# Patient Record
Sex: MALE | Race: WHITE | NOT HISPANIC OR LATINO | Employment: PART TIME | URBAN - METROPOLITAN AREA
[De-identification: names, ages, dates, MRNs, and addresses within clinical notes are randomized per-mention and may not be internally consistent; named-entity substitution may affect disease eponyms.]

---

## 2017-02-07 RX ORDER — NIFEDIPINE 60 MG/1
TABLET, FILM COATED, EXTENDED RELEASE ORAL
Qty: 90 TABLET | Refills: 0 | Status: SHIPPED | OUTPATIENT
Start: 2017-02-07 | End: 2017-05-04 | Stop reason: SDUPTHER

## 2017-03-16 RX ORDER — ESOMEPRAZOLE MAGNESIUM 40 MG/1
CAPSULE, DELAYED RELEASE ORAL
Qty: 90 CAPSULE | Refills: 0 | Status: SHIPPED | OUTPATIENT
Start: 2017-03-16 | End: 2017-11-21

## 2017-04-18 DIAGNOSIS — Z79.899 ENCOUNTER FOR LONG-TERM (CURRENT) USE OF MEDICATIONS: ICD-10-CM

## 2017-04-18 DIAGNOSIS — E78.5 HYPERLIPIDEMIA, UNSPECIFIED HYPERLIPIDEMIA TYPE: ICD-10-CM

## 2017-04-18 DIAGNOSIS — I10 ESSENTIAL HYPERTENSION: Primary | ICD-10-CM

## 2017-04-18 DIAGNOSIS — R97.20 ABNORMAL PSA: ICD-10-CM

## 2017-04-26 ENCOUNTER — LAB (OUTPATIENT)
Dept: LAB | Facility: HOSPITAL | Age: 67
End: 2017-04-26

## 2017-04-26 DIAGNOSIS — I10 ESSENTIAL HYPERTENSION: Primary | ICD-10-CM

## 2017-04-26 DIAGNOSIS — R97.20 ABNORMAL PSA: ICD-10-CM

## 2017-04-26 DIAGNOSIS — Z79.899 ENCOUNTER FOR LONG-TERM (CURRENT) USE OF MEDICATIONS: ICD-10-CM

## 2017-04-26 DIAGNOSIS — E78.5 HYPERLIPIDEMIA, UNSPECIFIED HYPERLIPIDEMIA TYPE: ICD-10-CM

## 2017-04-26 LAB
ALBUMIN SERPL-MCNC: 4.4 G/DL (ref 3.5–5.2)
ALBUMIN/GLOB SERPL: 1.8 G/DL
ALP SERPL-CCNC: 37 U/L (ref 39–117)
ALT SERPL W P-5'-P-CCNC: 16 U/L (ref 1–41)
ANION GAP SERPL CALCULATED.3IONS-SCNC: 11.9 MMOL/L
AST SERPL-CCNC: 17 U/L (ref 1–40)
BASOPHILS # BLD AUTO: 0.05 10*3/MM3 (ref 0–0.2)
BASOPHILS NFR BLD AUTO: 0.9 % (ref 0–1.5)
BILIRUB SERPL-MCNC: 0.3 MG/DL (ref 0.1–1.2)
BUN BLD-MCNC: 16 MG/DL (ref 8–23)
BUN/CREAT SERPL: 12.8 (ref 7–25)
CALCIUM SPEC-SCNC: 9 MG/DL (ref 8.6–10.5)
CHLORIDE SERPL-SCNC: 103 MMOL/L (ref 98–107)
CHOLEST SERPL-MCNC: 210 MG/DL (ref 0–200)
CO2 SERPL-SCNC: 27.1 MMOL/L (ref 22–29)
CREAT BLD-MCNC: 1.25 MG/DL (ref 0.76–1.27)
DEPRECATED RDW RBC AUTO: 42.5 FL (ref 37–54)
EOSINOPHIL # BLD AUTO: 0.19 10*3/MM3 (ref 0–0.7)
EOSINOPHIL NFR BLD AUTO: 3.3 % (ref 0.3–6.2)
ERYTHROCYTE [DISTWIDTH] IN BLOOD BY AUTOMATED COUNT: 12.7 % (ref 11.5–14.5)
GFR SERPL CREATININE-BSD FRML MDRD: 58 ML/MIN/1.73
GLOBULIN UR ELPH-MCNC: 2.4 GM/DL
GLUCOSE BLD-MCNC: 109 MG/DL (ref 65–99)
HCT VFR BLD AUTO: 43.4 % (ref 40.4–52.2)
HDLC SERPL-MCNC: 33 MG/DL (ref 40–60)
HGB BLD-MCNC: 14.7 G/DL (ref 13.7–17.6)
IMM GRANULOCYTES # BLD: 0.02 10*3/MM3 (ref 0–0.03)
IMM GRANULOCYTES NFR BLD: 0.4 % (ref 0–0.5)
LDLC SERPL CALC-MCNC: 137 MG/DL (ref 0–100)
LDLC/HDLC SERPL: 4.16 {RATIO}
LYMPHOCYTES # BLD AUTO: 2.42 10*3/MM3 (ref 0.9–4.8)
LYMPHOCYTES NFR BLD AUTO: 42.4 % (ref 19.6–45.3)
MCH RBC QN AUTO: 31.1 PG (ref 27–32.7)
MCHC RBC AUTO-ENTMCNC: 33.9 G/DL (ref 32.6–36.4)
MCV RBC AUTO: 91.8 FL (ref 79.8–96.2)
MONOCYTES # BLD AUTO: 0.44 10*3/MM3 (ref 0.2–1.2)
MONOCYTES NFR BLD AUTO: 7.7 % (ref 5–12)
NEUTROPHILS # BLD AUTO: 2.59 10*3/MM3 (ref 1.9–8.1)
NEUTROPHILS NFR BLD AUTO: 45.3 % (ref 42.7–76)
PLATELET # BLD AUTO: 218 10*3/MM3 (ref 140–500)
PMV BLD AUTO: 9.8 FL (ref 6–12)
POTASSIUM BLD-SCNC: 4.1 MMOL/L (ref 3.5–5.2)
PROT SERPL-MCNC: 6.8 G/DL (ref 6–8.5)
PSA SERPL-MCNC: 3.31 NG/ML (ref 0–4)
RBC # BLD AUTO: 4.73 10*6/MM3 (ref 4.6–6)
SODIUM BLD-SCNC: 142 MMOL/L (ref 136–145)
T-UPTAKE NFR SERPL: 1.08 TBI (ref 0.8–1.3)
T4 SERPL-MCNC: 5.99 MCG/DL (ref 4.5–11.7)
TRIGL SERPL-MCNC: 198 MG/DL (ref 0–150)
TSH SERPL DL<=0.05 MIU/L-ACNC: 1.32 MIU/ML (ref 0.27–4.2)
VLDLC SERPL-MCNC: 39.6 MG/DL (ref 5–40)
WBC NRBC COR # BLD: 5.71 10*3/MM3 (ref 4.5–10.7)

## 2017-04-26 PROCEDURE — 84443 ASSAY THYROID STIM HORMONE: CPT | Performed by: FAMILY MEDICINE

## 2017-04-26 PROCEDURE — 84436 ASSAY OF TOTAL THYROXINE: CPT | Performed by: FAMILY MEDICINE

## 2017-04-26 PROCEDURE — 80061 LIPID PANEL: CPT

## 2017-04-26 PROCEDURE — 85025 COMPLETE CBC W/AUTO DIFF WBC: CPT | Performed by: FAMILY MEDICINE

## 2017-04-26 PROCEDURE — 80053 COMPREHEN METABOLIC PANEL: CPT | Performed by: FAMILY MEDICINE

## 2017-04-26 PROCEDURE — 84153 ASSAY OF PSA TOTAL: CPT | Performed by: FAMILY MEDICINE

## 2017-04-26 PROCEDURE — 36415 COLL VENOUS BLD VENIPUNCTURE: CPT

## 2017-04-26 PROCEDURE — 84479 ASSAY OF THYROID (T3 OR T4): CPT | Performed by: FAMILY MEDICINE

## 2017-05-04 RX ORDER — NIFEDIPINE 60 MG/1
TABLET, FILM COATED, EXTENDED RELEASE ORAL
Qty: 90 TABLET | Refills: 1 | Status: SHIPPED | OUTPATIENT
Start: 2017-05-04 | End: 2018-02-02

## 2017-05-11 ENCOUNTER — TELEPHONE (OUTPATIENT)
Dept: CARDIOLOGY | Facility: CLINIC | Age: 67
End: 2017-05-11

## 2017-05-11 RX ORDER — PROPAFENONE HYDROCHLORIDE 150 MG/1
150 TABLET, COATED ORAL EVERY 8 HOURS
Qty: 270 TABLET | Refills: 0 | Status: SHIPPED | OUTPATIENT
Start: 2017-05-11 | End: 2017-08-04 | Stop reason: SDUPTHER

## 2017-05-16 ENCOUNTER — OFFICE VISIT (OUTPATIENT)
Dept: FAMILY MEDICINE CLINIC | Facility: CLINIC | Age: 67
End: 2017-05-16

## 2017-05-16 VITALS
SYSTOLIC BLOOD PRESSURE: 128 MMHG | WEIGHT: 192.2 LBS | TEMPERATURE: 98.2 F | OXYGEN SATURATION: 98 % | HEART RATE: 64 BPM | HEIGHT: 67 IN | BODY MASS INDEX: 30.17 KG/M2 | DIASTOLIC BLOOD PRESSURE: 84 MMHG

## 2017-05-16 DIAGNOSIS — R97.20 ELEVATED PSA: Primary | ICD-10-CM

## 2017-05-16 DIAGNOSIS — R13.12 OROPHARYNGEAL DYSPHAGIA: ICD-10-CM

## 2017-05-16 PROCEDURE — 99213 OFFICE O/P EST LOW 20 MIN: CPT | Performed by: FAMILY MEDICINE

## 2017-05-16 RX ORDER — LANOLIN ALCOHOL/MO/W.PET/CERES
1000 CREAM (GRAM) TOPICAL DAILY
COMMUNITY
End: 2017-11-21

## 2017-05-22 DIAGNOSIS — R13.12 OROPHARYNGEAL DYSPHAGIA: Primary | ICD-10-CM

## 2017-06-16 ENCOUNTER — HOSPITAL ENCOUNTER (OUTPATIENT)
Dept: GENERAL RADIOLOGY | Facility: HOSPITAL | Age: 67
Discharge: HOME | End: 2017-06-16
Attending: FAMILY MEDICINE | Admitting: FAMILY MEDICINE

## 2017-06-16 DIAGNOSIS — R13.12 OROPHARYNGEAL DYSPHAGIA: ICD-10-CM

## 2017-06-16 PROCEDURE — G8998 SWALLOW D/C STATUS: HCPCS

## 2017-06-16 PROCEDURE — 92611 MOTION FLUOROSCOPY/SWALLOW: CPT

## 2017-06-16 PROCEDURE — G8997 SWALLOW GOAL STATUS: HCPCS

## 2017-06-16 PROCEDURE — G8996 SWALLOW CURRENT STATUS: HCPCS

## 2017-06-16 PROCEDURE — 74230 X-RAY XM SWLNG FUNCJ C+: CPT

## 2017-06-16 NOTE — MBS/VFSS/FEES
Outpatient Speech Language Pathology   Adult Swallow Initial Evaluation  [facility]     Patient Name: Wilmar Carmona  : 1950  MRN: [id number]  Today's Date: 2017      SPEECH-LANGUAGE PATHOLOGY EVALUTION - VFSS  Subjective: The patient was seen on this date for a VFSS(Videofluoroscopic Swallowing Study).  Patient was alert and cooperative.    The patient's history is significant for Hair's esophagus.   Objective: Risks/benefits were reviewed with the patient, and consent was obtained. The study was completed with SLP present and Radiologist review. The patient was seen in lateral and AP view(s). Textures given included thin liquid, puree consistency, mechanical soft consistency and regular consistency.  Assessment: Difficulties were noted with none of the above consistencies. Patient also with abnormal pharyngeal impression (question pharyngeal diverticulum or pharyngeal web); however, this did not impact the patient's swallow. Patient with mild vallecular residue with one bolus of thin liquids. This was cleared with a dry swallow. Esophageal screen was performed. Reduced esophageal motility and retrograde esophageal movement noted.   SLP Findings: Patient presents with functional swallow.     Recommendations: Diet Textures: thin liquid, regular consistency food. Medications should be taken whole with thin liquids.   Recommended Strategies: Upright for PO and small bites and sips.   Other Recommended Evaluations: Question if upper GI/esophogram would be beneficial in further evaluation of patient's symptoms.   Dysphagia therapy is not recommended. Rationale: Patient with functional swallow.           Visit Date: 2017   Patient Active Problem List   Diagnosis   • Abnormal electrocardiogram   • Abnormal cardiovascular stress test   • Hypertension   • Premature atrial contraction   • Palpitations   • Hyperlipidemia   • Bronchitis with bronchospasm        Past Medical History:   Diagnosis Date   •  Arthritis    • BPH (benign prostatic hypertrophy)    • Depression    • GERD (gastroesophageal reflux disease)    • Heart murmur    • Hyperlipidemia    • Hypertension    • Tremor         Past Surgical History:   Procedure Laterality Date   • COLONOSCOPY     • HERNIA REPAIR     • KNEE SURGERY Left    • TONSILLECTOMY     • VASECTOMY           Visit Dx:     ICD-10-CM ICD-9-CM   1. Oropharyngeal dysphagia R13.12 787.22                   Adult Dysphagia - 06/16/17 1200     Adult Dysphagia Background    Patient Description of Complaint patient with globus sensation (indicated at upper chest area)   -KS    Symptoms Reported by Patient Food gets stuck  -KS    Oral Mech    Anatomy/Physiology WNL Patient demonstrates anatomy and physiology that is WNL  -KS    Dentition Patient has own teeth  -KS    Oral Health Oral cavity is clean  -KS    Awareness/Control of Secretions Patient swallows saliva;Patient wipes mouth  -KS    VFSS Exam    Study Completed By SLP with Radiology Review (comment)  -KS    SLP Communication to Radiology    Severity Level of Dysphagia esophageal dysfunction  -KS    Consistencies Aspirated/Penetrated other consistency (see comments)   None  -KS    Summary Statement Patient with no penetration or aspiration of any consistency. Reduced CP opening and abnormal pharyngeal impression noted.   -KS    Results/Recs/Barriers/Education for Dysphagia    Learning Motivation strong  -KS    Clinical Impression: Swallowing esophageal dysphagia  -KS    Impact on Function no impact on function  -KS    Recommendations for Diet/Nutirition full oral diet  -KS    Swallowing Precautions upright position for at least 30 minutes after meals;small sips and bites when eating;other   consider multiple small meals per day  -KS      User Key  (r) = Recorded By, (t) = Taken By, (c) = Cosigned By    Initials Name Provider Type    MANE Kruse CCC-SLP Speech and Language Pathologist                            OP SLP Education        06/16/17 1200    Education    Barriers to Learning No barriers identified  -KS    Education Provided Described results of evaluation;Patient expressed understanding of evaluation  -KS    Assessed Learning needs;Learning motivation;Learning preferences;Learning readiness  -KS    Learning Motivation Strong  -KS    Learning Method Explanation  -KS    Teaching Response Verbalized understanding  -KS      User Key  (r) = Recorded By, (t) = Taken By, (c) = Cosigned By    Initials Name Effective Dates    TAHIR Carbajal 05/11/17 -                     OP SLP Assessment/Plan - 06/16/17 1200     SLP Assessment    Functional Problems Swallowing  -KS    Impact on Function: Swallowing Impact on social aspects of eating  -KS    Clinical Impression: Swallowing esophageal dysphagia  -KS    SLP Diagnosis Esophageal Dysfunction   -KS      User Key  (r) = Recorded By, (t) = Taken By, (c) = Cosigned By    Initials Name Provider Type    TAHIR Carbajal Speech and Language Pathologist                    Time Calculation:   SLP Start Time: 1015  SLP Stop Time: 1130  SLP Time Calculation (min): 75 min    Therapy Charges for Today     Code Description Service Date Service Provider Modifiers Qty    [id number] HC ST MOTION FLUORO EVAL SWALLOW 5 6/16/2017 TAHIR Lenz GN 1                   TAHIR Lenz  6/16/2017

## 2017-06-22 DIAGNOSIS — K22.70 BARRETT'S ESOPHAGUS WITHOUT DYSPLASIA: Primary | ICD-10-CM

## 2017-06-23 ENCOUNTER — OFFICE VISIT (OUTPATIENT)
Dept: ORTHOPEDIC SURGERY | Facility: CLINIC | Age: 67
End: 2017-06-23

## 2017-06-23 VITALS — HEIGHT: 68 IN | BODY MASS INDEX: 28.04 KG/M2 | WEIGHT: 185 LBS

## 2017-06-23 DIAGNOSIS — M25.511 RIGHT SHOULDER PAIN, UNSPECIFIED CHRONICITY: Primary | ICD-10-CM

## 2017-06-23 PROCEDURE — 73030 X-RAY EXAM OF SHOULDER: CPT | Performed by: ORTHOPAEDIC SURGERY

## 2017-06-23 PROCEDURE — 99202 OFFICE O/P NEW SF 15 MIN: CPT | Performed by: ORTHOPAEDIC SURGERY

## 2017-06-26 ENCOUNTER — TRANSCRIBE ORDERS (OUTPATIENT)
Dept: ADMINISTRATIVE | Facility: HOSPITAL | Age: 67
End: 2017-06-26

## 2017-06-26 ENCOUNTER — TELEPHONE (OUTPATIENT)
Dept: CARDIOLOGY | Facility: CLINIC | Age: 67
End: 2017-06-26

## 2017-06-26 DIAGNOSIS — K21.9 CHRONIC GERD: Primary | ICD-10-CM

## 2017-06-26 RX ORDER — ACEBUTOLOL HYDROCHLORIDE 200 MG/1
200 CAPSULE ORAL 2 TIMES DAILY
Qty: 180 CAPSULE | Refills: 0 | Status: SHIPPED | OUTPATIENT
Start: 2017-06-26 | End: 2017-06-29 | Stop reason: SDUPTHER

## 2017-06-26 NOTE — PROGRESS NOTES
Patient: Wilmar Carmona    YOB: 1950    Medical Record Number: [id number]    Chief Complaints:  Right shoulder pain    History of Present Illness:     66 y.o. male patient who presents for evaluation of his right shoulder.  He reports a several month history of symptoms.  He cannot recall a specific inciting event.  He describes his pain as mild, constant, and aching.  The pain is worse with certain reaching and lifting movements.  When asked to localize his pain, he has a difficult time doing so.  Gestures towards the front and top of the shoulder.  Anti-inflammatories have helped somewhat.    Allergies: No Known Allergies    Home Medications:      Current Outpatient Prescriptions:   •  acebutolol (SECTRAL) 200 MG capsule, TAKE 1 CAPSULE TWICE A DAY, Disp: 180 capsule, Rfl: 3  •  esomeprazole (nexIUM) 40 MG capsule, TAKE 1 CAPSULE DAILY, Disp: 90 capsule, Rfl: 0  •  finasteride (PROSCAR) 5 MG tablet, TK 1 T PO ONCE DAILY, Disp: , Rfl: 2  •  Fish Oil-Cholecalciferol (FISH OIL + D3) 3636-2530 MG-UNIT capsule, , Disp: , Rfl:   •  lamoTRIgine (LaMICtal) 200 MG tablet, Take  by mouth daily., Disp: , Rfl:   •  Methylfol-Algae-W50-Tarmpyryzb (L-METHYLFOLATE CA ME-CBL NAC PO), Take  by mouth., Disp: , Rfl:   •  montelukast (SINGULAIR) 10 MG tablet, Take 1 tablet by mouth Every Night., Disp: 30 tablet, Rfl: 5  •  nefazodone (SERZONE) 50 MG tablet, Take  by mouth., Disp: , Rfl:   •  NIFEDICAL XL 60 MG 24 hr tablet, TAKE 1 TABLET BY MOUTH EVERY DAY, Disp: 90 tablet, Rfl: 1  •  propafenone (RHTHYMOL) 150 MG tablet, Take 1 tablet by mouth Every 8 (Eight) Hours., Disp: 270 tablet, Rfl: 0  •  simvastatin (ZOCOR) 20 MG tablet, Take 1 tablet by mouth every night., Disp: 30 tablet, Rfl: 3  •  vitamin B-12 (CYANOCOBALAMIN) 1000 MCG tablet, Take 1,000 mcg by mouth Daily., Disp: , Rfl:     Past Medical History:   Diagnosis Date   • Arthritis    • BPH (benign prostatic hypertrophy)    • Depression    • GERD  "(gastroesophageal reflux disease)    • Heart murmur    • Hyperlipidemia    • Hypertension    • Tremor        Past Surgical History:   Procedure Laterality Date   • COLONOSCOPY     • HERNIA REPAIR     • KNEE SURGERY Left    • TONSILLECTOMY     • VASECTOMY         Social History     Occupational History   • Not on file.     Social History Main Topics   • Smoking status: Never Smoker   • Smokeless tobacco: Never Used   • Alcohol use No   • Drug use: No   • Sexual activity: Not on file      Social History     Social History Narrative       Family History   Problem Relation Age of Onset   • Coronary artery disease Mother    • Cancer Father      prostate   • Heart disease Father    • Cancer Brother      prostate       Review of Systems:      Constitutional: Denies fever, shaking or chills   Eyes: Denies change in visual acuity   HEENT: Denies nasal congestion or sore throat   Respiratory: Denies cough or shortness of breath   Cardiovascular: Denies chest pain or edema  Endocrine: Denies tremors, palpitations, intolerance of heat or cold, polyuria, polydipsia.  GI: Denies abdominal pain, nausea, vomiting, bloody stools or diarrhea  : Denies frequency, urgency, incontinence, retention, or nocturia.  Musculoskeletal: Denies numbness tingling or loss of motor function except as above  Integument: Denies rash, lesion or ulceration   Neurologic: Denies headache or focal weakness, deficits  Heme: Denies epistaxis, spontaneous or excessive bleeding, epistaxis, hematuria, melena, fatigue, enlarged or tender lymph nodes.      All other pertinent positives and negatives as noted above in HPI.      Physical Exam: 66 y.o. male    Vitals:    06/23/17 1602   Weight: 185 lb (83.9 kg)   Height: 68\" (172.7 cm)       General:  Patient is awake and alert.  Appears in no acute distress or discomfort.    Psych:  Affect and demeanor are appropriate.    Eyes:  Conjunctiva and sclera appear grossly normal.  Eyes track well and EOM seem to be " intact.    Ears:  No gross abnormalities.  Hearing adequate for the exam.    Cardiovascular:  Regular rate and rhythm.    Lungs:  Good chest expansion.  Breathing unlabored.    Lymph:  No palpable masses or adenopathy in the affected extremity    Extremities:  The right shoulder is examined.  Skin is benign.  No masses or swelling.  Moderate tenderness over the acromioclavicular joint.  Mild pain with an active compression maneuver.  He has mild deep shoulder pain with an Luzerne's maneuver as well.  No mechanical phenomenon.  No instability.  Seems to have good strength in his rotator cuff.  Good motor and sensory function distally.  Brisk cap refill.         Radiology:   AP, scapular Y, and axillary views of the right shoulder are ordered and reviewed.  No comparison films are available.  He appears to have moderate to severe acromioclavicular osteoarthritis    Assessment/Plan:  Right shoulder symptomatic acromioclavicular osteoarthritis, possible degenerative glenoid labral tear    His symptoms seem to be fairly minimal.  We talked about options for him including a possible injection.  I think that would be helpful from both a diagnostic and potentially a therapeutic standpoint.  He feels that his pain is not really bad enough to justify that at this point.  Going forward, I have recommended appropriate activity modifications and restrictions as well as icing and anti-inflammatories as needed.  He will follow-up with me as needed.    Ricardo Stafford MD    06/23/2017    CC to Bonilla Campos Jr., MD

## 2017-06-26 NOTE — TELEPHONE ENCOUNTER
----- Message from Ema Gilbert sent at 6/26/2017 11:41 AM EDT -----  Regarding: REFILL ON ACEBUTOLOL  SEND TO [facility] HAS AN APPT AUGUST 11  THANKS

## 2017-06-29 RX ORDER — ACEBUTOLOL HYDROCHLORIDE 200 MG/1
200 CAPSULE ORAL 2 TIMES DAILY
Qty: 180 CAPSULE | Refills: 0 | Status: SHIPPED | OUTPATIENT
Start: 2017-06-29 | End: 2018-08-29 | Stop reason: SDUPTHER

## 2017-07-07 ENCOUNTER — HOSPITAL ENCOUNTER (OUTPATIENT)
Dept: GENERAL RADIOLOGY | Facility: HOSPITAL | Age: 67
Discharge: HOME | End: 2017-07-07
Admitting: NURSE PRACTITIONER

## 2017-07-07 DIAGNOSIS — K21.9 CHRONIC GERD: ICD-10-CM

## 2017-07-07 PROCEDURE — 74247: CPT

## 2017-07-21 ENCOUNTER — OFFICE VISIT (OUTPATIENT)
Dept: FAMILY MEDICINE CLINIC | Facility: CLINIC | Age: 67
End: 2017-07-21

## 2017-07-21 VITALS
DIASTOLIC BLOOD PRESSURE: 76 MMHG | OXYGEN SATURATION: 98 % | BODY MASS INDEX: 28.79 KG/M2 | HEART RATE: 61 BPM | TEMPERATURE: 98.6 F | SYSTOLIC BLOOD PRESSURE: 124 MMHG | HEIGHT: 68 IN | WEIGHT: 190 LBS

## 2017-07-21 DIAGNOSIS — M99.83 OTHER BIOMECHANICAL LESIONS OF LUMBAR REGION: Primary | ICD-10-CM

## 2017-07-21 PROCEDURE — 99213 OFFICE O/P EST LOW 20 MIN: CPT | Performed by: FAMILY MEDICINE

## 2017-07-21 RX ORDER — MELOXICAM 7.5 MG/1
15 TABLET ORAL DAILY
Qty: 30 TABLET | Refills: 2 | Status: SHIPPED | OUTPATIENT
Start: 2017-07-21 | End: 2017-11-21 | Stop reason: DRUGHIGH

## 2017-07-21 RX ORDER — LAMOTRIGINE 100 MG/1
250 TABLET ORAL DAILY
COMMUNITY
End: 2018-08-13

## 2017-07-21 NOTE — PROGRESS NOTES
"  Chief Complaint   Patient presents with   • GI Problem     follow up pt doing better        Subjective.../HPI  Patient present today with    I have reviewed the patient's medical history in detail and updated the computerized patient record.    Family History   Problem Relation Age of Onset   • Coronary artery disease Mother    • Cancer Father      prostate   • Heart disease Father    • Cancer Brother      prostate       Social History     Social History   • Marital status:      Spouse name: N/A   • Number of children: N/A   • Years of education: N/A     Occupational History   • Not on file.     Social History Main Topics   • Smoking status: Never Smoker   • Smokeless tobacco: Never Used   • Alcohol use No   • Drug use: No   • Sexual activity: Not on file     Other Topics Concern   • Not on file     Social History Narrative       Review of Systems:   Review of Systems   Eyes: Negative.    Respiratory: Negative.    Gastrointestinal:        Reflux   Endocrine: Negative.    Genitourinary: Negative.    Musculoskeletal: Negative.    Skin: Negative.    Allergic/Immunologic: Negative.    Neurological: Negative.    Psychiatric/Behavioral: Negative.        Objective:  Vital Signs     Vitals:    07/21/17 1709   BP: 124/76   BP Location: Left arm   Patient Position: Sitting   Pulse: 61   Temp: 98.6 °F (37 °C)   TempSrc: Oral   SpO2: 98%   Weight: 190 lb (86.2 kg)   Height: 68\" (172.7 cm)     Physical Exam   Constitutional: He is oriented to person, place, and time. He appears well-developed and well-nourished.   HENT:   Head: Normocephalic.   Eyes: Pupils are equal, round, and reactive to light.   Neck: Normal range of motion.   Cardiovascular: Normal rate and regular rhythm.    Pulmonary/Chest: Effort normal.   Abdominal: Soft.   Musculoskeletal: Normal range of motion. He exhibits tenderness (tender in left mid thoracic paraspinal muscle).   Neurological: He is alert and oriented to person, place, and time.   Skin: " Skin is warm and dry.        Results Review:      REVIEWED AND DISCUSSED XRAY RESULTS WITH PATIENT and REVIEWED AND DISCUSSED CLINICAL RESULTS WITH PATIENT                          Current Outpatient Prescriptions:   •  acebutolol (SECTRAL) 200 MG capsule, Take 1 capsule by mouth 2 (Two) Times a Day., Disp: 180 capsule, Rfl: 0  •  esomeprazole (nexIUM) 40 MG capsule, TAKE 1 CAPSULE DAILY (Patient taking differently: TAKE 2 CAPSULE DAILY), Disp: 90 capsule, Rfl: 0  •  finasteride (PROSCAR) 5 MG tablet, TK 1 T PO ONCE DAILY, Disp: , Rfl: 2  •  Fish Oil-Cholecalciferol (FISH OIL + D3) 8775-1563 MG-UNIT capsule, , Disp: , Rfl:   •  lamoTRIgine (LaMICtal) 100 MG tablet, Take 50 mg by mouth., Disp: , Rfl:   •  lamoTRIgine (LaMICtal) 200 MG tablet, Take  by mouth daily., Disp: , Rfl:   •  Methylfol-Algae-T46-Ncwphwtztn (L-METHYLFOLATE CA ME-CBL NAC PO), Take  by mouth., Disp: , Rfl:   •  nefazodone (SERZONE) 50 MG tablet, Take  by mouth., Disp: , Rfl:   •  NIFEDICAL XL 60 MG 24 hr tablet, TAKE 1 TABLET BY MOUTH EVERY DAY, Disp: 90 tablet, Rfl: 1  •  propafenone (RHTHYMOL) 150 MG tablet, Take 1 tablet by mouth Every 8 (Eight) Hours., Disp: 270 tablet, Rfl: 0  •  simvastatin (ZOCOR) 20 MG tablet, Take 1 tablet by mouth every night., Disp: 30 tablet, Rfl: 3  •  vitamin B-12 (CYANOCOBALAMIN) 1000 MCG tablet, Take 1,000 mcg by mouth Daily., Disp: , Rfl:   •  meloxicam (MOBIC) 7.5 MG tablet, Take 2 tablets by mouth Daily., Disp: 30 tablet, Rfl: 2    Procedures    Assessment/Plan     Diagnoses and all orders for this visit:    Other biomechanical lesions of lumbar region  -     NM Bone Scan Whole Body  -     MRI Lumbar Spine Without Contrast    Other orders  -     lamoTRIgine (LaMICtal) 100 MG tablet; Take 50 mg by mouth.  -     meloxicam (MOBIC) 7.5 MG tablet; Take 2 tablets by mouth Daily.         Bonilla Campos Jr., MD  07/21/17  5:58 PM

## 2017-08-02 ENCOUNTER — TELEPHONE (OUTPATIENT)
Dept: FAMILY MEDICINE CLINIC | Facility: CLINIC | Age: 67
End: 2017-08-02

## 2017-08-02 ENCOUNTER — HOSPITAL ENCOUNTER (OUTPATIENT)
Dept: NUCLEAR MEDICINE | Facility: HOSPITAL | Age: 67
Discharge: HOME | End: 2017-08-02
Attending: FAMILY MEDICINE

## 2017-08-02 ENCOUNTER — HOSPITAL ENCOUNTER (OUTPATIENT)
Dept: MRI IMAGING | Facility: HOSPITAL | Age: 67
Discharge: HOME | End: 2017-08-02
Attending: FAMILY MEDICINE | Admitting: FAMILY MEDICINE

## 2017-08-02 PROCEDURE — 72148 MRI LUMBAR SPINE W/O DYE: CPT

## 2017-08-02 PROCEDURE — 78306 BONE IMAGING WHOLE BODY: CPT

## 2017-08-02 PROCEDURE — 0 TECHNETIUM MEDRONATE KIT: Performed by: FAMILY MEDICINE

## 2017-08-02 PROCEDURE — A9503 TC99M MEDRONATE: HCPCS | Performed by: FAMILY MEDICINE

## 2017-08-02 RX ORDER — TC 99M MEDRONATE 20 MG/10ML
24 INJECTION, POWDER, LYOPHILIZED, FOR SOLUTION INTRAVENOUS
Status: COMPLETED | OUTPATIENT
Start: 2017-08-02 | End: 2017-08-02

## 2017-08-02 RX ADMIN — Medication 24 MILLICURIE: at 08:15

## 2017-08-02 NOTE — TELEPHONE ENCOUNTER
----- Message from Miriam Allen sent at 8/2/2017 11:20 AM EDT -----   [phone]    MRI AND BONE SCAN RESULTS       lov 7/21/17

## 2017-08-04 ENCOUNTER — OFFICE VISIT (OUTPATIENT)
Dept: CARDIOLOGY | Facility: CLINIC | Age: 67
End: 2017-08-04

## 2017-08-04 VITALS
WEIGHT: 191 LBS | SYSTOLIC BLOOD PRESSURE: 143 MMHG | HEART RATE: 69 BPM | BODY MASS INDEX: 28.95 KG/M2 | HEIGHT: 68 IN | DIASTOLIC BLOOD PRESSURE: 79 MMHG

## 2017-08-04 DIAGNOSIS — R94.31 ABNORMAL ELECTROCARDIOGRAM: ICD-10-CM

## 2017-08-04 DIAGNOSIS — R94.39 ABNORMAL CARDIOVASCULAR STRESS TEST: ICD-10-CM

## 2017-08-04 DIAGNOSIS — E78.5 HYPERLIPIDEMIA, UNSPECIFIED HYPERLIPIDEMIA TYPE: ICD-10-CM

## 2017-08-04 DIAGNOSIS — I10 HTN (HYPERTENSION), BENIGN: ICD-10-CM

## 2017-08-04 DIAGNOSIS — I49.1 PREMATURE ATRIAL CONTRACTION: ICD-10-CM

## 2017-08-04 DIAGNOSIS — R00.2 PALPITATIONS: Primary | ICD-10-CM

## 2017-08-04 DIAGNOSIS — I10 ESSENTIAL HYPERTENSION: ICD-10-CM

## 2017-08-04 PROCEDURE — 99214 OFFICE O/P EST MOD 30 MIN: CPT | Performed by: INTERNAL MEDICINE

## 2017-08-04 PROCEDURE — 93000 ELECTROCARDIOGRAM COMPLETE: CPT | Performed by: INTERNAL MEDICINE

## 2017-08-04 RX ORDER — PROPAFENONE HYDROCHLORIDE 150 MG/1
150 TABLET, COATED ORAL EVERY 8 HOURS
Qty: 270 TABLET | Refills: 3 | Status: SHIPPED | OUTPATIENT
Start: 2017-08-04 | End: 2018-04-19 | Stop reason: SDUPTHER

## 2017-08-04 NOTE — PROGRESS NOTES
[facility]  Cardiology Office Visit Note        Subjective:     Encounter Date:2017      Patient ID: Wilmar Carmona   Age: 66 y.o.  Sex: male  :  1950  MRN: [id number]             Date of Office Visit: 2017  Encounter Provider: Sharath Reynolds MD  Place of Service: [facility]     .    Chief Complaint:  History of Present Illness    The following portions of the patient's history were reviewed and updated as appropriate: allergies, current medications, past family history, past medical history, past social history, past surgical history and problem list.    Review of Systems   Constitution: Negative for chills, fever and weight gain.   HENT: Negative for congestion, headaches, hearing loss and sore throat.    Eyes: Negative for blurred vision and double vision.   Cardiovascular: Negative for chest pain, claudication, cyanosis, dyspnea on exertion, irregular heartbeat, leg swelling, near-syncope, orthopnea, palpitations, paroxysmal nocturnal dyspnea and syncope.   Respiratory: Negative for cough, shortness of breath, snoring and wheezing.    Endocrine: Negative for cold intolerance.   Hematologic/Lymphatic: Negative for adenopathy. Does not bruise/bleed easily.   Skin: Negative for rash.   Musculoskeletal: Negative for back pain.   Gastrointestinal: Negative for abdominal pain, change in bowel habit, constipation, diarrhea, nausea and vomiting.   Genitourinary: Negative for dysuria, frequency, hematuria and hesitancy.   Neurological: Negative for disturbances in coordination, excessive daytime sleepiness, dizziness, focal weakness, light-headedness, loss of balance and numbness.   Psychiatric/Behavioral: Negative for depression and memory loss. The patient is not nervous/anxious.    Allergic/Immunologic: Negative for hives.         ECG 12 Lead  Date/Time: 2017 3:26 PM  Performed by: SHARATH REYNOLDS JR  Authorized by: GAIL CHOU  SHARATH ECHOLS   Comparison: compared with previous ECG   Similar to previous ECG  Rhythm: sinus rhythm  BPM: 63  T flattening: V3, V4, V5, V6, I and aVL  Clinical impression: abnormal ECG                       HPI     The patient is a 66-year-old white male, with history of symptomatic PACs treated successfully Rythmol and Sectral, history of atrial fibrillation, history of hypertension and hyperlipidemia, obstructive sleep apnea, normal coronary arteries by cardiac catheterization in October 2015, who presents for cardiac follow-up.  I last saw him in the office in May 2016.  Since then, he complains of rare palpitations describes a fluttering or*the last about 5 seconds and then resolves spontaneously.  No associated symptoms.  The longest he had it was 3 minutes.    Cardiac review systems: No chest pain.  No PND, orthopnea pedal edema.  No dizziness or syncope.    Cardiac risk factors: Negative for family history of early CAD as 5 bypass at age 75.  Positive hypertension and hyperlipidemia.  No smoking.  No diabetes.          Past Medical History:   Diagnosis Date   • Arthritis    • BPH (benign prostatic hypertrophy)    • Depression    • GERD (gastroesophageal reflux disease)    • Heart murmur    • Hyperlipidemia    • Hypertension    • Tremor        Past Surgical History:   Procedure Laterality Date   • COLONOSCOPY     • HERNIA REPAIR     • KNEE SURGERY Left    • TONSILLECTOMY     • VASECTOMY         Social History     Social History   • Marital status:      Spouse name: N/A   • Number of children: N/A   • Years of education: N/A     Occupational History   • Not on file.     Social History Main Topics   • Smoking status: Never Smoker   • Smokeless tobacco: Never Used   • Alcohol use No   • Drug use: No   • Sexual activity: Not on file     Other Topics Concern   • Not on file     Social History Narrative       Family History   Problem Relation Age of Onset   • Coronary artery disease Mother    • Cancer Father   "    prostate   • Heart disease Father    • Cancer Brother      prostate           Scheduled Meds:  Current Outpatient Prescriptions on File Prior to Visit   Medication Sig Dispense Refill   • acebutolol (SECTRAL) 200 MG capsule Take 1 capsule by mouth 2 (Two) Times a Day. 180 capsule 0   • esomeprazole (nexIUM) 40 MG capsule TAKE 1 CAPSULE DAILY (Patient taking differently: TAKE 2 CAPSULE DAILY) 90 capsule 0   • finasteride (PROSCAR) 5 MG tablet TK 1 T PO ONCE DAILY  2   • Fish Oil-Cholecalciferol (FISH OIL + D3) 6613-3812 MG-UNIT capsule      • lamoTRIgine (LaMICtal) 100 MG tablet Take 50 mg by mouth.     • lamoTRIgine (LaMICtal) 200 MG tablet Take  by mouth daily.     • meloxicam (MOBIC) 7.5 MG tablet Take 2 tablets by mouth Daily. 30 tablet 2   • Methylfol-Algae-M64-Fdsfngnimn (L-METHYLFOLATE CA ME-CBL NAC PO) Take  by mouth.     • nefazodone (SERZONE) 50 MG tablet Take  by mouth.     • NIFEDICAL XL 60 MG 24 hr tablet TAKE 1 TABLET BY MOUTH EVERY DAY 90 tablet 1   • simvastatin (ZOCOR) 20 MG tablet Take 1 tablet by mouth every night. 30 tablet 3   • vitamin B-12 (CYANOCOBALAMIN) 1000 MCG tablet Take 1,000 mcg by mouth Daily.     • [DISCONTINUED] propafenone (RHTHYMOL) 150 MG tablet Take 1 tablet by mouth Every 8 (Eight) Hours. 270 tablet 0     No current facility-administered medications on file prior to visit.        /79  Pulse 69  Ht 68\" (172.7 cm)  Wt 191 lb (86.6 kg)  BMI 29.04 kg/m2    Objective:     Physical Exam   Constitutional: He is oriented to person, place, and time. He appears well-developed and well-nourished. No distress.   HENT:   Head: Normocephalic and atraumatic.   Right Ear: External ear normal.   Left Ear: External ear normal.   Mouth/Throat: Oropharynx is clear and moist. No oropharyngeal exudate.   Eyes: Conjunctivae and EOM are normal. Pupils are equal, round, and reactive to light. No scleral icterus.   Neck: Normal range of motion. Neck supple. No JVD present. No tracheal " deviation present. No thyromegaly present.   Cardiovascular: Normal rate, regular rhythm, S1 normal, S2 normal, normal heart sounds and intact distal pulses.  PMI is not displaced.  Exam reveals no gallop, no distant heart sounds, no friction rub and no decreased pulses.    No murmur heard.  Pulmonary/Chest: Effort normal and breath sounds normal. No accessory muscle usage. No respiratory distress. He has no wheezes. He has no rales. He exhibits no tenderness.   Abdominal: Soft. Bowel sounds are normal. He exhibits no distension and no mass. There is no tenderness. There is no rebound and no guarding.   Musculoskeletal: Normal range of motion. He exhibits no edema, tenderness or deformity.   Lymphadenopathy:     He has no cervical adenopathy.   Neurological: He is alert and oriented to person, place, and time. He has normal reflexes. No cranial nerve deficit. Coordination normal.   Skin: Skin is dry. No rash noted. He is not diaphoretic. No erythema. No pallor.   Psychiatric: He has a normal mood and affect.             Lab Review:               Lab Review:         Lab Review     Lab Results   Component Value Date    CHOL 210 (H) 04/26/2017     Lab Results   Component Value Date    HDL 33 (L) 04/26/2017    HDL 32 (L) 09/17/2016     Lab Results   Component Value Date    LDL 93 09/17/2016     Lab Results   Component Value Date    TRIG 198 (H) 04/26/2017    TRIG 201 (H) 09/17/2016     No components found for: CHOLHDL  Lab Results   Component Value Date    GLUCOSE 109 (H) 04/26/2017    BUN 16 04/26/2017    CREATININE 1.25 04/26/2017    EGFRIFNONA 58 (L) 04/26/2017    EGFRIFAFRI 69 09/17/2016    BCR 12.8 04/26/2017    CO2 27.1 04/26/2017    CALCIUM 9.0 04/26/2017    PROTENTOTREF 6.8 09/17/2016    ALBUMIN 4.40 04/26/2017    LABIL2 1.8 04/26/2017    AST 17 04/26/2017    ALT 16 04/26/2017     Lab Results   Component Value Date    GLUCOSE 109 (H) 04/26/2017    CALCIUM 9.0 04/26/2017     04/26/2017    K 4.1 04/26/2017     CO2 27.1 04/26/2017     04/26/2017    BUN 16 04/26/2017    CREATININE 1.25 04/26/2017    EGFRIFAFRI 69 09/17/2016    EGFRIFNONA 58 (L) 04/26/2017    BCR 12.8 04/26/2017    ANIONGAP 11.9 04/26/2017     Lab Results   Component Value Date    WBC 5.71 04/26/2017    HGB 14.7 04/26/2017    HCT 43.4 04/26/2017    MCV 91.8 04/26/2017     04/26/2017     No results found for: DDIMER  Lab Results   Component Value Date    TSH 1.320 04/26/2017    T0QMOVT 5.99 04/26/2017     No results found for: CKTOTAL  No results found for: DIGOXIN  No results found for: CKTOTAL, CKMB, CKMBINDEX, TROPONINI, TROPONINT  Lab Results   Component Value Date    INR 1.0 10/08/2015    PROTIME 10.8 10/08/2015     CrCl cannot be calculated (Patient's most recent sCr result is older than the maximum 30 days allowed.).    Assessment:          Diagnosis Plan   1. Palpitations  ECG 12 Lead   2. HTN (hypertension), benign  ECG 12 Lead   3. Abnormal cardiovascular stress test     4. Hyperlipidemia, unspecified hyperlipidemia type     5. Essential hypertension     6. Abnormal electrocardiogram     7. Premature atrial contraction            Assessment and Plan:    Problems Addressed this Visit        Cardiovascular and Mediastinum    Abnormal cardiovascular stress test    Hypertension    Premature atrial contraction    Palpitations - Primary    Relevant Orders    ECG 12 Lead (Completed)    Hyperlipidemia       Other    Abnormal electrocardiogram      Other Visit Diagnoses     HTN (hypertension), benign        Relevant Orders    ECG 12 Lead (Completed)      The patient is doing well from a cardiac standpoint without angina or heart failure symptoms.  He does have occasional palpitations likely due to brief atrial tachycardia or frequent PACs.  His primary care physician follows his electrolytes.  Told him that he can take the next dose of Rythmol sooner should his palpitations be prolonged more than a few minutes.      His last cholesterol  profile in April 2017 showed cholesterol 210, HDL 33 and .  These are more abnormal than his previous profile showed, which included cholesterol 165, 8 show 30 to an LDL 93.  He is not understanding.  However he does have normal coronary arteries.  Told him to stick to a better diet and exercise, to try to improve his cholesterol profile.    His recently diagnosed was sleep apnea and is now on CPAP.  CPAP should reduce his episodes of palpitations.      A total of 25 minutes was spent in the care of this patient, including at least 13 minutes face-to-face with the patient.    I not only counseled the patient today on the significant factors noted in the assessment and plan, but I also recommended that the patient reduce salt and saturated animal fat intake in diet, as well as to perform scheduled exercise on a regular basis.      Plan:                  08/04/2017  7:48 PM  MD Elier Zelaya MD  8/4/2017, 7:48 PM    EMR Dragon/Transcription disclaimer:   Much of this encounter note is an electronic transcription/translation of spoken language to printed text. The electronic translation of spoken language may permit erroneous, or at times, nonsensical words or phrases to be inadvertently transcribed; Although I have reviewed the note for such errors, some may still exist.

## 2017-08-08 ENCOUNTER — TELEPHONE (OUTPATIENT)
Dept: FAMILY MEDICINE CLINIC | Facility: CLINIC | Age: 67
End: 2017-08-08

## 2017-08-08 NOTE — TELEPHONE ENCOUNTER
----- Message from Maria Luisa Brown sent at 8/8/2017  9:28 AM EDT -----  Mri results   [phone]    Last office visit 7/21/17

## 2017-08-10 NOTE — TELEPHONE ENCOUNTER
Told patient results of his MRI.  Total patient results of his bone scan.  Total patient results of his video fluoroscopy.  Told patient results of his cholesterol.  Patient states that he restarted his simvastatin after his last visit, we discussed his elevated cholesterol.  The sclerosis on the MRI and bone scan have been present since 2012 and did not appear to have significantly worsened and are felt to be benign bone islands.  The patient saw Dr. Dez Jackson naproxen be a month ago and felt that his PSA had decreased to 3.3 and the patient is on finasteride.  The PSA has been as high as 7.3 while he is on the finasteride.  He has had 3 different biopsies on his prostate by Dr. shi.  He was instructed to return and see Dr. Jackson in approximately a year.  We discussed that he could repeat an x-ray of his lumbar spine and pelvis in 4-6 months.

## 2017-09-18 ENCOUNTER — OFFICE VISIT (OUTPATIENT)
Dept: FAMILY MEDICINE CLINIC | Facility: CLINIC | Age: 67
End: 2017-09-18

## 2017-09-18 VITALS
BODY MASS INDEX: 29.8 KG/M2 | DIASTOLIC BLOOD PRESSURE: 82 MMHG | WEIGHT: 196.6 LBS | TEMPERATURE: 97.3 F | HEIGHT: 68 IN | OXYGEN SATURATION: 98 % | SYSTOLIC BLOOD PRESSURE: 128 MMHG | HEART RATE: 64 BPM

## 2017-09-18 DIAGNOSIS — M67.472 GANGLION CYST OF LEFT FOOT: Primary | ICD-10-CM

## 2017-09-18 PROCEDURE — 99213 OFFICE O/P EST LOW 20 MIN: CPT | Performed by: FAMILY MEDICINE

## 2017-09-18 NOTE — PROGRESS NOTES
"  Chief Complaint   Patient presents with   • Foot Pain     arch of left foot       Subjective.../HPI  Patient present today with pain in left foot.    I have reviewed the patient's medical history in detail and updated the computerized patient record.    Family History   Problem Relation Age of Onset   • Coronary artery disease Mother    • Cancer Father      prostate   • Heart disease Father    • Cancer Brother      prostate       Social History     Social History   • Marital status:      Spouse name: N/A   • Number of children: N/A   • Years of education: N/A     Occupational History   • Not on file.     Social History Main Topics   • Smoking status: Never Smoker   • Smokeless tobacco: Never Used   • Alcohol use No   • Drug use: No   • Sexual activity: Defer     Other Topics Concern   • Not on file     Social History Narrative       Review of Systems:   Review of Systems   Constitutional: Negative.    HENT: Negative.    Eyes: Negative.    Respiratory: Negative.    Cardiovascular: Negative.    Gastrointestinal: Negative.    Endocrine: Negative.    Genitourinary: Negative.    Musculoskeletal: Negative.    Skin: Negative.    Allergic/Immunologic: Negative.    Neurological: Negative.    Hematological: Negative.    Psychiatric/Behavioral: Negative.        Objective:  Vital Signs     Vitals:    09/18/17 1307   BP: 136/70   BP Location: Right arm   Patient Position: Sitting   Cuff Size: Adult   Pulse: 64   Temp: 97.3 °F (36.3 °C)   TempSrc: Oral   SpO2: 98%   Weight: 196 lb 9.6 oz (89.2 kg)   Height: 68\" (172.7 cm)     Physical Exam   Constitutional: He is oriented to person, place, and time. He appears well-developed and well-nourished.   HENT:   Head: Normocephalic.   Eyes: Pupils are equal, round, and reactive to light.   Neck: Normal range of motion.   Cardiovascular: Normal rate, regular rhythm and normal heart sounds.    Pulmonary/Chest: Effort normal.   Abdominal: Soft.   Musculoskeletal: Normal range of " motion.   Neurological: He is alert and oriented to person, place, and time.   Skin: Skin is warm and dry.        Results Review:      REVIEWED AND DISCUSSED CLINICAL RESULTS WITH PATIENT                          Current Outpatient Prescriptions:   •  acebutolol (SECTRAL) 200 MG capsule, Take 1 capsule by mouth 2 (Two) Times a Day., Disp: 180 capsule, Rfl: 0  •  esomeprazole (nexIUM) 40 MG capsule, TAKE 1 CAPSULE DAILY (Patient taking differently: TAKE 2 CAPSULE DAILY), Disp: 90 capsule, Rfl: 0  •  finasteride (PROSCAR) 5 MG tablet, TK 1 T PO ONCE DAILY, Disp: , Rfl: 2  •  Fish Oil-Cholecalciferol (FISH OIL + D3) 0581-0426 MG-UNIT capsule, , Disp: , Rfl:   •  lamoTRIgine (LaMICtal) 100 MG tablet, Take 50 mg by mouth., Disp: , Rfl:   •  lamoTRIgine (LaMICtal) 200 MG tablet, Take  by mouth daily., Disp: , Rfl:   •  meloxicam (MOBIC) 7.5 MG tablet, Take 2 tablets by mouth Daily., Disp: 30 tablet, Rfl: 2  •  nefazodone (SERZONE) 50 MG tablet, Take  by mouth., Disp: , Rfl:   •  NIFEDICAL XL 60 MG 24 hr tablet, TAKE 1 TABLET BY MOUTH EVERY DAY, Disp: 90 tablet, Rfl: 1  •  propafenone (RHTHYMOL) 150 MG tablet, Take 1 tablet by mouth Every 8 (Eight) Hours., Disp: 270 tablet, Rfl: 3  •  simvastatin (ZOCOR) 20 MG tablet, Take 1 tablet by mouth every night., Disp: 30 tablet, Rfl: 3  •  vitamin B-12 (CYANOCOBALAMIN) 1000 MCG tablet, Take 1,000 mcg by mouth Daily., Disp: , Rfl:   •  Methylfol-Algae-S08-Olhufggftq (L-METHYLFOLATE CA ME-CBL NAC PO), Take  by mouth., Disp: , Rfl:     Procedures    Assessment/Plan     Diagnoses and all orders for this visit:    Ganglion cyst of left foot  -     Ambulatory Referral to Podiatry  -     Orthotics Lower Limb Ankle-Foot           Bonilla Romulo Campos Jr., MD  09/18/17  1:30 PM

## 2017-11-14 DIAGNOSIS — E02 SUBCLINICAL IODINE-DEFICIENCY HYPOTHYROIDISM: ICD-10-CM

## 2017-11-14 DIAGNOSIS — E78.00 PURE HYPERCHOLESTEROLEMIA: Primary | ICD-10-CM

## 2017-11-14 DIAGNOSIS — I10 ESSENTIAL HYPERTENSION: ICD-10-CM

## 2017-11-14 DIAGNOSIS — E08.00 DIABETES MELLITUS DUE TO UNDERLYING CONDITION WITH HYPEROSMOLARITY WITHOUT COMA, WITHOUT LONG-TERM CURRENT USE OF INSULIN (HCC): ICD-10-CM

## 2017-11-16 ENCOUNTER — LAB (OUTPATIENT)
Dept: LAB | Facility: HOSPITAL | Age: 67
End: 2017-11-16

## 2017-11-16 DIAGNOSIS — E02 SUBCLINICAL IODINE-DEFICIENCY HYPOTHYROIDISM: ICD-10-CM

## 2017-11-16 DIAGNOSIS — I10 ESSENTIAL HYPERTENSION: Primary | ICD-10-CM

## 2017-11-16 DIAGNOSIS — E78.00 PURE HYPERCHOLESTEROLEMIA: ICD-10-CM

## 2017-11-16 DIAGNOSIS — E08.00 DIABETES MELLITUS DUE TO UNDERLYING CONDITION WITH HYPEROSMOLARITY WITHOUT COMA, WITHOUT LONG-TERM CURRENT USE OF INSULIN (HCC): ICD-10-CM

## 2017-11-16 LAB
ALBUMIN SERPL-MCNC: 4.1 G/DL (ref 3.5–5.2)
ALBUMIN/GLOB SERPL: 1.5 G/DL
ALP SERPL-CCNC: 35 U/L (ref 39–117)
ALT SERPL W P-5'-P-CCNC: 14 U/L (ref 1–41)
ANION GAP SERPL CALCULATED.3IONS-SCNC: 13 MMOL/L
AST SERPL-CCNC: 15 U/L (ref 1–40)
BILIRUB SERPL-MCNC: 0.3 MG/DL (ref 0.1–1.2)
BUN BLD-MCNC: 18 MG/DL (ref 8–23)
BUN/CREAT SERPL: 14.3 (ref 7–25)
CALCIUM SPEC-SCNC: 9.3 MG/DL (ref 8.6–10.5)
CHLORIDE SERPL-SCNC: 102 MMOL/L (ref 98–107)
CHOLEST SERPL-MCNC: 151 MG/DL (ref 0–200)
CO2 SERPL-SCNC: 28 MMOL/L (ref 22–29)
CREAT BLD-MCNC: 1.26 MG/DL (ref 0.76–1.27)
GFR SERPL CREATININE-BSD FRML MDRD: 57 ML/MIN/1.73
GLOBULIN UR ELPH-MCNC: 2.7 GM/DL
GLUCOSE BLD-MCNC: 107 MG/DL (ref 65–99)
HBA1C MFR BLD: 5.58 % (ref 4.8–5.6)
HDLC SERPL-MCNC: 35 MG/DL (ref 40–60)
LDLC SERPL CALC-MCNC: 83 MG/DL (ref 0–100)
LDLC/HDLC SERPL: 2.37 {RATIO}
POTASSIUM BLD-SCNC: 4.1 MMOL/L (ref 3.5–5.2)
PROT SERPL-MCNC: 6.8 G/DL (ref 6–8.5)
SODIUM BLD-SCNC: 143 MMOL/L (ref 136–145)
TRIGL SERPL-MCNC: 166 MG/DL (ref 0–150)
TSH SERPL DL<=0.05 MIU/L-ACNC: 1.41 MIU/ML (ref 0.27–4.2)
VLDLC SERPL-MCNC: 33.2 MG/DL (ref 5–40)

## 2017-11-16 PROCEDURE — 84443 ASSAY THYROID STIM HORMONE: CPT

## 2017-11-16 PROCEDURE — 36415 COLL VENOUS BLD VENIPUNCTURE: CPT

## 2017-11-16 PROCEDURE — 80053 COMPREHEN METABOLIC PANEL: CPT

## 2017-11-16 PROCEDURE — 80061 LIPID PANEL: CPT

## 2017-11-16 PROCEDURE — 83036 HEMOGLOBIN GLYCOSYLATED A1C: CPT

## 2017-11-21 ENCOUNTER — OFFICE VISIT (OUTPATIENT)
Dept: FAMILY MEDICINE CLINIC | Facility: CLINIC | Age: 67
End: 2017-11-21

## 2017-11-21 VITALS
SYSTOLIC BLOOD PRESSURE: 138 MMHG | TEMPERATURE: 97.9 F | DIASTOLIC BLOOD PRESSURE: 78 MMHG | HEIGHT: 68 IN | WEIGHT: 192 LBS | BODY MASS INDEX: 29.1 KG/M2

## 2017-11-21 DIAGNOSIS — E78.01 FAMILIAL HYPERCHOLESTEROLEMIA: Primary | ICD-10-CM

## 2017-11-21 DIAGNOSIS — Z23 IMMUNIZATION DUE: ICD-10-CM

## 2017-11-21 PROCEDURE — G0009 ADMIN PNEUMOCOCCAL VACCINE: HCPCS | Performed by: FAMILY MEDICINE

## 2017-11-21 PROCEDURE — 90670 PCV13 VACCINE IM: CPT | Performed by: FAMILY MEDICINE

## 2017-11-21 PROCEDURE — 99213 OFFICE O/P EST LOW 20 MIN: CPT | Performed by: FAMILY MEDICINE

## 2017-11-21 RX ORDER — PANTOPRAZOLE SODIUM 40 MG/1
TABLET, DELAYED RELEASE ORAL
Refills: 4 | COMMUNITY
Start: 2017-09-26 | End: 2018-09-07 | Stop reason: ALTCHOICE

## 2017-11-21 RX ORDER — MELOXICAM 15 MG/1
TABLET ORAL
Refills: 0 | COMMUNITY
Start: 2017-11-06 | End: 2018-02-02

## 2017-11-21 RX ORDER — SIMVASTATIN 20 MG
20 TABLET ORAL NIGHTLY
Qty: 90 TABLET | Refills: 1 | Status: SHIPPED | OUTPATIENT
Start: 2017-11-21 | End: 2018-02-02 | Stop reason: SDUPTHER

## 2017-11-21 NOTE — PROGRESS NOTES
"  Chief Complaint   Patient presents with   • Hyperlipidemia     f/u     bld work       Subjective.../HPI  Patient present today with    I have reviewed the patient's medical history in detail and updated the computerized patient record.    Family History   Problem Relation Age of Onset   • Coronary artery disease Mother    • Cancer Father      prostate   • Heart disease Father    • Cancer Brother      prostate       Social History     Social History   • Marital status:      Spouse name: N/A   • Number of children: N/A   • Years of education: N/A     Occupational History   • Not on file.     Social History Main Topics   • Smoking status: Never Smoker   • Smokeless tobacco: Never Used   • Alcohol use No   • Drug use: No   • Sexual activity: Defer     Other Topics Concern   • Not on file     Social History Narrative       Review of Systems:   Review of Systems   Constitutional: Negative.    HENT: Negative.    Eyes: Negative.    Respiratory: Negative.    Cardiovascular: Negative.    Gastrointestinal: Negative.    Endocrine: Negative.    Genitourinary: Negative.    Musculoskeletal: Negative.    Skin: Negative.    Allergic/Immunologic: Negative.    Neurological: Negative.    Hematological: Negative.    Psychiatric/Behavioral: Negative.        Objective:  Vital Signs     Vitals:    11/21/17 1616   BP: 138/78   BP Location: Right arm   Patient Position: Sitting   Cuff Size: Adult   Temp: 97.9 °F (36.6 °C)   TempSrc: Oral   Weight: 192 lb (87.1 kg)   Height: 68\" (172.7 cm)     Physical Exam   Constitutional: He is oriented to person, place, and time. He appears well-developed and well-nourished.   HENT:   Head: Normocephalic.   Eyes: Pupils are equal, round, and reactive to light.   Neck: Normal range of motion.   Cardiovascular: Normal rate, regular rhythm and normal heart sounds.    Pulmonary/Chest: Effort normal.   Abdominal: Soft.   Musculoskeletal: Normal range of motion.   Neurological: He is alert and oriented " to person, place, and time.   Skin: Skin is warm and dry.        Results Review:      REVIEWED AND DISCUSSED LAB RESULTS WITH PATIENT and REVIEWED AND DISCUSSED CLINICAL RESULTS WITH PATIENT          Results from last 7 days  Lab Units 11/16/17  0738   SODIUM mmol/L 143   POTASSIUM mmol/L 4.1   CHLORIDE mmol/L 102   CO2 mmol/L 28.0   BUN mg/dL 18   CREATININE mg/dL 1.26   GLUCOSE mg/dL 107*   CALCIUM mg/dL 9.3                   Current Outpatient Prescriptions:   •  acebutolol (SECTRAL) 200 MG capsule, Take 1 capsule by mouth 2 (Two) Times a Day., Disp: 180 capsule, Rfl: 0  •  finasteride (PROSCAR) 5 MG tablet, TK 1 T PO ONCE DAILY, Disp: , Rfl: 2  •  Fish Oil-Cholecalciferol (FISH OIL + D3) 6642-8563 MG-UNIT capsule, , Disp: , Rfl:   •  lamoTRIgine (LaMICtal) 100 MG tablet, Take 50 mg by mouth., Disp: , Rfl:   •  lamoTRIgine (LaMICtal) 200 MG tablet, Take  by mouth daily., Disp: , Rfl:   •  meloxicam (MOBIC) 15 MG tablet, TK 1 T PO D, Disp: , Rfl: 0  •  nefazodone (SERZONE) 50 MG tablet, Take  by mouth., Disp: , Rfl:   •  NIFEDICAL XL 60 MG 24 hr tablet, TAKE 1 TABLET BY MOUTH EVERY DAY, Disp: 90 tablet, Rfl: 1  •  pantoprazole (PROTONIX) 40 MG EC tablet, TK 1 T PO D, Disp: , Rfl: 4  •  propafenone (RHTHYMOL) 150 MG tablet, Take 1 tablet by mouth Every 8 (Eight) Hours., Disp: 270 tablet, Rfl: 3  •  simvastatin (ZOCOR) 20 MG tablet, Take 1 tablet by mouth every night., Disp: 30 tablet, Rfl: 3    Procedures    Assessment/Plan     Diagnoses and all orders for this visit:    Familial hypercholesterolemia    Other orders  -     meloxicam (MOBIC) 15 MG tablet; TK 1 T PO D  -     pantoprazole (PROTONIX) 40 MG EC tablet; TK 1 T PO D  -     simvastatin (ZOCOR) 20 MG tablet; Take 1 tablet by mouth Every Night.  -     pneumococcal conj. 13-valent (PREVNAR-13) vaccine 0.5 mL; Inject 0.5 mL into the shoulder, thigh, or buttocks 1 (One) Time.         Bonilla Campos Jr., MD  11/21/17  5:42 PM

## 2017-12-11 ENCOUNTER — TELEPHONE (OUTPATIENT)
Dept: FAMILY MEDICINE CLINIC | Facility: CLINIC | Age: 67
End: 2017-12-11

## 2017-12-11 RX ORDER — CLONAZEPAM 0.5 MG/1
0.25 TABLET ORAL DAILY PRN
Qty: 6 TABLET | Refills: 0
Start: 2017-12-11 | End: 2018-02-02

## 2017-12-11 NOTE — TELEPHONE ENCOUNTER
----- Message from Rebeka Murphy sent at 12/11/2017  1:15 PM EST -----  Regarding: RX  Contact: [phone]  Patient needs script for 5-10 klonopin but it isn't on his list of medications. Would like a call from Dr. Campos.    Thank you

## 2017-12-12 NOTE — PROGRESS NOTES
The patient states he has anxiety.  It happens very rarely but has issues with the family.  When it occurs he wanted to know if he could have a low dose Klonopin.  He only needed about 6 pills for the year.  He sees Dr. Fierro for psychiatric issues.  He will discuss this with her on his next appointment.  He is aware that I will not be doing any refills.  I did call this into the pharmacy already.

## 2017-12-13 ENCOUNTER — APPOINTMENT (OUTPATIENT)
Dept: LAB | Facility: HOSPITAL | Age: 67
End: 2017-12-13

## 2017-12-13 DIAGNOSIS — R53.83 FATIGUE, UNSPECIFIED TYPE: ICD-10-CM

## 2017-12-13 DIAGNOSIS — R53.83 FATIGUE, UNSPECIFIED TYPE: Primary | ICD-10-CM

## 2017-12-13 DIAGNOSIS — K22.70 BARRETT'S ESOPHAGUS WITHOUT DYSPLASIA: ICD-10-CM

## 2017-12-13 LAB
FOLATE SERPL-MCNC: 18.37 NG/ML (ref 4.78–24.2)
T-UPTAKE NFR SERPL: 1.11 TBI (ref 0.8–1.3)
T4 SERPL-MCNC: 5.79 MCG/DL (ref 4.5–11.7)
TSH SERPL DL<=0.05 MIU/L-ACNC: 1.33 MIU/ML (ref 0.27–4.2)
VIT B12 BLD-MCNC: 598 PG/ML (ref 211–946)

## 2017-12-13 PROCEDURE — 82607 VITAMIN B-12: CPT | Performed by: FAMILY MEDICINE

## 2017-12-13 PROCEDURE — 84479 ASSAY OF THYROID (T3 OR T4): CPT | Performed by: FAMILY MEDICINE

## 2017-12-13 PROCEDURE — 84436 ASSAY OF TOTAL THYROXINE: CPT | Performed by: FAMILY MEDICINE

## 2017-12-13 PROCEDURE — 82746 ASSAY OF FOLIC ACID SERUM: CPT | Performed by: FAMILY MEDICINE

## 2017-12-13 PROCEDURE — 84443 ASSAY THYROID STIM HORMONE: CPT | Performed by: FAMILY MEDICINE

## 2018-01-29 ENCOUNTER — OFFICE VISIT (OUTPATIENT)
Dept: ORTHOPEDIC SURGERY | Facility: CLINIC | Age: 68
End: 2018-01-29

## 2018-01-29 VITALS — HEIGHT: 66 IN | TEMPERATURE: 97.6 F | BODY MASS INDEX: 31.66 KG/M2 | WEIGHT: 197 LBS

## 2018-01-29 DIAGNOSIS — S86.302A INJURY OF PERONEAL TENDON OF LEFT FOOT, INITIAL ENCOUNTER: ICD-10-CM

## 2018-01-29 DIAGNOSIS — M76.822 POSTERIOR TIBIAL TENDONITIS, LEFT: Primary | ICD-10-CM

## 2018-01-29 DIAGNOSIS — S99.912A INJURY OF LEFT ANKLE, INITIAL ENCOUNTER: ICD-10-CM

## 2018-01-29 DIAGNOSIS — S93.402A SPRAIN OF LEFT ANKLE, UNSPECIFIED LIGAMENT, INITIAL ENCOUNTER: ICD-10-CM

## 2018-01-29 PROCEDURE — 73610 X-RAY EXAM OF ANKLE: CPT | Performed by: ORTHOPAEDIC SURGERY

## 2018-01-29 PROCEDURE — 99214 OFFICE O/P EST MOD 30 MIN: CPT | Performed by: ORTHOPAEDIC SURGERY

## 2018-01-29 NOTE — PROGRESS NOTES
"   New Patient Complaint      Patient: Wilmar Carmona  YOB: 1950 67 y.o. male  Medical Record Number: [id number]    Chief Complaints: I hurt my ankle    History of Present Illness:   Patient injured left ankle on 1/10/18 when he had been sitting and  his foot was asleep when he stood up his left ankle inverted.  He's not sure if he felt or heard a pop had onset of significant pain and swelling.  Symptoms have persisted now with mild intermittent aching pain with bruising and swelling worse with walking and rest.  Pain is mainly over the inferolateral aspect left ankle.  Prior to this injury he had some occasional mild discomfort inferomedially which has persisted and worsened to some degree.  He states he's had a lifelong history of \"flatfeet\".  However he does not feel that this has worsened since his injury       HPI    Allergies: No Known Allergies    Medications:   Current Outpatient Prescriptions on File Prior to Visit   Medication Sig   • acebutolol (SECTRAL) 200 MG capsule Take 1 capsule by mouth 2 (Two) Times a Day.   • finasteride (PROSCAR) 5 MG tablet TK 1 T PO ONCE DAILY   • Fish Oil-Cholecalciferol (FISH OIL + D3) 6986-3323 MG-UNIT capsule    • lamoTRIgine (LaMICtal) 100 MG tablet Take 50 mg by mouth.   • lamoTRIgine (LaMICtal) 200 MG tablet Take  by mouth daily.   • nefazodone (SERZONE) 50 MG tablet Take  by mouth 3 (Three) Times a Day.   • NIFEDICAL XL 60 MG 24 hr tablet TAKE 1 TABLET BY MOUTH EVERY DAY   • pantoprazole (PROTONIX) 40 MG EC tablet TK 1 T PO D   • propafenone (RHTHYMOL) 150 MG tablet Take 1 tablet by mouth Every 8 (Eight) Hours.   • simvastatin (ZOCOR) 20 MG tablet Take 1 tablet by mouth Every Night.   • clonazePAM (KLONOPIN) 0.5 MG tablet Take 0.5 tablets by mouth Daily As Needed for Seizures.   • meloxicam (MOBIC) 15 MG tablet TK 1 T PO D     No current facility-administered medications on file prior to visit.        Past Medical History:   Diagnosis Date   • Arthritis  " "  • BPH (benign prostatic hypertrophy)    • Depression    • GERD (gastroesophageal reflux disease)    • Heart murmur    • Hyperlipidemia    • Hypertension    • Tremor      Past Surgical History:   Procedure Laterality Date   • COLONOSCOPY  10/20/2014    normal -dr leon..had egd at same time   • HERNIA REPAIR     • KNEE SURGERY Left    • TONSILLECTOMY     • VASECTOMY       Social History     Occupational History   • Not on file.     Social History Main Topics   • Smoking status: Never Smoker   • Smokeless tobacco: Never Used   • Alcohol use No   • Drug use: Defer   • Sexual activity: Defer      Social History     Social History Narrative     Family History   Problem Relation Age of Onset   • Coronary artery disease Mother    • Cancer Father      prostate   • Heart disease Father    • Cancer Brother      prostate       Review of Systems: 14 point review of systems performed, positive pertinent findings identified in HPI. All remaining systems negative     Review of Systems      Physical Exam:   Vitals:    01/29/18 0835   Temp: 97.6 °F (36.4 °C)   Weight: 89.4 kg (197 lb)   Height: 167.6 cm (66\")     Physical Exam   Constitutional: pleasant, well developed   Eyes: sclera non icteric  Hearing : adequate for exam  Cardiovascular: palpable pulses in left foot, left calf/ thigh NT without sign of DVT  Respiratoy: breathing unlabored   Neurological: grossly sensate to LT throughout left LE  Psychiatric: oriented with normal mood and affect.   Lymphatic: No palpable popliteal lymphadenopathy left LE  Skin: intact throughout left leg/foot  Musculoskeletal: Moderate  bilateral pes planus on standing without asymmetry.  There is mild swelling over the anterolateral ligamentous structures with discomfort and slightly increased anterior drawer.  Mild discomfort palpation along the peroneal tendons worsened with resisted eversion but no subluxation.  5 out of 5 eversion strength.  Mild discomfort along the posterior tib tendon " with mild discomfort on resisted inversion.  Nontender over the Achilles.  Mild discomfort to the inferolateral aspect of the ankle some along the sinus tarsi and anterior process calcaneus.  Physical Exam  Ortho Exam    Radiology: 3 views left ankle ordered to evaluate pain reviewed and no prior x-rays available for comparison talus appears well seated within the mortise there is elongation of posterior process of the talus and some questionable chronic changes along the syndesmosis but no widening.  There is also some questionable area along the anterior process the calcaneus.    Assessment/Plan: Left ankle injury with anterolateral ligamentous sprain and possible peroneal/posterior tib tendon injury and/or occult fracture or possible ligamentous injury around anterior process the calcaneus.    Fitted him with a PTT D brace today for daytime use with weightbearing.    need to get an MRI of his left ankle for further evaluation.    He understands to call to schedule follow-up appointment after MRI has been scheduled.

## 2018-02-02 ENCOUNTER — OFFICE VISIT (OUTPATIENT)
Dept: INTERNAL MEDICINE | Facility: CLINIC | Age: 68
End: 2018-02-02

## 2018-02-02 VITALS
SYSTOLIC BLOOD PRESSURE: 146 MMHG | DIASTOLIC BLOOD PRESSURE: 88 MMHG | WEIGHT: 197 LBS | BODY MASS INDEX: 29.86 KG/M2 | HEART RATE: 66 BPM | TEMPERATURE: 97.4 F | OXYGEN SATURATION: 98 % | HEIGHT: 68 IN

## 2018-02-02 DIAGNOSIS — E78.01 FAMILIAL HYPERCHOLESTEROLEMIA: Primary | ICD-10-CM

## 2018-02-02 DIAGNOSIS — N40.0 BENIGN PROSTATIC HYPERPLASIA, UNSPECIFIED WHETHER LOWER URINARY TRACT SYMPTOMS PRESENT: ICD-10-CM

## 2018-02-02 DIAGNOSIS — R00.2 PALPITATIONS: ICD-10-CM

## 2018-02-02 DIAGNOSIS — I10 ESSENTIAL HYPERTENSION: ICD-10-CM

## 2018-02-02 PROCEDURE — 99214 OFFICE O/P EST MOD 30 MIN: CPT | Performed by: FAMILY MEDICINE

## 2018-02-02 RX ORDER — NIFEDIPINE 60 MG/1
60 TABLET, EXTENDED RELEASE ORAL DAILY
COMMUNITY
End: 2018-11-09 | Stop reason: SDUPTHER

## 2018-02-02 RX ORDER — SIMVASTATIN 20 MG
20 TABLET ORAL NIGHTLY
Qty: 90 TABLET | Refills: 3 | Status: SHIPPED | OUTPATIENT
Start: 2018-02-02 | End: 2019-03-24 | Stop reason: SDUPTHER

## 2018-02-02 NOTE — PROGRESS NOTES
Subjective   Wilmar Carmona is a 67 y.o. male.     Chief Complaint   Patient presents with   • Hypertension   • GI Problem         History of Present Illness   Patient followed by Dr. Campos with a history of hypertension also premature atrial contractions normal heart catheterization is to be followed by cardiology..  He is on antiarrhythmic.  Is also followed by psychiatry for moderate depression.  He has been followed by Dr. Jackson urology for BPH.  Otherwise he is Dr. Bravo with history of reflux issues.      The following portions of the patient's history were reviewed and updated as appropriate: allergies, current medications, past social history and problem list.    Review of Systems   Constitutional: Negative.    HENT: Negative.    Eyes: Negative.    Respiratory: Negative.    Cardiovascular: Negative.    Gastrointestinal: Negative.    Endocrine: Negative.    Genitourinary: Negative.    Musculoskeletal: Negative.    Skin: Negative.    Allergic/Immunologic: Negative.    Neurological: Negative.    Hematological: Negative.    Psychiatric/Behavioral: Negative.        Objective   Vitals:    02/02/18 1529   BP: 146/88   Pulse: 66   Temp: 97.4 °F (36.3 °C)   SpO2: 98%     Physical Exam   Constitutional: He is oriented to person, place, and time. He appears well-developed and well-nourished.   HENT:   Head: Normocephalic and atraumatic.   Right Ear: Tympanic membrane and external ear normal.   Left Ear: Tympanic membrane and external ear normal.   Nose: Nose normal.   Mouth/Throat: Oropharynx is clear and moist.   Eyes: Conjunctivae and EOM are normal. Pupils are equal, round, and reactive to light.   Neck: Normal range of motion. Neck supple. No JVD present. No thyromegaly present.   Cardiovascular: Normal rate, regular rhythm, normal heart sounds and intact distal pulses.    Pulmonary/Chest: Effort normal and breath sounds normal.   Abdominal: Soft. Bowel sounds are normal.   Musculoskeletal: Normal range of  motion.   Lymphadenopathy:     He has no cervical adenopathy.   Neurological: He is alert and oriented to person, place, and time. No cranial nerve deficit. Coordination normal.   Skin: Skin is warm and dry. No rash noted.   Psychiatric: He has a normal mood and affect. His behavior is normal. Judgment and thought content normal.   Vitals reviewed.      Assessment/Plan   Problem List Items Addressed This Visit        Cardiovascular and Mediastinum    Hypertension    Relevant Medications    NIFEdipine XL (PROCARDIA XL) 60 MG 24 hr tablet    Palpitations    Hyperlipidemia - Primary    Relevant Medications    simvastatin (ZOCOR) 20 MG tablet      Other Visit Diagnoses     Benign prostatic hyperplasia, unspecified whether lower urinary tract symptoms present          Plan: Consider switching from nifedipine to losartan or valsartan for blood pressure and see if this improves reflux.  No changes at present we'll wait and see what cardiology thinks about this.  Otherwise recheck when necessary follow-up with a specialist he is seeing.  Refills on simvastatin.

## 2018-02-04 ENCOUNTER — HOSPITAL ENCOUNTER (OUTPATIENT)
Dept: MRI IMAGING | Facility: HOSPITAL | Age: 68
Discharge: HOME | End: 2018-02-04
Attending: ORTHOPAEDIC SURGERY | Admitting: ORTHOPAEDIC SURGERY

## 2018-02-04 DIAGNOSIS — S99.912A INJURY OF LEFT ANKLE, INITIAL ENCOUNTER: ICD-10-CM

## 2018-02-04 DIAGNOSIS — S86.302A INJURY OF PERONEAL TENDON OF LEFT FOOT, INITIAL ENCOUNTER: ICD-10-CM

## 2018-02-04 DIAGNOSIS — M76.822 POSTERIOR TIBIAL TENDONITIS, LEFT: ICD-10-CM

## 2018-02-04 DIAGNOSIS — S93.402A SPRAIN OF LEFT ANKLE, UNSPECIFIED LIGAMENT, INITIAL ENCOUNTER: ICD-10-CM

## 2018-02-04 PROCEDURE — 73721 MRI JNT OF LWR EXTRE W/O DYE: CPT

## 2018-02-09 ENCOUNTER — OFFICE VISIT (OUTPATIENT)
Dept: ORTHOPEDIC SURGERY | Facility: CLINIC | Age: 68
End: 2018-02-09

## 2018-02-09 VITALS — WEIGHT: 197 LBS | TEMPERATURE: 98.4 F | HEIGHT: 68 IN | BODY MASS INDEX: 29.86 KG/M2

## 2018-02-09 DIAGNOSIS — T14.8XXA CONTUSION OF BONE: ICD-10-CM

## 2018-02-09 DIAGNOSIS — S93.402D SPRAIN OF LEFT ANKLE, UNSPECIFIED LIGAMENT, SUBSEQUENT ENCOUNTER: Primary | ICD-10-CM

## 2018-02-09 PROBLEM — S93.402A SPRAIN OF LEFT ANKLE: Status: ACTIVE | Noted: 2018-02-09

## 2018-02-09 PROCEDURE — 99213 OFFICE O/P EST LOW 20 MIN: CPT | Performed by: ORTHOPAEDIC SURGERY

## 2018-02-09 NOTE — PROGRESS NOTES
"Ankle Follow Up      Patient: Wilmar Carmona    YOB: 1950 67 y.o. male    Chief Complaints: Ankle is much better    History of Present Illness: Left ankle injury 1/10/18 after he been sitting for a while and his foot was asleep and when he stood his left ankle inverted.  He was initially seen on 1/29/18 and fitted with a PTT D brace and sent for an MRI.  He says of last week or 2 his ankle has felt much much better only some occasional mild achiness over the anteromedial and some anterolateral aspects of the left ankle.  He has been going without his brace because he said it was more painful with it on than without.  He has not had any feelings of instability to the ankle  HPI    ROS: ankle pain  Past Medical History:   Diagnosis Date   • Arthritis    • BPH (benign prostatic hypertrophy)    • Depression    • GERD (gastroesophageal reflux disease)    • Heart murmur    • Hyperlipidemia    • Hypertension    • Tremor        Physical Exam:   Vitals:    02/09/18 1424   Temp: 98.4 °F (36.9 °C)   TempSrc: Temporal Artery    Weight: 89.4 kg (197 lb)   Height: 172.7 cm (68\")     Well developed with normal mood.Nonantalgic gait.  Minimal discomfort over the anteromedial ankle joint line along the posterior tib tendon area.  Minimal discomfort anterolaterally was grossly stable anterior drawer and talar tilt.  No focal discomfort along the peroneal tendons today.      Radiology: MRI films and report of the left ankle dated 2/4/18 were reviewed and actually discussed today with the radiologist.  There was some slight fluid within the peroneal tendon sheath but no clear tear.  There was some subchondral marrow edema within the central talar dome as well as tears of the ATFL and CFL ligaments.  Syndesmosis appeared intact as did the deltoid      Assessment/Plan: .  Left ATFL and CFL injury  2.  Left central talar dome contusion    We discussed treatment options and offered him a smaller brace which she declined " today.  Told him if he had increased pain he'll get an over-the-counter lace up brace from sporting goods or drugstore.    We talked about formal therapy he declined this at this time.    I counseled this may take several weeks or longer to improve and if he has persistent or worsening pain swelling or feelings of instability to let me know.  Otherwise I will see him back as needed

## 2018-04-09 ENCOUNTER — OFFICE VISIT (OUTPATIENT)
Dept: CARDIOLOGY | Facility: CLINIC | Age: 68
End: 2018-04-09

## 2018-04-09 ENCOUNTER — HOSPITAL ENCOUNTER (OUTPATIENT)
Dept: CARDIOLOGY | Facility: HOSPITAL | Age: 68
Discharge: HOME | End: 2018-04-09
Attending: INTERNAL MEDICINE | Admitting: INTERNAL MEDICINE

## 2018-04-09 VITALS
BODY MASS INDEX: 29.55 KG/M2 | HEIGHT: 68 IN | HEART RATE: 68 BPM | SYSTOLIC BLOOD PRESSURE: 128 MMHG | WEIGHT: 195 LBS | DIASTOLIC BLOOD PRESSURE: 76 MMHG

## 2018-04-09 DIAGNOSIS — I48.0 PAF (PAROXYSMAL ATRIAL FIBRILLATION) (HCC): Primary | ICD-10-CM

## 2018-04-09 DIAGNOSIS — I10 ESSENTIAL HYPERTENSION: ICD-10-CM

## 2018-04-09 DIAGNOSIS — I25.10 CORONARY ARTERY DISEASE INVOLVING NATIVE CORONARY ARTERY OF NATIVE HEART WITHOUT ANGINA PECTORIS: ICD-10-CM

## 2018-04-09 DIAGNOSIS — I71.20 THORACIC AORTIC ANEURYSM WITHOUT RUPTURE (HCC): ICD-10-CM

## 2018-04-09 DIAGNOSIS — E78.01 FAMILIAL HYPERCHOLESTEROLEMIA: ICD-10-CM

## 2018-04-09 DIAGNOSIS — R00.2 PALPITATIONS: ICD-10-CM

## 2018-04-09 DIAGNOSIS — I48.0 PAF (PAROXYSMAL ATRIAL FIBRILLATION) (HCC): ICD-10-CM

## 2018-04-09 LAB
ASCENDING AORTA: 2.8 CM
BH CV ECHO MEAS - ACS: 2.2 CM
BH CV ECHO MEAS - AO MAX PG (FULL): 1.3 MMHG
BH CV ECHO MEAS - AO MAX PG: 5.5 MMHG
BH CV ECHO MEAS - AO MEAN PG (FULL): 0.17 MMHG
BH CV ECHO MEAS - AO MEAN PG: 2.8 MMHG
BH CV ECHO MEAS - AO ROOT AREA (BSA CORRECTED): 1.7
BH CV ECHO MEAS - AO ROOT AREA: 9.4 CM^2
BH CV ECHO MEAS - AO ROOT DIAM: 3.5 CM
BH CV ECHO MEAS - AO V2 MAX: 116.7 CM/SEC
BH CV ECHO MEAS - AO V2 MEAN: 77.4 CM/SEC
BH CV ECHO MEAS - AO V2 VTI: 25.1 CM
BH CV ECHO MEAS - AVA(I,A): 3.5 CM^2
BH CV ECHO MEAS - AVA(I,D): 3.5 CM^2
BH CV ECHO MEAS - AVA(V,A): 3.3 CM^2
BH CV ECHO MEAS - AVA(V,D): 3.3 CM^2
BH CV ECHO MEAS - BSA(HAYCOCK): 2.1 M^2
BH CV ECHO MEAS - BSA: 2 M^2
BH CV ECHO MEAS - BZI_BMI: 29.6 KILOGRAMS/M^2
BH CV ECHO MEAS - BZI_METRIC_HEIGHT: 172.7 CM
BH CV ECHO MEAS - BZI_METRIC_WEIGHT: 88.5 KG
BH CV ECHO MEAS - CONTRAST EF (2CH): 59.1 ML/M^2
BH CV ECHO MEAS - CONTRAST EF 4CH: 60 ML/M^2
BH CV ECHO MEAS - EDV(MOD-SP2): 66 ML
BH CV ECHO MEAS - EDV(MOD-SP4): 75 ML
BH CV ECHO MEAS - EDV(TEICH): 144.6 ML
BH CV ECHO MEAS - EF(CUBED): 82.8 %
BH CV ECHO MEAS - EF(MOD-SP2): 59.1 %
BH CV ECHO MEAS - EF(MOD-SP4): 60 %
BH CV ECHO MEAS - EF(TEICH): 75.1 %
BH CV ECHO MEAS - ESV(MOD-SP2): 27 ML
BH CV ECHO MEAS - ESV(MOD-SP4): 30 ML
BH CV ECHO MEAS - ESV(TEICH): 36 ML
BH CV ECHO MEAS - FS: 44.4 %
BH CV ECHO MEAS - IVS/LVPW: 0.96
BH CV ECHO MEAS - IVSD: 1 CM
BH CV ECHO MEAS - LAT PEAK E' VEL: 11 CM/SEC
BH CV ECHO MEAS - LV DIASTOLIC VOL/BSA (35-75): 37.1 ML/M^2
BH CV ECHO MEAS - LV MASS(C)D: 215.2 GRAMS
BH CV ECHO MEAS - LV MASS(C)DI: 106.4 GRAMS/M^2
BH CV ECHO MEAS - LV MAX PG: 4.2 MMHG
BH CV ECHO MEAS - LV MEAN PG: 2.6 MMHG
BH CV ECHO MEAS - LV SYSTOLIC VOL/BSA (12-30): 14.8 ML/M^2
BH CV ECHO MEAS - LV V1 MAX: 102.3 CM/SEC
BH CV ECHO MEAS - LV V1 MEAN: 77.1 CM/SEC
BH CV ECHO MEAS - LV V1 VTI: 23.3 CM
BH CV ECHO MEAS - LVIDD: 5.5 CM
BH CV ECHO MEAS - LVIDS: 3 CM
BH CV ECHO MEAS - LVLD AP2: 7.1 CM
BH CV ECHO MEAS - LVLD AP4: 6.9 CM
BH CV ECHO MEAS - LVLS AP2: 6.3 CM
BH CV ECHO MEAS - LVLS AP4: 5.4 CM
BH CV ECHO MEAS - LVOT AREA (M): 3.8 CM^2
BH CV ECHO MEAS - LVOT AREA: 3.8 CM^2
BH CV ECHO MEAS - LVOT DIAM: 2.2 CM
BH CV ECHO MEAS - LVPWD: 1 CM
BH CV ECHO MEAS - MED PEAK E' VEL: 8 CM/SEC
BH CV ECHO MEAS - MV A DUR: 0.13 SEC
BH CV ECHO MEAS - MV A MAX VEL: 75.7 CM/SEC
BH CV ECHO MEAS - MV DEC SLOPE: 569 CM/SEC^2
BH CV ECHO MEAS - MV DEC TIME: 0.17 SEC
BH CV ECHO MEAS - MV E MAX VEL: 95.1 CM/SEC
BH CV ECHO MEAS - MV E/A: 1.3
BH CV ECHO MEAS - MV MAX PG: 4.1 MMHG
BH CV ECHO MEAS - MV MEAN PG: 1.7 MMHG
BH CV ECHO MEAS - MV P1/2T MAX VEL: 92.6 CM/SEC
BH CV ECHO MEAS - MV P1/2T: 47.7 MSEC
BH CV ECHO MEAS - MV V2 MAX: 100.8 CM/SEC
BH CV ECHO MEAS - MV V2 MEAN: 60.2 CM/SEC
BH CV ECHO MEAS - MV V2 VTI: 30.3 CM
BH CV ECHO MEAS - MVA P1/2T LCG: 2.4 CM^2
BH CV ECHO MEAS - MVA(P1/2T): 4.6 CM^2
BH CV ECHO MEAS - MVA(VTI): 2.9 CM^2
BH CV ECHO MEAS - PA MAX PG (FULL): 3.3 MMHG
BH CV ECHO MEAS - PA MAX PG: 5 MMHG
BH CV ECHO MEAS - PA V2 MAX: 111.3 CM/SEC
BH CV ECHO MEAS - PULM A REVS DUR: 0.12 SEC
BH CV ECHO MEAS - PULM A REVS VEL: 29.4 CM/SEC
BH CV ECHO MEAS - PULM DIAS VEL: 42.7 CM/SEC
BH CV ECHO MEAS - PULM S/D: 1.2
BH CV ECHO MEAS - PULM SYS VEL: 52.3 CM/SEC
BH CV ECHO MEAS - PVA(V,A): 2 CM^2
BH CV ECHO MEAS - PVA(V,D): 2 CM^2
BH CV ECHO MEAS - QP/QS: 0.69
BH CV ECHO MEAS - RAP SYSTOLE: 3 MMHG
BH CV ECHO MEAS - RV MAX PG: 1.7 MMHG
BH CV ECHO MEAS - RV MEAN PG: 1.1 MMHG
BH CV ECHO MEAS - RV V1 MAX: 65 CM/SEC
BH CV ECHO MEAS - RV V1 MEAN: 48.8 CM/SEC
BH CV ECHO MEAS - RV V1 VTI: 18.2 CM
BH CV ECHO MEAS - RVOT AREA: 3.4 CM^2
BH CV ECHO MEAS - RVOT DIAM: 2.1 CM
BH CV ECHO MEAS - RVSP: 25 MMHG
BH CV ECHO MEAS - SI(AO): 116.1 ML/M^2
BH CV ECHO MEAS - SI(CUBED): 66.4 ML/M^2
BH CV ECHO MEAS - SI(LVOT): 43.5 ML/M^2
BH CV ECHO MEAS - SI(MOD-SP2): 19.3 ML/M^2
BH CV ECHO MEAS - SI(MOD-SP4): 22.3 ML/M^2
BH CV ECHO MEAS - SI(TEICH): 53.7 ML/M^2
BH CV ECHO MEAS - SUP REN AO DIAM: 1.6 CM
BH CV ECHO MEAS - SV(AO): 234.7 ML
BH CV ECHO MEAS - SV(CUBED): 134.3 ML
BH CV ECHO MEAS - SV(LVOT): 87.9 ML
BH CV ECHO MEAS - SV(MOD-SP2): 39 ML
BH CV ECHO MEAS - SV(MOD-SP4): 45 ML
BH CV ECHO MEAS - SV(RVOT): 61.1 ML
BH CV ECHO MEAS - SV(TEICH): 108.6 ML
BH CV ECHO MEAS - TAPSE (>1.6): 1.8 CM2
BH CV ECHO MEAS - TR MAX VEL: 236.2 CM/SEC
BH CV XLRA - RV BASE: 2.3 CM
BH CV XLRA - TDI S': 13 CM/SEC
E/E' RATIO: 10.5
LEFT ATRIUM VOLUME INDEX: 19 ML/M2
SINUS: 3.4 CM
STJ: 3 CM

## 2018-04-09 PROCEDURE — 93306 TTE W/DOPPLER COMPLETE: CPT | Performed by: INTERNAL MEDICINE

## 2018-04-09 PROCEDURE — 99214 OFFICE O/P EST MOD 30 MIN: CPT | Performed by: INTERNAL MEDICINE

## 2018-04-09 PROCEDURE — 93000 ELECTROCARDIOGRAM COMPLETE: CPT | Performed by: INTERNAL MEDICINE

## 2018-04-09 PROCEDURE — 93306 TTE W/DOPPLER COMPLETE: CPT

## 2018-04-09 NOTE — PROGRESS NOTES
Date of Office Visit: 2018  Encounter Provider: Diana Durbin MD  Place of Service: [facility] CARDIOLOGY  Patient Name: Wilmar Carmona  :1950    Chief complaint  Consult requested by Dr. King for management of paroxysmal atrial fibrillation, dilated aortic root and left ventricular hypertrophy    History of Present Illness  Patient is a pleasant 67-year-old gentleman with history of hypertension, hyperlipidemia, obstructive sleep apnea.  He has had atrial fibrillation for many years and previously was followed by Dr. Bailey.  He has been maintained on Rythmol therapy and Sectral.  He had an abnormal stress test in  leading to cardiac catheterization that revealed minimal vessel disease up to 10%.  However there is a CT scan from 2016 that revealed calcification of all 3 major vessels.  He also had an echocardiogram that in  that showed normal systolic function with grade 1 diastolic disc function, RV enlargement with normal right ventricular systolic function with mild left ventricular hypertrophy and mild dilatation of the aortic root.    Since last visit he states he has palpitations about once a month lasting for 8-10 seconds it actually occurs less than once a month.  He denies any chest pain, shortness of breath, palpitations, syncope near syncope.  He is using his CPAP religiously.  He is followed by Dr. Mcgovern for this and has an upcoming appointment    Past Medical History:   Diagnosis Date   • Abnormal EKG    • Abnormal stress test    • Arthritis    • Atrial fibrillation    • BPH (benign prostatic hypertrophy)    • Depression    • GERD (gastroesophageal reflux disease)    • Heart murmur    • Hyperlipidemia    • Hypertension    • Mild tricuspid regurgitation    • PAC (premature atrial contraction)    • Palpitations    • Sleep apnea    • Tremor      Past Surgical History:   Procedure Laterality Date   • CARDIAC CATHETERIZATION     • COLONOSCOPY   10/20/2014    normal -dr leon..had egd at same time   • HERNIA REPAIR     • KNEE SURGERY Left    • TONSILLECTOMY     • VASECTOMY         Current Outpatient Prescriptions:   •  acebutolol (SECTRAL) 200 MG capsule, Take 1 capsule by mouth 2 (Two) Times a Day., Disp: 180 capsule, Rfl: 0  •  finasteride (PROSCAR) 5 MG tablet, TK 1 T PO ONCE DAILY, Disp: , Rfl: 2  •  Fish Oil-Cholecalciferol (FISH OIL + D3) 0664-7351 MG-UNIT capsule, , Disp: , Rfl:   •  lamoTRIgine (LaMICtal) 100 MG tablet, Take 250 mg by mouth Daily., Disp: , Rfl:   •  lamoTRIgine (LaMICtal) 200 MG tablet, Take 250 mg by mouth Daily., Disp: , Rfl:   •  nefazodone (SERZONE) 50 MG tablet, Take  by mouth 3 (Three) Times a Day., Disp: , Rfl:   •  NIFEdipine XL (PROCARDIA XL) 60 MG 24 hr tablet, Take 60 mg by mouth Daily., Disp: , Rfl:   •  pantoprazole (PROTONIX) 40 MG EC tablet, TK 1 T PO D, Disp: , Rfl: 4  •  propafenone (RHTHYMOL) 150 MG tablet, Take 1 tablet by mouth Every 8 (Eight) Hours., Disp: 270 tablet, Rfl: 3  •  simvastatin (ZOCOR) 20 MG tablet, Take 1 tablet by mouth Every Night., Disp: 90 tablet, Rfl: 3      Allergies as of 04/09/2018   • (No Known Allergies)     Social History     Social History   • Marital status:      Spouse name: N/A   • Number of children: N/A   • Years of education: N/A     Occupational History   • Not on file.     Social History Main Topics   • Smoking status: Never Smoker   • Smokeless tobacco: Never Used   • Alcohol use No   • Drug use: Unknown   • Sexual activity: Defer     Other Topics Concern   • Not on file     Social History Narrative   • No narrative on file     Family History   Problem Relation Age of Onset   • Hypertension Mother    • Cancer Father      prostate   • Heart disease Father    • Coronary artery disease Father    • Stroke Father    • Cancer Brother      prostate     Review of Systems   Constitution: Negative for fever, malaise/fatigue, weight gain and weight loss.   HENT: Negative for ear  "pain, hearing loss, nosebleeds and sore throat.    Eyes: Negative for double vision, pain, vision loss in left eye and vision loss in right eye.   Cardiovascular:        See history of present illness.   Respiratory: Negative for cough, shortness of breath, sleep disturbances due to breathing, snoring and wheezing.    Endocrine: Negative for cold intolerance, heat intolerance and polyuria.   Skin: Negative for itching, poor wound healing and rash.   Musculoskeletal: Negative for joint pain, joint swelling and myalgias.   Gastrointestinal: Negative for abdominal pain, diarrhea, hematochezia, nausea and vomiting.   Genitourinary: Negative for hematuria and hesitancy.   Neurological: Negative for numbness, paresthesias and seizures.   Psychiatric/Behavioral: Negative for depression. The patient is not nervous/anxious.         Objective:     Vitals:    04/09/18 0816 04/09/18 0817   BP: 126/76 128/76   BP Location: Right arm Left arm   Pulse: 68    Weight: 88.5 kg (195 lb)    Height: 172.7 cm (68\")      Body mass index is 29.65 kg/m².    Physical Exam   Constitutional: He is oriented to person, place, and time. He appears well-developed and well-nourished.   HENT:   Head: Normocephalic.   Nose: Nose normal.   Mouth/Throat: Oropharynx is clear and moist.   Eyes: Conjunctivae and EOM are normal. Pupils are equal, round, and reactive to light. Right eye exhibits no discharge. No scleral icterus.   Neck: Normal range of motion. Neck supple. No JVD present. No thyromegaly present.   Cardiovascular: Normal rate, regular rhythm, normal heart sounds and intact distal pulses.  Exam reveals no gallop and no friction rub.    No murmur heard.  Pulses:       Carotid pulses are 2+ on the right side, and 2+ on the left side.       Radial pulses are 2+ on the right side, and 2+ on the left side.        Femoral pulses are 2+ on the right side, and 2+ on the left side.       Popliteal pulses are 2+ on the right side, and 2+ on the left " side.        Dorsalis pedis pulses are 2+ on the right side, and 2+ on the left side.        Posterior tibial pulses are 2+ on the right side, and 2+ on the left side.   Pulmonary/Chest: Effort normal and breath sounds normal. No respiratory distress. He has no wheezes. He has no rales.   Abdominal: Soft. Bowel sounds are normal. He exhibits no distension. There is no hepatosplenomegaly. There is no tenderness. There is no rebound.   Musculoskeletal: Normal range of motion. He exhibits no edema or tenderness.   Neurological: He is alert and oriented to person, place, and time.   Skin: Skin is warm and dry. No rash noted. No erythema.   Psychiatric: He has a normal mood and affect. His behavior is normal. Judgment and thought content normal.   Vitals reviewed.    Lab Review:     ECG 12 Lead  Date/Time: 4/9/2018 8:50 AM  Performed by: SHANEKA DE DIOS  Authorized by: SHANEKA DE DIOS   Comparison: compared with previous ECG   Similar to previous ECG  Rhythm: sinus rhythm  Conduction: 1st degree and non-specific intraventricular conduction delay  Conduction comments: Nonspecific ST T wave changes  Clinical impression: abnormal ECG          Assessment:       Diagnosis Plan   1. PAF (paroxysmal atrial fibrillation)  Adult Transthoracic Echo Complete W/ Cont if Necessary Per Protocol    Holter Monitor - 72 Hour Up To 21 Days   2. Essential hypertension  Adult Transthoracic Echo Complete W/ Cont if Necessary Per Protocol   3. Familial hypercholesterolemia     4. Coronary artery disease involving native coronary artery of native heart without angina pectoris  Adult Transthoracic Echo Complete W/ Cont if Necessary Per Protocol    Holter Monitor - 72 Hour Up To 21 Days   5. Palpitations  Holter Monitor - 72 Hour Up To 21 Days   6. Thoracic aortic aneurysm without rupture  Holter Monitor - 72 Hour Up To 21 Days     Plan:       1.  Paroxysmal atrial fibrillation.  He has rare palpitations.  He is not on any anticoagulant therapy  whatsoever.  I recommended he see testing with 0 patch as he is now over age of 65 and his chads VASC score is 3.  2.  Dilated aortic root we'll check an echocardiogram  3. dilated right ventricle, as above likely due to sleep apnea.  4.  Obstructive sleep apnea on CPAP therapy  5.  Hypertension, controlled  6.  Dyslipidemia  7.  Coronary artery disease.  Noted by CT imaging there is no obvious anginal symptoms at this time we'll continue with lifestyle modification and risk factor management.  Plan on repeat stress testing in 1 year    Atrial Fibrillation and Atrial Flutter  Assessment  • The patient has paroxysmal atrial fibrillation  • This is non-valvular in etiology  • The patient's CHADS2-VASc score is 3  • A [id number] score of 2 or more is considered a high risk for a thromboembolic event    Plan  • Attempt to maintain sinus rhythm  • Continue propafenone for rhythm control       Wilmar Carmona   Home Medication Instructions SALMA:    Printed on:04/09/18 2956   Medication Information                      acebutolol (SECTRAL) 200 MG capsule  Take 1 capsule by mouth 2 (Two) Times a Day.             finasteride (PROSCAR) 5 MG tablet  TK 1 T PO ONCE DAILY             Fish Oil-Cholecalciferol (FISH OIL + D3) 0415-6739 MG-UNIT capsule               lamoTRIgine (LaMICtal) 100 MG tablet  Take 250 mg by mouth Daily.             lamoTRIgine (LaMICtal) 200 MG tablet  Take 250 mg by mouth Daily.             nefazodone (SERZONE) 50 MG tablet  Take  by mouth 3 (Three) Times a Day.             NIFEdipine XL (PROCARDIA XL) 60 MG 24 hr tablet  Take 60 mg by mouth Daily.             pantoprazole (PROTONIX) 40 MG EC tablet  TK 1 T PO D             propafenone (RHTHYMOL) 150 MG tablet  Take 1 tablet by mouth Every 8 (Eight) Hours.             simvastatin (ZOCOR) 20 MG tablet  Take 1 tablet by mouth Every Night.                 No orders of the defined types were placed in this encounter.      Dictated utilizing Ernst  dictation

## 2018-04-12 ENCOUNTER — TELEPHONE (OUTPATIENT)
Dept: CARDIOLOGY | Facility: CLINIC | Age: 68
End: 2018-04-12

## 2018-04-19 RX ORDER — PROPAFENONE HYDROCHLORIDE 150 MG/1
150 TABLET, COATED ORAL EVERY 8 HOURS
Qty: 270 TABLET | Refills: 1 | Status: SHIPPED | OUTPATIENT
Start: 2018-04-19 | End: 2018-08-29 | Stop reason: SDUPTHER

## 2018-04-24 ENCOUNTER — TELEPHONE (OUTPATIENT)
Dept: INTERNAL MEDICINE | Facility: CLINIC | Age: 68
End: 2018-04-24

## 2018-04-24 RX ORDER — FLUCONAZOLE 100 MG/1
100 TABLET ORAL DAILY
Qty: 10 TABLET | Refills: 0 | Status: SHIPPED | OUTPATIENT
Start: 2018-04-24 | End: 2018-06-29 | Stop reason: SDUPTHER

## 2018-04-24 NOTE — TELEPHONE ENCOUNTER
Pt is c/o thrush and was seen at the [facility] and he tried oral med and it went away and but it came back and he's tried the swish and spit and it wont go away completely. Any recommendation?  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

## 2018-04-26 ENCOUNTER — TRANSCRIBE ORDERS (OUTPATIENT)
Dept: SLEEP MEDICINE | Facility: HOSPITAL | Age: 68
End: 2018-04-26

## 2018-04-26 DIAGNOSIS — G47.33 OSA (OBSTRUCTIVE SLEEP APNEA): Primary | ICD-10-CM

## 2018-04-30 ENCOUNTER — HOSPITAL ENCOUNTER (OUTPATIENT)
Dept: SLEEP MEDICINE | Facility: HOSPITAL | Age: 68
Discharge: HOME | End: 2018-04-30
Admitting: INTERNAL MEDICINE

## 2018-04-30 DIAGNOSIS — G47.33 OSA (OBSTRUCTIVE SLEEP APNEA): ICD-10-CM

## 2018-04-30 PROCEDURE — 95811 POLYSOM 6/>YRS CPAP 4/> PARM: CPT

## 2018-05-03 ENCOUNTER — HOSPITAL ENCOUNTER (EMERGENCY)
Facility: HOSPITAL | Age: 68
Discharge: HOME | End: 2018-05-03
Attending: EMERGENCY MEDICINE | Admitting: EMERGENCY MEDICINE

## 2018-05-03 ENCOUNTER — APPOINTMENT (OUTPATIENT)
Dept: CARDIOLOGY | Facility: HOSPITAL | Age: 68
End: 2018-05-03

## 2018-05-03 VITALS
BODY MASS INDEX: 27.28 KG/M2 | WEIGHT: 180 LBS | TEMPERATURE: 98.7 F | RESPIRATION RATE: 18 BRPM | HEART RATE: 65 BPM | DIASTOLIC BLOOD PRESSURE: 77 MMHG | HEIGHT: 68 IN | OXYGEN SATURATION: 98 % | SYSTOLIC BLOOD PRESSURE: 131 MMHG

## 2018-05-03 DIAGNOSIS — S83.92XA: Primary | ICD-10-CM

## 2018-05-03 LAB
ALBUMIN SERPL-MCNC: 4.2 G/DL (ref 3.5–5.2)
ALBUMIN/GLOB SERPL: 1.6 G/DL
ALP SERPL-CCNC: 36 U/L (ref 39–117)
ALT SERPL W P-5'-P-CCNC: 15 U/L (ref 1–41)
ANION GAP SERPL CALCULATED.3IONS-SCNC: 13 MMOL/L
AST SERPL-CCNC: 18 U/L (ref 1–40)
BASOPHILS # BLD AUTO: 0.06 10*3/MM3 (ref 0–0.2)
BASOPHILS NFR BLD AUTO: 0.9 % (ref 0–1.5)
BH CV LOWER VASCULAR LEFT COMMON FEMORAL AUGMENT: NORMAL
BH CV LOWER VASCULAR LEFT COMMON FEMORAL COMPETENT: NORMAL
BH CV LOWER VASCULAR LEFT COMMON FEMORAL COMPRESS: NORMAL
BH CV LOWER VASCULAR LEFT COMMON FEMORAL PHASIC: NORMAL
BH CV LOWER VASCULAR LEFT COMMON FEMORAL SPONT: NORMAL
BH CV LOWER VASCULAR LEFT DISTAL FEMORAL COMPRESS: NORMAL
BH CV LOWER VASCULAR LEFT GASTRONEMIUS COMPRESS: NORMAL
BH CV LOWER VASCULAR LEFT GREATER SAPH AK COMPRESS: NORMAL
BH CV LOWER VASCULAR LEFT GREATER SAPH BK COMPRESS: NORMAL
BH CV LOWER VASCULAR LEFT LESSER SAPH COMPRESS: NORMAL
BH CV LOWER VASCULAR LEFT MID FEMORAL AUGMENT: NORMAL
BH CV LOWER VASCULAR LEFT MID FEMORAL COMPETENT: NORMAL
BH CV LOWER VASCULAR LEFT MID FEMORAL COMPRESS: NORMAL
BH CV LOWER VASCULAR LEFT MID FEMORAL PHASIC: NORMAL
BH CV LOWER VASCULAR LEFT MID FEMORAL SPONT: NORMAL
BH CV LOWER VASCULAR LEFT PERONEAL COMPRESS: NORMAL
BH CV LOWER VASCULAR LEFT POPLITEAL AUGMENT: NORMAL
BH CV LOWER VASCULAR LEFT POPLITEAL COMPETENT: NORMAL
BH CV LOWER VASCULAR LEFT POPLITEAL COMPRESS: NORMAL
BH CV LOWER VASCULAR LEFT POPLITEAL PHASIC: NORMAL
BH CV LOWER VASCULAR LEFT POPLITEAL SPONT: NORMAL
BH CV LOWER VASCULAR LEFT POSTERIOR TIBIAL COMPRESS: NORMAL
BH CV LOWER VASCULAR LEFT PROXIMAL FEMORAL COMPRESS: NORMAL
BH CV LOWER VASCULAR LEFT SAPHENOFEMORAL JUNCTION AUGMENT: NORMAL
BH CV LOWER VASCULAR LEFT SAPHENOFEMORAL JUNCTION COMPETENT: NORMAL
BH CV LOWER VASCULAR LEFT SAPHENOFEMORAL JUNCTION COMPRESS: NORMAL
BH CV LOWER VASCULAR LEFT SAPHENOFEMORAL JUNCTION PHASIC: NORMAL
BH CV LOWER VASCULAR LEFT SAPHENOFEMORAL JUNCTION SPONT: NORMAL
BH CV LOWER VASCULAR RIGHT COMMON FEMORAL AUGMENT: NORMAL
BH CV LOWER VASCULAR RIGHT COMMON FEMORAL COMPETENT: NORMAL
BH CV LOWER VASCULAR RIGHT COMMON FEMORAL COMPRESS: NORMAL
BH CV LOWER VASCULAR RIGHT COMMON FEMORAL PHASIC: NORMAL
BH CV LOWER VASCULAR RIGHT COMMON FEMORAL SPONT: NORMAL
BILIRUB SERPL-MCNC: 0.3 MG/DL (ref 0.1–1.2)
BUN BLD-MCNC: 21 MG/DL (ref 8–23)
BUN/CREAT SERPL: 17.1 (ref 7–25)
CALCIUM SPEC-SCNC: 8.8 MG/DL (ref 8.6–10.5)
CHLORIDE SERPL-SCNC: 104 MMOL/L (ref 98–107)
CO2 SERPL-SCNC: 26 MMOL/L (ref 22–29)
CREAT BLD-MCNC: 1.23 MG/DL (ref 0.76–1.27)
DEPRECATED RDW RBC AUTO: 44.3 FL (ref 37–54)
EOSINOPHIL # BLD AUTO: 0.22 10*3/MM3 (ref 0–0.7)
EOSINOPHIL NFR BLD AUTO: 3.5 % (ref 0.3–6.2)
ERYTHROCYTE [DISTWIDTH] IN BLOOD BY AUTOMATED COUNT: 12.9 % (ref 11.5–14.5)
GFR SERPL CREATININE-BSD FRML MDRD: 59 ML/MIN/1.73
GLOBULIN UR ELPH-MCNC: 2.7 GM/DL
GLUCOSE BLD-MCNC: 107 MG/DL (ref 65–99)
HCT VFR BLD AUTO: 44.2 % (ref 40.4–52.2)
HGB BLD-MCNC: 14.5 G/DL (ref 13.7–17.6)
HOLD SPECIMEN: NORMAL
HOLD SPECIMEN: NORMAL
IMM GRANULOCYTES # BLD: 0 10*3/MM3 (ref 0–0.03)
IMM GRANULOCYTES NFR BLD: 0 % (ref 0–0.5)
LYMPHOCYTES # BLD AUTO: 2.58 10*3/MM3 (ref 0.9–4.8)
LYMPHOCYTES NFR BLD AUTO: 40.7 % (ref 19.6–45.3)
MCH RBC QN AUTO: 30.7 PG (ref 27–32.7)
MCHC RBC AUTO-ENTMCNC: 32.8 G/DL (ref 32.6–36.4)
MCV RBC AUTO: 93.4 FL (ref 79.8–96.2)
MONOCYTES # BLD AUTO: 0.36 10*3/MM3 (ref 0.2–1.2)
MONOCYTES NFR BLD AUTO: 5.7 % (ref 5–12)
NEUTROPHILS # BLD AUTO: 3.12 10*3/MM3 (ref 1.9–8.1)
NEUTROPHILS NFR BLD AUTO: 49.2 % (ref 42.7–76)
PLATELET # BLD AUTO: 255 10*3/MM3 (ref 140–500)
PMV BLD AUTO: 9.6 FL (ref 6–12)
POTASSIUM BLD-SCNC: 4.5 MMOL/L (ref 3.5–5.2)
PROT SERPL-MCNC: 6.9 G/DL (ref 6–8.5)
RBC # BLD AUTO: 4.73 10*6/MM3 (ref 4.6–6)
SODIUM BLD-SCNC: 143 MMOL/L (ref 136–145)
WBC NRBC COR # BLD: 6.34 10*3/MM3 (ref 4.5–10.7)
WHOLE BLOOD HOLD SPECIMEN: NORMAL
WHOLE BLOOD HOLD SPECIMEN: NORMAL

## 2018-05-03 PROCEDURE — 36415 COLL VENOUS BLD VENIPUNCTURE: CPT

## 2018-05-03 PROCEDURE — 80053 COMPREHEN METABOLIC PANEL: CPT

## 2018-05-03 PROCEDURE — 85025 COMPLETE CBC W/AUTO DIFF WBC: CPT

## 2018-05-03 PROCEDURE — 93971 EXTREMITY STUDY: CPT

## 2018-05-03 PROCEDURE — 99283 EMERGENCY DEPT VISIT LOW MDM: CPT

## 2018-05-03 NOTE — ED TRIAGE NOTES
Pt complains of lower left leg pain and swelling that started this morning. Pt states that the pain has gotten worse. There is slight swelling to the left calf but no redness. Pt appears to be in NAD at this time, skin is PWD, and breathing is even and unlabored.

## 2018-05-03 NOTE — ED PROVIDER NOTES
[facility] EMERGENCY DEPARTMENT ENCOUNTER    CHIEF COMPLAINT  Chief Complaint: left leg pain and swelling  History given by: patient  History limited by: nothing  [facility] Room Number: 49/49  PMD: Darryl King Jr., MD      HPI:  Pt is a 67 y.o. male who presents complaining of left leg pain and swelling since 0900 while standing at work. Pt also reports there is some mild bruising and warmth to the area. He denies any recent trauma, CP, or SOA. Pt states his symptoms have resolved since arriving to the ED.    Onset: gradual  Duration: since 0900  Severity: moderate  Associated symptoms: mild bruising and warmth to left leg  Previous treatment: Pt reports no treatment PTA.    PAST MEDICAL HISTORY  Active Ambulatory Problems     Diagnosis Date Noted   • Abnormal electrocardiogram 05/06/2016   • PAF (paroxysmal atrial fibrillation) 05/06/2016   • Hypertension 05/06/2016   • Premature atrial contraction 05/06/2016   • Hyperlipidemia 05/06/2016   • Bronchitis with bronchospasm 11/18/2016   • Contusion of bone 02/09/2018   • Sprain of left ankle 02/09/2018   • Coronary artery disease involving native coronary artery of native heart without angina pectoris 04/09/2018     Resolved Ambulatory Problems     Diagnosis Date Noted   • Abnormal cardiovascular stress test 05/06/2016   • Palpitations 05/06/2016     Past Medical History:   Diagnosis Date   • Abnormal EKG    • Abnormal stress test    • Arthritis    • Atrial fibrillation    • BPH (benign prostatic hypertrophy)    • Depression    • GERD (gastroesophageal reflux disease)    • Heart murmur    • Hyperlipidemia    • Hypertension    • Mild tricuspid regurgitation    • PAC (premature atrial contraction)    • Palpitations    • Sleep apnea    • Tremor        PAST SURGICAL HISTORY  Past Surgical History:   Procedure Laterality Date   • CARDIAC CATHETERIZATION  2015   • COLONOSCOPY  10/20/2014    normal -dr leon..had egd at same time   • HERNIA REPAIR     • KNEE SURGERY Left    •  TONSILLECTOMY     • VASECTOMY         FAMILY HISTORY  Family History   Problem Relation Age of Onset   • Hypertension Mother    • Cancer Father      prostate   • Heart disease Father    • Coronary artery disease Father    • Stroke Father    • Cancer Brother      prostate       SOCIAL HISTORY  Social History     Social History   • Marital status:      Spouse name: N/A   • Number of children: N/A   • Years of education: N/A     Occupational History   • Not on file.     Social History Main Topics   • Smoking status: Never Smoker   • Smokeless tobacco: Never Used   • Alcohol use No   • Drug use: Unknown   • Sexual activity: Defer     Other Topics Concern   • Not on file     Social History Narrative   • No narrative on file       ALLERGIES  Review of patient's allergies indicates no known allergies.    REVIEW OF SYSTEMS  Review of Systems   Constitutional: Negative for fever.   Respiratory: Negative for shortness of breath.    Cardiovascular: Negative for chest pain.   Musculoskeletal: Positive for myalgias (left leg).   Skin: Positive for color change (bruise to left leg, resolved).       PHYSICAL EXAM  ED Triage Vitals   Temp Heart Rate Resp BP SpO2   05/03/18 1301 05/03/18 1259 05/03/18 1259 05/03/18 1314 05/03/18 1259   98.7 °F (37.1 °C) 65 18 131/77 98 %      Temp src Heart Rate Source Patient Position BP Location FiO2 (%)   05/03/18 1301 -- 05/03/18 1314 -- --   Tympanic  Sitting         Physical Exam   Constitutional: He is oriented to person, place, and time and well-developed, well-nourished, and in no distress.   HENT:   Head: Normocephalic and atraumatic.   Eyes: EOM are normal. Pupils are equal, round, and reactive to light.   Neck: Normal range of motion. Neck supple.   Cardiovascular: Normal rate, regular rhythm and normal heart sounds.    No murmur heard.  2+ dorsal pedal pulse on left  Cap refill is 1 second   Pulmonary/Chest: Effort normal and breath sounds normal. No respiratory distress.    Abdominal: Soft. There is no tenderness. There is no rebound and no guarding.   Musculoskeletal: Normal range of motion. He exhibits no edema.   BLE are equal in size  Mild tenderness to distal posteromedial leg  Tibia and fibula are non-tender  No Naidne's sign   Neurological: He is alert and oriented to person, place, and time. He has normal sensation and normal strength.   Skin: Skin is warm and dry.   Psychiatric: Mood and affect normal.   Nursing note and vitals reviewed.      LAB RESULTS  Lab Results (last 24 hours)     Procedure Component Value Units Date/Time    CBC & Differential [[id number]] Collected:  05/03/18 1320    Specimen:  Blood Updated:  05/03/18 1336    Narrative:       The following orders were created for panel order CBC & Differential.  Procedure                               Abnormality         Status                     ---------                               -----------         ------                     CBC Auto Differential[[id number]]        Normal              Final result                 Please view results for these tests on the individual orders.    Comprehensive Metabolic Panel [[id number]]  (Abnormal) Collected:  05/03/18 1320    Specimen:  Blood Updated:  05/03/18 1405     Glucose 107 (H) mg/dL      BUN 21 mg/dL      Creatinine 1.23 mg/dL      Sodium 143 mmol/L      Potassium 4.5 mmol/L      Chloride 104 mmol/L      CO2 26.0 mmol/L      Calcium 8.8 mg/dL      Total Protein 6.9 g/dL      Albumin 4.20 g/dL      ALT (SGPT) 15 U/L      AST (SGOT) 18 U/L      Alkaline Phosphatase 36 (L) U/L      Total Bilirubin 0.3 mg/dL      eGFR Non African Amer 59 (L) mL/min/1.73      Globulin 2.7 gm/dL      A/G Ratio 1.6 g/dL      BUN/Creatinine Ratio 17.1     Anion Gap 13.0 mmol/L     CBC Auto Differential [[id number]]  (Normal) Collected:  05/03/18 1320    Specimen:  Blood Updated:  05/03/18 1336     WBC 6.34 10*3/mm3      RBC 4.73 10*6/mm3      Hemoglobin 14.5 g/dL      Hematocrit 44.2 %      MCV  93.4 fL      MCH 30.7 pg      MCHC 32.8 g/dL      RDW 12.9 %      RDW-SD 44.3 fl      MPV 9.6 fL      Platelets 255 10*3/mm3      Neutrophil % 49.2 %      Lymphocyte % 40.7 %      Monocyte % 5.7 %      Eosinophil % 3.5 %      Basophil % 0.9 %      Immature Grans % 0.0 %      Neutrophils, Absolute 3.12 10*3/mm3      Lymphocytes, Absolute 2.58 10*3/mm3      Monocytes, Absolute 0.36 10*3/mm3      Eosinophils, Absolute 0.22 10*3/mm3      Basophils, Absolute 0.06 10*3/mm3      Immature Grans, Absolute 0.00 10*3/mm3           I ordered the above labs and reviewed the results      PROCEDURES  Procedures      PROGRESS AND CONSULTS  ED Course   1418  Reviewed pt's venus US, which is negative for DVT.    1424  Notified pt of all lab and imaging results which are negative. Discussed plan to d/c home. Pt understands and agrees with the plan, all questions answered.      MEDICAL DECISION MAKING  Results were reviewed/discussed with the patient and they were also made aware of online access. Pt also made aware that some labs, such as cultures, will not be resulted during ER visit and follow up with PMD is necessary.     MDM  Number of Diagnoses or Management Options  Sprain of lower leg, left, initial encounter:      Amount and/or Complexity of Data Reviewed  Clinical lab tests: ordered and reviewed (Labs are unremarkable.)  Tests in the radiology section of CPT®: ordered and reviewed (Doppler left leg is negative.)  Independent visualization of images, tracings, or specimens: yes    Patient Progress  Patient progress: stable         DIAGNOSIS  Final diagnoses:   Sprain of lower leg, left, initial encounter       DISPOSITION  DISCHARGE    Patient discharged in stable condition.    Reviewed implications of results, diagnosis, meds, responsibility to follow up, warning signs and symptoms of possible worsening, potential complications and reasons to return to ER.    Patient/Family voiced understanding of above  instructions.    Discussed plan for discharge, as there is no emergent indication for admission. Patient referred to primary care provider for BP management due to today's BP. Pt/family is agreeable and understands need for follow up and repeat testing.  Pt is aware that discharge does not mean that nothing is wrong but it indicates no emergency is present that requires admission and they must continue care with follow-up as given below or physician of their choice.     FOLLOW-UP  Darryl King Jr., MD  [address] [address]  [phone]    Schedule an appointment as soon as possible for a visit            Medication List      No changes were made to your prescriptions during this visit.         Latest Documented Vital Signs:  As of 2:26 PM  BP- 131/77 HR- 65 Temp- 98.7 °F (37.1 °C) (Tympanic) O2 sat- 98%    --  Documentation assistance provided by yany Jorgensen for Dr. Stokes.  Information recorded by the scribe was done at my direction and has been verified and validated by me.     Bhavana Jorgensen  05/03/18 4414       Venu Stokes MD  05/03/18 3696

## 2018-05-03 NOTE — DISCHARGE INSTRUCTIONS
Use over the counter Tylenol as needed for pain. Wear looser fitting socks.  Please return to the ED if symptoms worsen with chest pain or trouble breathing.

## 2018-05-07 ENCOUNTER — TELEPHONE (OUTPATIENT)
Dept: SOCIAL WORK | Facility: HOSPITAL | Age: 68
End: 2018-05-07

## 2018-05-07 ENCOUNTER — TELEPHONE (OUTPATIENT)
Dept: SLEEP MEDICINE | Facility: HOSPITAL | Age: 68
End: 2018-05-07

## 2018-05-07 NOTE — TELEPHONE ENCOUNTER
Faxed CPAP titration results to [facility].  Per Dr Mgcovern, pt to f/u at [facility].  Called [facility] to confirm receipt of pt study results.  [facility] is aware pt needs f/u appt @ their office. day[place]

## 2018-06-01 ENCOUNTER — TELEPHONE (OUTPATIENT)
Dept: CARDIOLOGY | Facility: CLINIC | Age: 68
End: 2018-06-01

## 2018-06-01 NOTE — TELEPHONE ENCOUNTER
Received a call from Lorena with Dr Tam's office @ [phone].  They are wanting the pt to have a Jorge Luis upper blepharoplasty under LMA anesthesia.  Pt did have an EKG at that office and this was abnormal.  They want a clearance to proceed.  No SOA, no palpitations, or no CP.  Pt was last seen 4/2018.     I did place a copy in your INBOX.

## 2018-06-21 DIAGNOSIS — E78.01 FAMILIAL HYPERCHOLESTEROLEMIA: ICD-10-CM

## 2018-06-21 DIAGNOSIS — I10 ESSENTIAL HYPERTENSION: Primary | ICD-10-CM

## 2018-06-22 LAB
ALBUMIN SERPL-MCNC: 4.3 G/DL (ref 3.5–5.2)
ALBUMIN/GLOB SERPL: 2 G/DL
ALP SERPL-CCNC: 39 U/L (ref 39–117)
ALT SERPL-CCNC: 14 U/L (ref 1–41)
AST SERPL-CCNC: 12 U/L (ref 1–40)
BASOPHILS # BLD AUTO: 0.02 10*3/MM3 (ref 0–0.2)
BASOPHILS NFR BLD AUTO: 0.3 % (ref 0–1.5)
BILIRUB SERPL-MCNC: 0.3 MG/DL (ref 0.1–1.2)
BUN SERPL-MCNC: 21 MG/DL (ref 8–23)
BUN/CREAT SERPL: 16.3 (ref 7–25)
CALCIUM SERPL-MCNC: 9.5 MG/DL (ref 8.6–10.5)
CHLORIDE SERPL-SCNC: 104 MMOL/L (ref 98–107)
CHOLEST SERPL-MCNC: 186 MG/DL (ref 0–200)
CHOLEST/HDLC SERPL: 4.89 {RATIO}
CO2 SERPL-SCNC: 27.9 MMOL/L (ref 22–29)
CREAT SERPL-MCNC: 1.29 MG/DL (ref 0.76–1.27)
EOSINOPHIL # BLD AUTO: 0.06 10*3/MM3 (ref 0–0.7)
EOSINOPHIL NFR BLD AUTO: 0.8 % (ref 0.3–6.2)
ERYTHROCYTE [DISTWIDTH] IN BLOOD BY AUTOMATED COUNT: 13.2 % (ref 11.5–14.5)
GFR SERPLBLD CREATININE-BSD FMLA CKD-EPI: 56 ML/MIN/1.73
GFR SERPLBLD CREATININE-BSD FMLA CKD-EPI: 67 ML/MIN/1.73
GLOBULIN SER CALC-MCNC: 2.2 GM/DL
GLUCOSE SERPL-MCNC: 96 MG/DL (ref 65–99)
HCT VFR BLD AUTO: 46.8 % (ref 40.4–52.2)
HDLC SERPL-MCNC: 38 MG/DL (ref 40–60)
HGB BLD-MCNC: 15.2 G/DL (ref 13.7–17.6)
IMM GRANULOCYTES # BLD: 0.02 10*3/MM3 (ref 0–0.03)
IMM GRANULOCYTES NFR BLD: 0.3 % (ref 0–0.5)
LDLC SERPL CALC-MCNC: 123 MG/DL (ref 0–100)
LYMPHOCYTES # BLD AUTO: 2.64 10*3/MM3 (ref 0.9–4.8)
LYMPHOCYTES NFR BLD AUTO: 33.2 % (ref 19.6–45.3)
MCH RBC QN AUTO: 30.5 PG (ref 27–32.7)
MCHC RBC AUTO-ENTMCNC: 32.5 G/DL (ref 32.6–36.4)
MCV RBC AUTO: 93.8 FL (ref 79.8–96.2)
MONOCYTES # BLD AUTO: 0.56 10*3/MM3 (ref 0.2–1.2)
MONOCYTES NFR BLD AUTO: 7.1 % (ref 5–12)
NEUTROPHILS # BLD AUTO: 4.64 10*3/MM3 (ref 1.9–8.1)
NEUTROPHILS NFR BLD AUTO: 58.3 % (ref 42.7–76)
PLATELET # BLD AUTO: 271 10*3/MM3 (ref 140–500)
POTASSIUM SERPL-SCNC: 4.3 MMOL/L (ref 3.5–5.2)
PROT SERPL-MCNC: 6.5 G/DL (ref 6–8.5)
RBC # BLD AUTO: 4.99 10*6/MM3 (ref 4.6–6)
SODIUM SERPL-SCNC: 144 MMOL/L (ref 136–145)
TRIGL SERPL-MCNC: 125 MG/DL (ref 0–150)
UNABLE TO VOID: NORMAL
VLDLC SERPL CALC-MCNC: 25 MG/DL (ref 5–40)
WBC # BLD AUTO: 7.94 10*3/MM3 (ref 4.5–10.7)

## 2018-06-29 RX ORDER — FLUCONAZOLE 100 MG/1
100 TABLET ORAL DAILY
Qty: 10 TABLET | Refills: 0 | Status: SHIPPED | OUTPATIENT
Start: 2018-06-29 | End: 2018-07-03

## 2018-07-03 ENCOUNTER — OFFICE VISIT (OUTPATIENT)
Dept: INTERNAL MEDICINE | Facility: CLINIC | Age: 68
End: 2018-07-03

## 2018-07-03 VITALS
HEART RATE: 63 BPM | DIASTOLIC BLOOD PRESSURE: 76 MMHG | WEIGHT: 179.2 LBS | SYSTOLIC BLOOD PRESSURE: 131 MMHG | BODY MASS INDEX: 27.16 KG/M2 | OXYGEN SATURATION: 96 % | HEIGHT: 68 IN | TEMPERATURE: 98.1 F

## 2018-07-03 DIAGNOSIS — B37.81 ESOPHAGEAL CANDIDIASIS (HCC): ICD-10-CM

## 2018-07-03 DIAGNOSIS — R68.89 COLD INTOLERANCE: ICD-10-CM

## 2018-07-03 DIAGNOSIS — E78.2 MIXED HYPERLIPIDEMIA: ICD-10-CM

## 2018-07-03 DIAGNOSIS — R53.83 OTHER FATIGUE: ICD-10-CM

## 2018-07-03 DIAGNOSIS — I48.0 PAF (PAROXYSMAL ATRIAL FIBRILLATION) (HCC): ICD-10-CM

## 2018-07-03 DIAGNOSIS — I10 ESSENTIAL HYPERTENSION: ICD-10-CM

## 2018-07-03 DIAGNOSIS — I25.10 CORONARY ARTERY DISEASE INVOLVING NATIVE CORONARY ARTERY OF NATIVE HEART WITHOUT ANGINA PECTORIS: Primary | ICD-10-CM

## 2018-07-03 PROCEDURE — 99214 OFFICE O/P EST MOD 30 MIN: CPT | Performed by: FAMILY MEDICINE

## 2018-07-03 RX ORDER — FLUCONAZOLE 200 MG/1
200 TABLET ORAL DAILY
Qty: 16 TABLET | Refills: 0 | Status: SHIPPED | OUTPATIENT
Start: 2018-07-03 | End: 2018-07-13 | Stop reason: ALTCHOICE

## 2018-07-03 NOTE — PROGRESS NOTES
Subjective   Wilmar Carmona is a 67 y.o. male.     Chief Complaint   Patient presents with   • GI Problem     Thrush, esophagitis, hiatal hernia, nail splitting, history of liver cyst.  Also cold intolerance.    History of Present Illness     Delightful gentleman with a history of recurrent thrush over the past 6 months.  He is gotten some improvement with at least 2 rounds of fluconazole requires at least 200 mg a day.  Historically it sounds like he's had some esophageal spasms related to yeast esophagitis.    Otherwise he has been evaluated gastroenterology Dr. Purdy with 2 EGDs over 10.  An upper GI swallow test gastrointestinal and he had previous severe GERD improved weight loss.  He is a new cardiologist whose they waited him as well.  History includes question of CAD also paroxysmal atrial fibrillation.  Treatment of hypertension hyperlipidemia is reviewed.    He's experienced some splitting of the nail of the index finger and no left hand.    He's experienced cold intolerance as well.  Prior labs are reviewed with him.  There is a history of a liver cyst on imaging studies.    The following portions of the patient's history were reviewed and updated as appropriate: allergies, current medications, past social history and problem list.    Review of Systems   Constitutional: Positive for fatigue.   HENT: Negative.    Eyes: Negative.    Respiratory: Negative.    Cardiovascular: Negative.    Gastrointestinal: Negative.    Endocrine: Positive for cold intolerance.   Genitourinary: Negative.    Musculoskeletal: Negative.    Skin: Negative.    Allergic/Immunologic: Negative.    Neurological: Negative.    Hematological: Negative.    Psychiatric/Behavioral: Negative.        Objective   Vitals:    07/03/18 1655   BP: 131/76   Pulse: 63   Temp: 98.1 °F (36.7 °C)   SpO2: 96%     Physical Exam   Constitutional: He is oriented to person, place, and time. He appears well-developed and well-nourished.   HENT:   Head:  Normocephalic.   Right Ear: External ear normal.   Left Ear: External ear normal.   Mouth/Throat: Oropharynx is clear and moist.   Eyes: Pupils are equal, round, and reactive to light.   Neck: Normal range of motion. Neck supple.   Cardiovascular: Normal rate, regular rhythm and normal heart sounds.    Pulmonary/Chest: Effort normal and breath sounds normal.   Abdominal: Soft. Bowel sounds are normal.   Musculoskeletal: Normal range of motion.        Hands:  Neurological: He is alert and oriented to person, place, and time.   Psychiatric: He has a normal mood and affect.   Vitals reviewed.      Assessment/Plan   Problem List Items Addressed This Visit        Cardiovascular and Mediastinum    PAF (paroxysmal atrial fibrillation) (CMS/HCC)    Coronary artery disease involving native coronary artery of native heart without angina pectoris - Primary    Hypertension    Hyperlipidemia      Other Visit Diagnoses     Esophageal candidiasis (CMS/[facility])        Relevant Medications    fluconazole (DIFLUCAN) 200 MG tablet    Other Relevant Orders    IgG    IgM    IgA    TSH    T4, Free    T3, Free    Sedimentation Rate    Prolactin    Testosterone, Free, Total    Cold intolerance        Relevant Orders    IgG    IgM    IgA    TSH    T4, Free    T3, Free    Sedimentation Rate    Prolactin    Testosterone, Free, Total    Other fatigue        Relevant Orders    IgG    IgM    IgA    TSH    T4, Free    T3, Free    Sedimentation Rate    Prolactin    Testosterone, Free, Total      Plan: Screening labs and also immunoglobulin quantitative.  Sedimentation rate prolactin testosterone free and total also thyroid panel.  Recheck in about a month.  Fluconazole 200 mg daily for 21 days.

## 2018-07-06 ENCOUNTER — LAB (OUTPATIENT)
Dept: LAB | Facility: HOSPITAL | Age: 68
End: 2018-07-06

## 2018-07-06 DIAGNOSIS — B37.81 ESOPHAGEAL CANDIDIASIS (HCC): ICD-10-CM

## 2018-07-06 DIAGNOSIS — R53.83 OTHER FATIGUE: ICD-10-CM

## 2018-07-06 DIAGNOSIS — I25.10 CORONARY ARTERY DISEASE INVOLVING NATIVE CORONARY ARTERY OF NATIVE HEART WITHOUT ANGINA PECTORIS: Primary | ICD-10-CM

## 2018-07-06 DIAGNOSIS — R68.89 COLD INTOLERANCE: ICD-10-CM

## 2018-07-06 LAB
BILIRUB UR QL STRIP: NEGATIVE
CLARITY UR: ABNORMAL
COLOR UR: ABNORMAL
ERYTHROCYTE [SEDIMENTATION RATE] IN BLOOD: 15 MM/HR (ref 0–20)
GLUCOSE UR STRIP-MCNC: NEGATIVE MG/DL
HGB UR QL STRIP.AUTO: NEGATIVE
IGA1 MFR SER: 133 MG/DL (ref 70–400)
IGG1 SER-MCNC: 603 MG/DL (ref 700–1600)
IGM SERPL-MCNC: 137 MG/DL (ref 40–230)
KETONES UR QL STRIP: ABNORMAL
LEUKOCYTE ESTERASE UR QL STRIP.AUTO: ABNORMAL
NITRITE UR QL STRIP: NEGATIVE
PH UR STRIP.AUTO: 5.5 [PH] (ref 5–8)
PROLACTIN SERPL-MCNC: 5.71 NG/ML (ref 4.04–15.2)
PROT UR QL STRIP: ABNORMAL
SP GR UR STRIP: >=1.03 (ref 1–1.03)
T3FREE SERPL-MCNC: 3.12 PG/ML (ref 2–4.4)
T4 FREE SERPL-MCNC: 1.09 NG/DL (ref 0.93–1.7)
TESTOST SERPL-MCNC: 441.2 NG/DL (ref 193–740)
TSH SERPL DL<=0.05 MIU/L-ACNC: 1.7 MIU/ML (ref 0.27–4.2)
UROBILINOGEN UR QL STRIP: ABNORMAL

## 2018-07-06 PROCEDURE — 84403 ASSAY OF TOTAL TESTOSTERONE: CPT

## 2018-07-06 PROCEDURE — 84439 ASSAY OF FREE THYROXINE: CPT

## 2018-07-06 PROCEDURE — 84146 ASSAY OF PROLACTIN: CPT

## 2018-07-06 PROCEDURE — 36415 COLL VENOUS BLD VENIPUNCTURE: CPT

## 2018-07-06 PROCEDURE — 84443 ASSAY THYROID STIM HORMONE: CPT

## 2018-07-06 PROCEDURE — 81003 URINALYSIS AUTO W/O SCOPE: CPT | Performed by: FAMILY MEDICINE

## 2018-07-06 PROCEDURE — 82784 ASSAY IGA/IGD/IGG/IGM EACH: CPT

## 2018-07-06 PROCEDURE — 84481 FREE ASSAY (FT-3): CPT

## 2018-07-06 PROCEDURE — 84402 ASSAY OF FREE TESTOSTERONE: CPT

## 2018-07-06 PROCEDURE — 85652 RBC SED RATE AUTOMATED: CPT

## 2018-07-08 LAB — TESTOST FREE SERPL-MCNC: 5.3 PG/ML (ref 6.6–18.1)

## 2018-07-09 ENCOUNTER — TELEPHONE (OUTPATIENT)
Dept: INTERNAL MEDICINE | Facility: CLINIC | Age: 68
End: 2018-07-09

## 2018-07-09 DIAGNOSIS — R76.8 LOW SERUM IGG FOR AGE: Primary | ICD-10-CM

## 2018-07-13 ENCOUNTER — TELEPHONE (OUTPATIENT)
Dept: INTERNAL MEDICINE | Facility: CLINIC | Age: 68
End: 2018-07-13

## 2018-07-13 DIAGNOSIS — B37.0 ORAL CANDIDIASIS: Primary | ICD-10-CM

## 2018-07-13 RX ORDER — AZITHROMYCIN 250 MG/1
TABLET, FILM COATED ORAL
Qty: 6 TABLET | Refills: 0 | Status: SHIPPED | OUTPATIENT
Start: 2018-07-13 | End: 2018-08-06

## 2018-07-13 NOTE — TELEPHONE ENCOUNTER
I placed her referral to Dr. Bryan Nelson's ENT.  Otherwise given a Z-Juan for presumptive sinus infection.  I talked about this on the phone Friday.

## 2018-07-13 NOTE — TELEPHONE ENCOUNTER
The pt has been on the Thrush med and it doesn't seem to be helping much, please call or Rx something else

## 2018-07-20 ENCOUNTER — LAB (OUTPATIENT)
Dept: LAB | Facility: HOSPITAL | Age: 68
End: 2018-07-20
Attending: UROLOGY

## 2018-07-20 ENCOUNTER — TRANSCRIBE ORDERS (OUTPATIENT)
Dept: ADMINISTRATIVE | Facility: HOSPITAL | Age: 68
End: 2018-07-20

## 2018-07-20 DIAGNOSIS — N40.3 NODULAR PROSTATE WITH URINARY OBSTRUCTION: ICD-10-CM

## 2018-07-20 DIAGNOSIS — N13.8 NODULAR PROSTATE WITH URINARY OBSTRUCTION: ICD-10-CM

## 2018-07-20 DIAGNOSIS — N40.1 ENLARGED PROSTATE WITH URINARY OBSTRUCTION: ICD-10-CM

## 2018-07-20 DIAGNOSIS — Z12.5 ENCOUNTER FOR SCREENING FOR MALIGNANT NEOPLASM OF PROSTATE: ICD-10-CM

## 2018-07-20 DIAGNOSIS — N40.1 ENLARGED PROSTATE WITH URINARY OBSTRUCTION: Primary | ICD-10-CM

## 2018-07-20 DIAGNOSIS — R39.12 WEAK URINARY STREAM: ICD-10-CM

## 2018-07-20 DIAGNOSIS — N13.8 ENLARGED PROSTATE WITH URINARY OBSTRUCTION: Primary | ICD-10-CM

## 2018-07-20 DIAGNOSIS — N13.8 ENLARGED PROSTATE WITH URINARY OBSTRUCTION: ICD-10-CM

## 2018-07-20 LAB — PSA SERPL-MCNC: 4.75 NG/ML (ref 0–4)

## 2018-07-20 PROCEDURE — 84153 ASSAY OF PSA TOTAL: CPT

## 2018-07-20 PROCEDURE — 36415 COLL VENOUS BLD VENIPUNCTURE: CPT

## 2018-07-20 PROCEDURE — G0103 PSA SCREENING: HCPCS

## 2018-07-23 ENCOUNTER — TELEPHONE (OUTPATIENT)
Dept: INTERNAL MEDICINE | Facility: CLINIC | Age: 68
End: 2018-07-23

## 2018-07-23 NOTE — TELEPHONE ENCOUNTER
He has to check with his psychiatrist about getting off Lamictal.  In the interim if he has a flare up I have given him Lamisil tablets 125 mg #14 pack one refill.  Also he is to try over-the-counter Biotene products.  I talked him about this at length on the phone.

## 2018-07-25 ENCOUNTER — EPISODE CHANGES (OUTPATIENT)
Dept: CASE MANAGEMENT | Facility: OTHER | Age: 68
End: 2018-07-25

## 2018-08-06 ENCOUNTER — OFFICE VISIT (OUTPATIENT)
Dept: INTERNAL MEDICINE | Facility: CLINIC | Age: 68
End: 2018-08-06

## 2018-08-06 VITALS
DIASTOLIC BLOOD PRESSURE: 72 MMHG | WEIGHT: 179 LBS | BODY MASS INDEX: 27.22 KG/M2 | SYSTOLIC BLOOD PRESSURE: 102 MMHG | OXYGEN SATURATION: 98 % | HEART RATE: 62 BPM | TEMPERATURE: 97.2 F

## 2018-08-06 DIAGNOSIS — K14.0 GLOSSITIS: Primary | ICD-10-CM

## 2018-08-06 PROCEDURE — 99213 OFFICE O/P EST LOW 20 MIN: CPT | Performed by: FAMILY MEDICINE

## 2018-08-06 RX ORDER — CLOTRIMAZOLE AND BETAMETHASONE DIPROPIONATE 10; .64 MG/G; MG/G
CREAM TOPICAL
Refills: 3 | COMMUNITY
Start: 2018-07-31 | End: 2019-10-04

## 2018-08-06 RX ORDER — TERBINAFINE HYDROCHLORIDE 250 MG/1
250 TABLET ORAL DAILY
Qty: 15 TABLET | Refills: 1 | Status: SHIPPED | OUTPATIENT
Start: 2018-08-06 | End: 2018-09-07

## 2018-08-06 NOTE — PROGRESS NOTES
Subjective   Wilmar Carmona is a 67 y.o. male.     Chief Complaint   Patient presents with   • Mouth inflammation         History of Present Illness   Patient returns with repetitive coating on tongue and mouth inflammation.  His psychiatrist should reduce his Lamictal is reduced dry mouth and seems to be improving things.  Discussed possible allergy to sodium leah sulfate and other measures for irritation of the time.  Also the addition of B complex vitamin.  He overall is improving.  Regarding to get him some generic terbinafine Lamisil tablets to 50 mg #15 one a day for nonresolution of this symptom.      The following portions of the patient's history were reviewed and updated as appropriate: allergies, current medications, past social history and problem list.    Review of Systems   Constitutional: Negative.    HENT: Negative.    Eyes: Negative.    Respiratory: Negative.    Cardiovascular: Negative.    Gastrointestinal: Positive for abdominal pain and nausea.   Endocrine: Negative.    Genitourinary: Negative.    Musculoskeletal: Negative.    Skin: Negative.    Allergic/Immunologic: Negative.    Neurological: Negative.    Hematological: Negative.    Psychiatric/Behavioral: Negative.        Objective   Vitals:    08/06/18 1708   BP: 102/72   Pulse: 62   Temp: 97.2 °F (36.2 °C)   SpO2: 98%     Physical Exam   Constitutional: He is oriented to person, place, and time. He appears well-developed.   HENT:   Head: Normocephalic.   Right Ear: External ear normal.   Left Ear: External ear normal.   Mouth/Throat: Oropharynx is clear and moist.       Eyes: Pupils are equal, round, and reactive to light.   Neck: Normal range of motion. Neck supple.   Cardiovascular: Normal rate, regular rhythm and normal heart sounds.    Pulmonary/Chest: Effort normal and breath sounds normal.   Abdominal: Soft. Bowel sounds are normal. There is no hepatosplenomegaly. There is no tenderness. There is no rigidity, no tenderness at  McBurney's point and negative Stock's sign.   Musculoskeletal: Normal range of motion.   Neurological: He is alert and oriented to person, place, and time.   Skin: Skin is warm.   Psychiatric: He has a normal mood and affect.   Vitals reviewed.      Assessment/Plan   Problem List Items Addressed This Visit     None      Visit Diagnoses     Glossitis    -  Primary      Continue measures for moisturizing the mouth.  Biotene mouthwash.  Get toothpaste and does not have sodium oral sulfate.  Recheck when necessary.  Terbinafine 250 mg daily #15 for nonresolution.

## 2018-08-13 ENCOUNTER — OFFICE VISIT (OUTPATIENT)
Dept: ORTHOPEDIC SURGERY | Facility: CLINIC | Age: 68
End: 2018-08-13

## 2018-08-13 VITALS — WEIGHT: 178.8 LBS | BODY MASS INDEX: 27.1 KG/M2 | TEMPERATURE: 97.8 F | HEIGHT: 68 IN

## 2018-08-13 DIAGNOSIS — M22.41 PATELLOFEMORAL CHONDROSIS OF RIGHT KNEE: ICD-10-CM

## 2018-08-13 DIAGNOSIS — M17.10 PATELLOFEMORAL ARTHROSIS: ICD-10-CM

## 2018-08-13 DIAGNOSIS — M22.42 PATELLOFEMORAL CHONDROSIS OF LEFT KNEE: ICD-10-CM

## 2018-08-13 DIAGNOSIS — R29.898 POPPING SOUND OF KNEE JOINT: Primary | ICD-10-CM

## 2018-08-13 PROCEDURE — 99213 OFFICE O/P EST LOW 20 MIN: CPT | Performed by: ORTHOPAEDIC SURGERY

## 2018-08-13 PROCEDURE — 73564 X-RAY EXAM KNEE 4 OR MORE: CPT | Performed by: ORTHOPAEDIC SURGERY

## 2018-08-13 NOTE — PROGRESS NOTES
"Patient:  Wilmar Carmona is a 67 y.o. male    Chief Complaint/ Reason for Visit:    Chief Complaint   Patient presents with   • Right Knee - Establish Care       HPI:  This pleasant gentleman presents today requesting evaluation of \"crunching and grinding\" sensations he has behind his right knee.  He's noticed it off and on for years, but decided that, especially lately when going up and down stairs, and may be getting a bit worse.  He is not had any injury.  And he says he has no pain.  He says that he would like to prevent future problems if possible.  He does not have similar symptoms that he can recall involving the left knee, and again, primarily notices the grinding sensation when going up and down stairs.      PMH:    Past Medical History:   Diagnosis Date   • Abnormal EKG    • Abnormal stress test    • Arthritis    • Atrial fibrillation (CMS/HCC)    • BPH (benign prostatic hypertrophy)    • Depression    • GERD (gastroesophageal reflux disease)    • Heart murmur    • Hyperlipidemia    • Hypertension    • Mild tricuspid regurgitation    • PAC (premature atrial contraction)    • Palpitations    • Sleep apnea    • Tremor        PSH:    Past Surgical History:   Procedure Laterality Date   • CARDIAC CATHETERIZATION  2015   • COLONOSCOPY  10/20/2014    normal -dr leon..had egd at same time   • HERNIA REPAIR     • KNEE SURGERY Left    • TONSILLECTOMY     • VASECTOMY         Social Hx:    Social History     Social History   • Marital status:      Spouse name: N/A   • Number of children: N/A   • Years of education: N/A     Occupational History   • Not on file.     Social History Main Topics   • Smoking status: Never Smoker   • Smokeless tobacco: Never Used   • Alcohol use No   • Drug use: Unknown   • Sexual activity: Defer     Other Topics Concern   • Not on file     Social History Narrative   • No narrative on file       Family Hx:    Family History   Problem Relation Age of Onset   • Hypertension Mother  " "  • Cancer Father         prostate   • Heart disease Father    • Coronary artery disease Father    • Stroke Father    • Cancer Brother         prostate       Meds:    Current Outpatient Prescriptions:   •  acebutolol (SECTRAL) 200 MG capsule, Take 1 capsule by mouth 2 (Two) Times a Day., Disp: 180 capsule, Rfl: 0  •  clotrimazole-betamethasone (LOTRISONE) 1-0.05 % cream, TEJAS EXT AA BID, Disp: , Rfl: 3  •  finasteride (PROSCAR) 5 MG tablet, TK 1 T PO ONCE DAILY, Disp: , Rfl: 2  •  Fish Oil-Cholecalciferol (FISH OIL + D3) 6188-2053 MG-UNIT capsule, , Disp: , Rfl:   •  lamoTRIgine (LaMICtal) 200 MG tablet, Take 250 mg by mouth Daily., Disp: , Rfl:   •  nefazodone (SERZONE) 50 MG tablet, Take  by mouth 3 (Three) Times a Day., Disp: , Rfl:   •  NIFEdipine XL (PROCARDIA XL) 60 MG 24 hr tablet, Take 60 mg by mouth Daily., Disp: , Rfl:   •  pantoprazole (PROTONIX) 40 MG EC tablet, TK 1 T PO D, Disp: , Rfl: 4  •  propafenone (RYTHMOL) 150 MG tablet, Take 1 tablet by mouth Every 8 (Eight) Hours., Disp: 270 tablet, Rfl: 1  •  simvastatin (ZOCOR) 20 MG tablet, Take 1 tablet by mouth Every Night., Disp: 90 tablet, Rfl: 3  •  terbinafine (lamiSIL) 250 MG tablet, Take 1 tablet by mouth Daily. For thrush, Disp: 15 tablet, Rfl: 1    Allergies:  No Known Allergies    ROS:  Review of Systems    Vitals:    08/13/18 1033   Temp: 97.8 °F (36.6 °C)   Weight: 81.1 kg (178 lb 12.8 oz)   Height: 172.7 cm (68\")   PainSc: 0-No pain     Body mass index is 27.19 kg/m².    Physical Exam    The patient is awake, alert, and oriented ×3.  The patient is in no acute distress.  Breathing is regular and unlabored with a respiratory rate of 12/m.  Extraocular movements and pupillary responses are symmetrically intact. Sclerae are anicteric.   Hearing is within normal limits.  Speech is within normal limits.  There is no jugular venous distention.    He walks well with no limp.  His lower extremities are symmetrical in all regards.  There is no atrophy " "or asymmetry of musculature.    Right knee: Range of motion is 0-138°.  The patient does have moderate crepitus on patella grind and tell a femoral palpation in general.  Apprehension test is negative.  There is no effusion, abnormal warmth, or tenderness.  The right knee is stable in all planes.  Bounce and Tonny's test are negative.    Left knee: Range of motion 0-138° of flexion.  Left knee exam is largely normal except for just a tiny bit of crepitus at nearly the terminus of flexion on patella grind.    The patient has strongly palpable normal feeling pulses symmetrically present both feet with a current regular heart rate around 72 beats are minute.  Sensory exam intact light touch and no toes motor strength is 5 over 5.  Skin and nails are unremarkable.        Radiology: X-rays: A 4 view arthritis series of the right knee, with incidental views left knee, was ordered and reviewed today to assess the patient's complaints of \"crunching and grinding\" in the anterior aspect of his right knee.  Comparison images were not available.  These images reveal the patient has mild osteoarthritic change in both knees most notably in the form of some mild medial and minimal patellofemoral joint line narrowing.  I do not see any evidence of acute fracture or stress fracture.        Assessment:     Diagnosis Plan   1. Popping sound of knee joint  XR Knee 4+ View Right    Ambulatory Referral to Physical Therapy Evaluate and treat, Ortho   2. Patellofemoral chondrosis of right knee  Ambulatory Referral to Physical Therapy Evaluate and treat, Ortho   3. Patellofemoral arthrosis  Ambulatory Referral to Physical Therapy Evaluate and treat, Ortho    Right knee   4. Patellofemoral chondrosis of left knee  Ambulatory Referral to Physical Therapy Evaluate and treat, Ortho           Plan:  I discussed everything with the patient at length.  He was fitted with a patella tracking brace for the right knee.  Physical therapy has been " ordered for both knees.  I anticipate uneventful improvement.  I have reassured him that the crepitus was always be there, but we need to maintain optimal knee mechanics to ensure that he does not have rapid progression of problems with his knees.      Orders Placed This Encounter   Procedures   • XR Knee 4+ View Right     Order Specific Question:   Reason for Exam:     Answer:   opnc right knee   • Ambulatory Referral to Physical Therapy Evaluate and treat, Ortho     Referral Priority:   Routine     Referral Type:   Therapy     Referral Reason:   Specialty Services Required     Requested Specialty:   Physical Therapy     Number of Visits Requested:   1

## 2018-08-24 ENCOUNTER — TREATMENT (OUTPATIENT)
Dept: PHYSICAL THERAPY | Facility: CLINIC | Age: 68
End: 2018-08-24

## 2018-08-24 DIAGNOSIS — M22.41 PATELLOFEMORAL CHONDROSIS OF RIGHT KNEE: Primary | ICD-10-CM

## 2018-08-24 DIAGNOSIS — M17.0 PRIMARY OSTEOARTHRITIS OF BOTH KNEES: ICD-10-CM

## 2018-08-24 DIAGNOSIS — M22.42 PATELLOFEMORAL CHONDROSIS OF LEFT KNEE: ICD-10-CM

## 2018-08-24 DIAGNOSIS — R29.898 POPPING SOUND OF KNEE JOINT: ICD-10-CM

## 2018-08-24 DIAGNOSIS — M17.10 PATELLOFEMORAL ARTHROSIS: ICD-10-CM

## 2018-08-24 DIAGNOSIS — M25.561 RIGHT KNEE PAIN, UNSPECIFIED CHRONICITY: ICD-10-CM

## 2018-08-24 DIAGNOSIS — M25.562 LEFT KNEE PAIN, UNSPECIFIED CHRONICITY: ICD-10-CM

## 2018-08-24 PROCEDURE — 97530 THERAPEUTIC ACTIVITIES: CPT | Performed by: PHYSICAL THERAPIST

## 2018-08-24 PROCEDURE — 97161 PT EVAL LOW COMPLEX 20 MIN: CPT | Performed by: PHYSICAL THERAPIST

## 2018-08-24 PROCEDURE — 97110 THERAPEUTIC EXERCISES: CPT | Performed by: PHYSICAL THERAPIST

## 2018-08-24 PROCEDURE — G8978 MOBILITY CURRENT STATUS: HCPCS | Performed by: PHYSICAL THERAPIST

## 2018-08-24 PROCEDURE — G8979 MOBILITY GOAL STATUS: HCPCS | Performed by: PHYSICAL THERAPIST

## 2018-08-24 NOTE — PROGRESS NOTES
Orthopedic / Sports / Industrial Physical Therapy  Physical Therapy Initial Evaluation and Plan of Care    Patient Name: Wilmar Carmona          :  1950  Referring Physician: Ihsan Keyes MD  Diagnosis: Patellofemoral chondrosis of right knee [M22.41] and left knee -   Date of Evaluation: 2018  ______________________________________________________________________    Subjective Evaluation    History of Present Illness  Onset date: Worse about 3 months ago -  Mechanism of injury: Insidious onset - no trama -     Subjective comment: H/O (R) Meniscus sx ; (R) knee pain from cycling and running in past -   Patient Occupation: Retired - [employer] -  Pain  No pain reported  Current pain ratin  At worst pain ratin  Location: R>>L anterior knee popping - Only slight incidences of giving way -   Quality: Popping / grinding.  Exacerbated by: Stairs Up/Down; Squatting ; Kneeling;   Progression: worsening    Social Support  Patient lives at: Multi-istory home; w/ spouse.    Diagnostic Tests  X-ray: abnormal (See MD Note - OA)    Treatments  Current treatment comments: PF Brace.     Patient Goals  Patient/family treatment goals: Improved functional use LE's; Less popping / cracking; Increase strength / endurance LE's and improved ability to ambulate up/down stairs -          ___________________________________________________  Objective       Observations     Additional Observation Details  Minimal / no antalgia w/ gait - Over pronation R>L  Minimal / no atrophy / deformity noted -     Palpation     Additional Palpation Details  Palpable and at time audible crepitus R>>L PF Jt    Palpable PLICA's (R) knee and PF Jt -     Active Range of Motion     Additional Active Range of Motion Details  AROM (B) Knees:  0-138-deg;     Strength/Myotome Testing     Left Knee   Flexion: 4+  Extension: 4  Quadriceps contraction: fair    Right Knee   Flexion: 4+  Extension: 4 (Crepitus PF Jt -)  Quadriceps contraction:  fair    Tests     Additional Tests Details  (+) Step-UP and Squat Tests w/ sig crepitus R>L -   (-) Meniscus and ligamentous testing (B) Knees -      See Treatment Flow sheet for Exercises, Manual therapy, and modalities.   FUNCTIONAL ACTIVITIES: X 25 min  · TAPING / BRACING: K-Tape to 1) Unload PF Jt (R); 2) Unload infrapatellar region (R) knee -   · Extensive explanation of OA, PF dysfunction, jt protection, cause of his popping, reasons for taping, exercise, future of his knee OA, etc  · Jt protection, ADL modification; Posture and     ___________________________________________________  Assessment & Plan     Assessment  Assessment details: R>L Knee pain ; PF Dysfunction / Chondrosis; Knee OA; Excessive pronation    PROBLEMS: Pain; Knee crepitus with stairs; Unable to kneel or squat; Knee OA;   PROGNOSIS: Good    GOALS:   SHORT TERM GOALS: 2 weeks:  1) HEP Initiated; 2) Discuss knee OA/PF Jt discussion: 3) Trial of Kinesio taping initiated -  4) Grossly improved flexibility of HS; 5) Improved functional ability including stair climbing, squatting, etc; 5) Assess Pt's feet for customized orthotics    LONG TERM GOALS: 4 weeks (or at time of DISCHARGE): 1) (I) HEP; 2) Pt (I) with self-taping of (B) knees / PF Jt;  3) Strength / mobility to be able to perform all ADL's and job-related activities w/o restrictions; 4) Pt (I) w/ orthotics prn;       Plan  Planned therapy interventions: flexibility, home exercise program, manual therapy, neuromuscular re-education, orthotic fitting/training, soft tissue mobilization, strengthening, stretching and therapeutic activities (Modalities prn; Taping / bracing prn; )  Frequency: 1-2x/wk.  Duration in visits: 8  Treatment plan discussed with: patient      ___________________________________________________  Manual Therapy:         mins  17158;   Therapeutic Exercise:   20     mins  34372;     Neuromuscular Hank:        mins  85876;   Therapeutic Activity:     25      mins  58630;     Ultrasound:          mins  42138;    Electrical Stimulation:        mins  40560 ( );  Dry Needling          mins self-pay   Gait Training:          mins  68218;  EVAL TIME:   25 mins    Timed Treatment:   45   mins                Total Treatment:     70   mins    PT SIGNATURE:   Cj Rivera, PT  DATE TREATMENT INITIATED: 8/24/2018  ___________________________________________________  Initial Certification  Certification Period: 11/22/2018  I certify that the therapy services are furnished while this patient is under my care.  The services outlined above are required by this patient, and will be reviewed every 90 days.     PHYSICIAN: ________________________________  DATE: ______  Ihsan Keyes MD        Please sign and return via fax to [phone].. Thank you, [facility].  ______________________________________________________________________  [address] [place] [address]  Phone: [phone] Fax: [phone]

## 2018-08-29 RX ORDER — ACEBUTOLOL HYDROCHLORIDE 200 MG/1
200 CAPSULE ORAL 2 TIMES DAILY
Qty: 180 CAPSULE | Refills: 0 | Status: SHIPPED | OUTPATIENT
Start: 2018-08-29 | End: 2018-11-28 | Stop reason: SDUPTHER

## 2018-08-29 RX ORDER — PROPAFENONE HYDROCHLORIDE 150 MG/1
150 TABLET, COATED ORAL EVERY 8 HOURS
Qty: 270 TABLET | Refills: 0 | Status: SHIPPED | OUTPATIENT
Start: 2018-08-29 | End: 2018-11-28 | Stop reason: SDUPTHER

## 2018-08-29 RX ORDER — FLUCONAZOLE 200 MG/1
TABLET ORAL
Qty: 16 TABLET | Refills: 0 | Status: SHIPPED | OUTPATIENT
Start: 2018-08-29 | End: 2018-09-07 | Stop reason: ALTCHOICE

## 2018-09-07 ENCOUNTER — OFFICE VISIT (OUTPATIENT)
Dept: INTERNAL MEDICINE | Facility: CLINIC | Age: 68
End: 2018-09-07

## 2018-09-07 VITALS
TEMPERATURE: 97 F | DIASTOLIC BLOOD PRESSURE: 88 MMHG | HEART RATE: 62 BPM | WEIGHT: 176 LBS | OXYGEN SATURATION: 97 % | BODY MASS INDEX: 26.76 KG/M2 | SYSTOLIC BLOOD PRESSURE: 106 MMHG

## 2018-09-07 DIAGNOSIS — B37.0 THRUSH OF MOUTH AND ESOPHAGUS (HCC): ICD-10-CM

## 2018-09-07 DIAGNOSIS — K21.00 GASTROESOPHAGEAL REFLUX DISEASE WITH ESOPHAGITIS: ICD-10-CM

## 2018-09-07 DIAGNOSIS — B37.81 THRUSH OF MOUTH AND ESOPHAGUS (HCC): ICD-10-CM

## 2018-09-07 DIAGNOSIS — D80.3 IGG DEFICIENCY (HCC): Primary | ICD-10-CM

## 2018-09-07 PROCEDURE — 99214 OFFICE O/P EST MOD 30 MIN: CPT | Performed by: FAMILY MEDICINE

## 2018-09-07 RX ORDER — FAMOTIDINE 40 MG/1
40 TABLET, FILM COATED ORAL DAILY
Qty: 30 TABLET | Refills: 2 | Status: SHIPPED | OUTPATIENT
Start: 2018-09-07 | End: 2019-04-19 | Stop reason: ALTCHOICE

## 2018-09-07 RX ORDER — FLUCONAZOLE 200 MG/1
200 TABLET ORAL DAILY
Qty: 15 TABLET | Refills: 1 | Status: SHIPPED | OUTPATIENT
Start: 2018-09-07 | End: 2019-04-19

## 2018-09-07 NOTE — PROGRESS NOTES
Subjective   Wilmar Carmona is a 67 y.o. male.     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  He appears to have somewhat geographic tongue been a value by ENT.  He also has a history of GERD which is pretty severe fears ear is on pantoprazole.  We discussed trying famotidine.  Otherwise Diflucan seems to help release worried about getting off of it.  I discussed seeing allergy/immunology given the low IgG level although this may be spurious.  His B12 folate levels looked okay.  We discussed taking a B complex vitamin and zinc supplement pending seeing immunology/allergy.  He has cut back on Lamictal is reduced dry mouth but could not go below 200 mg.  He has done this through his psychiatrist.      The following portions of the patient's history were reviewed and updated as appropriate: allergies, current medications, past social history and problem list.    Review of Systems   Constitutional: Positive for fatigue.   HENT:        Tongue and mouth inflammation   Eyes: Negative.    Respiratory: Negative.    Cardiovascular: Negative.    Gastrointestinal: Negative.    Endocrine: Negative.    Genitourinary: Negative.    Musculoskeletal: Negative.    Skin: Negative.    Allergic/Immunologic: Negative.    Neurological: Negative.    Hematological: Negative.    Psychiatric/Behavioral: Negative.        Objective   Vitals:    09/07/18 0920   BP: 106/88   Pulse: 62   Temp: 97 °F (36.1 °C)   SpO2: 97%     Physical Exam   Constitutional: He is oriented to person, place, and time. He appears well-developed.   HENT:   Head: Normocephalic.   Right Ear: External ear normal.   Left Ear: External ear normal.   Mouth/Throat: Uvula is midline. Mucous membranes are dry. No oral lesions. Posterior oropharyngeal erythema present.       Eyes: Pupils are equal, round, and reactive to light.   Neck: Normal range of motion. Neck supple.    Cardiovascular: Normal rate, regular rhythm and normal heart sounds.    Pulmonary/Chest: Effort normal and breath sounds normal.   Abdominal: Soft.   Musculoskeletal: Normal range of motion.   Neurological: He is alert and oriented to person, place, and time.   Skin: Skin is warm and dry.   Psychiatric: He has a normal mood and affect.   Vitals reviewed.      Assessment/Plan   Problem List Items Addressed This Visit     None      Visit Diagnoses     IgG deficiency (CMS/HCC)    -  Primary    Relevant Orders    Ambulatory Referral to Allergy    Thrush of mouth and esophagus (CMS/HCC)        Relevant Medications    famotidine (PEPCID) 40 MG tablet    fluconazole (DIFLUCAN) 200 MG tablet    Other Relevant Orders    Ambulatory Referral to Allergy    Gastroesophageal reflux disease with esophagitis        Relevant Medications    famotidine (PEPCID) 40 MG tablet      Plan: Try to get off pantoprazole substitute famotidine 40 mg daily.  Continue courses of when necessary fluconazole 200 mg daily for up to 15 days.  Referral to Dr. Priyanka Pedroza for consideration immunology.  He has evidence of borderline IgG deficiency.  I see no risk factors for HIV or syphilis.  Taken over-the-counter B complex vitamin plus zinc.

## 2018-09-11 ENCOUNTER — TREATMENT (OUTPATIENT)
Dept: PHYSICAL THERAPY | Facility: CLINIC | Age: 68
End: 2018-09-11

## 2018-09-11 DIAGNOSIS — M17.0 PRIMARY OSTEOARTHRITIS OF BOTH KNEES: ICD-10-CM

## 2018-09-11 DIAGNOSIS — R29.898 POPPING SOUND OF KNEE JOINT: ICD-10-CM

## 2018-09-11 DIAGNOSIS — M22.41 PATELLOFEMORAL CHONDROSIS OF RIGHT KNEE: Primary | ICD-10-CM

## 2018-09-11 DIAGNOSIS — M25.562 LEFT KNEE PAIN, UNSPECIFIED CHRONICITY: ICD-10-CM

## 2018-09-11 DIAGNOSIS — M25.561 RIGHT KNEE PAIN, UNSPECIFIED CHRONICITY: ICD-10-CM

## 2018-09-11 DIAGNOSIS — M17.10 PATELLOFEMORAL ARTHROSIS: ICD-10-CM

## 2018-09-11 DIAGNOSIS — M22.42 PATELLOFEMORAL CHONDROSIS OF LEFT KNEE: ICD-10-CM

## 2018-09-11 PROCEDURE — 97530 THERAPEUTIC ACTIVITIES: CPT | Performed by: PHYSICAL THERAPIST

## 2018-09-11 PROCEDURE — 97110 THERAPEUTIC EXERCISES: CPT | Performed by: PHYSICAL THERAPIST

## 2018-09-11 NOTE — PROGRESS NOTES
Physical Therapy Daily Progress Note    Patient Name: Wilmar Carmona  (ZEKE)        :  1950  Referring Physician: Ihsan Keyes MD    Subjective   Wilmar Carmona reports: no new problems; Very busy with work, etc. Tapes own knee with K-Tape -   Pt reports he has an extensive gym at work -     Objective   Discussed Ex equipment at work and how to adjust / use what was available to protect his knees, but provide good strengthening with emphasis on CKC -     See Exercise, Manual, and Modality Logs for complete treatment.     Functional / Therapeutic Activities:  15 min  · TAPING / BRACING:  · SEE EXERCISE FLOW SHEET -    · Jt protection, ADL modification; Posture and      Assessment/Plan  R>L Knee pain ; PF Dysfunction / Chondrosis; Knee OA; Excessive pronation  Pt would benefit from continuing HEP at the gym at his office -   Progress strengthening /stabilization /functional activity prn to progress program     _________________________________________________  Manual Therapy:         mins  81913;  Therapeutic Exercise:    40     mins  79876;     Neuromuscular Hank:        mins  85424;    Therapeutic Activity:     15     mins  79929;     Gait Training:           mins  45916;     Ultrasound:          mins  86558;    Electrical Stimulation:         mins  25705 ( );  Dry Needling          mins self-pay    Timed Treatment:  55   mins                  Total Treatment:     55   mins    Cj Rivera PT  Physical Therapist

## 2018-09-18 ENCOUNTER — TRANSCRIBE ORDERS (OUTPATIENT)
Dept: ADMINISTRATIVE | Facility: HOSPITAL | Age: 68
End: 2018-09-18

## 2018-09-18 ENCOUNTER — LAB (OUTPATIENT)
Dept: LAB | Facility: HOSPITAL | Age: 68
End: 2018-09-18
Attending: UROLOGY

## 2018-09-18 DIAGNOSIS — N40.3 NODULAR PROSTATE WITH URINARY OBSTRUCTION: ICD-10-CM

## 2018-09-18 DIAGNOSIS — N40.1 ENLARGED PROSTATE WITH URINARY OBSTRUCTION: ICD-10-CM

## 2018-09-18 DIAGNOSIS — Z12.5 ENCOUNTER FOR SCREENING FOR MALIGNANT NEOPLASM OF PROSTATE: ICD-10-CM

## 2018-09-18 DIAGNOSIS — N13.8 NODULAR PROSTATE WITH URINARY OBSTRUCTION: ICD-10-CM

## 2018-09-18 DIAGNOSIS — N40.1 ENLARGED PROSTATE WITH URINARY OBSTRUCTION: Primary | ICD-10-CM

## 2018-09-18 DIAGNOSIS — R97.20 ELEVATED PROSTATE SPECIFIC ANTIGEN (PSA): ICD-10-CM

## 2018-09-18 DIAGNOSIS — N13.8 ENLARGED PROSTATE WITH URINARY OBSTRUCTION: ICD-10-CM

## 2018-09-18 DIAGNOSIS — N13.8 ENLARGED PROSTATE WITH URINARY OBSTRUCTION: Primary | ICD-10-CM

## 2018-09-18 LAB — PSA SERPL-MCNC: 3.27 NG/ML (ref 0–4)

## 2018-09-18 PROCEDURE — 84153 ASSAY OF PSA TOTAL: CPT

## 2018-09-18 PROCEDURE — 36415 COLL VENOUS BLD VENIPUNCTURE: CPT

## 2018-09-18 PROCEDURE — G0103 PSA SCREENING: HCPCS

## 2018-10-09 ENCOUNTER — EPISODE CHANGES (OUTPATIENT)
Dept: CASE MANAGEMENT | Facility: OTHER | Age: 68
End: 2018-10-09

## 2018-10-12 ENCOUNTER — OFFICE VISIT (OUTPATIENT)
Dept: CARDIOLOGY | Facility: CLINIC | Age: 68
End: 2018-10-12

## 2018-10-12 VITALS
WEIGHT: 178 LBS | BODY MASS INDEX: 26.98 KG/M2 | HEIGHT: 68 IN | HEART RATE: 58 BPM | SYSTOLIC BLOOD PRESSURE: 126 MMHG | DIASTOLIC BLOOD PRESSURE: 74 MMHG

## 2018-10-12 DIAGNOSIS — I48.0 PAF (PAROXYSMAL ATRIAL FIBRILLATION) (HCC): Primary | ICD-10-CM

## 2018-10-12 DIAGNOSIS — I25.10 CORONARY ARTERY DISEASE INVOLVING NATIVE CORONARY ARTERY OF NATIVE HEART WITHOUT ANGINA PECTORIS: ICD-10-CM

## 2018-10-12 DIAGNOSIS — I10 ESSENTIAL HYPERTENSION: ICD-10-CM

## 2018-10-12 PROCEDURE — 93000 ELECTROCARDIOGRAM COMPLETE: CPT | Performed by: INTERNAL MEDICINE

## 2018-10-12 PROCEDURE — 99214 OFFICE O/P EST MOD 30 MIN: CPT | Performed by: INTERNAL MEDICINE

## 2018-10-12 NOTE — PROGRESS NOTES
Date of Office Visit: 10/12/2018  Encounter Provider: Diana Durbin MD  Place of Service: [facility] CARDIOLOGY  Patient Name: Wilmar Carmona  :1950    Chief complaint  Follow of paroxysmal atrial fibrillation, dilated aortic root and left ventricular hypertrophy    History of Present Illness  Patient is a pleasant 67-year-old gentleman with history of hypertension, hyperlipidemia, obstructive sleep apnea.  He has had atrial fibrillation for many years and previously was followed by Dr. Bailey.  He has been maintained on Rythmol therapy and Sectral.  He had an abnormal stress test in  leading to cardiac catheterization that revealed minimal vessel disease up to 10%.  However there is a CT scan from 2016 that revealed calcification of all 3 major vessels.  He follow-up echocardiogram in 2018 that showed normal left ventricular size and systolic function with out evidence of aneurysmal disease of the aortic root.  There was an abnormal structure noted in the liver possibly a cyst.  He had a 2 weeks patch in 2018 that showed sinus rhythm with occasional PVCs that were symptomatic.  There is no atrial arrhythmia noted.    Since last visit he has been on a low-salt diet.  Let pressures usually less than 130mmHg systolic.  Has palpitations once or twice a week lasting for a few seconds.  He has remained active walking 5-7000 steps a day.  Does not use CPAP for the past 2 weeks as he thinks it may be aggravating thrush.  He denies any shortness of breath, palpitations, syncope near syncope.    Past Medical History:   Diagnosis Date   • Abnormal EKG    • Abnormal stress test    • Arthritis    • Atrial fibrillation (CMS/HCC)    • BPH (benign prostatic hypertrophy)    • Depression    • GERD (gastroesophageal reflux disease)    • H/O medial meniscus repair of right knee     Partial Menisectomy right knee   • Heart murmur    • Hyperlipidemia    • Hypertension    •  Intermittent knee pain     R<L knee from cycling and running in the past -    • Mild tricuspid regurgitation    • PAC (premature atrial contraction)    • Palpitations    • Sleep apnea    • Tremor      Past Surgical History:   Procedure Laterality Date   • CARDIAC CATHETERIZATION  2015   • COLONOSCOPY  10/20/2014    normal -dr leon..had egd at same time   • HERNIA REPAIR     • KNEE SURGERY Left    • TONSILLECTOMY     • VASECTOMY       Outpatient Medications Prior to Visit   Medication Sig Dispense Refill   • acebutolol (SECTRAL) 200 MG capsule Take 1 capsule by mouth 2 (Two) Times a Day. 180 capsule 0   • clotrimazole-betamethasone (LOTRISONE) 1-0.05 % cream TEJAS EXT AA BID  3   • famotidine (PEPCID) 40 MG tablet Take 1 tablet by mouth Daily. 30 tablet 2   • finasteride (PROSCAR) 5 MG tablet TK 1 T PO ONCE DAILY  2   • Fish Oil-Cholecalciferol (FISH OIL + D3) 8288-5608 MG-UNIT capsule      • fluconazole (DIFLUCAN) 200 MG tablet Take 1 tablet by mouth Daily. 15 tablet 1   • lamoTRIgine (LaMICtal) 200 MG tablet Take 250 mg by mouth Daily.     • nefazodone (SERZONE) 50 MG tablet Take  by mouth 3 (Three) Times a Day.     • NIFEdipine XL (PROCARDIA XL) 60 MG 24 hr tablet Take 60 mg by mouth Daily.     • propafenone (RYTHMOL) 150 MG tablet Take 1 tablet by mouth Every 8 (Eight) Hours. 270 tablet 0   • simvastatin (ZOCOR) 20 MG tablet Take 1 tablet by mouth Every Night. 90 tablet 3     No facility-administered medications prior to visit.        Allergies as of 10/12/2018   • (No Known Allergies)     Social History     Social History   • Marital status:      Spouse name: N/A   • Number of children: N/A   • Years of education: N/A     Occupational History   • Not on file.     Social History Main Topics   • Smoking status: Never Smoker   • Smokeless tobacco: Never Used   • Alcohol use No   • Drug use: Unknown   • Sexual activity: Defer     Other Topics Concern   • Not on file     Social History Narrative   • No  "narrative on file     Family History   Problem Relation Age of Onset   • Hypertension Mother    • Cancer Father         prostate   • Heart disease Father    • Coronary artery disease Father    • Stroke Father    • Cancer Brother         prostate     Review of Systems   Constitution: Negative for fever, malaise/fatigue, weight gain and weight loss.   HENT: Negative for ear pain, hearing loss, nosebleeds and sore throat.    Eyes: Negative for double vision, pain, vision loss in left eye and vision loss in right eye.   Cardiovascular:        See history of present illness.   Respiratory: Negative for cough, shortness of breath, sleep disturbances due to breathing, snoring and wheezing.    Endocrine: Negative for cold intolerance, heat intolerance and polyuria.   Skin: Negative for itching, poor wound healing and rash.   Musculoskeletal: Negative for joint pain, joint swelling and myalgias.   Gastrointestinal: Negative for abdominal pain, diarrhea, hematochezia, nausea and vomiting.   Genitourinary: Negative for hematuria and hesitancy.   Neurological: Negative for numbness, paresthesias and seizures.   Psychiatric/Behavioral: Positive for depression. The patient is not nervous/anxious.         Objective:     Vitals:    10/12/18 1009   BP: 126/74   Pulse: 58   Weight: 80.7 kg (178 lb)   Height: 172.7 cm (68\")     Body mass index is 27.06 kg/m².    Physical Exam   Constitutional: He is oriented to person, place, and time. He appears well-developed and well-nourished.   HENT:   Head: Normocephalic.   Nose: Nose normal.   Mouth/Throat: Oropharynx is clear and moist.   Eyes: Pupils are equal, round, and reactive to light. Conjunctivae and EOM are normal. Right eye exhibits no discharge. No scleral icterus.   Neck: Normal range of motion. Neck supple. No JVD present. No thyromegaly present.   Cardiovascular: Normal rate, regular rhythm, normal heart sounds and intact distal pulses.  Exam reveals no gallop and no friction " rub.    No murmur heard.  Pulses:       Carotid pulses are 2+ on the right side, and 2+ on the left side.       Radial pulses are 2+ on the right side, and 2+ on the left side.        Femoral pulses are 2+ on the right side, and 2+ on the left side.       Popliteal pulses are 2+ on the right side, and 2+ on the left side.        Dorsalis pedis pulses are 2+ on the right side, and 2+ on the left side.        Posterior tibial pulses are 2+ on the right side, and 2+ on the left side.   Pulmonary/Chest: Effort normal and breath sounds normal. No respiratory distress. He has no wheezes. He has no rales.   Abdominal: Soft. Bowel sounds are normal. He exhibits no distension. There is no hepatosplenomegaly. There is no tenderness. There is no rebound.   Musculoskeletal: Normal range of motion. He exhibits no edema or tenderness.   Neurological: He is alert and oriented to person, place, and time.   Skin: Skin is warm and dry. No rash noted. No erythema.   Psychiatric: He has a normal mood and affect. His behavior is normal. Judgment and thought content normal.   Vitals reviewed.    Lab Review:     ECG 12 Lead  Date/Time: 10/12/2018 10:17 AM  Performed by: SHANEKA DE DIOS  Authorized by: SHANEKA DE DIOS   Comparison: compared with previous ECG   Similar to previous ECG  Rhythm: sinus rhythm  Conduction: 1st degree and non-specific intraventricular conduction delay  Conduction comments: Nonspecific ST T wave changes            Assessment:       Diagnosis Plan   1. PAF (paroxysmal atrial fibrillation) (CMS/HCC)  ECG 12 Lead   2. Coronary artery disease involving native coronary artery of native heart without angina pectoris     3. Essential hypertension       Plan:       1.  Paroxysmal atrial fibrillation.  No recurrence on Ziopatch  2.  No evidence of dilated aortic root by echo or CT in April 2018  3.  Dilated right ventricle, as above likely due to sleep apnea.,  Improved  4.  Obstructive sleep apnea.  He plans to resume CPAP  therapy in the near future  5.  Hypertension, controlled  6.  Dyslipidemia  7.  Coronary artery disease.  Clinically stable.  We'll plan on a treadmill access stress test 6 months    Atrial Fibrillation and Atrial Flutter  Assessment  • The patient has paroxysmal atrial fibrillation  • This is non-valvular in etiology  • The patient's CHADS2-VASc score is 3  • A [id number] score of 2 or more is considered a high risk for a thromboembolic event  • Warfarin not prescribed  • Dabigatran not prescribed  • Rivaroxaban not prescribed  • Apixaban not prescribed  • Aspirin not prescribed    Plan  • Attempt to maintain sinus rhythm  • Continue propafenone for rhythm control      Coronary Artery Disease  Assessment  • The patient has no angina           Your medication list          Accurate as of 10/12/18 11:59 PM. If you have any questions, ask your nurse or doctor.               CONTINUE taking these medications      Instructions Last Dose Given Next Dose Due   acebutolol 200 MG capsule  Commonly known as:  SECTRAL      Take 1 capsule by mouth 2 (Two) Times a Day.       clotrimazole-betamethasone 1-0.05 % cream  Commonly known as:  LOTRISONE      TEJAS EXT AA BID       famotidine 40 MG tablet  Commonly known as:  PEPCID      Take 1 tablet by mouth Daily.       finasteride 5 MG tablet  Commonly known as:  PROSCAR      TK 1 T PO ONCE DAILY       FISH OIL + D3 7188-2262 MG-UNIT capsule           fluconazole 200 MG tablet  Commonly known as:  DIFLUCAN      Take 1 tablet by mouth Daily.       lamoTRIgine 200 MG tablet  Commonly known as:  LaMICtal      Take 250 mg by mouth Daily.       nefazodone 50 MG tablet  Commonly known as:  SERZONE      Take  by mouth 3 (Three) Times a Day.       NIFEdipine XL 60 MG 24 hr tablet  Commonly known as:  PROCARDIA XL      Take 60 mg by mouth Daily.       propafenone 150 MG tablet  Commonly known as:  RYTHMOL      Take 1 tablet by mouth Every 8 (Eight) Hours.       simvastatin 20 MG  tablet  Commonly known as:  ZOCOR      Take 1 tablet by mouth Every Night.                Dictated utilizing Dragon dictation

## 2018-11-09 RX ORDER — NIFEDIPINE 60 MG/1
60 TABLET, EXTENDED RELEASE ORAL DAILY
Qty: 90 TABLET | Refills: 2 | Status: SHIPPED | OUTPATIENT
Start: 2018-11-09 | End: 2019-07-13 | Stop reason: SDUPTHER

## 2018-11-29 RX ORDER — PROPAFENONE HYDROCHLORIDE 150 MG/1
TABLET, COATED ORAL
Qty: 270 TABLET | Refills: 1 | Status: SHIPPED | OUTPATIENT
Start: 2018-11-29 | End: 2019-06-13 | Stop reason: SDUPTHER

## 2018-11-29 RX ORDER — ACEBUTOLOL HYDROCHLORIDE 200 MG/1
200 CAPSULE ORAL 2 TIMES DAILY
Qty: 180 CAPSULE | Refills: 1 | Status: SHIPPED | OUTPATIENT
Start: 2018-11-29 | End: 2019-07-03 | Stop reason: SDUPTHER

## 2018-12-28 ENCOUNTER — TELEPHONE (OUTPATIENT)
Dept: INTERNAL MEDICINE | Facility: CLINIC | Age: 68
End: 2018-12-28

## 2019-01-02 RX ORDER — ZOLPIDEM TARTRATE 5 MG/1
5 TABLET ORAL NIGHTLY PRN
Qty: 30 TABLET | Refills: 0 | Status: SHIPPED | OUTPATIENT
Start: 2019-01-02 | End: 2019-01-02 | Stop reason: SDUPTHER

## 2019-01-02 RX ORDER — ZOLPIDEM TARTRATE 5 MG/1
5 TABLET ORAL NIGHTLY PRN
Qty: 30 TABLET | Refills: 0 | Status: SHIPPED | OUTPATIENT
Start: 2019-01-02 | End: 2019-04-19

## 2019-01-02 NOTE — TELEPHONE ENCOUNTER
I electronically sent Ambien 5 mg at bedtime when necessary #30 to his pharmacy.  Please shred the printed Ambien prescription.

## 2019-02-01 ENCOUNTER — LAB (OUTPATIENT)
Dept: LAB | Facility: HOSPITAL | Age: 69
End: 2019-02-01
Attending: UROLOGY

## 2019-02-01 ENCOUNTER — TRANSCRIBE ORDERS (OUTPATIENT)
Dept: ADMINISTRATIVE | Facility: HOSPITAL | Age: 69
End: 2019-02-01

## 2019-02-01 DIAGNOSIS — N13.8 NODULAR PROSTATE WITH URINARY OBSTRUCTION: Primary | ICD-10-CM

## 2019-02-01 DIAGNOSIS — Z12.5 ENCOUNTER FOR SCREENING FOR MALIGNANT NEOPLASM OF PROSTATE: ICD-10-CM

## 2019-02-01 DIAGNOSIS — R97.20 ELEVATED PROSTATE SPECIFIC ANTIGEN (PSA): ICD-10-CM

## 2019-02-01 DIAGNOSIS — N13.8 NODULAR PROSTATE WITH URINARY OBSTRUCTION: ICD-10-CM

## 2019-02-01 DIAGNOSIS — N40.3 NODULAR PROSTATE WITH URINARY OBSTRUCTION: Primary | ICD-10-CM

## 2019-02-01 DIAGNOSIS — N40.3 NODULAR PROSTATE WITH URINARY OBSTRUCTION: ICD-10-CM

## 2019-02-01 LAB — PSA SERPL-MCNC: 3.35 NG/ML (ref 0–4)

## 2019-02-01 PROCEDURE — 36415 COLL VENOUS BLD VENIPUNCTURE: CPT

## 2019-02-01 PROCEDURE — 84153 ASSAY OF PSA TOTAL: CPT

## 2019-02-01 PROCEDURE — 84154 ASSAY OF PSA FREE: CPT

## 2019-02-01 PROCEDURE — G0103 PSA SCREENING: HCPCS

## 2019-02-03 LAB
PSA FREE MFR SERPL: 10.3 %
PSA FREE SERPL-MCNC: 0.31 NG/ML
PSA SERPL-MCNC: 3 NG/ML (ref 0–4)

## 2019-03-25 RX ORDER — SIMVASTATIN 20 MG
20 TABLET ORAL NIGHTLY
Qty: 90 TABLET | Refills: 1 | Status: SHIPPED | OUTPATIENT
Start: 2019-03-25 | End: 2019-10-15 | Stop reason: SDUPTHER

## 2019-04-19 ENCOUNTER — OFFICE VISIT (OUTPATIENT)
Dept: CARDIOLOGY | Facility: CLINIC | Age: 69
End: 2019-04-19

## 2019-04-19 VITALS
DIASTOLIC BLOOD PRESSURE: 84 MMHG | HEART RATE: 59 BPM | BODY MASS INDEX: 27.13 KG/M2 | SYSTOLIC BLOOD PRESSURE: 120 MMHG | HEIGHT: 68 IN | WEIGHT: 179 LBS

## 2019-04-19 DIAGNOSIS — I48.0 PAF (PAROXYSMAL ATRIAL FIBRILLATION) (HCC): ICD-10-CM

## 2019-04-19 DIAGNOSIS — I25.10 CORONARY ARTERY DISEASE INVOLVING NATIVE CORONARY ARTERY OF NATIVE HEART WITHOUT ANGINA PECTORIS: Primary | ICD-10-CM

## 2019-04-19 DIAGNOSIS — E78.2 MIXED HYPERLIPIDEMIA: ICD-10-CM

## 2019-04-19 DIAGNOSIS — I10 ESSENTIAL HYPERTENSION: ICD-10-CM

## 2019-04-19 PROCEDURE — 99214 OFFICE O/P EST MOD 30 MIN: CPT | Performed by: NURSE PRACTITIONER

## 2019-04-19 PROCEDURE — 93000 ELECTROCARDIOGRAM COMPLETE: CPT | Performed by: NURSE PRACTITIONER

## 2019-04-19 RX ORDER — TOBRAMYCIN AND DEXAMETHASONE 3; 1 MG/ML; MG/ML
SUSPENSION/ DROPS OPHTHALMIC
Refills: 0 | COMMUNITY
Start: 2019-03-27 | End: 2019-10-04

## 2019-04-19 RX ORDER — PANTOPRAZOLE SODIUM 40 MG/1
40 TABLET, DELAYED RELEASE ORAL DAILY
Refills: 0 | COMMUNITY
Start: 2019-03-20 | End: 2019-10-15 | Stop reason: SDUPTHER

## 2019-05-24 ENCOUNTER — TELEPHONE (OUTPATIENT)
Dept: CARDIOLOGY | Facility: CLINIC | Age: 69
End: 2019-05-24

## 2019-05-24 ENCOUNTER — HOSPITAL ENCOUNTER (OUTPATIENT)
Dept: CARDIOLOGY | Facility: HOSPITAL | Age: 69
Discharge: HOME | End: 2019-05-24
Admitting: NURSE PRACTITIONER

## 2019-05-24 VITALS — HEIGHT: 69 IN | BODY MASS INDEX: 26.36 KG/M2 | WEIGHT: 178 LBS

## 2019-05-24 DIAGNOSIS — I25.10 CORONARY ARTERY DISEASE INVOLVING NATIVE CORONARY ARTERY OF NATIVE HEART WITHOUT ANGINA PECTORIS: ICD-10-CM

## 2019-05-24 LAB
BH CV NUCLEAR PRIOR STUDY: 3
BH CV STRESS BP STAGE 1: NORMAL
BH CV STRESS COMMENTS STAGE 1: NORMAL
BH CV STRESS DOSE REGADENOSON STAGE 1: 0.4
BH CV STRESS DURATION MIN STAGE 1: 0
BH CV STRESS DURATION SEC STAGE 1: 10
BH CV STRESS HR STAGE 1: 93
BH CV STRESS PROTOCOL 1: NORMAL
BH CV STRESS RECOVERY BP: NORMAL MMHG
BH CV STRESS RECOVERY HR: 82 BPM
BH CV STRESS STAGE 1: 1
LV EF NUC BP: 65 %
MAXIMAL PREDICTED HEART RATE: 152 BPM
PERCENT MAX PREDICTED HR: 61.18 %
STRESS BASELINE BP: NORMAL MMHG
STRESS BASELINE HR: 68 BPM
STRESS PERCENT HR: 72 %
STRESS POST EXERCISE DUR SEC: 10 SEC
STRESS POST PEAK BP: NORMAL MMHG
STRESS POST PEAK HR: 93 BPM
STRESS TARGET HR: 129 BPM

## 2019-05-24 PROCEDURE — 93017 CV STRESS TEST TRACING ONLY: CPT

## 2019-05-24 PROCEDURE — 0 TECHNETIUM TETROFOSMIN KIT: Performed by: NURSE PRACTITIONER

## 2019-05-24 PROCEDURE — 93016 CV STRESS TEST SUPVJ ONLY: CPT | Performed by: INTERNAL MEDICINE

## 2019-05-24 PROCEDURE — 78452 HT MUSCLE IMAGE SPECT MULT: CPT | Performed by: INTERNAL MEDICINE

## 2019-05-24 PROCEDURE — 93018 CV STRESS TEST I&R ONLY: CPT | Performed by: INTERNAL MEDICINE

## 2019-05-24 PROCEDURE — 25010000002 REGADENOSON 0.4 MG/5ML SOLUTION: Performed by: NURSE PRACTITIONER

## 2019-05-24 PROCEDURE — A9502 TC99M TETROFOSMIN: HCPCS | Performed by: NURSE PRACTITIONER

## 2019-05-24 PROCEDURE — 78452 HT MUSCLE IMAGE SPECT MULT: CPT

## 2019-05-24 RX ADMIN — TETROFOSMIN 1 DOSE: 1.38 INJECTION, POWDER, LYOPHILIZED, FOR SOLUTION INTRAVENOUS at 08:00

## 2019-05-24 RX ADMIN — TETROFOSMIN 1 DOSE: 1.38 INJECTION, POWDER, LYOPHILIZED, FOR SOLUTION INTRAVENOUS at 09:00

## 2019-05-24 RX ADMIN — REGADENOSON 0.4 MG: 0.08 INJECTION, SOLUTION INTRAVENOUS at 09:00

## 2019-05-24 NOTE — TELEPHONE ENCOUNTER
Patient was due for routine follow-up stress test during 4/2019 office visit and this was discussed and ordered.    This nuclear stress test was done 4/24/2019 and showed no evidence of tightly blocked arteries and impressions were consistent with a low risk study.  Pumping function of the heart looked normal.    Please let patient know that stress test looked good and have him keep October follow-up with Dr. Durbin as scheduled or follow-up sooner if needed for any new, recurrent, or worsening symptoms or other problems/concerns.

## 2019-05-31 ENCOUNTER — TELEPHONE (OUTPATIENT)
Dept: CARDIOLOGY | Facility: CLINIC | Age: 69
End: 2019-05-31

## 2019-05-31 NOTE — TELEPHONE ENCOUNTER
"05/31/19  8:09 AM  Wilmar Carmona  1950  Home Phone [phone]   Mobile [phone]       Wilmar Carmona is a patient of Dr Durbin. He is calling in this morning concerned about his bp.  He said he just hasn't been feeling right since the stress test.  He thinks it is due to the injection he got during the stress test.  He c/o a headache on and off since last Friday.    He said on average his bp is in the 130s/70s.  For the last week his readings have been getting higher.    Last night  184/92- he had a HA and was over all not feeling well  This am  185/90- HA.    Cardiac meds reviewed  zocor  Propafenone 150mg every 8 hours  Nifedipine 60mg daily    Lastly he added that he is being weaned off his   Serzone.  He stated he is in week 3 of the taper down dosage.  He goes down a 1/2 pill every 2 weeks.  He is wondering if this has anything to do with why he is not feeling well and why his bp is elevated?    Does he need a med adjustment?  Does he need to come in for a bp check so we can verify his machine?  He has a wrist cuff and an arm cuff and \"he is not sure if he is using the arm cuff correctly, but stated that the readings usually match.\"    Thanks  Monalisa Fuller RN  Triage nurse    "

## 2019-05-31 NOTE — TELEPHONE ENCOUNTER
Monalisa-- see message below and please call patient with Dr. Durbin's recommendations.  Just FYI, stress test results previously given to patient.

## 2019-05-31 NOTE — TELEPHONE ENCOUNTER
See if patient held his blood pressure medicines for the stress test.  Have him take an extra 30 mg (half a tablet) of the nifedipine now and if he needs to over the weekend he can take an extra every day of 30 mg.  Have him call with an update on Monday if blood pressure remains elevated.  If his blood pressure does not improve with extra 30 mg of amlodipine have him call back over the weekend.  His stress test overall looked normal. narinder

## 2019-05-31 NOTE — TELEPHONE ENCOUNTER
Called patient he didn't hold his bp meds for the stress test.    We discussed his results of stress test were normal    Patient to take extra nifedipine 30mg today and through the weekend if his pressure is still elevated.  He will call back on Monday to let me know how he is doing.  I also instructed him to call in over the weekend if his bp is not going down. Patient verbalized understanding.  Monalisa Fuller RN  Triage nurse

## 2019-06-05 ENCOUNTER — CLINICAL SUPPORT (OUTPATIENT)
Dept: CARDIOLOGY | Facility: CLINIC | Age: 69
End: 2019-06-05

## 2019-06-05 VITALS — DIASTOLIC BLOOD PRESSURE: 84 MMHG | SYSTOLIC BLOOD PRESSURE: 132 MMHG | HEART RATE: 64 BPM

## 2019-06-05 NOTE — PROGRESS NOTES
Patient came in today for a BP check, to check the accuarcy of his wrist cuff. His medications were reviewed. He has continued to take the extra 30 mg of Nifedipine at night.  He sat for 5 min prior to having vitals taking. His vitals are as follows:     Manual BP Cuff:  BP   132/84  HR   66     Home Cuff:  BP   134/85   HR   66     At home his BP/HRs with the new cuff are:   6/3   131/75  58  6/4   143/78  61    The patient was released to go. Please advise of any medication changes if needed.

## 2019-06-13 RX ORDER — PROPAFENONE HYDROCHLORIDE 150 MG/1
TABLET, COATED ORAL
Qty: 270 TABLET | Refills: 1 | Status: SHIPPED | OUTPATIENT
Start: 2019-06-13 | End: 2020-06-12

## 2019-07-03 RX ORDER — ACEBUTOLOL HYDROCHLORIDE 200 MG/1
CAPSULE ORAL
Qty: 180 CAPSULE | Refills: 1 | Status: SHIPPED | OUTPATIENT
Start: 2019-07-03 | End: 2020-01-02

## 2019-07-07 ENCOUNTER — TELEPHONE (OUTPATIENT)
Dept: CARDIOLOGY | Facility: CLINIC | Age: 69
End: 2019-07-07

## 2019-07-08 NOTE — TELEPHONE ENCOUNTER
Spoke with pt.  He has NOT been tracking as he was out of town.  He will call by Friday with an update.

## 2019-07-12 NOTE — TELEPHONE ENCOUNTER
Pt called with an BP update.  He said he is running between 129-135/70-80 pulse in the 60s.    Current Meds:  Rythmol 150mg Q 8hrs  Nifedipine XL 60mg in the AM and 30mg in the PM

## 2019-07-15 RX ORDER — NIFEDIPINE 60 MG/1
60 TABLET, EXTENDED RELEASE ORAL 2 TIMES DAILY
Qty: 180 TABLET | Refills: 0 | Status: SHIPPED | OUTPATIENT
Start: 2019-07-15 | End: 2019-10-12 | Stop reason: SDUPTHER

## 2019-07-15 NOTE — TELEPHONE ENCOUNTER
Spoke with pt.  Verbalized understanding.  Rx sent.  Reminder set to call him in 1 week for update.

## 2019-07-22 NOTE — TELEPHONE ENCOUNTER
BP was 130-134/75 pulse 58-61.  Pt said he took both Nifedipine 60mg si QAM at the same time because he did not want to take BID.  He believe this was the cause for stomach upset.  Pt said he will take 60mg BID as we had directed.

## 2019-07-25 NOTE — TELEPHONE ENCOUNTER
Spoke with pt.  Verbalized understanding.  Stomach is better.  He will see his PCP is this returns.  Pt will call us back with further update in 2 weeks.

## 2019-08-01 ENCOUNTER — LAB (OUTPATIENT)
Dept: LAB | Facility: HOSPITAL | Age: 69
End: 2019-08-01

## 2019-08-01 ENCOUNTER — TRANSCRIBE ORDERS (OUTPATIENT)
Dept: LAB | Facility: HOSPITAL | Age: 69
End: 2019-08-01

## 2019-08-01 DIAGNOSIS — N40.3 NODULAR PROSTATE WITH URINARY OBSTRUCTION: ICD-10-CM

## 2019-08-01 DIAGNOSIS — N40.1 ENLARGED PROSTATE WITH URINARY OBSTRUCTION: Primary | ICD-10-CM

## 2019-08-01 DIAGNOSIS — N13.8 ENLARGED PROSTATE WITH URINARY OBSTRUCTION: Primary | ICD-10-CM

## 2019-08-01 DIAGNOSIS — N40.1 ENLARGED PROSTATE WITH URINARY OBSTRUCTION: ICD-10-CM

## 2019-08-01 DIAGNOSIS — N13.8 NODULAR PROSTATE WITH URINARY OBSTRUCTION: ICD-10-CM

## 2019-08-01 DIAGNOSIS — N13.8 ENLARGED PROSTATE WITH URINARY OBSTRUCTION: ICD-10-CM

## 2019-08-01 LAB — PSA SERPL-MCNC: 2.88 NG/ML (ref 0–4)

## 2019-08-01 PROCEDURE — 84153 ASSAY OF PSA TOTAL: CPT

## 2019-08-01 PROCEDURE — 36415 COLL VENOUS BLD VENIPUNCTURE: CPT

## 2019-10-04 ENCOUNTER — OFFICE VISIT (OUTPATIENT)
Dept: CARDIOLOGY | Facility: CLINIC | Age: 69
End: 2019-10-04

## 2019-10-04 VITALS
SYSTOLIC BLOOD PRESSURE: 140 MMHG | HEART RATE: 54 BPM | WEIGHT: 184 LBS | HEIGHT: 68 IN | DIASTOLIC BLOOD PRESSURE: 84 MMHG | BODY MASS INDEX: 27.89 KG/M2

## 2019-10-04 DIAGNOSIS — I48.0 PAF (PAROXYSMAL ATRIAL FIBRILLATION) (HCC): Primary | ICD-10-CM

## 2019-10-04 DIAGNOSIS — I25.10 CORONARY ARTERY DISEASE INVOLVING NATIVE CORONARY ARTERY OF NATIVE HEART WITHOUT ANGINA PECTORIS: ICD-10-CM

## 2019-10-04 DIAGNOSIS — I10 ESSENTIAL HYPERTENSION: ICD-10-CM

## 2019-10-04 PROCEDURE — 99214 OFFICE O/P EST MOD 30 MIN: CPT | Performed by: INTERNAL MEDICINE

## 2019-10-04 PROCEDURE — 93000 ELECTROCARDIOGRAM COMPLETE: CPT | Performed by: INTERNAL MEDICINE

## 2019-10-04 NOTE — PROGRESS NOTES
Date of Office Visit: 10/04/2019  Encounter Provider: Diana Durbin MD  Place of Service: [facility] CARDIOLOGY  Patient Name: Wilmar Carmona  :1950    Chief complaint  Follow of paroxysmal atrial fibrillation, dilated aortic root and left ventricular hypertrophy    History of Present Illness  Patient is a pleasant 68-year-old gentleman with history of hypertension, hyperlipidemia, obstructive sleep apnea.  He has had atrial fibrillation for many years and previously was followed by Dr. Bailey.  He has been maintained on Rythmol therapy and Sectral.  He had an abnormal stress test in  leading to cardiac catheterization that revealed minimal vessel disease up to 10%.  However there is a CT scan from 2016 that revealed calcification of all 3 major vessels.  He follow-up echocardiogram in 2018 that showed normal left ventricular size and systolic function with out evidence of aneurysmal disease of the aortic root.  There was an abnormal structure noted in the liver possibly a cyst.  He had a 2 weeks patch in 2018 that showed sinus rhythm with occasional PVCs that were symptomatic.  There is no atrial arrhythmia noted.  In May 2019 he had a stress perfusion study that was negative for ischemia    Last visit he is walking 6-7000 steps a day.  He denies any chest pain, shortness of breath, palpitations, syncope near syncope.  Blood pressures been in the 120s to 130s when checked sporadically.  He states several months ago he stops Serazone and his blood pressure increased significantly.  It has been resumed at a lower dose.  Unfortunately he is still not using CPAP    Past Medical History:   Diagnosis Date   • Abnormal EKG    • Abnormal stress test    • Arthritis    • Atrial fibrillation (CMS/HCC)    • BPH (benign prostatic hypertrophy)    • CAD (coronary artery disease)    • Depression    • GERD (gastroesophageal reflux disease)    • H/O medial meniscus repair of  right knee 2014    Partial Menisectomy right knee   • Heart murmur    • Hyperlipidemia    • Hypertension    • Intermittent knee pain     R<L knee from cycling and running in the past -    • Mild tricuspid regurgitation    • PAC (premature atrial contraction)    • PAF (paroxysmal atrial fibrillation) (CMS/HCC)    • Palpitations    • Sleep apnea    • Tremor      Past Surgical History:   Procedure Laterality Date   • CARDIAC CATHETERIZATION  2015   • COLONOSCOPY  10/20/2014    normal -dr leon..had egd at same time   • HERNIA REPAIR     • KNEE SURGERY Left    • TONSILLECTOMY     • VASECTOMY       Outpatient Medications Prior to Visit   Medication Sig Dispense Refill   • acebutolol (SECTRAL) 200 MG capsule TAKE 1 CAPSULE BY MOUTH TWICE DAILY 180 capsule 1   • Ascorbic Acid (VITAMIN C PO) Take  by mouth.     • B Complex Vitamins (VITAMIN B COMPLEX PO) Take  by mouth.     • finasteride (PROSCAR) 5 MG tablet TK 1 T PO ONCE DAILY  2   • Fish Oil-Cholecalciferol (FISH OIL + D3) [id number] MG-UNIT capsule Take 1 capsule by mouth As Needed.     • lamoTRIgine (LaMICtal) 200 MG tablet Take 250 mg by mouth Daily.     • nefazodone (SERZONE) 50 MG tablet Take 50 mg by mouth Daily.     • NIFEdipine XL (PROCARDIA XL) 60 MG 24 hr tablet Take 1 tablet by mouth 2 (Two) Times a Day. 180 tablet 0   • pantoprazole (PROTONIX) 40 MG EC tablet Take 40 mg by mouth Daily.  0   • propafenone (RYTHMOL) 150 MG tablet TAKE 1 TABLET BY MOUTH EVERY 8 HOURS 270 tablet 1   • simvastatin (ZOCOR) 20 MG tablet TAKE 1 TABLET BY MOUTH EVERY NIGHT 90 tablet 1   • clotrimazole-betamethasone (LOTRISONE) 1-0.05 % cream TEJAS EXT AA BID - prn  3   • tobramycin-dexamethasone (TOBRADEX) 0.3-0.1 % ophthalmic suspension As directed  0     No facility-administered medications prior to visit.        Allergies as of 10/04/2019   • (No Known Allergies)     Social History     Socioeconomic History   • Marital status:      Spouse name: Not on file   • Number of  "children: Not on file   • Years of education: Not on file   • Highest education level: Not on file   Tobacco Use   • Smoking status: Never Smoker   • Smokeless tobacco: Never Used   Substance and Sexual Activity   • Alcohol use: No   • Drug use: Defer   • Sexual activity: Defer     Family History   Problem Relation Age of Onset   • Hypertension Mother    • Cancer Father         prostate   • Heart disease Father    • Coronary artery disease Father    • Stroke Father    • Cancer Brother         prostate     Review of Systems   Constitution: Negative for fever, malaise/fatigue, weight gain and weight loss.   HENT: Negative for ear pain, hearing loss, nosebleeds and sore throat.    Eyes: Negative for double vision, pain, vision loss in left eye and vision loss in right eye.   Cardiovascular:        See history of present illness.   Respiratory: Negative for cough, shortness of breath, sleep disturbances due to breathing, snoring and wheezing.    Endocrine: Negative for cold intolerance, heat intolerance and polyuria.   Skin: Negative for itching, poor wound healing and rash.   Musculoskeletal: Negative for joint pain, joint swelling and myalgias.   Gastrointestinal: Negative for abdominal pain, diarrhea, hematochezia, nausea and vomiting.   Genitourinary: Negative for hematuria and hesitancy.   Neurological: Negative for numbness, paresthesias and seizures.   Psychiatric/Behavioral: Positive for depression. The patient is not nervous/anxious.         Objective:     Vitals:    10/04/19 1006   BP: 140/84   Pulse: 54   Weight: 83.5 kg (184 lb)   Height: 172.7 cm (68\")     Body mass index is 27.98 kg/m².    Physical Exam   Constitutional: He is oriented to person, place, and time. He appears well-developed and well-nourished.   HENT:   Head: Normocephalic.   Nose: Nose normal.   Mouth/Throat: Oropharynx is clear and moist.   Eyes: Conjunctivae and EOM are normal. Pupils are equal, round, and reactive to light. Right eye " exhibits no discharge. No scleral icterus.   Neck: Normal range of motion. Neck supple. No JVD present. No thyromegaly present.   Cardiovascular: Normal rate, regular rhythm, normal heart sounds and intact distal pulses. Exam reveals no gallop and no friction rub.   No murmur heard.  Pulses:       Carotid pulses are 2+ on the right side, and 2+ on the left side.       Radial pulses are 2+ on the right side, and 2+ on the left side.        Femoral pulses are 2+ on the right side, and 2+ on the left side.       Popliteal pulses are 2+ on the right side, and 2+ on the left side.        Dorsalis pedis pulses are 2+ on the right side, and 2+ on the left side.        Posterior tibial pulses are 2+ on the right side, and 2+ on the left side.   Pulmonary/Chest: Effort normal and breath sounds normal. No respiratory distress. He has no wheezes. He has no rales.   Abdominal: Soft. Bowel sounds are normal. He exhibits no distension. There is no hepatosplenomegaly. There is no tenderness. There is no rebound.   Musculoskeletal: Normal range of motion. He exhibits no edema or tenderness.   Neurological: He is alert and oriented to person, place, and time.   Skin: Skin is warm and dry. No rash noted. No erythema.   Psychiatric: He has a normal mood and affect. His behavior is normal. Judgment and thought content normal.   Vitals reviewed.    Lab Review:     ECG 12 Lead  Date/Time: 10/4/2019 10:24 AM  Performed by: Diana Durbin MD  Authorized by: Diana Durbin MD   Comparison: compared with previous ECG   Similar to previous ECG  Rhythm: sinus bradycardia  Conduction: 1st degree AV block and non-specific intraventricular conduction delay    Clinical impression: abnormal EKG          Assessment:       Diagnosis Plan   1. PAF (paroxysmal atrial fibrillation) (CMS/[facility])  ECG 12 Lead   2. Essential hypertension     3. Coronary artery disease involving native coronary artery of native heart without angina pectoris       Plan:       1.   Recurrent PACs of questionable atrial fibrillation.  Patient now states he never had a history of atrial fibrillation though many years ago he told Dr. Bailey he had.  There has been no clear documentation of atrial fibrillation though he has had PACs.  This is been managed with Rythmol Sectral.  He was relatively asymptomatic.  We will continue with this for now.  I have not started him on any anticoagulation due to this vague history.  2.  Mild coronary artery disease.  No anginal symptoms with a negative stress test and 2019.  I did ask him to start a coated baby aspirin a day.  3.  Dilated right ventricle, as above likely due to sleep apnea.,  Improved  4.  Obstructive sleep apnea.Usimng CPAP nightly and followed by Dr Mcgovern  5.  Hypertension, controlled  6.  Dyslipidemia.  He will have lipids checked with Dr. spaulding.  7.  Hepatic cysts         Your medication list           Accurate as of 10/4/19 11:59 PM. If you have any questions, ask your nurse or doctor.               CONTINUE taking these medications      Instructions Last Dose Given Next Dose Due   acebutolol 200 MG capsule  Commonly known as:  SECTRAL      TAKE 1 CAPSULE BY MOUTH TWICE DAILY       finasteride 5 MG tablet  Commonly known as:  PROSCAR      TK 1 T PO ONCE DAILY       FISH OIL + D3 [id number] MG-UNIT capsule      Take 1 capsule by mouth As Needed.       lamoTRIgine 200 MG tablet  Commonly known as:  LaMICtal      Take 250 mg by mouth Daily.       nefazodone 50 MG tablet  Commonly known as:  SERZONE      Take 50 mg by mouth Daily.       NIFEdipine XL 60 MG 24 hr tablet  Commonly known as:  PROCARDIA XL      Take 1 tablet by mouth 2 (Two) Times a Day.       pantoprazole 40 MG EC tablet  Commonly known as:  PROTONIX      Take 40 mg by mouth Daily.       propafenone 150 MG tablet  Commonly known as:  RYTHMOL      TAKE 1 TABLET BY MOUTH EVERY 8 HOURS       simvastatin 20 MG tablet  Commonly known as:  ZOCOR      TAKE 1 TABLET BY MOUTH EVERY NIGHT        VITAMIN B COMPLEX PO      Take  by mouth.       VITAMIN C PO      Take  by mouth.          STOP taking these medications    clotrimazole-betamethasone 1-0.05 % cream  Commonly known as:  LOTRISONE  Stopped by:  Diana Durbin MD        tobramycin-dexamethasone 0.3-0.1 % ophthalmic suspension  Commonly known as:  TOBRADEX  Stopped by:  Diana Durbin MD             Patient is no longer taking TobraDex and Lotrisone.  I corrected the med list to reflect this.  I did not stop these medications.    Dictated utilizing Dragon dictation

## 2019-10-14 RX ORDER — SIMVASTATIN 20 MG
20 TABLET ORAL NIGHTLY
Qty: 90 TABLET | Refills: 0 | OUTPATIENT
Start: 2019-10-14

## 2019-10-14 RX ORDER — NIFEDIPINE 60 MG/1
TABLET, EXTENDED RELEASE ORAL
Qty: 180 TABLET | Refills: 0 | Status: SHIPPED | OUTPATIENT
Start: 2019-10-14 | End: 2019-10-15 | Stop reason: SDUPTHER

## 2019-10-15 RX ORDER — PANTOPRAZOLE SODIUM 40 MG/1
40 TABLET, DELAYED RELEASE ORAL DAILY
Qty: 90 TABLET | Refills: 0 | Status: SHIPPED | OUTPATIENT
Start: 2019-10-15 | End: 2020-06-26 | Stop reason: SDUPTHER

## 2019-10-15 RX ORDER — NIFEDIPINE 60 MG/1
60 TABLET, EXTENDED RELEASE ORAL 2 TIMES DAILY
Qty: 180 TABLET | Refills: 0 | Status: SHIPPED | OUTPATIENT
Start: 2019-10-15 | End: 2020-04-20

## 2019-10-15 RX ORDER — SIMVASTATIN 20 MG
20 TABLET ORAL NIGHTLY
Qty: 90 TABLET | Refills: 0 | Status: SHIPPED | OUTPATIENT
Start: 2019-10-15 | End: 2020-01-21

## 2019-10-30 ENCOUNTER — OFFICE VISIT (OUTPATIENT)
Dept: INTERNAL MEDICINE | Facility: CLINIC | Age: 69
End: 2019-10-30

## 2019-10-30 VITALS
DIASTOLIC BLOOD PRESSURE: 75 MMHG | HEIGHT: 68 IN | RESPIRATION RATE: 16 BRPM | TEMPERATURE: 97.7 F | BODY MASS INDEX: 28.64 KG/M2 | OXYGEN SATURATION: 94 % | WEIGHT: 189 LBS | HEART RATE: 56 BPM | SYSTOLIC BLOOD PRESSURE: 132 MMHG

## 2019-10-30 DIAGNOSIS — R10.9 RIGHT FLANK PAIN: Primary | ICD-10-CM

## 2019-10-30 PROCEDURE — 99214 OFFICE O/P EST MOD 30 MIN: CPT | Performed by: NURSE PRACTITIONER

## 2019-10-30 NOTE — PROGRESS NOTES
"Subjective   Wilmar Carmona is a 69 y.o. male.   CC: Right flank/right upper quadrant abdominal pain    Patient presents for evaluation of right flank/upper quadrant pain.  This is a 69-year-old male patient of Dr. spaulding with a history of paroxysmal A. fib, hypertension.  He reports that this has been present for about 3 weeks.  He reports that over the past 2 days it has resolved for the most part with only intermittent pain.  He reports that he can identify no mechanism of injury or recent falls.  He endorses right upper quadrant/right flank pain that is present only intermittently.  It increases at times when he \"twists from side to side.\"  He endorses some associated nausea but no vomiting.  He has had no issues with constipation or diarrhea and has seen no blood or mucus in his stool.  He reports some baseline urinary frequency but no increase in this, and he has had no dysuria or cloudy urine.  Tylenol has made a bit of a difference intermittently.  The pain is not present all the time, but intermittently throughout the day and the nausea occurs randomly.  He does still have his gallbladder.  He endorses no fever or chills.  He denies development of any other new issues today.         The following portions of the patient's history were reviewed and updated as appropriate: allergies, current medications, past family history, past medical history, past social history, past surgical history and problem list.    Review of Systems   Constitutional: Negative for activity change, chills, fatigue, fever, unexpected weight gain and unexpected weight loss.   HENT: Negative for congestion, hearing loss, postnasal drip, sinus pressure, sneezing, sore throat, swollen glands and tinnitus.    Eyes: Negative for photophobia, pain and visual disturbance.   Respiratory: Negative for cough, chest tightness, shortness of breath and wheezing.    Cardiovascular: Negative for chest pain, palpitations and leg swelling. " "  Gastrointestinal: Positive for abdominal pain (  Right upper quadrant) and nausea. Negative for abdominal distention, constipation, diarrhea and vomiting.   Endocrine: Negative for polydipsia, polyphagia and polyuria.   Genitourinary: Positive for flank pain. Negative for dysuria, frequency, hematuria and urgency.   Neurological: Negative for dizziness, weakness, numbness and headache.   All other systems reviewed and are negative.      Objective    /75 (BP Location: Left arm, Patient Position: Sitting, Cuff Size: Adult)   Pulse 56   Temp 97.7 °F (36.5 °C) (Oral)   Resp 16   Ht 172.7 cm (68\")   Wt 85.7 kg (189 lb)   SpO2 94%   BMI 28.74 kg/m²     Physical Exam   Constitutional: He is oriented to person, place, and time. He appears well-developed and well-nourished. No distress.   HENT:   Head: Normocephalic and atraumatic.   Eyes: Pupils are equal, round, and reactive to light.   Neck: Normal range of motion. Neck supple. No JVD present. No tracheal deviation present. No thyromegaly present.   Cardiovascular: Normal rate, regular rhythm, normal heart sounds and intact distal pulses. Exam reveals no gallop and no friction rub.   No murmur heard.  Pulmonary/Chest: Effort normal and breath sounds normal.   Lungs are CTA bilaterally   Abdominal: Soft. Bowel sounds are normal. He exhibits no distension and no mass. There is no tenderness. There is no rigidity, no rebound, no guarding, no CVA tenderness, no tenderness at McBurney's point and negative Stock's sign. No hernia.   Sounds active x4 quadrants.  No pain to light or deep palpation x4 quadrants.  No CVA tenderness.   Musculoskeletal: Normal range of motion.   Lymphadenopathy:     He has no cervical adenopathy.   Neurological: He is alert and oriented to person, place, and time.   Skin: Skin is warm and dry. Capillary refill takes less than 2 seconds. He is not diaphoretic.   Psychiatric: He has a normal mood and affect. His behavior is normal. " Judgment and thought content normal.   Nursing note and vitals reviewed.    Current outpatient and discharge medications have been reconciled for the patient.  Reviewed by: GEOFFREY Shaw      Assessment/Plan   Wilmar was seen today for pain.    Diagnoses and all orders for this visit:    Right flank pain  -     US Gallbladder; Future  -     CBC & Differential  -     Comprehensive metabolic panel  -     Urinalysis With Culture If Indicated - Urine, Clean Catch    -We will evaluate with an ultrasound of the gallbladder as well as CBC, CMP and urinalysis.    -We will contact patient with results of his imaging and labs and any further recommendations.  Follow-up PRN and routinely with PCP, Dr. spaulding.

## 2019-11-02 LAB
ALBUMIN SERPL-MCNC: 4.8 G/DL (ref 3.5–5.2)
ALBUMIN/GLOB SERPL: 2.1 G/DL
ALP SERPL-CCNC: 38 U/L (ref 39–117)
ALT SERPL-CCNC: 29 U/L (ref 1–41)
APPEARANCE UR: CLEAR
AST SERPL-CCNC: 25 U/L (ref 1–40)
BACTERIA #/AREA URNS HPF: ABNORMAL /HPF
BACTERIA UR CULT: NORMAL
BACTERIA UR CULT: NORMAL
BASOPHILS # BLD AUTO: 0.06 10*3/MM3 (ref 0–0.2)
BASOPHILS NFR BLD AUTO: 0.8 % (ref 0–1.5)
BILIRUB SERPL-MCNC: 0.4 MG/DL (ref 0.2–1.2)
BUN SERPL-MCNC: 17 MG/DL (ref 8–23)
BUN/CREAT SERPL: 13.8 (ref 7–25)
CALCIUM SERPL-MCNC: 9.1 MG/DL (ref 8.6–10.5)
CASTS URNS MICRO: ABNORMAL
CASTS URNS QL MICRO: PRESENT /LPF
CHLORIDE SERPL-SCNC: 103 MMOL/L (ref 98–107)
CO2 SERPL-SCNC: 30.5 MMOL/L (ref 22–29)
COLOR UR: YELLOW
CREAT SERPL-MCNC: 1.23 MG/DL (ref 0.76–1.27)
EOSINOPHIL # BLD AUTO: 0.27 10*3/MM3 (ref 0–0.4)
EOSINOPHIL NFR BLD AUTO: 3.8 % (ref 0.3–6.2)
EPI CELLS #/AREA URNS HPF: ABNORMAL /HPF
ERYTHROCYTE [DISTWIDTH] IN BLOOD BY AUTOMATED COUNT: 13 % (ref 12.3–15.4)
GLOBULIN SER CALC-MCNC: 2.3 GM/DL
GLUCOSE SERPL-MCNC: 80 MG/DL (ref 65–99)
GLUCOSE UR QL: NORMAL
HCT VFR BLD AUTO: 43.7 % (ref 37.5–51)
HGB BLD-MCNC: 15.2 G/DL (ref 13–17.7)
IMM GRANULOCYTES # BLD AUTO: 0.04 10*3/MM3 (ref 0–0.05)
IMM GRANULOCYTES NFR BLD AUTO: 0.6 % (ref 0–0.5)
KETONES UR QL STRIP: NORMAL
LYMPHOCYTES # BLD AUTO: 2.26 10*3/MM3 (ref 0.7–3.1)
LYMPHOCYTES NFR BLD AUTO: 31.5 % (ref 19.6–45.3)
MCH RBC QN AUTO: 31.9 PG (ref 26.6–33)
MCHC RBC AUTO-ENTMCNC: 34.8 G/DL (ref 31.5–35.7)
MCV RBC AUTO: 91.8 FL (ref 79–97)
MICRO URNS: NORMAL
MICRO URNS: NORMAL
MONOCYTES # BLD AUTO: 0.58 10*3/MM3 (ref 0.1–0.9)
MONOCYTES NFR BLD AUTO: 8.1 % (ref 5–12)
MUCOUS THREADS URNS QL MICRO: PRESENT /HPF
NEUTROPHILS # BLD AUTO: 3.96 10*3/MM3 (ref 1.7–7)
NEUTROPHILS NFR BLD AUTO: 55.2 % (ref 42.7–76)
NRBC BLD AUTO-RTO: 0 /100 WBC (ref 0–0.2)
PH UR STRIP: NORMAL [PH]
PLATELET # BLD AUTO: 254 10*3/MM3 (ref 140–450)
POTASSIUM SERPL-SCNC: 4.3 MMOL/L (ref 3.5–5.2)
PROT SERPL-MCNC: 7.1 G/DL (ref 6–8.5)
PROT UR QL STRIP: NORMAL
RBC # BLD AUTO: 4.76 10*6/MM3 (ref 4.14–5.8)
RBC #/AREA URNS HPF: ABNORMAL /HPF
SODIUM SERPL-SCNC: 144 MMOL/L (ref 136–145)
SP GR UR: NORMAL
URINALYSIS REFLEX: NORMAL
WBC # BLD AUTO: 7.17 10*3/MM3 (ref 3.4–10.8)
WBC #/AREA URNS HPF: ABNORMAL /HPF

## 2019-11-12 ENCOUNTER — HOSPITAL ENCOUNTER (OUTPATIENT)
Dept: ULTRASOUND IMAGING | Facility: HOSPITAL | Age: 69
Discharge: HOME | End: 2019-11-12
Admitting: NURSE PRACTITIONER

## 2019-11-12 DIAGNOSIS — R10.9 RIGHT FLANK PAIN: ICD-10-CM

## 2019-11-12 PROCEDURE — 76705 ECHO EXAM OF ABDOMEN: CPT

## 2019-11-15 NOTE — PROGRESS NOTES
Please notify patient that I have the results of his right upper quadrant ultrasound.  He has 2 liver cysts which look unchanged in size compared to his CT from 2016, which should not be causing any problems.  He has what appear to be 3 gallbladder polyps measuring 5 mm each.  Otherwise his ultrasound is normal.  Please inquire about his symptoms.

## 2019-11-18 DIAGNOSIS — R10.9 ABDOMINAL PAIN, UNSPECIFIED ABDOMINAL LOCATION: Primary | ICD-10-CM

## 2019-11-18 DIAGNOSIS — R10.9 RIGHT FLANK PAIN: ICD-10-CM

## 2019-11-18 NOTE — PROGRESS NOTES
Please let him know that I went ahead and ordered a CT of the entire abdomen/pelvis since the pain seems to be lower and is not letting up. I will get him the results as soon as I have them.

## 2019-11-26 ENCOUNTER — HOSPITAL ENCOUNTER (OUTPATIENT)
Dept: CT IMAGING | Facility: HOSPITAL | Age: 69
Discharge: HOME | End: 2019-11-26
Admitting: NURSE PRACTITIONER

## 2019-11-26 DIAGNOSIS — R10.9 RIGHT FLANK PAIN: ICD-10-CM

## 2019-11-26 DIAGNOSIS — R10.9 ABDOMINAL PAIN, UNSPECIFIED ABDOMINAL LOCATION: ICD-10-CM

## 2019-11-26 PROCEDURE — 74177 CT ABD & PELVIS W/CONTRAST: CPT

## 2019-11-26 PROCEDURE — 25010000002 IOPAMIDOL 61 % SOLUTION: Performed by: NURSE PRACTITIONER

## 2019-11-26 PROCEDURE — 82565 ASSAY OF CREATININE: CPT

## 2019-11-26 RX ADMIN — IOPAMIDOL 85 ML: 612 INJECTION, SOLUTION INTRAVENOUS at 09:52

## 2019-11-27 LAB — CREAT BLDA-MCNC: 1.2 MG/DL (ref 0.6–1.3)

## 2019-12-02 DIAGNOSIS — R93.5 ABNORMAL CT OF THE ABDOMEN: Primary | ICD-10-CM

## 2019-12-03 ENCOUNTER — TELEPHONE (OUTPATIENT)
Dept: INTERNAL MEDICINE | Facility: CLINIC | Age: 69
End: 2019-12-03

## 2019-12-03 NOTE — TELEPHONE ENCOUNTER
----- Message from GEOFFREY Sahw sent at 12/3/2019  1:00 PM EST -----  I spoke with patient on the phone and further interpreted his CT scan.  Please send him in some diclofenac gel, he is having some rib pain and I asked him to try this along with ibuprofen/Tylenol.  He is going to get a repeat CT scan for 3 months.

## 2019-12-06 ENCOUNTER — APPOINTMENT (OUTPATIENT)
Dept: CT IMAGING | Facility: HOSPITAL | Age: 69
End: 2019-12-06

## 2020-01-02 RX ORDER — ACEBUTOLOL HYDROCHLORIDE 200 MG/1
CAPSULE ORAL
Qty: 180 CAPSULE | Refills: 1 | Status: SHIPPED | OUTPATIENT
Start: 2020-01-02 | End: 2020-07-09

## 2020-01-03 ENCOUNTER — OFFICE VISIT (OUTPATIENT)
Dept: INTERNAL MEDICINE | Facility: CLINIC | Age: 70
End: 2020-01-03

## 2020-01-03 VITALS
HEART RATE: 67 BPM | OXYGEN SATURATION: 93 % | TEMPERATURE: 98.2 F | WEIGHT: 190 LBS | SYSTOLIC BLOOD PRESSURE: 118 MMHG | DIASTOLIC BLOOD PRESSURE: 70 MMHG | BODY MASS INDEX: 28.89 KG/M2

## 2020-01-03 DIAGNOSIS — R10.11 RIGHT UPPER QUADRANT ABDOMINAL PAIN: ICD-10-CM

## 2020-01-03 DIAGNOSIS — I10 ESSENTIAL HYPERTENSION: ICD-10-CM

## 2020-01-03 DIAGNOSIS — E78.2 MIXED HYPERLIPIDEMIA: ICD-10-CM

## 2020-01-03 DIAGNOSIS — I25.10 CORONARY ARTERY DISEASE INVOLVING NATIVE CORONARY ARTERY OF NATIVE HEART WITHOUT ANGINA PECTORIS: ICD-10-CM

## 2020-01-03 DIAGNOSIS — J20.9 ACUTE BRONCHITIS, UNSPECIFIED ORGANISM: Primary | ICD-10-CM

## 2020-01-03 PROCEDURE — 99214 OFFICE O/P EST MOD 30 MIN: CPT | Performed by: FAMILY MEDICINE

## 2020-01-03 RX ORDER — AZITHROMYCIN 250 MG/1
TABLET, FILM COATED ORAL
Qty: 6 TABLET | Refills: 0 | Status: SHIPPED | OUTPATIENT
Start: 2020-01-03 | End: 2020-01-07

## 2020-01-03 NOTE — PROGRESS NOTES
Subjective   Wilmar Carmona is a 69 y.o. male.     Chief Complaint   Patient presents with   • Bronchitis   • Hypertension   • Hyperlipidemia   • Coronary Artery Disease         History of Present Illness   Patient with a history of CAD hyperlipidemia hypertension.  A question about history of paroxysmal atrial fibrillation but he could not figure out where that came from.  Otherwise he developed onset of acute bronchitis since last night.  He had some degree of bronchitis over [place] holiday as well.  He has obviously very much congestion and a red throat cough productive sputum.    He has had prior abdominal pain without much findings on CT other than some cysts in the liver and possible cyst in the spleen.  He has pain at the right flank right side radiating sometimes to the right anterior abdomen under the right costal margin.  This sounds more musculoskeletal or radicular in nature.  Without rash.  If he gets worse we will repeat the CT scan.  He is noted to have some gallbladder polyps as well.      The following portions of the patient's history were reviewed and updated as appropriate: allergies, current medications, past social history and problem list.    Review of Systems   Constitutional: Negative.    HENT: Negative.    Eyes: Negative.    Respiratory: Positive for cough.    Cardiovascular: Negative.    Gastrointestinal: Negative.    Endocrine: Negative.    Genitourinary: Negative.    Musculoskeletal: Negative.    Skin: Negative.    Allergic/Immunologic: Negative.    Neurological: Negative.    Hematological: Negative.    Psychiatric/Behavioral: Negative.        Objective   Vitals:    01/03/20 1035   BP: 118/70   Pulse: 67   Temp: 98.2 °F (36.8 °C)   SpO2: 93%     Physical Exam   Constitutional: He is oriented to person, place, and time. He appears well-developed and well-nourished.   HENT:   Head: Normocephalic and atraumatic.   Right Ear: Tympanic membrane and external ear normal.   Left Ear: Tympanic  membrane and external ear normal.   Nose: Mucosal edema present.   Mouth/Throat: Posterior oropharyngeal edema and posterior oropharyngeal erythema present.   Eyes: Pupils are equal, round, and reactive to light. Conjunctivae and EOM are normal.   Neck: Normal range of motion. Neck supple. No JVD present. No thyromegaly present.   Cardiovascular: Normal rate, regular rhythm, normal heart sounds and intact distal pulses.   Pulmonary/Chest: Effort normal. He has rhonchi in the right middle field and the left middle field.   Abdominal: Soft. Bowel sounds are normal. There is no tenderness.   Musculoskeletal: Normal range of motion.   Lymphadenopathy:     He has no cervical adenopathy.   Neurological: He is alert and oriented to person, place, and time. No cranial nerve deficit. Coordination normal.   Skin: Skin is warm and dry. No rash noted.   Psychiatric: He has a normal mood and affect. His behavior is normal. Judgment and thought content normal.   Vitals reviewed.      Assessment/Plan   Problem List Items Addressed This Visit        Cardiovascular and Mediastinum    Coronary artery disease involving native coronary artery of native heart without angina pectoris    Relevant Orders    CBC & Differential    Comprehensive Metabolic Panel    Lipid Panel With / Chol / HDL Ratio    TSH    T4, Free    T3, Free    Urinalysis With Microscopic If Indicated (No Culture) - Urine, Clean Catch    Vitamin B12    Hypertension    Relevant Orders    CBC & Differential    Comprehensive Metabolic Panel    Lipid Panel With / Chol / HDL Ratio    TSH    T4, Free    T3, Free    Urinalysis With Microscopic If Indicated (No Culture) - Urine, Clean Catch    Vitamin B12    Hyperlipidemia    Relevant Orders    CBC & Differential    Comprehensive Metabolic Panel    Lipid Panel With / Chol / HDL Ratio    TSH    T4, Free    T3, Free    Urinalysis With Microscopic If Indicated (No Culture) - Urine, Clean Catch    Vitamin B12      Other Visit  Diagnoses     Acute bronchitis, unspecified organism    -  Primary    Relevant Orders    CBC & Differential    Comprehensive Metabolic Panel    Lipid Panel With / Chol / HDL Ratio    TSH    T4, Free    T3, Free    Urinalysis With Microscopic If Indicated (No Culture) - Urine, Clean Catch    Vitamin B12      Plan: Zithromax Z-KYRA Meds continued labs pending recheck in 6 months for Medicare wellness with the pain worsens we will repeat the CT of the abdomen pelvis with contrast.

## 2020-01-04 LAB
ALBUMIN SERPL-MCNC: 4.4 G/DL (ref 3.5–5.2)
ALBUMIN/GLOB SERPL: 1.6 G/DL
ALP SERPL-CCNC: 39 U/L (ref 39–117)
ALT SERPL-CCNC: 26 U/L (ref 1–41)
APPEARANCE UR: CLEAR
AST SERPL-CCNC: 22 U/L (ref 1–40)
BACTERIA #/AREA URNS HPF: ABNORMAL /HPF
BASOPHILS # BLD AUTO: 0.04 10*3/MM3 (ref 0–0.2)
BASOPHILS NFR BLD AUTO: 0.4 % (ref 0–1.5)
BILIRUB SERPL-MCNC: 0.5 MG/DL (ref 0.2–1.2)
BILIRUB UR QL STRIP: NEGATIVE
BUN SERPL-MCNC: 19 MG/DL (ref 8–23)
BUN/CREAT SERPL: 13.8 (ref 7–25)
CALCIUM SERPL-MCNC: 9 MG/DL (ref 8.6–10.5)
CASTS URNS MICRO: ABNORMAL
CHLORIDE SERPL-SCNC: 102 MMOL/L (ref 98–107)
CHOLEST SERPL-MCNC: 149 MG/DL (ref 0–200)
CHOLEST/HDLC SERPL: 4.52 {RATIO}
CO2 SERPL-SCNC: 26.3 MMOL/L (ref 22–29)
COLOR UR: ABNORMAL
CREAT SERPL-MCNC: 1.38 MG/DL (ref 0.76–1.27)
EOSINOPHIL # BLD AUTO: 0.24 10*3/MM3 (ref 0–0.4)
EOSINOPHIL NFR BLD AUTO: 2.2 % (ref 0.3–6.2)
EPI CELLS #/AREA URNS HPF: ABNORMAL /HPF
ERYTHROCYTE [DISTWIDTH] IN BLOOD BY AUTOMATED COUNT: 12.9 % (ref 12.3–15.4)
GLOBULIN SER CALC-MCNC: 2.7 GM/DL
GLUCOSE SERPL-MCNC: 101 MG/DL (ref 65–99)
GLUCOSE UR QL: NEGATIVE
HCT VFR BLD AUTO: 43.3 % (ref 37.5–51)
HDLC SERPL-MCNC: 33 MG/DL (ref 40–60)
HGB BLD-MCNC: 14.6 G/DL (ref 13–17.7)
HGB UR QL STRIP: NEGATIVE
IMM GRANULOCYTES # BLD AUTO: 0.04 10*3/MM3 (ref 0–0.05)
IMM GRANULOCYTES NFR BLD AUTO: 0.4 % (ref 0–0.5)
KETONES UR QL STRIP: ABNORMAL
LDLC SERPL CALC-MCNC: 88 MG/DL (ref 0–100)
LEUKOCYTE ESTERASE UR QL STRIP: NEGATIVE
LYMPHOCYTES # BLD AUTO: 2.69 10*3/MM3 (ref 0.7–3.1)
LYMPHOCYTES NFR BLD AUTO: 24.5 % (ref 19.6–45.3)
MCH RBC QN AUTO: 30.3 PG (ref 26.6–33)
MCHC RBC AUTO-ENTMCNC: 33.7 G/DL (ref 31.5–35.7)
MCV RBC AUTO: 89.8 FL (ref 79–97)
MONOCYTES # BLD AUTO: 0.81 10*3/MM3 (ref 0.1–0.9)
MONOCYTES NFR BLD AUTO: 7.4 % (ref 5–12)
NEUTROPHILS # BLD AUTO: 7.15 10*3/MM3 (ref 1.7–7)
NEUTROPHILS NFR BLD AUTO: 65.1 % (ref 42.7–76)
NITRITE UR QL STRIP: NEGATIVE
NRBC BLD AUTO-RTO: 0 /100 WBC (ref 0–0.2)
PH UR STRIP: 5.5 [PH] (ref 5–8)
PLATELET # BLD AUTO: 261 10*3/MM3 (ref 140–450)
POTASSIUM SERPL-SCNC: 4.3 MMOL/L (ref 3.5–5.2)
PROT SERPL-MCNC: 7.1 G/DL (ref 6–8.5)
PROT UR QL STRIP: ABNORMAL
RBC # BLD AUTO: 4.82 10*6/MM3 (ref 4.14–5.8)
RBC #/AREA URNS HPF: ABNORMAL /HPF
SODIUM SERPL-SCNC: 143 MMOL/L (ref 136–145)
SP GR UR: ABNORMAL (ref 1–1.03)
T3FREE SERPL-MCNC: 3.7 PG/ML (ref 2–4.4)
T4 FREE SERPL-MCNC: 1.33 NG/DL (ref 0.93–1.7)
TRIGL SERPL-MCNC: 142 MG/DL (ref 0–150)
TSH SERPL DL<=0.005 MIU/L-ACNC: 0.92 UIU/ML (ref 0.27–4.2)
UROBILINOGEN UR STRIP-MCNC: ABNORMAL MG/DL
VIT B12 SERPL-MCNC: 933 PG/ML (ref 211–946)
VLDLC SERPL CALC-MCNC: 28.4 MG/DL
WBC # BLD AUTO: 10.97 10*3/MM3 (ref 3.4–10.8)
WBC #/AREA URNS HPF: ABNORMAL /HPF

## 2020-01-07 ENCOUNTER — TELEPHONE (OUTPATIENT)
Dept: INTERNAL MEDICINE | Facility: CLINIC | Age: 70
End: 2020-01-07

## 2020-01-07 RX ORDER — ALBUTEROL SULFATE 90 UG/1
2 AEROSOL, METERED RESPIRATORY (INHALATION) EVERY 4 HOURS PRN
Qty: 1 INHALER | Refills: 1 | Status: SHIPPED | OUTPATIENT
Start: 2020-01-07 | End: 2020-04-08

## 2020-01-07 RX ORDER — CLARITHROMYCIN 500 MG/1
500 TABLET, COATED ORAL 2 TIMES DAILY
Qty: 20 TABLET | Refills: 0 | Status: SHIPPED | OUTPATIENT
Start: 2020-01-07 | End: 2020-01-21

## 2020-01-07 NOTE — TELEPHONE ENCOUNTER
Biaxin 500 bid #20 and stay off any statin meds while on biaxin.   Telephone Encounter by Lakia Hernandez at 08/20/18 10:34 AM     Author:  Lakia Hernandez Service:  (none) Author Type:       Filed:  08/20/18 10:35 AM Encounter Date:  8/20/2018 Status:  Signed     :  Lakia Hernandez ()              CECILIO RODRIGUEZ    Patient Age: 55 year old   Refill request by:[TA1.1T] Pharmacy phone[TA1.1M]  Refill to be:[TA1.1T] ePrescribed to[TA1.1M]   Pharmacy     [facility] [address]    [address], [address] [facility] [address]    Phone: [phone]          Ashish Cedillo MD         INS: Payor: [employer] / Plan: *No Plan* / Product Type: *No Product type* / Note: This is the primary coverage, but no account was found for this location or the patient's primary location.   ADDRESS:  [address] [place] [id number][TA1.1T]       Revision History        User Key Date/Time User Provider Type Action    > TA1.1 08/20/18 10:35 AM Lakia Hernandez  Sign    M - Manual, T - Template

## 2020-01-09 ENCOUNTER — TELEPHONE (OUTPATIENT)
Dept: INTERNAL MEDICINE | Facility: CLINIC | Age: 70
End: 2020-01-09

## 2020-01-09 RX ORDER — PREDNISONE 20 MG/1
TABLET ORAL
Qty: 12 TABLET | Refills: 0 | Status: SHIPPED | OUTPATIENT
Start: 2020-01-09 | End: 2020-01-17

## 2020-01-09 NOTE — TELEPHONE ENCOUNTER
Pt has been on the Biaxin and Inhaler for 2 days and feels like it's not helping at all and the Biaxin is really hurting his stomach.  He is asking what else can be done?  Someone told him he needed a steroid?  Please call the pt or advise

## 2020-01-09 NOTE — TELEPHONE ENCOUNTER
Electronically sent taper dose prednisone over 8 days.  This is sent to his pharmacy.  Twice daily for 4 days then once a day for 4 days.  If the Biaxin is hurting his stomach have him stop the Biaxin and we will try the prednisone.

## 2020-01-16 ENCOUNTER — TELEPHONE (OUTPATIENT)
Dept: INTERNAL MEDICINE | Facility: CLINIC | Age: 70
End: 2020-01-16

## 2020-01-16 RX ORDER — BENZONATATE 100 MG/1
200 CAPSULE ORAL 3 TIMES DAILY PRN
Qty: 40 CAPSULE | Refills: 0 | Status: SHIPPED | OUTPATIENT
Start: 2020-01-16 | End: 2020-04-08

## 2020-01-21 RX ORDER — SIMVASTATIN 20 MG
20 TABLET ORAL NIGHTLY
Qty: 90 TABLET | Refills: 1 | Status: SHIPPED | OUTPATIENT
Start: 2020-01-21 | End: 2020-07-17

## 2020-02-11 ENCOUNTER — LAB (OUTPATIENT)
Dept: LAB | Facility: HOSPITAL | Age: 70
End: 2020-02-11

## 2020-02-11 ENCOUNTER — TRANSCRIBE ORDERS (OUTPATIENT)
Dept: ADMINISTRATIVE | Facility: HOSPITAL | Age: 70
End: 2020-02-11

## 2020-02-11 DIAGNOSIS — Z12.5 ENCOUNTER FOR SCREENING FOR MALIGNANT NEOPLASM OF PROSTATE: ICD-10-CM

## 2020-02-11 DIAGNOSIS — Z80.42 FAMILY HISTORY OF MALIGNANT NEOPLASM OF PROSTATE: ICD-10-CM

## 2020-02-11 DIAGNOSIS — Z80.42 FAMILY HISTORY OF MALIGNANT NEOPLASM OF PROSTATE: Primary | ICD-10-CM

## 2020-02-11 LAB — PSA SERPL-MCNC: 2.44 NG/ML (ref 0–4)

## 2020-02-11 PROCEDURE — G0103 PSA SCREENING: HCPCS

## 2020-02-11 PROCEDURE — 36415 COLL VENOUS BLD VENIPUNCTURE: CPT

## 2020-02-19 RX ORDER — FLUCONAZOLE 200 MG/1
200 TABLET ORAL DAILY
Qty: 15 TABLET | Refills: 1 | Status: SHIPPED | OUTPATIENT
Start: 2020-02-19 | End: 2020-05-08

## 2020-02-20 ENCOUNTER — TELEPHONE (OUTPATIENT)
Dept: INTERNAL MEDICINE | Facility: CLINIC | Age: 70
End: 2020-02-20

## 2020-02-20 RX ORDER — AZITHROMYCIN 250 MG/1
TABLET, FILM COATED ORAL
Qty: 6 TABLET | Refills: 0 | Status: SHIPPED | OUTPATIENT
Start: 2020-02-20 | End: 2020-02-26

## 2020-02-26 ENCOUNTER — APPOINTMENT (OUTPATIENT)
Dept: WOMENS IMAGING | Facility: HOSPITAL | Age: 70
End: 2020-02-26

## 2020-02-26 ENCOUNTER — TELEPHONE (OUTPATIENT)
Dept: INTERNAL MEDICINE | Facility: CLINIC | Age: 70
End: 2020-02-26

## 2020-02-26 ENCOUNTER — OFFICE VISIT (OUTPATIENT)
Dept: INTERNAL MEDICINE | Facility: CLINIC | Age: 70
End: 2020-02-26

## 2020-02-26 VITALS
TEMPERATURE: 98.5 F | DIASTOLIC BLOOD PRESSURE: 70 MMHG | HEART RATE: 65 BPM | OXYGEN SATURATION: 98 % | WEIGHT: 188.4 LBS | SYSTOLIC BLOOD PRESSURE: 124 MMHG | BODY MASS INDEX: 28.55 KG/M2 | HEIGHT: 68 IN

## 2020-02-26 DIAGNOSIS — J20.9 ACUTE BRONCHITIS, UNSPECIFIED ORGANISM: ICD-10-CM

## 2020-02-26 DIAGNOSIS — R05.9 COUGH: Primary | ICD-10-CM

## 2020-02-26 PROBLEM — N28.9 RENAL INSUFFICIENCY: Status: ACTIVE | Noted: 2018-01-10

## 2020-02-26 PROBLEM — R60.9 EDEMA: Status: ACTIVE | Noted: 2018-01-10

## 2020-02-26 PROBLEM — G47.33 OBSTRUCTIVE SLEEP APNEA ON CPAP: Status: ACTIVE | Noted: 2018-01-10

## 2020-02-26 PROBLEM — Z99.89 OBSTRUCTIVE SLEEP APNEA ON CPAP: Status: ACTIVE | Noted: 2018-01-10

## 2020-02-26 LAB
ERYTHROCYTE [DISTWIDTH] IN BLOOD BY AUTOMATED COUNT: 12.7 % (ref 12.3–15.4)
HCT VFR BLD AUTO: 42.2 % (ref 37.5–51)
HGB BLD-MCNC: 14.4 G/DL (ref 13–17.7)
MCH RBC QN AUTO: 30.3 PG (ref 26.6–33)
MCHC RBC AUTO-ENTMCNC: 34.1 G/DL (ref 31.5–35.7)
MCV RBC AUTO: 88.7 FL (ref 79–97)
PLATELET # BLD AUTO: 321 10*3/MM3 (ref 140–450)
RBC # BLD AUTO: 4.76 10*6/MM3 (ref 4.14–5.8)
WBC # BLD AUTO: 6.77 10*3/MM3 (ref 3.4–10.8)

## 2020-02-26 PROCEDURE — 71046 X-RAY EXAM CHEST 2 VIEWS: CPT | Performed by: NURSE PRACTITIONER

## 2020-02-26 PROCEDURE — 71046 X-RAY EXAM CHEST 2 VIEWS: CPT | Performed by: RADIOLOGY

## 2020-02-26 PROCEDURE — 99213 OFFICE O/P EST LOW 20 MIN: CPT | Performed by: NURSE PRACTITIONER

## 2020-02-26 RX ORDER — METHYLPREDNISOLONE 4 MG/1
TABLET ORAL
Qty: 21 TABLET | Refills: 0 | Status: SHIPPED | OUTPATIENT
Start: 2020-02-26 | End: 2020-04-08

## 2020-02-26 NOTE — PROGRESS NOTES
Subjective   Wilmar Carmona is a 69 y.o. male.     He was seen in office 1/3/2020 and treated for bronchitis. He has been on three antibiotics (z-ford twice and biaxin, along with oral prednisone and albuterol inhaler). He reports getting some mild relief but his cough has continued and is bothersome. He denies any fever or chills. Some wheezing. No recent air travel. No known ID contact.     Cough   This is a new problem. The current episode started more than 1 month ago. The problem has been unchanged. The cough is productive of sputum. Associated symptoms include wheezing. Pertinent negatives include no chest pain, chills, ear congestion, ear pain, fever, heartburn, nasal congestion, postnasal drip, rhinorrhea, sore throat or shortness of breath. Treatments tried: zithromax, oral steroid, inhaler,  His past medical history is significant for bronchitis. There is no history of environmental allergies or pneumonia.        The following portions of the patient's history were reviewed and updated as appropriate: allergies, current medications, past family history, past medical history, past social history, past surgical history and problem list.    Review of Systems   Constitutional: Negative for chills and fever.   HENT: Negative for ear pain, postnasal drip, rhinorrhea and sore throat.    Respiratory: Positive for cough and wheezing. Negative for shortness of breath.    Cardiovascular: Negative for chest pain.   Gastrointestinal: Negative for heartburn.   Allergic/Immunologic: Negative for environmental allergies.       Objective   Physical Exam   Constitutional: He is oriented to person, place, and time. He appears well-developed and well-nourished. He is cooperative. He does not have a sickly appearance. He does not appear ill.   HENT:   Head: Normocephalic.   Right Ear: Hearing, tympanic membrane and external ear normal. No drainage, swelling or tenderness. No mastoid tenderness. Tympanic membrane is not injected,  not scarred, not erythematous and not bulging. Tympanic membrane mobility is normal. No middle ear effusion. No decreased hearing is noted.   Left Ear: Hearing and external ear normal. No drainage, swelling or tenderness. No mastoid tenderness. Tympanic membrane is bulging. Tympanic membrane is not injected, not scarred and not perforated. Tympanic membrane mobility is normal.  No middle ear effusion. No decreased hearing is noted.   Nose: Nose normal. No mucosal edema, rhinorrhea, sinus tenderness or nasal deformity. Right sinus exhibits no maxillary sinus tenderness and no frontal sinus tenderness. Left sinus exhibits no maxillary sinus tenderness and no frontal sinus tenderness.   Mouth/Throat: Oropharynx is clear and moist and mucous membranes are normal. Normal dentition.   Eyes: Pupils are equal, round, and reactive to light. Conjunctivae and lids are normal. Right eye exhibits no discharge and no exudate. Left eye exhibits no discharge and no exudate.   Neck: Trachea normal and normal range of motion. Carotid bruit is not present. No edema present. No thyroid mass and no thyromegaly present.   Cardiovascular: Regular rhythm, normal heart sounds and normal pulses. Exam reveals no S3 and no S4.   No murmur heard.  Pulmonary/Chest: Effort normal. No respiratory distress. He has no decreased breath sounds. He has no wheezes. He has no rhonchi. He has rales in the right lower field.   Dry cough    Musculoskeletal: He exhibits no edema.   Lymphadenopathy:        Head (right side): No submental, no submandibular, no tonsillar, no preauricular, no posterior auricular and no occipital adenopathy present.        Head (left side): No submental, no submandibular, no tonsillar, no preauricular, no posterior auricular and no occipital adenopathy present.     He has no cervical adenopathy.   Neurological: He is alert and oriented to person, place, and time. Gait normal.   Skin: Skin is warm, dry and intact. No cyanosis.  Nails show no clubbing.   Psychiatric: He has a normal mood and affect.       Assessment/Plan   Wilmar was seen today for cough.    Diagnoses and all orders for this visit:    Cough  -     XR Chest 2 View  -     methylPREDNISolone (MEDROL) 4 MG tablet; follow package directions  -     CBC No Differential; Future  -     CBC No Differential    Acute bronchitis, unspecified organism      CXR with questionable left lower infiltrate. Sent for review. Compared with previous film. Will check cbc today. Will update with plan of care once results available.   He is accompanied by his spouse.

## 2020-02-26 NOTE — TELEPHONE ENCOUNTER
Spouse called in and stated the patient has started back coughing, he's had it since December.  She would like to have him do an x-ray.      Please call back to advise [phone]

## 2020-02-26 NOTE — TELEPHONE ENCOUNTER
Pt has not been seen since the beginning of January I advised to come to the walk in clinic to be seen

## 2020-02-27 ENCOUNTER — TELEPHONE (OUTPATIENT)
Dept: INTERNAL MEDICINE | Facility: CLINIC | Age: 70
End: 2020-02-27

## 2020-02-27 DIAGNOSIS — J18.9 PNEUMONIA OF LEFT LOWER LOBE DUE TO INFECTIOUS ORGANISM: Primary | ICD-10-CM

## 2020-02-27 RX ORDER — LEVOFLOXACIN 500 MG/1
500 TABLET, FILM COATED ORAL DAILY
Qty: 7 TABLET | Refills: 0 | Status: SHIPPED | OUTPATIENT
Start: 2020-02-27 | End: 2020-03-05

## 2020-02-27 NOTE — TELEPHONE ENCOUNTER
I called and spoke with patient regarding abnormal CXR results (left lobe infiltrate). Will have him return in 3 weeks for repeat imaging. He was advised to black box warning of tendon rupture.

## 2020-04-08 ENCOUNTER — OFFICE VISIT (OUTPATIENT)
Dept: INTERNAL MEDICINE | Facility: CLINIC | Age: 70
End: 2020-04-08

## 2020-04-08 DIAGNOSIS — J18.9 PNEUMONIA OF LEFT LOWER LOBE DUE TO INFECTIOUS ORGANISM: Primary | ICD-10-CM

## 2020-04-08 PROBLEM — H43.399 VITREOUS FLOATERS: Status: ACTIVE | Noted: 2019-05-17

## 2020-04-08 PROCEDURE — 99442 PR PHYS/QHP TELEPHONE EVALUATION 11-20 MIN: CPT | Performed by: NURSE PRACTITIONER

## 2020-04-08 NOTE — PROGRESS NOTES
Subjective   Wilmar Carmona is a 69 y.o. male.     He was seen in office on 2/26/2020 and had chest xray which was suspicious for left lobe pnuemonia. He was treated with both levaquin and oral steroid and has responding quite well. He currently has no complaints. Unfortunately he has not had repeat imaging due to current pandemic and will have him return in next 4-6 weeks for repeat imaging per discussion with Dr King.     Cough   This is a new problem. The current episode started 1 to 4 weeks ago. The problem has been resolved. Pertinent negatives include no chest pain, chills, ear congestion, ear pain, fever, rhinorrhea, sore throat, shortness of breath or wheezing. Treatments tried: levaquin, steroid  The treatment provided significant relief. There is no history of environmental allergies.        The following portions of the patient's history were reviewed and updated as appropriate: allergies, current medications, past family history, past medical history, past social history, past surgical history and problem list.    Review of Systems   Constitutional: Negative for chills and fever.   HENT: Negative for ear pain, rhinorrhea and sore throat.    Respiratory: Negative for cough, shortness of breath and wheezing.    Cardiovascular: Negative for chest pain.   Allergic/Immunologic: Negative for environmental allergies.       Objective   Physical Exam    Assessment/Plan   Diagnoses and all orders for this visit:    Pneumonia of left lower lobe due to infectious organism (CMS/[facility])  Comments:  symptoms much improved, will repeat imaging in 4-6 weeks due to current pandemic     This visit has been rescheduled as a phone visit to comply with patient safety concerns in accordance with CDC recommendations. Total time of discussion was 11 minutes.

## 2020-04-08 NOTE — PROGRESS NOTES
You have chosen to receive care through a telephone visit today. Do you consent to use a telephone visit for your medical care today?Yes

## 2020-04-16 ENCOUNTER — APPOINTMENT (OUTPATIENT)
Dept: WOMENS IMAGING | Facility: HOSPITAL | Age: 70
End: 2020-04-16

## 2020-04-16 ENCOUNTER — OFFICE VISIT (OUTPATIENT)
Dept: INTERNAL MEDICINE | Facility: CLINIC | Age: 70
End: 2020-04-16

## 2020-04-16 VITALS
SYSTOLIC BLOOD PRESSURE: 132 MMHG | HEART RATE: 63 BPM | OXYGEN SATURATION: 97 % | TEMPERATURE: 98.6 F | WEIGHT: 189 LBS | DIASTOLIC BLOOD PRESSURE: 75 MMHG | BODY MASS INDEX: 28.74 KG/M2

## 2020-04-16 DIAGNOSIS — R07.81 RIB PAIN ON RIGHT SIDE: Primary | ICD-10-CM

## 2020-04-16 PROCEDURE — 71101 X-RAY EXAM UNILAT RIBS/CHEST: CPT | Performed by: NURSE PRACTITIONER

## 2020-04-16 PROCEDURE — 99213 OFFICE O/P EST LOW 20 MIN: CPT | Performed by: NURSE PRACTITIONER

## 2020-04-16 RX ORDER — ASCORBIC ACID 500 MG
1000 TABLET ORAL DAILY
COMMUNITY
End: 2020-12-11
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
## 2020-04-17 ENCOUNTER — DOCUMENTATION (OUTPATIENT)
Dept: INTERNAL MEDICINE | Facility: CLINIC | Age: 70
End: 2020-04-17

## 2020-04-17 ENCOUNTER — TELEMEDICINE (OUTPATIENT)
Dept: CARDIOLOGY | Facility: CLINIC | Age: 70
End: 2020-04-17

## 2020-04-17 VITALS
HEIGHT: 68 IN | HEART RATE: 58 BPM | DIASTOLIC BLOOD PRESSURE: 75 MMHG | BODY MASS INDEX: 28.19 KG/M2 | WEIGHT: 186 LBS | SYSTOLIC BLOOD PRESSURE: 136 MMHG

## 2020-04-17 DIAGNOSIS — S22.41XA CLOSED FRACTURE OF MULTIPLE RIBS OF RIGHT SIDE, INITIAL ENCOUNTER: ICD-10-CM

## 2020-04-17 DIAGNOSIS — I49.1 PREMATURE ATRIAL CONTRACTION: ICD-10-CM

## 2020-04-17 DIAGNOSIS — I25.10 CORONARY ARTERY DISEASE INVOLVING NATIVE CORONARY ARTERY OF NATIVE HEART WITHOUT ANGINA PECTORIS: ICD-10-CM

## 2020-04-17 DIAGNOSIS — J18.1 CONSOLIDATION OF LEFT LOWER LOBE OF LUNG (HCC): Primary | ICD-10-CM

## 2020-04-17 DIAGNOSIS — I10 ESSENTIAL HYPERTENSION: Primary | ICD-10-CM

## 2020-04-17 DIAGNOSIS — I48.91 ATRIAL FIBRILLATION, UNSPECIFIED TYPE (HCC): ICD-10-CM

## 2020-04-17 DIAGNOSIS — I49.3 PVC (PREMATURE VENTRICULAR CONTRACTION): ICD-10-CM

## 2020-04-17 PROCEDURE — 99213 OFFICE O/P EST LOW 20 MIN: CPT | Performed by: NURSE PRACTITIONER

## 2020-04-17 RX ORDER — LEVOFLOXACIN 500 MG/1
500 TABLET, FILM COATED ORAL DAILY
Qty: 10 TABLET | Refills: 0 | Status: SHIPPED | OUTPATIENT
Start: 2020-04-17 | End: 2020-04-27

## 2020-04-17 NOTE — PROGRESS NOTES
Date of Office Visit: 2020  Encounter Provider: Elizabeth Trevino, SANTO, APRN  Place of Service: [facility] CARDIOLOGY  Patient Name: Wilmar Carmona  :1950        Subjective:     Chief Complaint:  Follow-up, hypertension, PACs and PVCs, questionable history of atrial fibrillation      History of Present Illness:  Wilmar Carmona is a 69 y.o. male patient of Dr. Durbin.  I am doing a telehealth visit with patient and I have reviewed his records    Patient has a history of hypertension, hyperlipidemia, obstructive sleep apnea, PVCs and PACs, questionable paroxysmal atrial fibrillation, left ventricular hypertrophy.     Patient has a history of atrial fibrillation for many years and was previously followed by Dr. Bailey.  He has been maintained on Rythmol therapy and Sectral.  He had an abnormal stress test in  leading to a cardiac catheterization that showed minimal vessel disease up to 10%.  However CT scan 2016 showed calcification of all 3 major vessels.  Follow-up echocardiogram 2018 showed normal left ventricular size and systolic function without evidence of aneurysmal disease of the aortic root.  Abnormal structure noted in the liver, possibly a cyst.  Patient had 2 weeks Zio patch in 2018 that showed sinus rhythm with occasional PVCs that were symptomatic.  No atrial arrhythmia was noted.      Patient has called into the office today for a telehealth video follow-up appointment. Patient reports he is feeling fine since last visit.  Exercising once a week, treadmill or walking outside about 45 minutes, which he knows is not enough.  Stays active other days around the house.  BP usually 130-135/70s recently however checking right after meds and not sitting down to rest first. HR usually 50s-60s. Has gained some weight over the last 6-8 months.  Denies chest pain/ discomfort, palpitations, shortness of breath, shortness of breath with exertion, edema,  dizziness, syncope, falls, abnormal bleeding, or fatigue.         Past Medical History:   Diagnosis Date   • Abnormal EKG    • Abnormal stress test    • Arthritis    • Atrial fibrillation (CMS/HCC)    • BPH (benign prostatic hypertrophy)    • CAD (coronary artery disease)    • Depression    • GERD (gastroesophageal reflux disease)    • H/O medial meniscus repair of right knee 2014    Partial Menisectomy right knee   • Heart murmur    • Hyperlipidemia    • Hypertension    • Intermittent knee pain     R<L knee from cycling and running in the past -    • Mild tricuspid regurgitation    • PAC (premature atrial contraction)    • PAF (paroxysmal atrial fibrillation) (CMS/HCC)    • Palpitations    • Sleep apnea    • Tremor      Past Surgical History:   Procedure Laterality Date   • CARDIAC CATHETERIZATION  2015   • COLONOSCOPY  10/20/2014    normal -dr leon..had egd at same time   • HERNIA REPAIR     • KNEE SURGERY Left    • TONSILLECTOMY     • VASECTOMY       Outpatient Medications Prior to Visit   Medication Sig Dispense Refill   • acebutolol (SECTRAL) 200 MG capsule TAKE 1 CAPSULE BY MOUTH TWICE DAILY 180 capsule 1   • B COMPLEX VITAMINS PO Take  by mouth.     • diclofenac (VOLTAREN) 1 % gel gel Apply 4 g topically to the appropriate area as directed 2 (Two) Times a Day. (Patient taking differently: Apply 4 g topically to the appropriate area as directed 2 (Two) Times a Day As Needed.) 100 g 1   • finasteride (PROSCAR) 5 MG tablet TK 1 T PO ONCE DAILY  2   • Fish Oil-Cholecalciferol (FISH OIL + D3) 0932-5168 MG-UNIT capsule Take 1 capsule by mouth Daily.     • fluconazole (DIFLUCAN) 200 MG tablet TAKE 1 TABLET BY MOUTH DAILY (Patient taking differently: Take 200 mg by mouth Daily As Needed.) 15 tablet 1   • lamoTRIgine (LaMICtal) 200 MG tablet Take 250 mg by mouth Daily.     • nefazodone (SERZONE) 50 MG tablet Take 50 mg by mouth 3 (Three) Times a Day.     • NIFEdipine XL (PROCARDIA XL) 60 MG 24 hr tablet Take 1  "tablet by mouth 2 (Two) Times a Day. 180 tablet 0   • pantoprazole (PROTONIX) 40 MG EC tablet Take 1 tablet by mouth Daily. 90 tablet 0   • propafenone (RYTHMOL) 150 MG tablet TAKE 1 TABLET BY MOUTH EVERY 8 HOURS 270 tablet 1   • simvastatin (ZOCOR) 20 MG tablet TAKE 1 TABLET BY MOUTH EVERY NIGHT 90 tablet 1   • vitamin C (ASCORBIC ACID) 500 MG tablet Take 1,000 mg by mouth Daily.       No facility-administered medications prior to visit.        Allergies as of 04/17/2020   • (No Known Allergies)     Social History     Socioeconomic History   • Marital status:      Spouse name: Not on file   • Number of children: Not on file   • Years of education: Not on file   • Highest education level: Not on file   Tobacco Use   • Smoking status: Never Smoker   • Smokeless tobacco: Never Used   Substance and Sexual Activity   • Alcohol use: No   • Drug use: Not Currently   • Sexual activity: Defer     Family History   Problem Relation Age of Onset   • Hypertension Mother    • Cancer Father         prostate   • Heart disease Father    • Coronary artery disease Father    • Stroke Father    • Cancer Brother         prostate       Review of Systems   Constitution: Negative for malaise/fatigue.   Cardiovascular: Negative for chest pain, dyspnea on exertion, leg swelling, palpitations and syncope.   Respiratory: Negative for shortness of breath.    Hematologic/Lymphatic: Negative for bleeding problem.   Musculoskeletal: Negative for falls.   Gastrointestinal: Negative for melena.   Genitourinary: Negative for hematuria.   Neurological: Negative for dizziness.   Psychiatric/Behavioral: Negative for altered mental status.          Objective:     Vitals:    04/17/20 0855   BP: 136/75   Pulse: 58   Weight: 84.4 kg (186 lb)   Height: 172.7 cm (68\")     Body mass index is 28.28 kg/m².      PHYSICAL EXAM:  Resting comfortably.  No acute distress.  Alert and oriented x 4.  Respirations appear even, unlabored, and normal rate.  Voice " normal.   Normal affect.         Assessment:       Diagnosis Plan   1. Essential hypertension     2. Premature atrial contraction     3. Coronary artery disease involving native coronary artery of native heart without angina pectoris     4. PVC (premature ventricular contraction)     5. Atrial fibrillation, unspecified type (CMS/HCC)           Plan:     1. History symptomatic PACs and PVCs with questionable history atrial fibrillation: patient does not believe any monitor study ever showed atrial fibrillation.  He believes he has been on rhythmol for symptomatic PVCs/ PACs.  Denies palpitations at this time.  2. Mild coronary artery disease: negative stress test 5/2019.  Denies anginal symptoms.  Continue with risk factor modification. Slowly increase frequency of exercise.  3. Hypertension: avoid salt, increase exercise, check BP at least 1-2 hours after AM meds and after resting calmly 10-15 minutes, and call if BP staying >130/80 on average.   4. BRITTA on CPAP: followed by Dr. Mcgovern  5. Dyslipidemia: PCP managing  6. Hepatic cysts      Patient to follow-up with Dr. Durbin in 6 months or sooner if needed for any new, recurrent, or worsening symptoms or other problems/concerns.     This patient has consented to a telehealth visit via video. The visit was scheduled as a telehealth video visit to comply with patient safety concerns in accordance with CDC recommendations.  All vitals recorded within this visit are reported by the patient.  I spent 16 minutes in direct conversation with this patient via video.            Your medication list           Accurate as of April 17, 2020  9:34 AM. If you have any questions, ask your nurse or doctor.               START taking these medications      Instructions Last Dose Given Next Dose Due   levoFLOXacin 500 MG tablet  Commonly known as:  Levaquin  Started by:  GEOFFREY Shaw      Take 1 tablet by mouth Daily for 10 days.          CHANGE how you take these medications       Instructions Last Dose Given Next Dose Due   diclofenac 1 % gel gel  Commonly known as:  VOLTAREN  What changed:    · when to take this  · reasons to take this      Apply 4 g topically to the appropriate area as directed 2 (Two) Times a Day.       fluconazole 200 MG tablet  Commonly known as:  DIFLUCAN  What changed:    · when to take this  · reasons to take this      TAKE 1 TABLET BY MOUTH DAILY          CONTINUE taking these medications      Instructions Last Dose Given Next Dose Due   acebutolol 200 MG capsule  Commonly known as:  SECTRAL      TAKE 1 CAPSULE BY MOUTH TWICE DAILY       B COMPLEX VITAMINS PO      Take  by mouth.       finasteride 5 MG tablet  Commonly known as:  PROSCAR      TK 1 T PO ONCE DAILY       Fish Oil + D3 7190-2049 MG-UNIT capsule      Take 1 capsule by mouth Daily.       lamoTRIgine 200 MG tablet  Commonly known as:  LaMICtal      Take 250 mg by mouth Daily.       nefazodone 50 MG tablet  Commonly known as:  SERZONE      Take 50 mg by mouth 3 (Three) Times a Day.       NIFEdipine XL 60 MG 24 hr tablet  Commonly known as:  PROCARDIA XL      Take 1 tablet by mouth 2 (Two) Times a Day.       pantoprazole 40 MG EC tablet  Commonly known as:  PROTONIX      Take 1 tablet by mouth Daily.       propafenone 150 MG tablet  Commonly known as:  RYTHMOL      TAKE 1 TABLET BY MOUTH EVERY 8 HOURS       simvastatin 20 MG tablet  Commonly known as:  ZOCOR      TAKE 1 TABLET BY MOUTH EVERY NIGHT       vitamin C 500 MG tablet  Commonly known as:  ASCORBIC ACID      Take 1,000 mg by mouth Daily.             Where to Get Your Medications      These medications were sent to [id number] - [place], [place] - [address] [address] AT [facility] OF [address] [address] - [phone]  - [phone]   [address] [place] [place] [address]    Phone:  [phone]   · levoFLOXacin 500 MG tablet         I did not stop or change the above medications.  Patient's medication list was updated to reflect medications  they are currently taking including medication changes made by other providers.          Thanks,    Elizabeth Trevino, SANTO, APRN  04/17/2020           Dictated utilizing Dragon dictation

## 2020-04-20 RX ORDER — NIFEDIPINE 60 MG/1
TABLET, EXTENDED RELEASE ORAL
Qty: 180 TABLET | Refills: 1 | Status: SHIPPED | OUTPATIENT
Start: 2020-04-20 | End: 2020-05-08

## 2020-05-05 ENCOUNTER — TELEPHONE (OUTPATIENT)
Dept: INTERNAL MEDICINE | Facility: CLINIC | Age: 70
End: 2020-05-05

## 2020-05-05 ENCOUNTER — HOSPITAL ENCOUNTER (OUTPATIENT)
Dept: CT IMAGING | Facility: HOSPITAL | Age: 70
Discharge: HOME | End: 2020-05-05
Admitting: NURSE PRACTITIONER

## 2020-05-05 ENCOUNTER — TELEPHONE (OUTPATIENT)
Dept: CARDIOLOGY | Facility: CLINIC | Age: 70
End: 2020-05-05

## 2020-05-05 ENCOUNTER — APPOINTMENT (OUTPATIENT)
Dept: LAB | Facility: HOSPITAL | Age: 70
End: 2020-05-05

## 2020-05-05 DIAGNOSIS — R06.9 UNSPECIFIED ABNORMALITIES OF BREATHING: ICD-10-CM

## 2020-05-05 DIAGNOSIS — S22.41XA CLOSED FRACTURE OF MULTIPLE RIBS OF RIGHT SIDE, INITIAL ENCOUNTER: ICD-10-CM

## 2020-05-05 DIAGNOSIS — R60.0 BILATERAL LOWER EXTREMITY EDEMA: Primary | ICD-10-CM

## 2020-05-05 DIAGNOSIS — J18.1 CONSOLIDATION OF LEFT LOWER LOBE OF LUNG (HCC): ICD-10-CM

## 2020-05-05 LAB
ANION GAP SERPL CALCULATED.3IONS-SCNC: 10.4 MMOL/L (ref 5–15)
BUN BLD-MCNC: 20 MG/DL (ref 8–23)
BUN/CREAT SERPL: 15.5 (ref 7–25)
CALCIUM SPEC-SCNC: 9.1 MG/DL (ref 8.6–10.5)
CHLORIDE SERPL-SCNC: 103 MMOL/L (ref 98–107)
CO2 SERPL-SCNC: 28.6 MMOL/L (ref 22–29)
CREAT BLD-MCNC: 1.29 MG/DL (ref 0.76–1.27)
GFR SERPL CREATININE-BSD FRML MDRD: 55 ML/MIN/1.73
GLUCOSE BLD-MCNC: 95 MG/DL (ref 65–99)
NT-PROBNP SERPL-MCNC: 106.6 PG/ML (ref 5–900)
POTASSIUM BLD-SCNC: 4.3 MMOL/L (ref 3.5–5.2)
SODIUM BLD-SCNC: 142 MMOL/L (ref 136–145)

## 2020-05-05 PROCEDURE — 83880 ASSAY OF NATRIURETIC PEPTIDE: CPT | Performed by: NURSE PRACTITIONER

## 2020-05-05 PROCEDURE — 80048 BASIC METABOLIC PNL TOTAL CA: CPT | Performed by: NURSE PRACTITIONER

## 2020-05-05 PROCEDURE — 71250 CT THORAX DX C-: CPT

## 2020-05-05 PROCEDURE — 36415 COLL VENOUS BLD VENIPUNCTURE: CPT

## 2020-05-05 NOTE — TELEPHONE ENCOUNTER
Pt called us and PCP re: ankle swelling.  What should he do?    LMM with pt re: What other sx is he having? (ie SOA, weight gain, hard time sleeping) What is BP and pulse?  Did he eat some processed food or restaurant food?  Call to discuss.

## 2020-05-05 NOTE — TELEPHONE ENCOUNTER
Bilateral ANKLE swelling.  NO SOA. NO redness or pain.  He just noticed pitting.  BP is 136/76 with 62 pulse.  Pt will have the lab before his CT-Chest today and will followup tomorrow with results.

## 2020-05-05 NOTE — TELEPHONE ENCOUNTER
Spoke with pt.  No SOA.  No issues with sleeping.  Ankle swelling has been getting worse over the last two days but ongoing for a few months.  NO going into processed food or takeout lately.  No palpitations.  No CP.  No fatigue.  He is headed to have a CT-Chest today to eval his pneumonia that has been worsening since January.  Pt reported that he had a fever at that time but not since then.  Was never COVID tested.   BP today was 136/76 with 62 pulse.  Pt just wanted to r/o cardiac causes of edema.  Please advise.

## 2020-05-05 NOTE — TELEPHONE ENCOUNTER
PT CALLING IN TO REPORT HE HAS SWELLING AROUND BOTH ANKLES AND IT LEAVES A DIMPLE WHEN YOU PRESS ON THEM.  PT ALSO NOTES THAT HE HAD AN XRAY TODAY FOR LEFT LUNG AND WAS TOLD THE SCAN SHOWED IT IS WORSE AND NOT BETTER.    PLEASE CALL AND ADVISE ON NEXT STEPS FOR THE PT.    PT CONTACT NUMBER [phone]

## 2020-05-05 NOTE — TELEPHONE ENCOUNTER
Please see if he can get BMP and proBNP at lab when he comes in for CT chest.  Please make sure the edema is bilateral and NOT one-sided associated with calf pain/ redness.   Please find out how HR and BP running.  It is odd that I recently had a telehealth visit with him and he denied any edema, shortness of breath, etc

## 2020-05-06 NOTE — TELEPHONE ENCOUNTER
ProBNP was WNL suggesting swelling is not cardiac in nature.  Cr remains elevated on BMP.   If swelling is worse at the end of day but improved in AM then this would sound like dependent edema and he could try putting on compression socks in AM and removing in the evening. Would have him avoid sodium, elevate lower extremities, and follow-up with PCP or nephrologist if swelling does not improve or for any new or worsening symptoms.  Continue to monitor BP at least 1-2 hours after AM meds and after resting calmly ~ 10 minutes and call if staying >130/80 on average.

## 2020-05-06 NOTE — TELEPHONE ENCOUNTER
The report on his CT looks better with resolution of his infiltrate/Pneumonia.  I reviewed his nuclear stress test from last year which looked good also.  I am not sure why he has swelling. He may need duplex venous of both lower legs and follow up appointment to see me to sort it out.

## 2020-05-06 NOTE — TELEPHONE ENCOUNTER
I'll give patient a call to give him the CT results and check on how his swelling is. Thanks, Dr. King.

## 2020-05-06 NOTE — TELEPHONE ENCOUNTER
I spoke with patient and gave him the CT results. Creatinine from yesterday mildly elevated. Advised to avoid any excessive NSAID use. No fever or SOA and feels fine from a pulmonary standpoint per patient. He is having some bilateral lower extremity edema which sounds like it is pitting. No redness, heat or pain in the legs. Worse toward the end of the day. I advised elevation of legs and lowering dietary sodium intake. He is going to come see you Friday 5/8/20 for further evaluation in the office.

## 2020-05-08 ENCOUNTER — OFFICE VISIT (OUTPATIENT)
Dept: INTERNAL MEDICINE | Facility: CLINIC | Age: 70
End: 2020-05-08

## 2020-05-08 VITALS
WEIGHT: 190 LBS | SYSTOLIC BLOOD PRESSURE: 112 MMHG | DIASTOLIC BLOOD PRESSURE: 64 MMHG | HEART RATE: 61 BPM | OXYGEN SATURATION: 93 % | TEMPERATURE: 96.7 F | BODY MASS INDEX: 28.89 KG/M2

## 2020-05-08 DIAGNOSIS — I10 ESSENTIAL HYPERTENSION: ICD-10-CM

## 2020-05-08 DIAGNOSIS — R79.89 ELEVATED SERUM CREATININE: ICD-10-CM

## 2020-05-08 DIAGNOSIS — R60.0 PEDAL EDEMA: ICD-10-CM

## 2020-05-08 DIAGNOSIS — I25.10 CORONARY ARTERY DISEASE INVOLVING NATIVE CORONARY ARTERY OF NATIVE HEART WITHOUT ANGINA PECTORIS: Primary | ICD-10-CM

## 2020-05-08 DIAGNOSIS — E78.00 ELEVATED CHOLESTEROL: ICD-10-CM

## 2020-05-08 PROCEDURE — 99214 OFFICE O/P EST MOD 30 MIN: CPT | Performed by: FAMILY MEDICINE

## 2020-05-08 RX ORDER — NIFEDIPINE 60 MG/1
60 TABLET, EXTENDED RELEASE ORAL DAILY
Qty: 180 TABLET | Refills: 1
Start: 2020-05-08 | End: 2020-05-27 | Stop reason: ALTCHOICE
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
## 2020-05-22 ENCOUNTER — RESULTS ENCOUNTER (OUTPATIENT)
Dept: INTERNAL MEDICINE | Facility: CLINIC | Age: 70
End: 2020-05-22

## 2020-05-22 DIAGNOSIS — I25.10 CORONARY ARTERY DISEASE INVOLVING NATIVE CORONARY ARTERY OF NATIVE HEART WITHOUT ANGINA PECTORIS: ICD-10-CM

## 2020-05-22 DIAGNOSIS — R79.89 ELEVATED SERUM CREATININE: ICD-10-CM

## 2020-05-22 DIAGNOSIS — E78.00 ELEVATED CHOLESTEROL: ICD-10-CM

## 2020-05-22 DIAGNOSIS — R60.0 PEDAL EDEMA: ICD-10-CM

## 2020-05-22 DIAGNOSIS — I10 ESSENTIAL HYPERTENSION: ICD-10-CM

## 2020-05-27 ENCOUNTER — TELEPHONE (OUTPATIENT)
Dept: CARDIOLOGY | Facility: CLINIC | Age: 70
End: 2020-05-27

## 2020-05-27 RX ORDER — HYDRALAZINE HYDROCHLORIDE 25 MG/1
25 TABLET, FILM COATED ORAL 3 TIMES DAILY
Qty: 90 TABLET | Refills: 1 | Status: SHIPPED | OUTPATIENT
Start: 2020-05-27 | End: 2020-06-04

## 2020-05-27 NOTE — TELEPHONE ENCOUNTER
Patient notified of Dr. Durbin' instruction. He is using his cpap and is watching salt intake.  He is not taking diclofenac.   He will stop Nifedipine and try Hydralazine 25 mg three times daily.  Rx sent to pharmacy.  Patient verbalized understanding of instructions given. / JESSICA

## 2020-05-27 NOTE — TELEPHONE ENCOUNTER
Please verify he is has any use of diclofenac, treating sleep apnea and is on a low-salt diet.  Cannot use ACE inhibitor due to renal insufficiency.  Bradycardia limits use of AV je blockers.  Therefore try hydralazine 25 mg 3 times daily.  Let him know this may also lead to edema but less likely to.  Stop the nifedipine when he starts hydralazine mkd

## 2020-05-27 NOTE — TELEPHONE ENCOUNTER
Patient called to report he has problems with swelling of his feet.  He saw his PCP (Reji King MD) and was instructed to stop Nifedipine 60 mg for a few days and see if the swelling resolves.  Patient did this and the edema improved but his BP became elevated.  Patient states he tried to go back on the Nifedipine 60 mg bid as before and the edema returned.  He would like to know if you can change him to something else?      Patient uses [facility] [address].     Patient's phone number is [phone]

## 2020-06-04 ENCOUNTER — TELEPHONE (OUTPATIENT)
Dept: CARDIOLOGY | Facility: CLINIC | Age: 70
End: 2020-06-04

## 2020-06-04 RX ORDER — NIFEDIPINE 60 MG/1
60 TABLET, EXTENDED RELEASE ORAL 2 TIMES DAILY
COMMUNITY
End: 2020-06-04

## 2020-06-04 RX ORDER — HYDRALAZINE HYDROCHLORIDE 50 MG/1
50 TABLET, FILM COATED ORAL 3 TIMES DAILY
Start: 2020-06-04 | End: 2020-06-09 | Stop reason: SDUPTHER

## 2020-06-04 NOTE — TELEPHONE ENCOUNTER
Unfortunately he is switching around his medications on his own too many times.  This is going to make it tough to treat him.  As expected edema has returned on the nifedipine.  I would recommend that he again stop nifedipine and start hydralazine this time 50 mg 3 times daily and he should call if this does not work.  Let him know it may take several days to completely even this out.  Also let him know hydralazine can sometimes also cause edema but hopefully not in his case.  Please remind him to call when he has questions instead of changing medications on his own.  There is an on-call staff provider at all times if I am not available.

## 2020-06-04 NOTE — TELEPHONE ENCOUNTER
Spoke with pt.  Verbalized understanding.  No further questions.  He agreed to call if any issues.  No new rx needed.  Pt said he will call Monday with an update.

## 2020-06-04 NOTE — TELEPHONE ENCOUNTER
Pt called and he stopped the Hydralazine 25mg TID because pt said his BP was not controlled.  His SBP was sitting at 160s-170s.  He went back to Nifedipine 60mg BID and the swelling came back.  Pt wants to know what to do?  He did not know his current BP as he was driving.  He said he will call in the AM with this info.

## 2020-06-05 ENCOUNTER — LAB (OUTPATIENT)
Dept: LAB | Facility: HOSPITAL | Age: 70
End: 2020-06-05

## 2020-06-05 LAB
ALBUMIN SERPL-MCNC: 4.3 G/DL (ref 3.5–5.2)
ALBUMIN/GLOB SERPL: 1.9 G/DL
ALP SERPL-CCNC: 35 U/L (ref 39–117)
ALT SERPL W P-5'-P-CCNC: 18 U/L (ref 1–41)
ANION GAP SERPL CALCULATED.3IONS-SCNC: 5.6 MMOL/L (ref 5–15)
AST SERPL-CCNC: 19 U/L (ref 1–40)
BACTERIA UR QL AUTO: NORMAL /HPF
BASOPHILS # BLD AUTO: 0.05 10*3/MM3 (ref 0–0.2)
BASOPHILS NFR BLD AUTO: 0.8 % (ref 0–1.5)
BILIRUB SERPL-MCNC: 0.2 MG/DL (ref 0.2–1.2)
BILIRUB UR QL STRIP: NEGATIVE
BUN BLD-MCNC: 14 MG/DL (ref 8–23)
BUN/CREAT SERPL: 11.9 (ref 7–25)
CALCIUM SPEC-SCNC: 9.2 MG/DL (ref 8.6–10.5)
CHLORIDE SERPL-SCNC: 106 MMOL/L (ref 98–107)
CHOLEST SERPL-MCNC: 137 MG/DL (ref 0–200)
CLARITY UR: CLEAR
CO2 SERPL-SCNC: 27.4 MMOL/L (ref 22–29)
COLOR UR: YELLOW
CREAT BLD-MCNC: 1.18 MG/DL (ref 0.76–1.27)
DEPRECATED RDW RBC AUTO: 41.4 FL (ref 37–54)
EOSINOPHIL # BLD AUTO: 0.27 10*3/MM3 (ref 0–0.4)
EOSINOPHIL NFR BLD AUTO: 4.4 % (ref 0.3–6.2)
ERYTHROCYTE [DISTWIDTH] IN BLOOD BY AUTOMATED COUNT: 12.7 % (ref 12.3–15.4)
GFR SERPL CREATININE-BSD FRML MDRD: 61 ML/MIN/1.73
GLOBULIN UR ELPH-MCNC: 2.3 GM/DL
GLUCOSE BLD-MCNC: 107 MG/DL (ref 65–99)
GLUCOSE UR STRIP-MCNC: NEGATIVE MG/DL
HBA1C MFR BLD: 5.74 % (ref 4.8–5.6)
HCT VFR BLD AUTO: 41.1 % (ref 37.5–51)
HDLC SERPL QL: 4.03
HDLC SERPL-MCNC: 34 MG/DL (ref 40–60)
HGB BLD-MCNC: 13.8 G/DL (ref 13–17.7)
HGB UR QL STRIP.AUTO: NEGATIVE
HYALINE CASTS UR QL AUTO: NORMAL /LPF
IMM GRANULOCYTES # BLD AUTO: 0.03 10*3/MM3 (ref 0–0.05)
IMM GRANULOCYTES NFR BLD AUTO: 0.5 % (ref 0–0.5)
KETONES UR QL STRIP: ABNORMAL
LDLC SERPL CALC-MCNC: 81 MG/DL (ref 0–100)
LEUKOCYTE ESTERASE UR QL STRIP.AUTO: ABNORMAL
LYMPHOCYTES # BLD AUTO: 1.78 10*3/MM3 (ref 0.7–3.1)
LYMPHOCYTES NFR BLD AUTO: 28.7 % (ref 19.6–45.3)
MCH RBC QN AUTO: 30.3 PG (ref 26.6–33)
MCHC RBC AUTO-ENTMCNC: 33.6 G/DL (ref 31.5–35.7)
MCV RBC AUTO: 90.3 FL (ref 79–97)
MONOCYTES # BLD AUTO: 0.53 10*3/MM3 (ref 0.1–0.9)
MONOCYTES NFR BLD AUTO: 8.5 % (ref 5–12)
NEUTROPHILS # BLD AUTO: 3.54 10*3/MM3 (ref 1.7–7)
NEUTROPHILS NFR BLD AUTO: 57.1 % (ref 42.7–76)
NITRITE UR QL STRIP: NEGATIVE
NRBC BLD AUTO-RTO: 0 /100 WBC (ref 0–0.2)
PH UR STRIP.AUTO: 7.5 [PH] (ref 5–8)
PLATELET # BLD AUTO: 219 10*3/MM3 (ref 140–450)
PMV BLD AUTO: 9.2 FL (ref 6–12)
POTASSIUM BLD-SCNC: 4.2 MMOL/L (ref 3.5–5.2)
PROT SERPL-MCNC: 6.6 G/DL (ref 6–8.5)
PROT UR QL STRIP: ABNORMAL
RBC # BLD AUTO: 4.55 10*6/MM3 (ref 4.14–5.8)
RBC # UR: NORMAL /HPF
REF LAB TEST METHOD: NORMAL
SODIUM BLD-SCNC: 139 MMOL/L (ref 136–145)
SP GR UR STRIP: 1.02 (ref 1–1.03)
SQUAMOUS #/AREA URNS HPF: NORMAL /HPF
T3FREE SERPL-MCNC: 3.2 PG/ML (ref 2–4.4)
T4 FREE SERPL-MCNC: 1.13 NG/DL (ref 0.93–1.7)
TRIGL SERPL-MCNC: 111 MG/DL (ref 0–150)
TSH SERPL DL<=0.05 MIU/L-ACNC: 1.18 UIU/ML (ref 0.27–4.2)
UROBILINOGEN UR QL STRIP: ABNORMAL
VLDLC SERPL-MCNC: 22.2 MG/DL (ref 5–40)
WBC NRBC COR # BLD: 6.2 10*3/MM3 (ref 3.4–10.8)
WBC UR QL AUTO: NORMAL /HPF

## 2020-06-05 PROCEDURE — 80053 COMPREHEN METABOLIC PANEL: CPT | Performed by: FAMILY MEDICINE

## 2020-06-05 PROCEDURE — 84481 FREE ASSAY (FT-3): CPT | Performed by: FAMILY MEDICINE

## 2020-06-05 PROCEDURE — 84439 ASSAY OF FREE THYROXINE: CPT | Performed by: FAMILY MEDICINE

## 2020-06-05 PROCEDURE — 36415 COLL VENOUS BLD VENIPUNCTURE: CPT | Performed by: FAMILY MEDICINE

## 2020-06-05 PROCEDURE — 83036 HEMOGLOBIN GLYCOSYLATED A1C: CPT | Performed by: FAMILY MEDICINE

## 2020-06-05 PROCEDURE — 85025 COMPLETE CBC W/AUTO DIFF WBC: CPT | Performed by: FAMILY MEDICINE

## 2020-06-05 PROCEDURE — 81001 URINALYSIS AUTO W/SCOPE: CPT | Performed by: FAMILY MEDICINE

## 2020-06-05 PROCEDURE — 80061 LIPID PANEL: CPT | Performed by: FAMILY MEDICINE

## 2020-06-05 PROCEDURE — 84443 ASSAY THYROID STIM HORMONE: CPT | Performed by: FAMILY MEDICINE

## 2020-06-07 ENCOUNTER — TELEPHONE (OUTPATIENT)
Dept: CARDIOLOGY | Facility: CLINIC | Age: 70
End: 2020-06-07

## 2020-06-07 NOTE — TELEPHONE ENCOUNTER
Patient called and reported that his spoke with Alexandra HALE last night after his BP was found to be 190's/90s. He said that he had a little bit of a headache but otherwise felt fine. He said that she told him to increase to 75 mg of Hydralazine TID.This morning his BP was 171/85 and 2 hours after his meds he was 150/81. He wasn't sure how long it was going to take this medication to start working. I told him that it may take several weeks, per Dr. Durbin note on 6/4/2020, to get him completely straightened out. He reports that he was never told that. SO I reiterated it again. I spoke with Dr. Langston, reported his blood pressure after meds and he was good with the 150/81 and asked him to continue the 75 mg TID and to call the office on Monday for further instruction from Dr. Durbin. He verbalized understanding and agreed with plan.

## 2020-06-08 RX ORDER — HYDRALAZINE HYDROCHLORIDE 25 MG/1
75 TABLET, FILM COATED ORAL 3 TIMES DAILY
COMMUNITY
End: 2020-06-08 | Stop reason: SDUPTHER

## 2020-06-08 NOTE — TELEPHONE ENCOUNTER
Please find out how is his blood pressure and clarify with the patient I told him it would take several days (not weeks) for blood pressure spots the med changes to be seen.  narinder

## 2020-06-08 NOTE — TELEPHONE ENCOUNTER
Spoke with pt.  Verbalized clarification about time frame for this to get straightened out.  Pt is aware and agreed to calling vs changing things on his own.      BP today was 168/89 with 51 pulse (before meds)      159/87 With 60 pulse (2 hours after meds)

## 2020-06-08 NOTE — TELEPHONE ENCOUNTER
Please verify that he is staying on a low-salt diet avoiding soda pop and exercising.  Also should be on CPAP.  Have him increase hydralazine to 100 mg 3 times daily.  Come in for renal artery Doppler ultrasound if he is doing all of the above.

## 2020-06-09 RX ORDER — HYDRALAZINE HYDROCHLORIDE 100 MG/1
100 TABLET, FILM COATED ORAL 3 TIMES DAILY
Qty: 90 TABLET | Refills: 1 | Status: SHIPPED | OUTPATIENT
Start: 2020-06-09 | End: 2020-06-19

## 2020-06-09 NOTE — TELEPHONE ENCOUNTER
Spoke with pt.  Verbalized understanding.  He will await a call from scheduling.  Rx sent.  (done)

## 2020-06-11 DIAGNOSIS — I10 RESISTANT HYPERTENSION: Primary | ICD-10-CM

## 2020-06-12 RX ORDER — PROPAFENONE HYDROCHLORIDE 150 MG/1
TABLET, COATED ORAL
Qty: 270 TABLET | Refills: 1 | Status: SHIPPED | OUTPATIENT
Start: 2020-06-12 | End: 2020-12-18 | Stop reason: SDUPTHER

## 2020-06-18 ENCOUNTER — TELEPHONE (OUTPATIENT)
Dept: CARDIOLOGY | Facility: CLINIC | Age: 70
End: 2020-06-18

## 2020-06-18 NOTE — TELEPHONE ENCOUNTER
Pt called and LMM re: having sx and wants a call ASAP to discuss.      Called pt back to verify what was going on and got his VM.  Asked the pt to call back and ask for me or Triage.  Dont go to VM with active sx.      Triage-JUST JALEN

## 2020-06-19 RX ORDER — NIFEDIPINE 60 MG/1
60 TABLET, EXTENDED RELEASE ORAL 2 TIMES DAILY
COMMUNITY
End: 2020-11-12 | Stop reason: SDUPTHER

## 2020-06-19 NOTE — TELEPHONE ENCOUNTER
Spoke with pt and he called [facility].  He was told to go back on the Nifedipine at 60mg QD.  His BP has stabilized.  BP is sitting at 129/68 and 134/75 with pulse in the 60s.      No palpitations, no SOA, no CP, minimal swelling in the ankles.  Pt will continue to watch the swelling and call if any worsening.  His Renal Artery US is scheduled for 6/29.      Please advise if he needs to do anything else or continue same?

## 2020-06-26 ENCOUNTER — OFFICE VISIT (OUTPATIENT)
Dept: GASTROENTEROLOGY | Facility: CLINIC | Age: 70
End: 2020-06-26

## 2020-06-26 VITALS
TEMPERATURE: 98 F | OXYGEN SATURATION: 99 % | BODY MASS INDEX: 28.79 KG/M2 | SYSTOLIC BLOOD PRESSURE: 104 MMHG | HEART RATE: 56 BPM | HEIGHT: 68 IN | DIASTOLIC BLOOD PRESSURE: 72 MMHG | WEIGHT: 190 LBS

## 2020-06-26 DIAGNOSIS — Z86.010 HISTORY OF COLON POLYPS: ICD-10-CM

## 2020-06-26 DIAGNOSIS — K22.70 BARRETT'S ESOPHAGUS WITHOUT DYSPLASIA: ICD-10-CM

## 2020-06-26 DIAGNOSIS — K21.9 GASTROESOPHAGEAL REFLUX DISEASE WITHOUT ESOPHAGITIS: ICD-10-CM

## 2020-06-26 DIAGNOSIS — D73.89 LESION OF SPLEEN: Primary | ICD-10-CM

## 2020-06-26 PROCEDURE — 99214 OFFICE O/P EST MOD 30 MIN: CPT | Performed by: NURSE PRACTITIONER

## 2020-06-26 RX ORDER — PANTOPRAZOLE SODIUM 40 MG/1
40 TABLET, DELAYED RELEASE ORAL DAILY
Qty: 90 TABLET | Refills: 3 | Status: SHIPPED | OUTPATIENT
Start: 2020-06-26 | End: 2021-10-26

## 2020-06-26 NOTE — PROGRESS NOTES
Preventive Health Recommendations  Female Ages 65 +    Yearly exam:     See your health care provider every year in order to  o Review health changes.   o Discuss preventive care.    o Review your medicines if your doctor has prescribed any.      You no longer need a yearly Pap test unless you've had an abnormal Pap test in the past 10 years. If you have vaginal symptoms, such as bleeding or discharge, be sure to talk with your provider about a Pap test.      Every 1 to 2 years, have a mammogram.  If you are over 69, talk with your health care provider about whether or not you want to continue having screening mammograms.      Every 10 years, have a colonoscopy. Or, have a yearly FIT test (stool test). These exams will check for colon cancer.       Have a cholesterol test every 5 years, or more often if your doctor advises it.       Have a diabetes test (fasting glucose) every three years. If you are at risk for diabetes, you should have this test more often.       At age 65, have a bone density scan (DEXA) to check for osteoporosis (brittle bone disease).    Shots:    Get a flu shot each year.    Get a tetanus shot every 10 years.    Talk to your doctor about your pneumonia vaccines. There are now two you should receive - Pneumovax (PPSV 23) and Prevnar (PCV 13).    Talk to your doctor about the shingles vaccine.    Talk to your doctor about the hepatitis B vaccine.    Nutrition:     Eat at least 5 servings of fruits and vegetables each day.      Eat whole-grain bread, whole-wheat pasta and brown rice instead of white grains and rice.      Talk to your provider about Calcium and Vitamin D.     Lifestyle    Exercise at least 150 minutes a week (30 minutes a day, 5 days a week). This will help you control your weight and prevent disease.      Limit alcohol to one drink per day.      No smoking.       Wear sunscreen to prevent skin cancer.       See your dentist twice a year for an exam and cleaning.      See your  Chief Complaint   Patient presents with   • Follow-up   • Imaging Only       HPI  Patient is a 69-year-old male who presents today for follow-up.    He has a history of GERD and Finnegan's esophagus.  His most recent EGD was November 2019.  He had prominent vessels noted in the proximal esophagus, mucosal changes suspicious for short segment Finnegan's esophagus, gastric polyps and mild erythema in the stomach.  Biopsies were consistent with chronic inactive gastritis, reflux-like changes, negative for intestinal metaplasia.  Colonoscopy November 2019 with 1 adenomatous polyp and internal hemorrhoids, otherwise normal.  CT abdomen pelvis was recommended for further evaluation.  He had the CT scan performed November 2019 which showed several benign hepatic cysts, 4 small low-density lesions in the spleen.  Follow-up CT in 3 to 4 months was recommended.    Patient presents today for follow-up.  He continues on pantoprazole 40 mg daily for GERD.  He reports good symptom control with use of this medication.  He denies any dysphasia.    He denies any nausea, vomiting, or abdominal pain.  Denies any bowel complaints or rectal bleeding.    Review of Systems   Constitutional: Negative for appetite change, chills, diaphoresis, fatigue, fever and unexpected weight change.   HENT: Negative for dental problem, ear pain, mouth sores, rhinorrhea, sore throat and voice change.    Eyes: Negative for pain, redness and visual disturbance.   Respiratory: Negative for cough, chest tightness and wheezing.    Cardiovascular: Negative for chest pain, palpitations and leg swelling.   Endocrine: Negative for cold intolerance, heat intolerance, polydipsia, polyphagia and polyuria.   Genitourinary: Negative for dysuria, frequency, hematuria and urgency.   Musculoskeletal: Negative for arthralgias, back pain, joint swelling, myalgias and neck pain.   Skin: Negative for rash.   Allergic/Immunologic: Negative for environmental allergies, food  allergies and immunocompromised state.   Neurological: Negative for dizziness, seizures, weakness, numbness and headaches.   Hematological: Does not bruise/bleed easily.   Psychiatric/Behavioral: Negative for sleep disturbance. The patient is not nervous/anxious.         Problem List:    Patient Active Problem List   Diagnosis   • Abnormal electrocardiogram   • Unspecified atrial fibrillation (CMS/HCC)   • Hypertension   • Premature atrial contraction   • Hyperlipidemia   • Bronchitis with bronchospasm   • Contusion of bone   • Sprain of left ankle   • Coronary artery disease involving native coronary artery of native heart without angina pectoris   • Patellofemoral chondrosis of left knee   • Patellofemoral arthrosis   • Patellofemoral chondrosis of right knee   • Popping sound of knee joint   • Edema   • Obstructive sleep apnea on CPAP   • Renal insufficiency   • Vitreous floaters   • PVC (premature ventricular contraction)       Medical History:    Past Medical History:   Diagnosis Date   • Abnormal EKG    • Abnormal stress test    • Arthritis    • Atrial fibrillation (CMS/HCC)    • BPH (benign prostatic hypertrophy)    • CAD (coronary artery disease)    • Depression    • GERD (gastroesophageal reflux disease)    • H/O medial meniscus repair of right knee 2014    Partial Menisectomy right knee   • Heart murmur    • Hyperlipidemia    • Hypertension    • Intermittent knee pain     R<L knee from cycling and running in the past -    • Mild tricuspid regurgitation    • PAC (premature atrial contraction)    • PAF (paroxysmal atrial fibrillation) (CMS/HCC)    • Palpitations    • Sleep apnea    • Tremor         Social History:    Social History     Socioeconomic History   • Marital status:      Spouse name: Not on file   • Number of children: Not on file   • Years of education: Not on file   • Highest education level: Not on file   Tobacco Use   • Smoking status: Never Smoker   • Smokeless tobacco: Never Used  eye doctor every 1 to 2 years to screen for conditions such as glaucoma, macular degeneration, cataracts, etc     At [facility], we strive to deliver an exceptional experience to you, every time we see you.  If you receive a survey in the mail, please send us back your thoughts. We really do value your feedback.    Based on your medical history, these are the current health maintenance/preventive care services that you are due for (some may have been done at this visit.)  Health Maintenance Due   Topic Date Due     DEPRESSION ACTION PLAN Q1 YR  08/18/1958     EYE EXAM Q1 YEAR  10/25/2017     FALL RISK ASSESSMENT  10/26/2017         Suggested websites for health information:  Www.[employer].Myagi : Up to date and easily searchable information on multiple topics.  Www.medlineplus.gov : medication info, interactive tutorials, watch real surgeries online  Www.familydoctor.org : good info from the Academy of Family Physicians  Www.cdc.gov : public health info, travel advisories, epidemics (H1N1)  Www.aap.org : children's health info, normal development, vaccinations  Www.health.[facility].[place].us : [place] dept of health, public health issues in [place], N1N1    Your care team:                            Family Medicine Internal Medicine   MD Ivan Barlow MD Shantel Branch-Fleming, MD Katya Georgiev PA-C Nam Ho, MD Pediatrics   SAI Kruse, JACQUELIN Daniel APRN MD Janessa Dean MD Deborah Mielke, MD Kim Thein, APRN [facility]      Clinic hours: Monday - Thursday 7 am-7 pm; Fridays 7 am-5 pm.   Urgent care: Monday - Friday 11 am-9 pm; Saturday and Sunday 9 am-5 pm.  Pharmacy : Monday -Thursday 8 am-8 pm; Friday 8 am-6 pm; Saturday and Sunday 9 am-5 pm.     Clinic: [phone]   Pharmacy: [phone]    At your visit today, we discussed your risk for falls and preventive options.    Fall Prevention  Falls often occur due to slipping, tripping    Substance and Sexual Activity   • Alcohol use: No   • Drug use: Not Currently   • Sexual activity: Defer       Family History:   Family History   Problem Relation Age of Onset   • Hypertension Mother    • Cancer Father         prostate   • Heart disease Father    • Coronary artery disease Father    • Stroke Father    • Cancer Brother         prostate       Surgical History:   Past Surgical History:   Procedure Laterality Date   • CARDIAC CATHETERIZATION  2015   • COLONOSCOPY  10/20/2014    normal -dr leon..had egd at same time   • HERNIA REPAIR     • KNEE SURGERY Left    • TONSILLECTOMY     • VASECTOMY           Current Outpatient Medications:   •  acebutolol (SECTRAL) 200 MG capsule, TAKE 1 CAPSULE BY MOUTH TWICE DAILY, Disp: 180 capsule, Rfl: 1  •  B COMPLEX VITAMINS PO, Take  by mouth., Disp: , Rfl:   •  diclofenac (VOLTAREN) 1 % gel gel, Apply 4 g topically to the appropriate area as directed 2 (Two) Times a Day. (Patient taking differently: Apply 4 g topically to the appropriate area as directed 2 (Two) Times a Day As Needed.), Disp: 100 g, Rfl: 1  •  finasteride (PROSCAR) 5 MG tablet, TK 1 T PO ONCE DAILY, Disp: , Rfl: 2  •  Fish Oil-Cholecalciferol (FISH OIL + D3) 3725-6749 MG-UNIT capsule, Take 1 capsule by mouth Daily., Disp: , Rfl:   •  lamoTRIgine (LaMICtal) 200 MG tablet, Take 250 mg by mouth Daily., Disp: , Rfl:   •  nefazodone (SERZONE) 50 MG tablet, Take 50 mg by mouth 3 (Three) Times a Day., Disp: , Rfl:   •  NIFEdipine XL (PROCARDIA XL) 60 MG 24 hr tablet, Take 60 mg by mouth Daily., Disp: , Rfl:   •  pantoprazole (PROTONIX) 40 MG EC tablet, Take 1 tablet by mouth Daily., Disp: 90 tablet, Rfl: 3  •  propafenone (RYTHMOL) 150 MG tablet, TAKE 1 TABLET BY MOUTH EVERY 8 HOURS, Disp: 270 tablet, Rfl: 1  •  simvastatin (ZOCOR) 20 MG tablet, TAKE 1 TABLET BY MOUTH EVERY NIGHT, Disp: 90 tablet, Rfl: 1  •  vitamin C (ASCORBIC ACID) 500 MG tablet, Take 1,000 mg by mouth Daily., Disp: , Rfl:  or losing your balance. Millions of people fall every year and injure themselves. Here are ways to reduce your risk of falling again.    Think about your fall, was there anything that caused your fall that can be fixed, removed, or replaced?    Make your home safe by keeping walkways clear of objects you may trip over.    Use non-slip pads under rugs. Do not use area rugs or small throw rugs.    Use non-slip mats in bathtubs and showers.    Install handrails and lights on staircases.    Do not walk in poorly lit areas.    Do not stand on chairs or wobbly ladders.    Use caution when reaching overhead or looking upward. This position can cause a loss of balance.    Be sure your shoes fit properly, have non-slip bottoms and are in good condition.     Wear shoes both inside and out. Avoid going barefoot or wearing slippers.    Be cautious when going up and down stairs, curbs, and when walking on uneven sidewalks.    If your balance is poor, consider using a cane or walker.    If your fall was related to alcohol use, stop or limit alcohol intake.     If your fall was related to use of sleeping medicines, talk to your doctor about this. You may need to reduce your dosage at bedtime if you awaken during the night to go to the bathroom.      To reduce the need for nighttime bathroom trips:    Avoid drinking fluids for several hours before going to bed    Empty your bladder before going to bed    Men can keep a urinal at the bedside    Stay as active as you can. Balance, flexibility, strength, and endurance all come from exercise. They all play a role in preventing falls. Ask your healthcare provider which types of activity are right for you.    Get your vision checked on a regular basis.    If you have pets, know where they are before you stand up or walk so you don't trip over them.    Use night lights.  Date Last Reviewed: 11/5/2015 2000-2017 [employer] [address], [place] [address]. All      Allergies:  Patient has no known allergies.    The following portions of the patient's history were reviewed and updated as appropriate: allergies, current medications, past family history, past medical history, past social history, past surgical history and problem list.    Vitals:    06/26/20 0945   BP: 104/72   Pulse: 56   Temp: 98 °F (36.7 °C)   SpO2: 99%         06/26/20  0945   Weight: 86.2 kg (190 lb)     Body mass index is 28.9 kg/m².    Physical Exam   Constitutional: He is oriented to person, place, and time. He appears well-developed and well-nourished. No distress.   HENT:   Head: Normocephalic and atraumatic.   Pulmonary/Chest: Effort normal. No respiratory distress.   Neurological: He is alert and oriented to person, place, and time.   Skin: Skin is dry. No pallor.   Psychiatric: He has a normal mood and affect. Thought content normal.   Vitals reviewed.        Assessment/ Plan  Wilmar was seen today for follow-up and imaging only.    Diagnoses and all orders for this visit:    Lesion of spleen  -     CT Abdomen With Contrast; Future    Gastroesophageal reflux disease without esophagitis    Finnegan's esophagus without dysplasia    History of colon polyps    Other orders  -     pantoprazole (PROTONIX) 40 MG EC tablet; Take 1 tablet by mouth Daily.         Return in about 1 year (around 6/26/2021), or if symptoms worsen or fail to improve.    Patient Instructions   Schedule CT for surveillance of lesions of spleen seen on CT from November 2019.    For GERD with Finnegan's esophagus, continue pantoprazole 40 mg daily as prescribed.  Refill provided.    For surveillance of Finnegan's esophagus, EGD is due November 2022.    For surveillance of colon polyps, colonoscopy is due November 2024.    Follow-up yearly and as needed.  Call for any new or worsening symptoms.       Discussion:  EGD, colonoscopy, and CT findings reviewed at today's office visit.  We will schedule updated CT for surveillance of  rights reserved. This information is not intended as a substitute for professional medical care. Always follow your healthcare professional's instructions.         splenic lesions seen on prior CT.  We will continue current treatment for GERD as symptoms are well controlled.

## 2020-06-26 NOTE — PATIENT INSTRUCTIONS
Schedule CT for surveillance of lesions of spleen seen on CT from November 2019.    For GERD with Finnegan's esophagus, continue pantoprazole 40 mg daily as prescribed.  Refill provided.    For surveillance of Finnegan's esophagus, EGD is due November 2022.    For surveillance of colon polyps, colonoscopy is due November 2024.    Follow-up yearly and as needed.  Call for any new or worsening symptoms.

## 2020-06-30 NOTE — TELEPHONE ENCOUNTER
Spoke with pt re: BP Readings.      Average is 128/70    Lowest was 122/70  Highest was 144/73    Pt is asking if he still needs to do the Renal Artery US since BP is now controlled?

## 2020-06-30 NOTE — TELEPHONE ENCOUNTER
Spoke with pt.  Verbalized understanding.  No other questions.  He will continue to watch BP.  Cancel US appt for the 6th.  (done)

## 2020-06-30 NOTE — TELEPHONE ENCOUNTER
Okay to hold off on renal ultrasound for now blood pressure well controlled.    Becomes elevated again would want to have him reschedule.    Call if blood pressure becomes greater than 130/80 on average.

## 2020-07-09 RX ORDER — ACEBUTOLOL HYDROCHLORIDE 200 MG/1
CAPSULE ORAL
Qty: 180 CAPSULE | Refills: 1 | Status: SHIPPED | OUTPATIENT
Start: 2020-07-09 | End: 2021-01-07

## 2020-07-17 ENCOUNTER — APPOINTMENT (OUTPATIENT)
Dept: CT IMAGING | Facility: HOSPITAL | Age: 70
End: 2020-07-17

## 2020-07-17 RX ORDER — SIMVASTATIN 20 MG
20 TABLET ORAL NIGHTLY
Qty: 90 TABLET | Refills: 1 | Status: SHIPPED | OUTPATIENT
Start: 2020-07-17 | End: 2021-02-01 | Stop reason: SDUPTHER

## 2020-07-31 ENCOUNTER — APPOINTMENT (OUTPATIENT)
Dept: GENERAL RADIOLOGY | Facility: HOSPITAL | Age: 70
End: 2020-07-31

## 2020-07-31 ENCOUNTER — TELEMEDICINE (OUTPATIENT)
Dept: INTERNAL MEDICINE | Facility: CLINIC | Age: 70
End: 2020-07-31

## 2020-07-31 DIAGNOSIS — R05.9 COUGH: Primary | ICD-10-CM

## 2020-07-31 PROCEDURE — 73080 X-RAY EXAM OF ELBOW: CPT | Performed by: PHYSICIAN ASSISTANT

## 2020-07-31 PROCEDURE — 99213 OFFICE O/P EST LOW 20 MIN: CPT | Performed by: NURSE PRACTITIONER

## 2020-07-31 RX ORDER — AZITHROMYCIN 250 MG/1
TABLET, FILM COATED ORAL
Qty: 6 TABLET | Refills: 0 | Status: SHIPPED | OUTPATIENT
Start: 2020-07-31 | End: 2020-12-11

## 2020-07-31 RX ORDER — GUAIFENESIN 600 MG/1
600 TABLET, EXTENDED RELEASE ORAL 2 TIMES DAILY
Qty: 14 TABLET | Refills: 0
Start: 2020-07-31 | End: 2020-08-07

## 2020-07-31 NOTE — PROGRESS NOTES
Subjective   Wilmar Carmona is a 69 y.o. male.     No recent travel. No known exposure to covid-19. He does have history of pneumonia. He reports last week he started with cough, runny nose and sneezing for about three days. He treated with otc medications and symptoms improved. However they returned a couple days ago.     Cough   This is a new problem. The current episode started 1 to 4 weeks ago. The problem has been waxing and waning. The cough is productive of sputum. Associated symptoms include nasal congestion and postnasal drip. Pertinent negatives include no chest pain, chills, ear congestion, ear pain, fever, headaches, myalgias, rhinorrhea, sore throat, shortness of breath, sweats or wheezing. Associated symptoms comments: No loss of taste or smell . Treatments tried: nyquil, sudafed, otc antihistamines  The treatment provided mild relief. His past medical history is significant for bronchitis and pneumonia. There is no history of environmental allergies.        The following portions of the patient's history were reviewed and updated as appropriate: allergies, current medications, past social history, past surgical history and problem list.    Review of Systems   Constitutional: Negative for appetite change, chills, fatigue and fever.   HENT: Positive for postnasal drip and sneezing (resolved ). Negative for ear pain, rhinorrhea, sinus pressure and sore throat.    Respiratory: Positive for cough. Negative for chest tightness, shortness of breath and wheezing.    Cardiovascular: Negative for chest pain.   Gastrointestinal: Negative for diarrhea.   Musculoskeletal: Negative for myalgias.   Allergic/Immunologic: Negative for environmental allergies.   Neurological: Negative for dizziness and headaches.       Objective   Physical Exam   Constitutional: He appears well-developed and well-nourished. He does not have a sickly appearance.   Assessment is limited due to video visit    Pulmonary/Chest: Effort  normal.   Psychiatric: He has a normal mood and affect.       Assessment/Plan   Wilmar was seen today for cough.    Diagnoses and all orders for this visit:    Cough  -     COVID-19,[facility] ROUTINE, NP/OP SWAB IN TRANSPORT MEDIA OR ESWAB 72 HR TAT - Swab, Nasopharynx; Future  -     azithromycin (Zithromax Z-Juan) 250 MG tablet; Take 2 tablets the first day, then 1 tablet daily for 4 days.  -     guaiFENesin (Mucinex) 600 MG 12 hr tablet; Take 1 tablet by mouth 2 (Two) Times a Day for 7 days.  -     QUESTIONNAIRE SERIES    He will go to local urgent care to be tested for Covid-19. He was advised to social distancing, face mask and hand hygiene. Will treat with z-juan due to recent history of pneumonia. He has been advised if no improvement symptoms or worsening of symptoms to be evaluated in local urgent care for assessment.           This was an audio and video enabled telemedicine encounter. I spent a total of 15 minutes in discussion with patient.

## 2020-08-07 ENCOUNTER — OFFICE VISIT (OUTPATIENT)
Dept: INTERNAL MEDICINE | Facility: CLINIC | Age: 70
End: 2020-08-07

## 2020-08-07 ENCOUNTER — HOSPITAL ENCOUNTER (OUTPATIENT)
Dept: CT IMAGING | Facility: HOSPITAL | Age: 70
Discharge: HOME | End: 2020-08-07
Admitting: NURSE PRACTITIONER

## 2020-08-07 VITALS
HEART RATE: 63 BPM | HEIGHT: 68 IN | SYSTOLIC BLOOD PRESSURE: 132 MMHG | TEMPERATURE: 98.6 F | BODY MASS INDEX: 29.4 KG/M2 | WEIGHT: 194 LBS | DIASTOLIC BLOOD PRESSURE: 70 MMHG | OXYGEN SATURATION: 95 %

## 2020-08-07 DIAGNOSIS — Z00.00 HEALTHCARE MAINTENANCE: ICD-10-CM

## 2020-08-07 DIAGNOSIS — I48.91 ATRIAL FIBRILLATION, UNSPECIFIED TYPE (HCC): ICD-10-CM

## 2020-08-07 DIAGNOSIS — I10 ESSENTIAL HYPERTENSION: ICD-10-CM

## 2020-08-07 DIAGNOSIS — R05.9 COUGH: ICD-10-CM

## 2020-08-07 DIAGNOSIS — R73.03 PREDIABETES: ICD-10-CM

## 2020-08-07 DIAGNOSIS — Z00.00 MEDICARE ANNUAL WELLNESS VISIT, SUBSEQUENT: Primary | ICD-10-CM

## 2020-08-07 DIAGNOSIS — E78.2 MIXED HYPERLIPIDEMIA: ICD-10-CM

## 2020-08-07 DIAGNOSIS — D73.89 LESION OF SPLEEN: ICD-10-CM

## 2020-08-07 LAB — CREAT BLDA-MCNC: 1.2 MG/DL (ref 0.6–1.3)

## 2020-08-07 PROCEDURE — 82565 ASSAY OF CREATININE: CPT

## 2020-08-07 PROCEDURE — G0439 PPPS, SUBSEQ VISIT: HCPCS | Performed by: NURSE PRACTITIONER

## 2020-08-07 PROCEDURE — 25010000002 IOPAMIDOL 61 % SOLUTION: Performed by: NURSE PRACTITIONER

## 2020-08-07 PROCEDURE — 74160 CT ABDOMEN W/CONTRAST: CPT

## 2020-08-07 RX ADMIN — IOPAMIDOL 85 ML: 612 INJECTION, SOLUTION INTRAVENOUS at 10:02

## 2020-08-07 NOTE — PATIENT INSTRUCTIONS
Medicare Wellness  Personal Prevention Plan of Service     Date of Office Visit:  2020  Encounter Provider:  GEOFFREY Shaw  Place of Service:  [facility] INTERNAL MEDICINE  Patient Name: Wilmar Carmona  :  1950    As part of the Medicare Wellness portion of your visit today, we are providing you with this personalized preventive plan of services (PPPS). This plan is based upon recommendations of the [place] Preventive Services Task Force (USPSTF) and the Advisory Committee on Immunization Practices (ACIP).    This lists the preventive care services that should be considered, and provides dates of when you are due. Items listed as completed are up-to-date and do not require any further intervention.    Health Maintenance   Topic Date Due   • GASTROSCOPY (EGD)  1950   • HEPATITIS C SCREENING  2016   • MEDICARE ANNUAL WELLNESS  2016   • ZOSTER VACCINE (3 of 3) 2018   • TDAP/TD VACCINES (2 - Td) 2019   • INFLUENZA VACCINE  2020   • LIPID PANEL  2021   • COLONOSCOPY  2024   • Pneumococcal Vaccine Once at 65 Years Old  Completed       No orders of the defined types were placed in this encounter.      No follow-ups on file.

## 2020-08-07 NOTE — PROGRESS NOTES
The ABCs of the Annual Wellness Visit  Subsequent Medicare Wellness Visit    Chief Complaint   Patient presents with   • Medicare Wellness-subsequent       Subjective   History of Present Illness:  Wilmar Carmona is a 69 y.o. male who presents for a Subsequent Medicare Wellness Visit.    HEALTH RISK ASSESSMENT    Recent Hospitalizations:  No hospitalization(s) within the last year.    Current Medical Providers:  Patient Care Team:  Darryl King Jr., MD as PCP - General (Family Medicine)  Sudeep George MD as PCP - Claims Attributed  Sudeep George MD as Consulting Physician (Urology)  Diana Durbin MD as Consulting Physician (Cardiology)  Meghan Fierro MD (Psychiatry)  Han Mcgovern MD as Consulting Physician (Pulmonary Disease)  Hemal Bravo MD as Consulting Physician (Gastroenterology)    Smoking Status:  Social History     Tobacco Use   Smoking Status Never Smoker   Smokeless Tobacco Never Used       Alcohol Consumption:  Social History     Substance and Sexual Activity   Alcohol Use No       Depression Screen:   PHQ-2/PHQ-9 Depression Screening 8/7/2020   Little interest or pleasure in doing things 0   Feeling down, depressed, or hopeless -   Trouble falling or staying asleep, or sleeping too much 0   Feeling tired or having little energy 0   Poor appetite or overeating 0   Feeling bad about yourself - or that you are a failure or have let yourself or your family down 0   Trouble concentrating on things, such as reading the newspaper or watching television 0   Moving or speaking so slowly that other people could have noticed. Or the opposite - being so fidgety or restless that you have been moving around a lot more than usual 0   Thoughts that you would be better off dead, or of hurting yourself in some way 0   Total Score 0   If you checked off any problems, how difficult have these problems made it for you to do your work, take care of things at home, or get along with other people? Not  difficult at all       Fall Risk Screen:  ARIANNE Fall Risk Assessment was completed, and patient is at LOW risk for falls.Assessment completed on:8/7/2020    Health Habits and Functional and Cognitive Screening:  Functional & Cognitive Status 8/7/2020   Do you have difficulty preparing food and eating? No   Do you have difficulty bathing yourself, getting dressed or grooming yourself? No   Do you have difficulty using the toilet? No   Do you have difficulty moving around from place to place? No   Do you have trouble with steps or getting out of a bed or a chair? No   Current Diet Well Balanced Diet   Dental Exam Up to date   Eye Exam Up to date   Exercise (times per week) 3 times per week   Current Exercise Activities Include Walking   Do you need help using the phone?  No   Are you deaf or do you have serious difficulty hearing?  No   Do you need help with transportation? No   Do you need help shopping? No   Do you need help preparing meals?  No   Do you need help with housework?  No   Do you need help with laundry? No   Do you need help taking your medications? No   Do you need help managing money? No   Do you ever drive or ride in a car without wearing a seat belt? No   Have you felt unusual stress, anger or loneliness in the last month? No   Who do you live with? Spouse   If you need help, do you have trouble finding someone available to you? No   Have you been bothered in the last four weeks by sexual problems? No   Do you have difficulty concentrating, remembering or making decisions? No         Does the patient have evidence of cognitive impairment? No    Asprin use counseling:Does not need ASA (and currently is not on it)    Age-appropriate Screening Schedule:  Refer to the list below for future screening recommendations based on patient's age, sex and/or medical conditions. Orders for these recommended tests are listed in the plan section. The patient has been provided with a written plan.    Health  Maintenance   Topic Date Due   • ZOSTER VACCINE (3 of 3) 09/24/2018   • TDAP/TD VACCINES (2 - Td) 12/14/2019   • INFLUENZA VACCINE  08/01/2020   • LIPID PANEL  06/05/2021   • COLONOSCOPY  11/06/2024          The following portions of the patient's history were reviewed and updated as appropriate: allergies, current medications, past family history, past medical history, past social history, past surgical history and problem list.    Outpatient Medications Prior to Visit   Medication Sig Dispense Refill   • acebutolol (SECTRAL) 200 MG capsule TAKE 1 CAPSULE BY MOUTH TWICE DAILY 180 capsule 1   • azithromycin (Zithromax Z-Juan) 250 MG tablet Take 2 tablets the first day, then 1 tablet daily for 4 days. 6 tablet 0   • B COMPLEX VITAMINS PO Take  by mouth.     • diclofenac (VOLTAREN) 1 % gel gel Apply 4 g topically to the appropriate area as directed 2 (Two) Times a Day. (Patient taking differently: Apply 4 g topically to the appropriate area as directed 2 (Two) Times a Day As Needed.) 100 g 1   • finasteride (PROSCAR) 5 MG tablet TK 1 T PO ONCE DAILY  2   • Fish Oil-Cholecalciferol (FISH OIL + D3) 5727-3130 MG-UNIT capsule Take 1 capsule by mouth Daily.     • guaiFENesin (Mucinex) 600 MG 12 hr tablet Take 1 tablet by mouth 2 (Two) Times a Day for 7 days. 14 tablet 0   • lamoTRIgine (LaMICtal) 200 MG tablet Take 250 mg by mouth Daily.     • nefazodone (SERZONE) 50 MG tablet Take 50 mg by mouth 3 (Three) Times a Day.     • NIFEdipine XL (PROCARDIA XL) 60 MG 24 hr tablet Take 60 mg by mouth 2 (two) times a day.     • pantoprazole (PROTONIX) 40 MG EC tablet Take 1 tablet by mouth Daily. 90 tablet 3   • propafenone (RYTHMOL) 150 MG tablet TAKE 1 TABLET BY MOUTH EVERY 8 HOURS 270 tablet 1   • simvastatin (ZOCOR) 20 MG tablet TAKE 1 TABLET BY MOUTH EVERY NIGHT 90 tablet 1   • vitamin C (ASCORBIC ACID) 500 MG tablet Take 1,000 mg by mouth Daily.       No facility-administered medications prior to visit.        Patient Active  Problem List   Diagnosis   • Abnormal electrocardiogram   • Unspecified atrial fibrillation (CMS/HCC)   • Hypertension   • Premature atrial contraction   • Hyperlipidemia   • Bronchitis with bronchospasm   • Contusion of bone   • Sprain of left ankle   • Coronary artery disease involving native coronary artery of native heart without angina pectoris   • Patellofemoral chondrosis of left knee   • Patellofemoral arthrosis   • Patellofemoral chondrosis of right knee   • Popping sound of knee joint   • Edema   • Obstructive sleep apnea on CPAP   • Renal insufficiency   • Vitreous floaters   • PVC (premature ventricular contraction)       Advanced Care Planning:  ACP discussion was held with the patient during this visit. Patient has an advance directive (not in EMR), copy requested.    Review of Systems   Constitutional: Negative for activity change, chills, fatigue, fever and unexpected weight change.   HENT: Negative for congestion, hearing loss, postnasal drip, sinus pressure, sinus pain, sneezing, sore throat and tinnitus.    Eyes: Negative for photophobia, pain and visual disturbance.   Respiratory: Negative for cough, chest tightness, shortness of breath and wheezing.    Cardiovascular: Negative for chest pain, palpitations and leg swelling.   Gastrointestinal: Negative for abdominal distention, abdominal pain, constipation, diarrhea, nausea and vomiting.   Endocrine: Negative for polydipsia, polyphagia and polyuria.   Genitourinary: Negative for dysuria, frequency, hematuria and urgency.   Neurological: Negative for dizziness, weakness, numbness and headaches.   All other systems reviewed and are negative.      Compared to one year ago, the patient feels his physical health is the same.  Compared to one year ago, the patient feels his mental health is the same.    Reviewed chart for potential of high risk medication in the elderly: yes  Reviewed chart for potential of harmful drug interactions in the  "elderly:yes    Objective         Vitals:    08/07/20 1518   BP: 132/70   Pulse: 63   Temp: 98.6 °F (37 °C)   SpO2: 95%   Weight: 88 kg (194 lb)   Height: 172.7 cm (68\")       Body mass index is 29.5 kg/m².  Discussed the patient's BMI with him. The BMI is above average; no BMI management plan is appropriate..    Physical Exam   Constitutional: He is oriented to person, place, and time. He appears well-developed and well-nourished. No distress.   HENT:   Head: Normocephalic and atraumatic.   Eyes: Pupils are equal, round, and reactive to light. EOM are normal.   Neck: Normal range of motion. Neck supple.   Cardiovascular: Normal rate, regular rhythm, normal heart sounds and intact distal pulses. Exam reveals no gallop and no friction rub.   No murmur heard.  No peripheral edema. Posterior tib pulses 2+ and equal bilat.   Pulmonary/Chest: Effort normal and breath sounds normal. No stridor. No respiratory distress. He has no wheezes. He has no rales. He exhibits no tenderness.   Lungs CTA bilat.   Abdominal: Soft. Bowel sounds are normal. He exhibits no distension. There is no tenderness.   Musculoskeletal: Normal range of motion.   Neurological: He is alert and oriented to person, place, and time.   Skin: Skin is warm and dry. Capillary refill takes less than 2 seconds. He is not diaphoretic.   Psychiatric: He has a normal mood and affect. His behavior is normal. Judgment and thought content normal.   Nursing note and vitals reviewed.      Lab Results   Component Value Date    TRIG 111 06/05/2020    HDL 34 (L) 06/05/2020    LDL 81 06/05/2020    VLDL 22.2 06/05/2020    HGBA1C 5.74 (H) 06/05/2020        Assessment/Plan   Medicare Risks and Personalized Health Plan  CMS Preventative Services Quick Reference  Advance Directive Discussion  Alcohol Misuse  Cardiovascular risk  Dementia/Memory   Diabetic Lab Screening   Fall Risk  Inactivity/Sedentary  Osteoprorosis Risk  Polypharmacy    The above risks/problems have been " discussed with the patient.  Pertinent information has been shared with the patient in the After Visit Summary.  Follow up plans and orders are seen below in the Assessment/Plan Section.    Diagnoses and all orders for this visit:    1. Medicare annual wellness visit, subsequent (Primary)    2. Healthcare maintenance    3. Cough    4. Essential hypertension  -     CBC No Differential; Future  -     Comprehensive metabolic panel; Future  -     TSH Rfx On Abnormal To Free T4; Future    5. Mixed hyperlipidemia  -     Lipid panel; Future    6. Atrial fibrillation, unspecified type (CMS/[facility])    7. Prediabetes  -     Hemoglobin A1c; Future      Follow Up:  Return in about 4 months (around 12/7/2020).     An After Visit Summary and PPPS were given to the patient.       -Annual medicare wellness visit performed.     -Healthcare maintenance: UTD.     -Cough: He is going to begin azithromycin as prescribed by GEOFFREY Munroe at phone visit last week. Was COVID-19 swabbed and negative. Sinus pressure and postnasal drip persist and he will let us know if this persists or worsens despite tx. Advised mucinex as well.     -HTN: Well controlled with nifedipine XL 60 mg twice daily.  Routine home monitoring recommended for goal of less than 130/80.    -Hyperlipidemia: Reviewed lipid panel from 6/5/2020.  Stable, continue simvastatin 20 mg daily.    -Prediabetes: Reviewed A1c from 6/5/2020 of 5.7.  Discussed lowering carbs and sugars/concentrated sweets.  Recheck of A1c at next visit.    -Follow-up PRN and in 4 months with PCP, Dr. spaulding for recheck of chronic conditions.

## 2020-09-04 ENCOUNTER — TELEPHONE (OUTPATIENT)
Dept: INTERNAL MEDICINE | Facility: CLINIC | Age: 70
End: 2020-09-04

## 2020-09-04 NOTE — TELEPHONE ENCOUNTER
Patient called in and stated he broke 3 ribs in march and did a roll over and broke a rib on the other side and heard it pop and wants to know the meds he is taking  Might have be making his bones break easy .             Please call patient at [phone]

## 2020-10-02 ENCOUNTER — TELEPHONE (OUTPATIENT)
Dept: INTERNAL MEDICINE | Facility: CLINIC | Age: 70
End: 2020-10-02

## 2020-10-02 RX ORDER — ESCITALOPRAM OXALATE 20 MG/1
20 TABLET ORAL DAILY
Qty: 15 TABLET | Refills: 0 | Status: SHIPPED | OUTPATIENT
Start: 2020-10-02 | End: 2020-12-11

## 2020-10-02 NOTE — TELEPHONE ENCOUNTER
I sent prescription for the 100 mg tablets to take a half tab 3 times daily.  Please tell him to not take the Lexapro as long as he can get the Serzone.

## 2020-10-02 NOTE — TELEPHONE ENCOUNTER
Caller: Wilmar Carmona    Relationship: Self    Best call back number: [phone]    What medication are you requesting: A SUBSTITUTE ANTI DEPRESSANT HIS CURRENT PRESCRIPTION NEFAZODONE 50 MG 3 TIMES A DAY IS OUT OF STOCK AT SEVERAL PHARMACIES AROUND [facility] AND PATIENT HAS MADE A CALL TO HIS PSYCH DOCTOR ABOUT OBTAINING A SUBSTITUTE BUT SHE HAS NOT CALLED HIM BACK YET AND THE PATIENT HAS BEEN WARNED TO NEVER GO OFF THIS MEDICATION AND HE IS UNSURE OF WHAT TO DO AND HE WILL BE OUT TOMORROW 10-3-20.       PATIENT REQUESTED TO REFILL AT THE PHARMACY TWO WEEKS AGO BUT THEY DID NOT TELL THE PATIENT UNTIL TODAY THAT THEY CAN'T GET THE MEDICATION AND NOW HE IS WORRIED ABOUT WHAT TO DO.       If a prescription is needed, what is your preferred pharmacy and phone number: [id number] - [place], [place] - [address] [address] AT [facility] [address] - [phone]  - [phone] FX

## 2020-10-02 NOTE — TELEPHONE ENCOUNTER
Is there any way we could call and try to get through to his psychiatrist and see what they would recommend as an alternative? I am unfamiliar with this medication and we could try Lexapro, but I would rather his psychiatrist make this call.

## 2020-10-02 NOTE — TELEPHONE ENCOUNTER
I just spoke with patient again.  He called around to other pharmacies and was told that the [facility] on [address] and [address] has the 100 mg scored tablets.  He asked if you would send that in to tale 1/2 tablet TID.  He did reach out to psych again and left message.  He said he would prefer to get the Serzone filled for the 100 mg as opposed to starting Lexapro, at least until he speaks with psychiatry.

## 2020-10-02 NOTE — TELEPHONE ENCOUNTER
I sent in Lexapro 20 mg daily. I would like him to go ahead and try reaching out to psych as soon as possible, leaving a voicemail would be good because maybe their on-call will hear this and respond to him.

## 2020-10-02 NOTE — TELEPHONE ENCOUNTER
I called their office and their hours on Friday at 8:30 to 12.  It gave me the option to leave a voicemail, but I declined since it appears they are not open and patient is going to be out of the medication tomorrow.

## 2020-11-13 RX ORDER — NIFEDIPINE 60 MG/1
60 TABLET, EXTENDED RELEASE ORAL 2 TIMES DAILY
Qty: 180 TABLET | Refills: 0 | Status: SHIPPED | OUTPATIENT
Start: 2020-11-13 | End: 2021-02-11 | Stop reason: SDUPTHER

## 2020-11-13 NOTE — TELEPHONE ENCOUNTER
Kika--  Patient needs appointment to see me or Dr. Durbin within the next few weeks.  Ok to put with me if Dr. Durbin does not have openings.

## 2020-11-18 ENCOUNTER — TRANSCRIBE ORDERS (OUTPATIENT)
Dept: ADMINISTRATIVE | Facility: HOSPITAL | Age: 70
End: 2020-11-18

## 2020-11-18 ENCOUNTER — LAB (OUTPATIENT)
Dept: LAB | Facility: HOSPITAL | Age: 70
End: 2020-11-18

## 2020-11-18 DIAGNOSIS — N40.1 ENLARGED PROSTATE WITH URINARY OBSTRUCTION: ICD-10-CM

## 2020-11-18 DIAGNOSIS — N13.8 ENLARGED PROSTATE WITH URINARY OBSTRUCTION: Primary | ICD-10-CM

## 2020-11-18 DIAGNOSIS — N40.1 ENLARGED PROSTATE WITH URINARY OBSTRUCTION: Primary | ICD-10-CM

## 2020-11-18 DIAGNOSIS — N13.8 ENLARGED PROSTATE WITH URINARY OBSTRUCTION: ICD-10-CM

## 2020-11-18 DIAGNOSIS — Z80.42 FAMILY HISTORY OF MALIGNANT NEOPLASM OF PROSTATE: ICD-10-CM

## 2020-11-18 LAB — PSA SERPL-MCNC: 2.58 NG/ML (ref 0–4)

## 2020-11-18 PROCEDURE — 84153 ASSAY OF PSA TOTAL: CPT

## 2020-11-18 PROCEDURE — 36415 COLL VENOUS BLD VENIPUNCTURE: CPT

## 2020-12-03 ENCOUNTER — LAB (OUTPATIENT)
Dept: LAB | Facility: HOSPITAL | Age: 70
End: 2020-12-03

## 2020-12-03 DIAGNOSIS — R73.03 PREDIABETES: ICD-10-CM

## 2020-12-03 DIAGNOSIS — I10 ESSENTIAL HYPERTENSION: ICD-10-CM

## 2020-12-03 DIAGNOSIS — E78.2 MIXED HYPERLIPIDEMIA: ICD-10-CM

## 2020-12-03 LAB
ALBUMIN SERPL-MCNC: 4.4 G/DL (ref 3.5–5.2)
ALBUMIN/GLOB SERPL: 1.9 G/DL
ALP SERPL-CCNC: 36 U/L (ref 39–117)
ALT SERPL W P-5'-P-CCNC: 16 U/L (ref 1–41)
ANION GAP SERPL CALCULATED.3IONS-SCNC: 6.7 MMOL/L (ref 5–15)
AST SERPL-CCNC: 18 U/L (ref 1–40)
BILIRUB SERPL-MCNC: 0.3 MG/DL (ref 0–1.2)
BUN SERPL-MCNC: 22 MG/DL (ref 8–23)
BUN/CREAT SERPL: 16.3 (ref 7–25)
CALCIUM SPEC-SCNC: 9.4 MG/DL (ref 8.6–10.5)
CHLORIDE SERPL-SCNC: 109 MMOL/L (ref 98–107)
CHOLEST SERPL-MCNC: 133 MG/DL (ref 0–200)
CO2 SERPL-SCNC: 27.3 MMOL/L (ref 22–29)
CREAT SERPL-MCNC: 1.35 MG/DL (ref 0.76–1.27)
DEPRECATED RDW RBC AUTO: 41.3 FL (ref 37–54)
ERYTHROCYTE [DISTWIDTH] IN BLOOD BY AUTOMATED COUNT: 12.6 % (ref 12.3–15.4)
GFR SERPL CREATININE-BSD FRML MDRD: 52 ML/MIN/1.73
GLOBULIN UR ELPH-MCNC: 2.3 GM/DL
GLUCOSE SERPL-MCNC: 120 MG/DL (ref 65–99)
HBA1C MFR BLD: 5.71 % (ref 4.8–5.6)
HCT VFR BLD AUTO: 43.2 % (ref 37.5–51)
HDLC SERPL-MCNC: 32 MG/DL (ref 40–60)
HGB BLD-MCNC: 14.7 G/DL (ref 13–17.7)
LDLC SERPL CALC-MCNC: 79 MG/DL (ref 0–100)
LDLC/HDLC SERPL: 2.4 {RATIO}
MCH RBC QN AUTO: 30.3 PG (ref 26.6–33)
MCHC RBC AUTO-ENTMCNC: 34 G/DL (ref 31.5–35.7)
MCV RBC AUTO: 89.1 FL (ref 79–97)
PLATELET # BLD AUTO: 235 10*3/MM3 (ref 140–450)
PMV BLD AUTO: 8.9 FL (ref 6–12)
POTASSIUM SERPL-SCNC: 4.1 MMOL/L (ref 3.5–5.2)
PROT SERPL-MCNC: 6.7 G/DL (ref 6–8.5)
RBC # BLD AUTO: 4.85 10*6/MM3 (ref 4.14–5.8)
SODIUM SERPL-SCNC: 143 MMOL/L (ref 136–145)
TRIGL SERPL-MCNC: 121 MG/DL (ref 0–150)
TSH SERPL DL<=0.05 MIU/L-ACNC: 1.43 UIU/ML (ref 0.27–4.2)
VLDLC SERPL-MCNC: 22 MG/DL (ref 5–40)
WBC # BLD AUTO: 6.51 10*3/MM3 (ref 3.4–10.8)

## 2020-12-03 PROCEDURE — 36415 COLL VENOUS BLD VENIPUNCTURE: CPT

## 2020-12-03 PROCEDURE — 84443 ASSAY THYROID STIM HORMONE: CPT

## 2020-12-03 PROCEDURE — 85027 COMPLETE CBC AUTOMATED: CPT

## 2020-12-03 PROCEDURE — 80061 LIPID PANEL: CPT

## 2020-12-03 PROCEDURE — 83036 HEMOGLOBIN GLYCOSYLATED A1C: CPT

## 2020-12-03 PROCEDURE — 80053 COMPREHEN METABOLIC PANEL: CPT

## 2020-12-11 ENCOUNTER — OFFICE VISIT (OUTPATIENT)
Dept: INTERNAL MEDICINE | Facility: CLINIC | Age: 70
End: 2020-12-11

## 2020-12-11 VITALS
DIASTOLIC BLOOD PRESSURE: 78 MMHG | HEART RATE: 64 BPM | SYSTOLIC BLOOD PRESSURE: 126 MMHG | TEMPERATURE: 96.6 F | BODY MASS INDEX: 29.19 KG/M2 | WEIGHT: 192 LBS | OXYGEN SATURATION: 96 %

## 2020-12-11 DIAGNOSIS — R97.20 ELEVATED PSA: ICD-10-CM

## 2020-12-11 DIAGNOSIS — R73.09 ELEVATED HEMOGLOBIN A1C: ICD-10-CM

## 2020-12-11 DIAGNOSIS — I25.10 CORONARY ARTERY DISEASE INVOLVING NATIVE CORONARY ARTERY OF NATIVE HEART WITHOUT ANGINA PECTORIS: Primary | ICD-10-CM

## 2020-12-11 DIAGNOSIS — I10 ESSENTIAL HYPERTENSION: ICD-10-CM

## 2020-12-11 DIAGNOSIS — E78.2 MIXED HYPERLIPIDEMIA: ICD-10-CM

## 2020-12-11 PROCEDURE — 99214 OFFICE O/P EST MOD 30 MIN: CPT | Performed by: FAMILY MEDICINE

## 2020-12-11 RX ORDER — HYDROCORTISONE ACETATE 0.5 %
CREAM (GRAM) TOPICAL
COMMUNITY
End: 2020-12-31 | Stop reason: ALTCHOICE

## 2020-12-11 RX ORDER — DESVENLAFAXINE 25 MG/1
25 TABLET, EXTENDED RELEASE ORAL DAILY
COMMUNITY
Start: 2020-10-05 | End: 2021-07-13

## 2020-12-11 NOTE — PROGRESS NOTES
Subjective   Wilmar Carmona is a 70 y.o. male.     Chief Complaint   Patient presents with   • Hypertension   • Hyperlipidemia         History of Present Illness   Mr. Carmona is here for recheck with active management of hypertension hyperlipidemia.  His blood pressure is doing very well however he develops pedal edema presumably related to nifedipine.  He will check back with cardiology not regard.  He is more annoyance.  Sometimes his gums well.    Otherwise has had a slight rise in creatinine presumably from dehydration.  His medications are reviewed with him.  He has a progressive decrease in PSA and follows up with urology and is on finasteride.    Management of cholesterol and low HDL is reviewed and he is on simvastatin 20 mg nightly.    We discussed prevention of diabetes type 2 as well as ramifications of slight elevation of creatinine.      The following portions of the patient's history were reviewed and updated as appropriate: allergies, current medications, past social history and problem list.    Review of Systems   Constitutional: Negative.    HENT: Negative.    Eyes: Negative.    Respiratory: Negative.    Cardiovascular: Negative.    Gastrointestinal: Negative.    Endocrine: Negative.    Genitourinary: Negative.    Musculoskeletal: Negative.    Skin: Negative.    Allergic/Immunologic: Negative.    Neurological: Negative.    Hematological: Negative.    Psychiatric/Behavioral: Negative.        Objective   Vitals:    12/11/20 1023   BP: 126/78   Pulse: 64   Temp: 96.6 °F (35.9 °C)   SpO2: 96%     Physical Exam  Vitals signs reviewed.   Constitutional:       Appearance: He is well-developed.   HENT:      Head: Normocephalic and atraumatic.      Right Ear: Tympanic membrane and external ear normal.      Left Ear: Tympanic membrane and external ear normal.   Eyes:      Conjunctiva/sclera: Conjunctivae normal.      Pupils: Pupils are equal, round, and reactive to light.   Neck:      Musculoskeletal: Normal  range of motion and neck supple.      Thyroid: No thyromegaly.      Vascular: No JVD.   Cardiovascular:      Rate and Rhythm: Normal rate and regular rhythm.      Heart sounds: Normal heart sounds.   Pulmonary:      Effort: Pulmonary effort is normal.      Breath sounds: Normal breath sounds.   Abdominal:      General: Bowel sounds are normal.      Palpations: Abdomen is soft.   Musculoskeletal: Normal range of motion.   Lymphadenopathy:      Cervical: No cervical adenopathy.   Skin:     General: Skin is warm and dry.      Findings: No rash.   Neurological:      Mental Status: He is alert and oriented to person, place, and time.      Cranial Nerves: No cranial nerve deficit.      Coordination: Coordination normal.   Psychiatric:         Behavior: Behavior normal.         Thought Content: Thought content normal.         Judgment: Judgment normal.         Assessment/Plan   Problems Addressed this Visit        Cardiovascular and Mediastinum    Coronary artery disease involving native coronary artery of native heart without angina pectoris - Primary    Hypertension    Hyperlipidemia      Other Visit Diagnoses     Elevated PSA          Diagnoses       Codes Comments    Coronary artery disease involving native coronary artery of native heart without angina pectoris    -  Primary ICD-10-CM: I25.10  ICD-9-CM: 414.01     Essential hypertension     ICD-10-CM: I10  ICD-9-CM: 401.9     Mixed hyperlipidemia     ICD-10-CM: E78.2  ICD-9-CM: 272.2     Elevated PSA     ICD-10-CM: R97.20  ICD-9-CM: 790.93       Plan: Return in 6 months with labs preceding the visit.  Medications remain the same.  Follow-up with cardiology.  He has follow-up with psychiatry as well.

## 2020-12-18 RX ORDER — PROPAFENONE HYDROCHLORIDE 150 MG/1
150 TABLET, COATED ORAL EVERY 8 HOURS
Qty: 270 TABLET | Refills: 0 | Status: SHIPPED | OUTPATIENT
Start: 2020-12-18 | End: 2020-12-21

## 2020-12-21 RX ORDER — PROPAFENONE HYDROCHLORIDE 150 MG/1
TABLET, COATED ORAL
Qty: 270 TABLET | Refills: 0 | Status: SHIPPED | OUTPATIENT
Start: 2020-12-21 | End: 2021-03-18

## 2020-12-31 ENCOUNTER — OFFICE VISIT (OUTPATIENT)
Dept: CARDIOLOGY | Facility: CLINIC | Age: 70
End: 2020-12-31

## 2020-12-31 VITALS
BODY MASS INDEX: 29.1 KG/M2 | HEART RATE: 57 BPM | DIASTOLIC BLOOD PRESSURE: 70 MMHG | WEIGHT: 192 LBS | SYSTOLIC BLOOD PRESSURE: 110 MMHG | HEIGHT: 68 IN

## 2020-12-31 DIAGNOSIS — Z99.89 OBSTRUCTIVE SLEEP APNEA ON CPAP: ICD-10-CM

## 2020-12-31 DIAGNOSIS — I10 ESSENTIAL HYPERTENSION: ICD-10-CM

## 2020-12-31 DIAGNOSIS — G47.33 OBSTRUCTIVE SLEEP APNEA ON CPAP: ICD-10-CM

## 2020-12-31 DIAGNOSIS — I25.10 CORONARY ARTERY DISEASE INVOLVING NATIVE CORONARY ARTERY OF NATIVE HEART WITHOUT ANGINA PECTORIS: Primary | ICD-10-CM

## 2020-12-31 PROCEDURE — 99214 OFFICE O/P EST MOD 30 MIN: CPT | Performed by: NURSE PRACTITIONER

## 2020-12-31 PROCEDURE — 93000 ELECTROCARDIOGRAM COMPLETE: CPT | Performed by: NURSE PRACTITIONER

## 2020-12-31 NOTE — PROGRESS NOTES
Date of Office Visit: 2020  Encounter Provider: Elizabeth Trevino, SANTO, APRN  Place of Service: [facility] CARDIOLOGY  Patient Name: Wilmar Carmona  :1950        Subjective:     Chief Complaint:  Follow-up, coronary artery disease, hypertension      History of Present Illness:  Wilmar Carmona is a 70 y.o. male patient of Dr. Durbin.  I am seeing this patient in the office today and I have reviewed his records.    Patient has a history of hypertension, hyperlipidemia, obstructive sleep apnea, PVCs and PACs, questionable paroxysmal atrial fibrillation, left ventricular hypertrophy.     Patient has a history of atrial fibrillation for many years and was previously followed by Dr. Bailey.  He has been maintained on Rythmol therapy and Sectral.  He had an abnormal stress test in  leading to a cardiac catheterization that showed minimal vessel disease up to 10%.  However CT scan 2016 showed calcification of all 3 major vessels.  Follow-up echocardiogram 2018 showed normal left ventricular size and systolic function without evidence of aneurysmal disease of the aortic root.  Abnormal structure noted in the liver, possibly a cyst.  Patient had 2 weeks Zio patch in 2018 that showed sinus rhythm with occasional PVCs that were symptomatic.  No atrial arrhythmia was noted.      Patient presents to office today for follow-up appointment.  Patient reports he is doing well since last visit.  He is not doing any formal exercise.  He stays somewhat active.  He does do some occasional walking outside for 45 minutes at a time without any exertional symptoms or concerns but does not do this on a regular basis.  Did recommend easing into more regular walking routine and getting an aerobics DVD that he can do at home when the weather is poor.  He denies any chest pain or discomfort, shortness of breath, shortness of breath with exertion, palpitations, racing heartbeat sensation,  dizziness, syncope, falls, fatigue, or abnormal bleeding.  He gets some mild dependent lower extremity swelling by the end of the day which she attributes to his nifedipine.  He reports that this is chronic, not new or worsening.  I did recommend using compression socks as he is on his feet a lot during the day and this really sounds more dependent in nature and calling if symptoms worsen or fail to improve.          Past Medical History:   Diagnosis Date   • Abnormal EKG    • Abnormal stress test    • Arthritis    • Atrial fibrillation (CMS/HCC)    • BPH (benign prostatic hypertrophy)    • CAD (coronary artery disease)    • Depression    • GERD (gastroesophageal reflux disease)    • H/O medial meniscus repair of right knee 2014    Partial Menisectomy right knee   • Heart murmur    • Hyperlipidemia    • Hypertension    • Intermittent knee pain     R<L knee from cycling and running in the past -    • Mild tricuspid regurgitation    • PAC (premature atrial contraction)    • PAF (paroxysmal atrial fibrillation) (CMS/HCC)    • Palpitations    • Sleep apnea    • Tremor      Past Surgical History:   Procedure Laterality Date   • CARDIAC CATHETERIZATION  2015   • COLONOSCOPY  10/20/2014    normal -dr leon..had egd at same time   • HERNIA REPAIR     • KNEE SURGERY Left    • TONSILLECTOMY     • VASECTOMY       Outpatient Medications Prior to Visit   Medication Sig Dispense Refill   • acebutolol (SECTRAL) 200 MG capsule TAKE 1 CAPSULE BY MOUTH TWICE DAILY 180 capsule 1   • B COMPLEX VITAMINS PO Take 2 tablets by mouth Daily.     • Desvenlafaxine Succinate ER 25 MG tablet sustained-release 24 hour Take  by mouth Daily.     • diclofenac (VOLTAREN) 1 % gel gel Apply 4 g topically to the appropriate area as directed 2 (Two) Times a Day. (Patient taking differently: Apply 4 g topically to the appropriate area as directed 2 (Two) Times a Day As Needed.) 100 g 1   • finasteride (PROSCAR) 5 MG tablet TK 1 T PO ONCE DAILY  2   •  Fish Oil-Cholecalciferol (FISH OIL + D3) 4654-7119 MG-UNIT capsule Take 1 capsule by mouth Daily.     • lamoTRIgine (LaMICtal) 200 MG tablet Take 250 mg by mouth Daily.     • nefazodone (SERZONE) 50 MG tablet Take 25 mg by mouth Daily.     • NIFEdipine XL (PROCARDIA XL) 60 MG 24 hr tablet Take 1 tablet by mouth 2 (two) times a day. 180 tablet 0   • pantoprazole (PROTONIX) 40 MG EC tablet Take 1 tablet by mouth Daily. 90 tablet 3   • propafenone (RYTHMOL) 150 MG tablet TAKE 1 TABLET BY MOUTH EVERY 8 HOURS 270 tablet 0   • simvastatin (ZOCOR) 20 MG tablet TAKE 1 TABLET BY MOUTH EVERY NIGHT 90 tablet 1   • Glucosamine-Chondroit-Vit C-Mn (Glucosamine 1500 Complex) capsule Take  by mouth.       No facility-administered medications prior to visit.        Allergies as of 12/31/2020   • (No Known Allergies)     Social History     Socioeconomic History   • Marital status:      Spouse name: Not on file   • Number of children: Not on file   • Years of education: Not on file   • Highest education level: Not on file   Tobacco Use   • Smoking status: Never Smoker   • Smokeless tobacco: Never Used   Substance and Sexual Activity   • Alcohol use: No   • Drug use: Not Currently   • Sexual activity: Defer     Family History   Problem Relation Age of Onset   • Hypertension Mother    • Cancer Father         prostate   • Heart disease Father    • Coronary artery disease Father    • Stroke Father    • Cancer Brother         prostate       Review of Systems   Constitution: Negative for malaise/fatigue.   Cardiovascular: Positive for leg swelling (Mild dependent ankle, bilateral, chronic, not new or worsening). Negative for chest pain, dyspnea on exertion, near-syncope, palpitations and syncope.        SEE HPI   Respiratory: Negative for shortness of breath.         BRITTA on CPAP   Hematologic/Lymphatic: Negative for bleeding problem.   Musculoskeletal: Negative for falls.   Gastrointestinal: Negative for melena.   Genitourinary:  "Negative for hematuria.   Neurological: Negative for dizziness.   Psychiatric/Behavioral: Negative for altered mental status.          Objective:     Vitals:    12/31/20 0946   BP: 110/70   BP Location: Right arm   Cuff Size: Adult   Pulse: 57   Weight: 87.1 kg (192 lb)   Height: 172.7 cm (68\")     Body mass index is 29.19 kg/m².      PHYSICAL EXAM:  Constitutional:       General: Not in acute distress.     Appearance: Well-developed. Not diaphoretic.   Eyes:      Pupils: Pupils are equal, round, and reactive to light.   HENT:      Head: Normocephalic and atraumatic.   Neck:      Musculoskeletal: Neck supple.      Vascular: No carotid bruit or JVD.   Pulmonary:      Effort: Pulmonary effort is normal. No respiratory distress.      Breath sounds: Normal breath sounds. No wheezing. No rales.   Cardiovascular:      Normal rate. Regular rhythm.      Murmurs: There is no murmur.      No gallop. No click. No rub.   Pulses:     Intact distal pulses.   Edema:     Peripheral edema absent.   Abdominal:      General: Bowel sounds are normal. There is no distension.      Palpations: Abdomen is soft.   Musculoskeletal: Normal range of motion.         General: No tenderness or deformity.   Skin:     General: Skin is warm and dry.      Findings: No erythema or rash.   Neurological:      Mental Status: Alert and oriented to person, place, and time.   Psychiatric:         Behavior: Behavior normal.         Judgment: Judgment normal.             ECG 12 Lead    Date/Time: 12/31/2020 10:05 AM  Performed by: Elizabeth Trevino DNP, APRN  Authorized by: Elizabeth Trevino DNP, GEOFFREY   Comparison: compared with previous ECG from 10/4/2019  Rhythm: sinus rhythm  BPM: 57  Conduction: non-specific intraventricular conduction delay  Comments: No significant changes from previous EKG              Assessment:       Diagnosis Plan   1. Coronary artery disease involving native coronary artery of native heart without angina pectoris     2. " Essential hypertension     3. Obstructive sleep apnea on CPAP           Plan:     1. Mild coronary artery disease: With negative stress test 5/2019.  Denies anginal symptoms.  Continue with risk factor modification.  Highly recommended easing into a light exercise routine and increasing as tolerated.  Notify office for symptoms or concerns prior to next visit.  2. Hypertension: Blood pressure well controlled in the office today.  Patient to continue to monitor.  Blood pressure goal less than 130/80 as long as he can tolerate it.  3. Bilateral lower extremity edema: He gets some mild dependent edema to bilateral ankles by the end of the day.  This is been chronic, going on for years, nothing new or worsening.  It is not bothersome.  However he did recommended using compression socks during the day and calling if symptoms worsen or do not improve.  4. History of symptomatic PACs and PVCs: Patient denies a history of atrial fibrillation.  He believes that he has been maintained on Rythmol for history of symptomatic PACs and PVCs.  EKG appears stable in the office today.  Denies palpitations on current regimen.  5. Obstructive sleep apnea: On CPAP therapy.  6. Dyslipidemia: PCP managing.  LDL goal less than 70.  7. History of hepatic cyst: PCP managing.    Patient to follow-up with Dr. Durbin in 6 months or sooner if needed for any new, recurrent, or worsening symptoms or other issues or concerns.           Your medication list          Accurate as of December 31, 2020 10:06 AM. If you have any questions, ask your nurse or doctor.            CHANGE how you take these medications      Instructions Last Dose Given Next Dose Due   diclofenac 1 % gel gel  Commonly known as: VOLTAREN  What changed:   · when to take this  · reasons to take this      Apply 4 g topically to the appropriate area as directed 2 (Two) Times a Day.          CONTINUE taking these medications      Instructions Last Dose Given Next Dose Due   acebutolol  200 MG capsule  Commonly known as: SECTRAL      TAKE 1 CAPSULE BY MOUTH TWICE DAILY       B COMPLEX VITAMINS PO      Take 2 tablets by mouth Daily.       Desvenlafaxine Succinate ER 25 MG tablet sustained-release 24 hour      Take  by mouth Daily.       finasteride 5 MG tablet  Commonly known as: PROSCAR      TK 1 T PO ONCE DAILY       Fish Oil + D3 0262-7713 MG-UNIT capsule      Take 1 capsule by mouth Daily.       lamoTRIgine 200 MG tablet  Commonly known as: LaMICtal      Take 250 mg by mouth Daily.       nefazodone 50 MG tablet  Commonly known as: SERZONE      Take 25 mg by mouth Daily.       NIFEdipine XL 60 MG 24 hr tablet  Commonly known as: PROCARDIA XL      Take 1 tablet by mouth 2 (two) times a day.       pantoprazole 40 MG EC tablet  Commonly known as: PROTONIX      Take 1 tablet by mouth Daily.       propafenone 150 MG tablet  Commonly known as: RYTHMOL      TAKE 1 TABLET BY MOUTH EVERY 8 HOURS       simvastatin 20 MG tablet  Commonly known as: ZOCOR      TAKE 1 TABLET BY MOUTH EVERY NIGHT              I did not stop or change the above medications.  Patient's medication list was updated to reflect medications they are currently taking including medication changes made by other providers.            Thanks,    Elizabeth Trevino, DNP, APRN  12/31/2020         Dictated utilizing Dragon dictation

## 2021-01-07 RX ORDER — ACEBUTOLOL HYDROCHLORIDE 200 MG/1
CAPSULE ORAL
Qty: 180 CAPSULE | Refills: 1 | Status: SHIPPED | OUTPATIENT
Start: 2021-01-07 | End: 2021-07-13

## 2021-02-01 RX ORDER — SIMVASTATIN 20 MG
20 TABLET ORAL NIGHTLY
Qty: 90 TABLET | Refills: 1 | Status: SHIPPED | OUTPATIENT
Start: 2021-02-01 | End: 2021-05-07

## 2021-02-01 NOTE — TELEPHONE ENCOUNTER
Caller: Wilmar Carmona    Relationship: Self    Best call back number: [phone]    Medication needed:   Requested Prescriptions     Pending Prescriptions Disp Refills   • simvastatin (ZOCOR) 20 MG tablet 90 tablet 1     Sig: Take 1 tablet by mouth Every Night.       When do you need the refill by: TODAY 02/01/2021    What details did the patient provide when requesting the medication: PATIENT IS COMPLETELY OUT OF MEDICATION.     Does the patient have less than a 3 day supply:  [x] Yes  [] No    What is the patient's preferred pharmacy: [id number] [address] AT [facility] - [phone]  - [phone] FX

## 2021-02-11 RX ORDER — NIFEDIPINE 60 MG/1
60 TABLET, EXTENDED RELEASE ORAL 2 TIMES DAILY
Qty: 180 TABLET | Refills: 1 | Status: SHIPPED | OUTPATIENT
Start: 2021-02-11 | End: 2021-07-13 | Stop reason: SDUPTHER

## 2021-02-11 NOTE — TELEPHONE ENCOUNTER
LOV: 12/31/20 KH  Next appt: 7/13/21 MKD  Labs 12/3/20: CMP/CBC    *Patient requested that this RX be under Dr. Durbin's name.

## 2021-02-12 NOTE — TELEPHONE ENCOUNTER
Please find out why is this pt needing this under my name? Please let him know that our practice utilizes APRN's routinely and will be a part of his care in office visits and other needs. narinder

## 2021-02-13 ENCOUNTER — IMMUNIZATION (OUTPATIENT)
Dept: VACCINE CLINIC | Facility: HOSPITAL | Age: 71
End: 2021-02-13

## 2021-02-13 PROCEDURE — 0001A: CPT | Performed by: INTERNAL MEDICINE

## 2021-02-13 PROCEDURE — 91300 HC SARSCOV02 VAC 30MCG/0.3ML IM: CPT | Performed by: INTERNAL MEDICINE

## 2021-03-06 ENCOUNTER — IMMUNIZATION (OUTPATIENT)
Dept: VACCINE CLINIC | Facility: HOSPITAL | Age: 71
End: 2021-03-06

## 2021-03-06 PROCEDURE — 0002A: CPT | Performed by: INTERNAL MEDICINE

## 2021-03-06 PROCEDURE — 91300 HC SARSCOV02 VAC 30MCG/0.3ML IM: CPT | Performed by: INTERNAL MEDICINE

## 2021-03-18 RX ORDER — PROPAFENONE HYDROCHLORIDE 150 MG/1
TABLET, COATED ORAL
Qty: 270 TABLET | Refills: 1 | Status: SHIPPED | OUTPATIENT
Start: 2021-03-18 | End: 2021-07-13 | Stop reason: SDUPTHER

## 2021-05-03 RX ORDER — FLUCONAZOLE 200 MG/1
200 TABLET ORAL DAILY
Qty: 15 TABLET | Refills: 1 | Status: SHIPPED | OUTPATIENT
Start: 2021-05-03 | End: 2021-05-07 | Stop reason: SDUPTHER

## 2021-05-06 ENCOUNTER — TELEPHONE (OUTPATIENT)
Dept: INTERNAL MEDICINE | Facility: CLINIC | Age: 71
End: 2021-05-06

## 2021-05-06 NOTE — TELEPHONE ENCOUNTER
THIS MESSAGE IS FOR:   ROSA SAWYER    PATIENT CALLED STATING:     HAS POSSIBLE BURSITIS ON RIGHT ELBOW --LARGE LUMP     AND WANTED TO KNOW WHAT HE NEEDS TO DO ABOUT IT.         PATIENT IS REQUESTING:  CALL BACK PLEASE         PLEASE ADVISE/CALL PATIENT IF YOU HAVE ANY QUESTIONS- PHONE NUMBER:   Wilmar Carmona (Self) [phone] (Y)

## 2021-05-06 NOTE — TELEPHONE ENCOUNTER
I called pt to schedule a visit, but he wanted to have it looked at before first available visit next week.  He is going to go to an urgent care.

## 2021-05-07 RX ORDER — SIMVASTATIN 20 MG
20 TABLET ORAL NIGHTLY
Qty: 90 TABLET | Refills: 1 | Status: SHIPPED | OUTPATIENT
Start: 2021-05-07 | End: 2022-01-14

## 2021-05-09 RX ORDER — FLUCONAZOLE 200 MG/1
200 TABLET ORAL DAILY
Qty: 15 TABLET | Refills: 1 | Status: SHIPPED | OUTPATIENT
Start: 2021-05-09 | End: 2021-10-15

## 2021-05-24 ENCOUNTER — OFFICE VISIT (OUTPATIENT)
Dept: ORTHOPEDIC SURGERY | Facility: CLINIC | Age: 71
End: 2021-05-24

## 2021-05-24 VITALS — BODY MASS INDEX: 27.28 KG/M2 | HEIGHT: 68 IN | TEMPERATURE: 97.3 F | WEIGHT: 180 LBS

## 2021-05-24 DIAGNOSIS — R52 PAIN: ICD-10-CM

## 2021-05-24 DIAGNOSIS — M70.21 OLECRANON BURSITIS OF RIGHT ELBOW: Primary | ICD-10-CM

## 2021-05-24 PROCEDURE — 73070 X-RAY EXAM OF ELBOW: CPT | Performed by: ORTHOPAEDIC SURGERY

## 2021-05-24 PROCEDURE — 99203 OFFICE O/P NEW LOW 30 MIN: CPT | Performed by: ORTHOPAEDIC SURGERY

## 2021-05-24 NOTE — PROGRESS NOTES
Patient: Wilmar Carmona    YOB: 1950    Medical Record Number: [id number]    Chief Complaints: Right elbow swelling    History of Present Illness:     70 y.o. male patient who presents with swelling to right elbow.  States has had swelling for the past 3 to 4 weeks denies any pain.  States.  Little bit smaller.  Is right-hand dominant.  Does have a hard office chair when she rests his elbow on quite often.  Did have a fall in April onto the elbow.  Denies any fevers no redness no pain.  Denies other associated complaints or issues.    Allergies: No Known Allergies    Home Medications:    Current Outpatient Medications:   •  acebutolol (SECTRAL) 200 MG capsule, TAKE 1 CAPSULE BY MOUTH TWICE DAILY, Disp: 180 capsule, Rfl: 1  •  B COMPLEX VITAMINS PO, Take 2 tablets by mouth Daily., Disp: , Rfl:   •  Desvenlafaxine Succinate ER 25 MG tablet sustained-release 24 hour, Take  by mouth Daily., Disp: , Rfl:   •  diclofenac (VOLTAREN) 1 % gel gel, Apply 4 g topically to the appropriate area as directed 2 (Two) Times a Day. (Patient taking differently: Apply 4 g topically to the appropriate area as directed 2 (Two) Times a Day As Needed.), Disp: 100 g, Rfl: 1  •  finasteride (PROSCAR) 5 MG tablet, TK 1 T PO ONCE DAILY, Disp: , Rfl: 2  •  Fish Oil-Cholecalciferol (FISH OIL + D3) 3688-3954 MG-UNIT capsule, Take 1 capsule by mouth Daily., Disp: , Rfl:   •  fluconazole (DIFLUCAN) 200 MG tablet, Take 1 tablet by mouth Daily., Disp: 15 tablet, Rfl: 1  •  lamoTRIgine (LaMICtal) 200 MG tablet, Take 250 mg by mouth Daily., Disp: , Rfl:   •  NIFEdipine XL (PROCARDIA XL) 60 MG 24 hr tablet, Take 1 tablet by mouth 2 (two) times a day., Disp: 180 tablet, Rfl: 1  •  pantoprazole (PROTONIX) 40 MG EC tablet, Take 1 tablet by mouth Daily., Disp: 90 tablet, Rfl: 3  •  propafenone (RYTHMOL) 150 MG tablet, TAKE 1 TABLET BY MOUTH EVERY 8 HOURS, Disp: 270 tablet, Rfl: 1  •  simvastatin (ZOCOR) 20 MG tablet, TAKE 1 TABLET BY MOUTH  EVERY NIGHT, Disp: 90 tablet, Rfl: 1  Past Medical History:   Diagnosis Date   • Abnormal EKG    • Abnormal stress test    • Arthritis    • Atrial fibrillation (CMS/HCC)    • BPH (benign prostatic hypertrophy)    • CAD (coronary artery disease)    • Depression    • GERD (gastroesophageal reflux disease)    • H/O medial meniscus repair of right knee 2014    Partial Menisectomy right knee   • Heart murmur    • Hyperlipidemia    • Hypertension    • Intermittent knee pain     R<L knee from cycling and running in the past -    • Mild tricuspid regurgitation    • PAC (premature atrial contraction)    • PAF (paroxysmal atrial fibrillation) (CMS/HCC)    • Palpitations    • Sleep apnea    • Tremor      Past Surgical History:   Procedure Laterality Date   • CARDIAC CATHETERIZATION  2015   • COLONOSCOPY  10/20/2014    normal -dr leon..had egd at same time   • HERNIA REPAIR     • KNEE SURGERY Left    • TONSILLECTOMY     • VASECTOMY       Social History     Occupational History   • Not on file   Tobacco Use   • Smoking status: Never Smoker   • Smokeless tobacco: Never Used   Substance and Sexual Activity   • Alcohol use: No   • Drug use: Not Currently   • Sexual activity: Defer      Social History     Social History Narrative   • Not on file     Family History   Problem Relation Age of Onset   • Hypertension Mother    • Cancer Father         prostate   • Heart disease Father    • Coronary artery disease Father    • Stroke Father    • Cancer Brother         prostate       Review of Systems:      Constitutional: Denies fever, shaking or chills   Eyes: Denies change in visual acuity   HEENT: Denies nasal congestion or sore throat   Respiratory: Denies cough or shortness of breath   Cardiovascular: Denies chest pain or edema  Endocrine: Denies tremors, palpitations, intolerance of heat or cold, polyuria, polydipsia.  GI: Denies abdominal pain, nausea, vomiting, bloody stools or diarrhea  : Denies frequency, urgency,  "incontinence, retention, or nocturia.  Musculoskeletal: Denies numbness, tingling or loss of motor function except as above  Integument: Denies rash, lesion or ulceration   Neurologic: Denies headache or focal weakness, deficits  Heme: Denies spontaneous or excessive bleeding, epistaxis, hematuria, melena, fatigue, enlarged or tender lymph nodes.      All other pertinent positives and negatives as noted above in HPI.    Physical Exam:   70 y.o. male  Vitals:    05/24/21 0929   Temp: 97.3 °F (36.3 °C)   TempSrc: Temporal   Weight: 81.6 kg (180 lb)   Height: 172.7 cm (68\")     General:  Patient is awake and alert.  Appears in no acute distress or discomfort.    Psych:  Affect and demeanor are appropriate.    Eyes:  Conjunctiva and sclera appear grossly normal.  Eyes track well and EOM seem to be intact.    Ears:  No gross abnormalities.  Hearing adequate for the exam.    Cardiovascular:  Regular rate and rhythm.    Lungs:  Good chest expansion.  Breathing unlabored.    Extremities:  Right elbow is examined.  Skin is benign.  No skin breaks, erythema, lesions, or ulcerations.  There is moderate edema over posterior aspect of the olecranon with a bursal effusion.  Full elbow motion.  No instability.  Good strength with elbow flexion, extension, pronation, supination.  Intact sensation distally.  Palpable radial pulse.  Brisk cap refill.    Radiology:  AP and lateral views of the right elbow are ordered by myself and reviewed to evaluate the patient's complaint.  No comparison films are immediately available.  The x-rays show soft tissue swelling over the dorsum of the elbow.  There are no obvious acute abnormalities, lesions, masses, significant degenerative changes, or other concerning findings.    Assessment/Plan:  Right olecranon bursitis    I had a lengthy discussion with the patient regarding the natural history of this condition and treatment options.  I have recommended that we start with a conservative approach. "  All options were thoroughly discussed with the patient and he consented to proceed with conservative treatment considering activity modification rest ice anti-inflammatories wrapping for some compression as needed.  Going forward the patient will follow up as needed. The patient understands and agrees.       Blas Robbins MD    05/24/2021    CC to Darryl King Jr., MD    Procedures

## 2021-06-01 ENCOUNTER — TELEPHONE (OUTPATIENT)
Dept: INTERNAL MEDICINE | Facility: CLINIC | Age: 71
End: 2021-06-01

## 2021-06-01 NOTE — TELEPHONE ENCOUNTER
Jyothi is correct. No bloodwork done ahead of visit. Will be done same day or thereafter. Hub may tell patient if returns calll.

## 2021-06-01 NOTE — TELEPHONE ENCOUNTER
Caller: Wilmar Carmona    Relationship: Self    Best call back number: [phone]    What orders are you requesting (i.e. lab or imaging): BLOOD WORK     In what timeframe would the patient need to come in: BEFORE 6/11    Where will you receive your lab/imaging services: [facility] LOCATION OR [facility] [place] [address]    Additional notes: PATIENT CALLED IN AND WANTED TO KNOW ABOUT HAVING LABS DONE. TALKED TO [facility] AND SHE SAID THEY WOULD DO LABS AFTER THE APPOINTMENT. THE PATIENT SAID HE HAS ALWAYS HAD LABS DONE BEFORE THE APPOINTMENT AND WOULD LIKE AN ORDER TO HAVE THEM DONE BEFORE THE 6/11 APPOINTMENT. PLEASE CALL PATIENT AND ADVISE.

## 2021-06-11 ENCOUNTER — OFFICE VISIT (OUTPATIENT)
Dept: INTERNAL MEDICINE | Facility: CLINIC | Age: 71
End: 2021-06-11

## 2021-06-11 VITALS
HEIGHT: 68 IN | HEART RATE: 56 BPM | WEIGHT: 193 LBS | OXYGEN SATURATION: 96 % | SYSTOLIC BLOOD PRESSURE: 124 MMHG | TEMPERATURE: 97.8 F | DIASTOLIC BLOOD PRESSURE: 66 MMHG | BODY MASS INDEX: 29.25 KG/M2

## 2021-06-11 DIAGNOSIS — R79.89 ELEVATED SERUM CREATININE: ICD-10-CM

## 2021-06-11 DIAGNOSIS — R74.8 LOW SERUM ALKALINE PHOSPHATASE: ICD-10-CM

## 2021-06-11 DIAGNOSIS — I25.10 CORONARY ARTERY DISEASE INVOLVING NATIVE CORONARY ARTERY OF NATIVE HEART WITHOUT ANGINA PECTORIS: ICD-10-CM

## 2021-06-11 DIAGNOSIS — R73.09 ELEVATED HEMOGLOBIN A1C: ICD-10-CM

## 2021-06-11 DIAGNOSIS — I10 ESSENTIAL HYPERTENSION: ICD-10-CM

## 2021-06-11 DIAGNOSIS — E55.9 VITAMIN D DEFICIENCY: ICD-10-CM

## 2021-06-11 DIAGNOSIS — E78.2 MIXED HYPERLIPIDEMIA: Primary | ICD-10-CM

## 2021-06-11 PROCEDURE — 99214 OFFICE O/P EST MOD 30 MIN: CPT | Performed by: FAMILY MEDICINE

## 2021-06-11 NOTE — PROGRESS NOTES
"Chief Complaint  Hyperlipidemia and Hypertension    Subjective          Wilmar Carmona presents to [facility]  Very pleasant gentleman with a history of hypertension hyperlipidemia prior history of atrial fibrillation now normal sinus rhythm as well as CAD.  He has had borderline elevation of A1c barely.  Symptomatically doing well except for some olecranon bursitis of the right elbow which is self-limited.    He is seeing Dr. Dez George for BPH.    We discussed at length low alkaline phosphatase which he has but is asymptomatic for hypophosphatasia.  His daughter does have some of the symptoms.  Information is discussed at length about the condition.      Objective   Vital Signs:   /66   Pulse 56   Temp 97.8 °F (36.6 °C)   Ht 172.7 cm (68\")   Wt 87.5 kg (193 lb)   SpO2 96%   BMI 29.35 kg/m²     Physical Exam  Vitals reviewed.   Constitutional:       Appearance: He is well-developed.   HENT:      Head: Normocephalic and atraumatic.      Right Ear: Tympanic membrane and external ear normal.      Left Ear: Tympanic membrane and external ear normal.   Eyes:      Conjunctiva/sclera: Conjunctivae normal.      Pupils: Pupils are equal, round, and reactive to light.   Neck:      Thyroid: No thyromegaly.      Vascular: No JVD.   Cardiovascular:      Rate and Rhythm: Normal rate and regular rhythm.      Heart sounds: Normal heart sounds.   Pulmonary:      Effort: Pulmonary effort is normal.      Breath sounds: Normal breath sounds.   Abdominal:      General: Bowel sounds are normal.      Palpations: Abdomen is soft.   Musculoskeletal:         General: Normal range of motion.      Right elbow: Swelling present.      Cervical back: Normal range of motion and neck supple.      Comments: Olecranon bursitis   Lymphadenopathy:      Cervical: No cervical adenopathy.   Skin:     General: Skin is warm and dry.      Findings: No rash.   Neurological:      Mental Status: He is alert and " oriented to person, place, and time.      Cranial Nerves: No cranial nerve deficit.      Coordination: Coordination normal.   Psychiatric:         Behavior: Behavior normal.         Thought Content: Thought content normal.         Judgment: Judgment normal.        Result Review :   The following data was reviewed by: Darryl King Jr., MD on 06/11/2021:  Common labs    Common Labsle 8/7/20 11/18/20 12/3/20 12/3/20 12/3/20 12/3/20      0837 0837 0837 0837   Glucose    120 (A)     BUN    22     Creatinine 1.20   1.35 (A)     eGFR Non  Am    52 (A)     Sodium    143     Potassium    4.1     Chloride    109 (A)     Calcium    9.4     Albumin    4.40     Total Bilirubin    0.3     Alkaline Phosphatase    36 (A)     AST (SGOT)    18     ALT (SGPT)    16     WBC   6.51      Hemoglobin   14.7      Hematocrit   43.2      Platelets   235      Total Cholesterol      133   Triglycerides      121   HDL Cholesterol      32 (A)   LDL Cholesterol       79   Hemoglobin A1C     5.71 (A)    PSA  2.580       (A) Abnormal value       Comments are available for some flowsheets but are not being displayed.                     Assessment and Plan    Diagnoses and all orders for this visit:    1. Mixed hyperlipidemia (Primary)  Comments:  Zocor 20 mg daily  Orders:  -     CBC & Differential  -     Comprehensive Metabolic Panel  -     Vitamin B12  -     Lipid Panel With / Chol / HDL Ratio  -     Hemoglobin A1c  -     TSH  -     T4, Free  -     T3, Free  -     Urinalysis With Microscopic If Indicated (No Culture) - Urine, Clean Catch  -     Vitamin D 25 Hydroxy    2. Essential hypertension  Comments:  Continue current treatment  Orders:  -     CBC & Differential  -     Comprehensive Metabolic Panel  -     Vitamin B12  -     Lipid Panel With / Chol / HDL Ratio  -     Hemoglobin A1c  -     TSH  -     T4, Free  -     T3, Free  -     Urinalysis With Microscopic If Indicated (No Culture) - Urine, Clean Catch  -     Vitamin D 25  Hydroxy    3. Coronary artery disease involving native coronary artery of native heart without angina pectoris  Comments:  Follow-up with cardiology routinely  Orders:  -     CBC & Differential  -     Comprehensive Metabolic Panel  -     Vitamin B12  -     Lipid Panel With / Chol / HDL Ratio  -     Hemoglobin A1c  -     TSH  -     T4, Free  -     T3, Free  -     Urinalysis With Microscopic If Indicated (No Culture) - Urine, Clean Catch  -     Vitamin D 25 Hydroxy    4. Elevated hemoglobin A1c  -     CBC & Differential  -     Comprehensive Metabolic Panel  -     Vitamin B12  -     Lipid Panel With / Chol / HDL Ratio  -     Hemoglobin A1c  -     TSH  -     T4, Free  -     T3, Free  -     Urinalysis With Microscopic If Indicated (No Culture) - Urine, Clean Catch  -     Vitamin D 25 Hydroxy    5. Elevated serum creatinine  Comments:  Labs pending  Orders:  -     CBC & Differential  -     Comprehensive Metabolic Panel  -     Vitamin B12  -     Lipid Panel With / Chol / HDL Ratio  -     Hemoglobin A1c  -     TSH  -     T4, Free  -     T3, Free  -     Urinalysis With Microscopic If Indicated (No Culture) - Urine, Clean Catch  -     Vitamin D 25 Hydroxy    6. Low serum alkaline phosphatase  Comments:  Information given for screen for hypophosphatasia  Orders:  -     CBC & Differential  -     Comprehensive Metabolic Panel  -     Vitamin B12  -     Lipid Panel With / Chol / HDL Ratio  -     Hemoglobin A1c  -     TSH  -     T4, Free  -     T3, Free  -     Urinalysis With Microscopic If Indicated (No Culture) - Urine, Clean Catch  -     Vitamin D 25 Hydroxy    7. Vitamin D deficiency   Comments:  Check vitamin D  Orders:  -     Vitamin D 25 Hydroxy        Follow Up   Return in about 6 months (around 12/11/2021) for Medicare Wellness, Recheck.  Patient was given instructions and counseling regarding his condition or for health maintenance advice. Please see specific information pulled into the AVS if appropriate.

## 2021-06-12 LAB
25(OH)D3+25(OH)D2 SERPL-MCNC: 46.6 NG/ML (ref 30–100)
ALBUMIN SERPL-MCNC: 4.7 G/DL (ref 3.5–5.2)
ALBUMIN/GLOB SERPL: 2.4 G/DL
ALP SERPL-CCNC: 44 U/L (ref 39–117)
ALT SERPL-CCNC: 17 U/L (ref 1–41)
APPEARANCE UR: CLEAR
AST SERPL-CCNC: 18 U/L (ref 1–40)
BASOPHILS # BLD AUTO: 0.09 10*3/MM3 (ref 0–0.2)
BASOPHILS NFR BLD AUTO: 1.3 % (ref 0–1.5)
BILIRUB SERPL-MCNC: 0.3 MG/DL (ref 0–1.2)
BILIRUB UR QL STRIP: NEGATIVE
BUN SERPL-MCNC: 17 MG/DL (ref 8–23)
BUN/CREAT SERPL: 13.8 (ref 7–25)
CALCIUM SERPL-MCNC: 9.4 MG/DL (ref 8.6–10.5)
CHLORIDE SERPL-SCNC: 106 MMOL/L (ref 98–107)
CHOLEST SERPL-MCNC: 187 MG/DL (ref 0–200)
CHOLEST/HDLC SERPL: 4.92 {RATIO}
CO2 SERPL-SCNC: 26.9 MMOL/L (ref 22–29)
COLOR UR: YELLOW
CREAT SERPL-MCNC: 1.23 MG/DL (ref 0.76–1.27)
EOSINOPHIL # BLD AUTO: 0.31 10*3/MM3 (ref 0–0.4)
EOSINOPHIL NFR BLD AUTO: 4.6 % (ref 0.3–6.2)
ERYTHROCYTE [DISTWIDTH] IN BLOOD BY AUTOMATED COUNT: 13.2 % (ref 12.3–15.4)
GLOBULIN SER CALC-MCNC: 2 GM/DL
GLUCOSE SERPL-MCNC: 99 MG/DL (ref 65–99)
GLUCOSE UR QL: NEGATIVE
HBA1C MFR BLD: 5.6 % (ref 4.8–5.6)
HCT VFR BLD AUTO: 46 % (ref 37.5–51)
HDLC SERPL-MCNC: 38 MG/DL (ref 40–60)
HGB BLD-MCNC: 15.5 G/DL (ref 13–17.7)
HGB UR QL STRIP: NEGATIVE
IMM GRANULOCYTES # BLD AUTO: 0.04 10*3/MM3 (ref 0–0.05)
IMM GRANULOCYTES NFR BLD AUTO: 0.6 % (ref 0–0.5)
KETONES UR QL STRIP: NEGATIVE
LDLC SERPL CALC-MCNC: 119 MG/DL (ref 0–100)
LEUKOCYTE ESTERASE UR QL STRIP: NEGATIVE
LYMPHOCYTES # BLD AUTO: 2.43 10*3/MM3 (ref 0.7–3.1)
LYMPHOCYTES NFR BLD AUTO: 36 % (ref 19.6–45.3)
MCH RBC QN AUTO: 31.1 PG (ref 26.6–33)
MCHC RBC AUTO-ENTMCNC: 33.7 G/DL (ref 31.5–35.7)
MCV RBC AUTO: 92.2 FL (ref 79–97)
MONOCYTES # BLD AUTO: 0.47 10*3/MM3 (ref 0.1–0.9)
MONOCYTES NFR BLD AUTO: 7 % (ref 5–12)
NEUTROPHILS # BLD AUTO: 3.41 10*3/MM3 (ref 1.7–7)
NEUTROPHILS NFR BLD AUTO: 50.5 % (ref 42.7–76)
NITRITE UR QL STRIP: NEGATIVE
NRBC BLD AUTO-RTO: 0 /100 WBC (ref 0–0.2)
PH UR STRIP: 6 [PH] (ref 5–8)
PLATELET # BLD AUTO: 281 10*3/MM3 (ref 140–450)
POTASSIUM SERPL-SCNC: 4.5 MMOL/L (ref 3.5–5.2)
PROT SERPL-MCNC: 6.7 G/DL (ref 6–8.5)
PROT UR QL STRIP: NEGATIVE
RBC # BLD AUTO: 4.99 10*6/MM3 (ref 4.14–5.8)
SODIUM SERPL-SCNC: 146 MMOL/L (ref 136–145)
SP GR UR: 1.02 (ref 1–1.03)
T3FREE SERPL-MCNC: 3.3 PG/ML (ref 2–4.4)
T4 FREE SERPL-MCNC: 1.16 NG/DL (ref 0.93–1.7)
TRIGL SERPL-MCNC: 167 MG/DL (ref 0–150)
TSH SERPL DL<=0.005 MIU/L-ACNC: 1.2 UIU/ML (ref 0.27–4.2)
UROBILINOGEN UR STRIP-MCNC: NORMAL MG/DL
VIT B12 SERPL-MCNC: 640 PG/ML (ref 211–946)
VLDLC SERPL CALC-MCNC: 30 MG/DL (ref 5–40)
WBC # BLD AUTO: 6.75 10*3/MM3 (ref 3.4–10.8)

## 2021-07-13 ENCOUNTER — OFFICE VISIT (OUTPATIENT)
Dept: CARDIOLOGY | Facility: CLINIC | Age: 71
End: 2021-07-13

## 2021-07-13 VITALS
HEART RATE: 61 BPM | DIASTOLIC BLOOD PRESSURE: 80 MMHG | SYSTOLIC BLOOD PRESSURE: 130 MMHG | WEIGHT: 195 LBS | HEIGHT: 68 IN | BODY MASS INDEX: 29.55 KG/M2

## 2021-07-13 DIAGNOSIS — I25.10 CORONARY ARTERY DISEASE INVOLVING NATIVE CORONARY ARTERY OF NATIVE HEART WITHOUT ANGINA PECTORIS: Primary | ICD-10-CM

## 2021-07-13 DIAGNOSIS — I49.3 PVC (PREMATURE VENTRICULAR CONTRACTION): ICD-10-CM

## 2021-07-13 DIAGNOSIS — I48.0 PAF (PAROXYSMAL ATRIAL FIBRILLATION) (HCC): ICD-10-CM

## 2021-07-13 DIAGNOSIS — I10 ESSENTIAL HYPERTENSION: ICD-10-CM

## 2021-07-13 DIAGNOSIS — N28.9 RENAL INSUFFICIENCY: ICD-10-CM

## 2021-07-13 DIAGNOSIS — I48.0 PAROXYSMAL ATRIAL FIBRILLATION (HCC): ICD-10-CM

## 2021-07-13 DIAGNOSIS — G47.33 OBSTRUCTIVE SLEEP APNEA ON CPAP: ICD-10-CM

## 2021-07-13 DIAGNOSIS — Z99.89 OBSTRUCTIVE SLEEP APNEA ON CPAP: ICD-10-CM

## 2021-07-13 PROCEDURE — 93000 ELECTROCARDIOGRAM COMPLETE: CPT | Performed by: INTERNAL MEDICINE

## 2021-07-13 PROCEDURE — 99213 OFFICE O/P EST LOW 20 MIN: CPT | Performed by: INTERNAL MEDICINE

## 2021-07-13 RX ORDER — PROPAFENONE HYDROCHLORIDE 150 MG/1
150 TABLET, COATED ORAL EVERY 8 HOURS
Qty: 270 TABLET | Refills: 1 | Status: SHIPPED | OUTPATIENT
Start: 2021-07-13 | End: 2021-09-27

## 2021-07-13 RX ORDER — NIFEDIPINE 60 MG/1
60 TABLET, EXTENDED RELEASE ORAL 2 TIMES DAILY
Qty: 180 TABLET | Refills: 1 | Status: SHIPPED | OUTPATIENT
Start: 2021-07-13 | End: 2021-09-03 | Stop reason: SDUPTHER

## 2021-07-13 RX ORDER — ACEBUTOLOL HYDROCHLORIDE 200 MG/1
CAPSULE ORAL
Qty: 180 CAPSULE | Refills: 0 | Status: SHIPPED | OUTPATIENT
Start: 2021-07-13 | End: 2021-10-14

## 2021-07-13 NOTE — PROGRESS NOTES
Date of Office Visit: 2021  Encounter Provider: Diana Durbin MD  Place of Service: [facility]  Patient Name: Wilmar Carmona  :1950    Chief complaint  Follow of paroxysmal atrial fibrillation, dilated aortic root and left ventricular hypertrophy    History of Present Illness  Patient is a pleasant 68-year-old gentleman with history of hypertension, hyperlipidemia, obstructive sleep apnea.  He has had atrial fibrillation for many years and previously was followed by Dr. Bailey.  He has been maintained on Rythmol therapy and Sectral.  He had an abnormal stress test in  leading to cardiac catheterization that revealed minimal vessel disease up to 10%.  However there is a CT scan from 2016 that revealed calcification of all 3 major vessels.  He follow-up echocardiogram in 2018 that showed normal left ventricular size and systolic function with out evidence of aneurysmal disease of the aortic root.  There was an abnormal structure noted in the liver possibly a cyst.  He had a 2 weeks patch in 2018 that showed sinus rhythm with occasional PVCs that were symptomatic.  There is no atrial arrhythmia noted.  In May 2019 he had a stress perfusion study that was negative for ischemia.    His last visit he is walking at home intermittently he denies any chest pain, shortness of breath, palpitations syncope near syncope.  He is not using his CPAP.  Pressure checked sporadically has been acceptable.  His lipids are described below.  He hadCMP that was unremarkable except for sodium of 146 potassium 4.6.  Creatinine 1.23 with GFR 58, CBC unremarkable.  LDL of 119, HDL 38, triglycerides 1.67, hemoglobin A1c is normal    Past Medical History:   Diagnosis Date   • Abnormal EKG    • Abnormal stress test    • Arthritis    • Atrial fibrillation (CMS/HCC)    • BPH (benign prostatic hypertrophy)    • CAD (coronary artery disease)    • Depression    • GERD  (gastroesophageal reflux disease)    • H/O medial meniscus repair of right knee 2014    Partial Menisectomy right knee   • Heart murmur    • Hyperlipidemia    • Hypertension    • Intermittent knee pain     R<L knee from cycling and running in the past -    • Mild tricuspid regurgitation    • PAC (premature atrial contraction)    • PAF (paroxysmal atrial fibrillation) (CMS/HCC)    • Palpitations    • Sleep apnea    • Tremor      Past Surgical History:   Procedure Laterality Date   • CARDIAC CATHETERIZATION  2015   • COLONOSCOPY  10/20/2014    normal -dr leon..had egd at same time   • HERNIA REPAIR     • KNEE SURGERY Left    • TONSILLECTOMY     • VASECTOMY       Outpatient Medications Prior to Visit   Medication Sig Dispense Refill   • finasteride (PROSCAR) 5 MG tablet TK 1 T PO ONCE DAILY  2   • Fish Oil-Cholecalciferol (FISH OIL + D3) 1830-9326 MG-UNIT capsule Take 1 capsule by mouth Daily.     • fluconazole (DIFLUCAN) 200 MG tablet Take 1 tablet by mouth Daily. (Patient taking differently: Take 200 mg by mouth As Needed.) 15 tablet 1   • lamoTRIgine (LaMICtal) 200 MG tablet Take 200 mg by mouth Daily.     • pantoprazole (PROTONIX) 40 MG EC tablet Take 1 tablet by mouth Daily. 90 tablet 3   • simvastatin (ZOCOR) 20 MG tablet TAKE 1 TABLET BY MOUTH EVERY NIGHT 90 tablet 1   • acebutolol (SECTRAL) 200 MG capsule TAKE 1 CAPSULE BY MOUTH TWICE DAILY 180 capsule 1   • NIFEdipine XL (PROCARDIA XL) 60 MG 24 hr tablet Take 1 tablet by mouth 2 (two) times a day. 180 tablet 1   • propafenone (RYTHMOL) 150 MG tablet TAKE 1 TABLET BY MOUTH EVERY 8 HOURS 270 tablet 1   • B COMPLEX VITAMINS PO Take 2 tablets by mouth As Needed.     • Desvenlafaxine Succinate ER 25 MG tablet sustained-release 24 hour Take 25 mg by mouth Daily.       No facility-administered medications prior to visit.       Allergies as of 07/13/2021   • (No Known Allergies)     Social History     Socioeconomic History   • Marital status:      Spouse  "name: Not on file   • Number of children: Not on file   • Years of education: Not on file   • Highest education level: Not on file   Tobacco Use   • Smoking status: Never Smoker   • Smokeless tobacco: Never Used   Substance and Sexual Activity   • Alcohol use: No   • Drug use: Not Currently   • Sexual activity: Defer     Family History   Problem Relation Age of Onset   • Hypertension Mother    • Cancer Father         prostate   • Heart disease Father    • Coronary artery disease Father    • Stroke Father    • Cancer Brother         prostate     Review of Systems   Constitutional: Negative for chills, fever, weight gain and weight loss.   Cardiovascular: Negative for leg swelling.   Respiratory: Negative for cough, snoring and wheezing.    Hematologic/Lymphatic: Negative for bleeding problem. Does not bruise/bleed easily.   Skin: Negative for color change.   Musculoskeletal: Negative for falls, joint pain and myalgias.   Gastrointestinal: Negative for melena.   Genitourinary: Negative for hematuria.   Neurological: Negative for excessive daytime sleepiness.   Psychiatric/Behavioral: Negative for depression. The patient is not nervous/anxious.         Objective:     Vitals:    07/13/21 1016   BP: 130/80   Pulse: 61   Weight: 88.5 kg (195 lb)   Height: 172.7 cm (68\")     Body mass index is 29.65 kg/m².    Vitals reviewed.   Constitutional:       Appearance: Well-developed.   Eyes:      General: No scleral icterus.        Right eye: No discharge.      Conjunctiva/sclera: Conjunctivae normal.      Pupils: Pupils are equal, round, and reactive to light.   HENT:      Head: Normocephalic.      Nose: Nose normal.   Neck:      Thyroid: No thyromegaly.      Vascular: No JVD.   Pulmonary:      Effort: Pulmonary effort is normal. No respiratory distress.      Breath sounds: Normal breath sounds. No wheezing. No rales.   Cardiovascular:      Normal rate. Regular rhythm. Normal S1. Normal S2.      Murmurs: There is no murmur.      " No gallop.   Pulses:     Intact distal pulses.   Edema:     Peripheral edema absent.   Abdominal:      General: Bowel sounds are normal. There is no distension.      Palpations: Abdomen is soft.      Tenderness: There is no abdominal tenderness. There is no rebound.   Musculoskeletal: Normal range of motion.         General: No tenderness.      Cervical back: Normal range of motion and neck supple. Skin:     General: Skin is warm and dry.      Findings: No erythema or rash.   Neurological:      Mental Status: Alert and oriented to person, place, and time.   Psychiatric:         Behavior: Behavior normal.         Thought Content: Thought content normal.         Judgment: Judgment normal.       Lab Review:     ECG 12 Lead    Date/Time: 7/13/2021 10:16 AM  Performed by: Diana Durbin MD  Authorized by: Diana Durbin MD   Comparison: compared with previous ECG   Similar to previous ECG  Rhythm: sinus rhythm  Conduction: incomplete left bundle branch block and 1st degree AV block    Clinical impression: abnormal EKG          Assessment:       Diagnosis Plan   1. Coronary artery disease involving native coronary artery of native heart without angina pectoris  ECG 12 Lead   2. PAF (paroxysmal atrial fibrillation) (CMS/HCC)  ECG 12 Lead   3. Paroxysmal atrial fibrillation (CMS/HCC)     4. Essential hypertension     5. PVC (premature ventricular contraction)     6. Renal insufficiency     7. Obstructive sleep apnea on CPAP       Plan:       1.  Recurrent PACs of questionable atrial fibrillation.  Patient now states he never had a history of atrial fibrillation though many years ago he told Dr. Bailey he had.  There has been no clear documentation of atrial fibrillation though he has had PACs.  This is been managed with Rythmol Sectral.  Continue with both Sectral and Rythmol.  Clinically stable.  2.  Mild coronary artery disease.  Last stress test negative for ischemia in 2019.  Continue current regimen respect modification  3.   Dilated right ventricle, as above likely due to sleep apnea.,  Improved  4.  Obstructive sleep apnea.untreated  5.  Hypertension, elevated today  6.  Dyslipidemia.  Remains elevated on blood work on 611/21.  LDL was 119, HDL 38, triglycerides 167  7.  Hepatic cysts  8.  Nonspecific interventricular conduction delay and incomplete left bundle branch block pattern.  This is unchanged.  Observe for now.    Time Spent: I spent 2o minutes caring for Wilmar on this date of service. This time includes time spent by me in the following activities: preparing for the visit, reviewing tests, obtaining and/or reviewing a separately obtained history, performing a medically appropriate examination and/or evaluation, counseling and educating the patient/family/caregiver and documenting information in the medical record.   I spent 1 minutes on the separately reported service of ECG. This time is not included in the time used to support the E/M service also reported today.        Your medication list          Accurate as of July 13, 2021 11:59 PM. If you have any questions, ask your nurse or doctor.            CHANGE how you take these medications      Instructions Last Dose Given Next Dose Due   fluconazole 200 MG tablet  Commonly known as: DIFLUCAN  What changed:   · when to take this  · reasons to take this      Take 1 tablet by mouth Daily.          CONTINUE taking these medications      Instructions Last Dose Given Next Dose Due   acebutolol 200 MG capsule  Commonly known as: SECTRAL      TAKE 1 CAPSULE BY MOUTH TWICE DAILY       finasteride 5 MG tablet  Commonly known as: PROSCAR      TK 1 T PO ONCE DAILY       Fish Oil + D3 2646-5369 MG-UNIT capsule      Take 1 capsule by mouth Daily.       lamoTRIgine 200 MG tablet  Commonly known as: LaMICtal      Take 200 mg by mouth Daily.       NIFEdipine XL 60 MG 24 hr tablet  Commonly known as: PROCARDIA XL      Take 1 tablet by mouth 2 (two) times a day.       pantoprazole 40 MG EC  tablet  Commonly known as: PROTONIX      Take 1 tablet by mouth Daily.       propafenone 150 MG tablet  Commonly known as: RYTHMOL      Take 1 tablet by mouth Every 8 (Eight) Hours.       simvastatin 20 MG tablet  Commonly known as: ZOCOR      TAKE 1 TABLET BY MOUTH EVERY NIGHT          STOP taking these medications    B COMPLEX VITAMINS PO  Stopped by: Diana Durbin MD        Desvenlafaxine Succinate ER 25 MG tablet sustained-release 24 hour  Stopped by: Diana Durbin MD              Where to Get Your Medications      These medications were sent to [id number] - [place], [place] - [address] AT [facility] [address] [facility] - [phone]  - [phone] fx [address], Psychiatric [id number]    Phone: [phone]   · NIFEdipine XL 60 MG 24 hr tablet  · propafenone 150 MG tablet         Patient is no longer taking B complex .  I corrected the med list to reflect this.  I did not stop these medications.      Dictated utilizing Dragon dictation

## 2021-08-04 ENCOUNTER — TELEPHONE (OUTPATIENT)
Dept: CARDIOLOGY | Facility: CLINIC | Age: 71
End: 2021-08-04

## 2021-08-04 NOTE — TELEPHONE ENCOUNTER
Pt called re: Lipids.  He said that Dr Durbin said he should have these rechecked in 3 months.  Pt said that Dr King will not have labs sooner unless you place an order.  Dr King is managing this cholesterol and will be seeing him in December.  Pt said he is between what to do.  Please advise.

## 2021-08-05 NOTE — TELEPHONE ENCOUNTER
Can see change in lipid status in 3 months with dietary and lifestyle changes but it is reasonable to check this in 6 months.  At that point should have full effect of lifestyle changes evident.  Okay to wait 6 months until December.

## 2021-08-16 ENCOUNTER — TRANSCRIBE ORDERS (OUTPATIENT)
Dept: ADMINISTRATIVE | Facility: HOSPITAL | Age: 71
End: 2021-08-16

## 2021-08-16 ENCOUNTER — LAB (OUTPATIENT)
Dept: LAB | Facility: HOSPITAL | Age: 71
End: 2021-08-16

## 2021-08-16 DIAGNOSIS — R97.20 ELEVATED PSA: ICD-10-CM

## 2021-08-16 DIAGNOSIS — R97.20 ELEVATED PSA: Primary | ICD-10-CM

## 2021-08-16 LAB — PSA SERPL-MCNC: 2.2 NG/ML (ref 0–4)

## 2021-08-16 PROCEDURE — 84153 ASSAY OF PSA TOTAL: CPT

## 2021-08-16 PROCEDURE — 36415 COLL VENOUS BLD VENIPUNCTURE: CPT

## 2021-08-18 ENCOUNTER — TELEPHONE (OUTPATIENT)
Dept: INTERNAL MEDICINE | Facility: CLINIC | Age: 71
End: 2021-08-18

## 2021-08-18 NOTE — TELEPHONE ENCOUNTER
Left msg on the pt's voicemail he will need to go online and find a facility that does Covid screenings-we do not order Covid screenings for travel

## 2021-08-18 NOTE — TELEPHONE ENCOUNTER
Caller: Wilmar Carmona    Relationship: Self    Best call back number: [phone]    What orders are you requesting (i.e. lab or imaging): COVID TESTING     In what timeframe would the patient need to come in: 9/6    Where will you receive your lab/imaging services:   [facility]  [address]  [place] [address]    PATIENT LEAVES FOR [place] 9/9 AND IS REQUESTING TO HAVE COVID TESTING 3 DAY PRIOR TO LEAVING. PATIENT IS REQUESTING ORDER FOR TEST TO BE PUT IN AND SENT TO [facility]

## 2021-09-02 ENCOUNTER — PATIENT MESSAGE (OUTPATIENT)
Dept: INTERNAL MEDICINE | Facility: CLINIC | Age: 71
End: 2021-09-02

## 2021-09-02 DIAGNOSIS — G47.25 CIRCADIAN RHYTHM SLEEP DISORDER, JET LAG TYPE: Primary | ICD-10-CM

## 2021-09-02 NOTE — TELEPHONE ENCOUNTER
From: Wilmar Carmona  To: Darryl King Jr., MD  Sent: 9/2/2021 12:47 PM EDT  Subject: Prescription Question    I am traveling to [facility] on the 9th, returning on the 21st. I am requesting a sleep aid, e.g. Ambien or similar, to help me sleep on the planes and avoid extreme jet lag. I'm requesting 6 pills. Thank you.   Jose Maria Carmona, [phone]

## 2021-09-03 ENCOUNTER — TELEPHONE (OUTPATIENT)
Dept: CARDIOLOGY | Facility: CLINIC | Age: 71
End: 2021-09-03

## 2021-09-03 DIAGNOSIS — I10 ESSENTIAL HYPERTENSION: Primary | ICD-10-CM

## 2021-09-03 RX ORDER — NIFEDIPINE 60 MG/1
60 TABLET, EXTENDED RELEASE ORAL DAILY
Qty: 30 TABLET | Refills: 0
Start: 2021-09-03 | End: 2021-10-08

## 2021-09-03 RX ORDER — LOSARTAN POTASSIUM 25 MG/1
25 TABLET ORAL DAILY
Qty: 30 TABLET | Refills: 0 | Status: SHIPPED | OUTPATIENT
Start: 2021-09-03 | End: 2021-09-27 | Stop reason: SDUPTHER

## 2021-09-03 NOTE — TELEPHONE ENCOUNTER
Okay thanks, next time you can just for the my chart message to me unless he has issues that need to be addressed more urgently.

## 2021-09-03 NOTE — TELEPHONE ENCOUNTER
BP usually 120s/70s, rare 140s systolic.      He reports his GI provider told him that the nifedipine is going to relax his esophageal sphincter and makes his acid reflux worse and lately has had a lot of issues with acid reflux.  He denies any recent palpitations report heart rate is always well controlled on other medications.    Will decrease nifedipine to once a day and add losartan 25mg daily.  He will send some updated heart rate and blood pressure readings next week based on these we will look at making some further adjustments and possibly stopping the nifedipine if no palpitation issues and if heart rate will tolerate.  Also may need to increase losartan for better blood pressure control however need to monitor kidney enzymes closely.  If he does develop any low blood pressure readings over the weekend after starting the losartan he will go ahead and just hold the nifedipine.  We will look at rechecking metabolic panel in about a week to ensure kidney enzymes stable on losartan but he will call first with readings in case we need to make further adjustments.

## 2021-09-03 NOTE — TELEPHONE ENCOUNTER
Thank you for getting back so quickly! As for other BP meds Destinee  been on nifedipine so long that I don’t recall previous meds. Exception: I did try another medication a year or so ago to try and alleviate this issue and ankle swelling. The medication did not work and defaulted  back to the Nifedipine.

## 2021-09-03 NOTE — TELEPHONE ENCOUNTER
Please let patient know that Dr. Durbin is not in the office this week.  She will be back on Tuesday.    We can look at changing nifedipine to a different medication however would need to watch closely for recurrence of palpitations.    Please see if he has ever tried any other blood pressure medications in the past that he tolerated well.  Hydralazine?  Lisinopril or losartan?  Last kidney enzymes okay but would need to monitor kidney function closely if we were to start him on lisinopril or losartan.

## 2021-09-03 NOTE — TELEPHONE ENCOUNTER
----- Message from Wilmar Carmona sent at 9/2/2021  7:05 PM EDT -----  Regarding: Prescription Question  Contact: [phone]  I need to find a new BP medicine.  Nifedipine XL causes my ankles to swell (OK), gums to get puffy (OK), but has relaxed my esophagus sphincter to relax & lose its ability to block reflux from getting in my esophagus.  Not a day goes by that I don't have acid reflux up in my esophagus.  Meds don't really help because acid from my stomach doesn't get blocked.  Please call to discuss.  Need to do something soon.

## 2021-09-07 RX ORDER — ZOLPIDEM TARTRATE 5 MG/1
5 TABLET ORAL NIGHTLY PRN
Qty: 25 TABLET | Refills: 0 | Status: SHIPPED | OUTPATIENT
Start: 2021-09-07 | End: 2021-10-15

## 2021-09-27 ENCOUNTER — PATIENT MESSAGE (OUTPATIENT)
Dept: CARDIOLOGY | Facility: CLINIC | Age: 71
End: 2021-09-27

## 2021-09-27 ENCOUNTER — TELEPHONE (OUTPATIENT)
Dept: CARDIOLOGY | Facility: CLINIC | Age: 71
End: 2021-09-27

## 2021-09-27 DIAGNOSIS — I10 ESSENTIAL HYPERTENSION: ICD-10-CM

## 2021-09-27 RX ORDER — LOSARTAN POTASSIUM 50 MG/1
50 TABLET ORAL DAILY
Qty: 30 TABLET | Refills: 0 | Status: SHIPPED | OUTPATIENT
Start: 2021-09-27 | End: 2021-10-18 | Stop reason: SDUPTHER

## 2021-09-27 RX ORDER — PROPAFENONE HYDROCHLORIDE 150 MG/1
TABLET, COATED ORAL
Qty: 270 TABLET | Refills: 0 | Status: SHIPPED | OUTPATIENT
Start: 2021-09-27 | End: 2022-06-20

## 2021-09-27 RX ORDER — LOSARTAN POTASSIUM 50 MG/1
50 TABLET ORAL DAILY
Qty: 30 TABLET | Refills: 0 | Status: SHIPPED | OUTPATIENT
Start: 2021-09-27 | End: 2021-09-27

## 2021-09-27 NOTE — TELEPHONE ENCOUNTER
Dr Durbin is out today.  Can you review and ok this?      Next appt with Radha: 1/13/2022  Last appt with Dr Durbin: 7/13/2021

## 2021-09-27 NOTE — TELEPHONE ENCOUNTER
----- Message from Wilmar Carmona sent at 9/27/2021 11:30 AM EDT -----  Regarding: Prescription Question  Contact: [phone]  Elizabeth, I spoke with you back in early Sept about changing my blood pressure medicine to correct side effects of Nifedipine.   You prescribed  Losartan 25mg, take 1 in the morning in lieu of the morning dose of Nifedipine and take my BP about 2 hours after.  Try that for a few days to see if medication worked.  You asked me to get back with you after a few days with results after starting.     Due to a long vacation I did not start the medication until last Wed the 22nd.  BP readings have been running in the-low to-mid 140s / low-to-mid 80's.  Last evening at 7p (took after NP) was 164/94, only reading above 143 and 86.   I started back on the NP 2x a day per your instruction.    Next steps? Thank you.     Jose Maria Carmona  [birth year]  [phone]

## 2021-09-27 NOTE — TELEPHONE ENCOUNTER
Hold the morning nifedipine and try losartan 50 mg daily.  Let us try this for a week and continue to monitor heart rate and blood pressure least an hour or 2 after and after sitting calmly 5 to 10 minutes.  Call with some updated heart rate and blood pressure readings in 1 week or call sooner for blood pressure greater than 150/90 or for systolic blood pressure less than 110.  Will need repeat metabolic panel at the outpatient lab in 1 week as well to ensure kidney function remained stable on this regimen.  Continue to stay hydrated and avoid nephrotoxic medications such as NSAIDs.

## 2021-09-29 ENCOUNTER — TELEPHONE (OUTPATIENT)
Dept: GASTROENTEROLOGY | Facility: CLINIC | Age: 71
End: 2021-09-29

## 2021-10-05 ENCOUNTER — LAB (OUTPATIENT)
Dept: LAB | Facility: HOSPITAL | Age: 71
End: 2021-10-05

## 2021-10-05 DIAGNOSIS — I10 ESSENTIAL HYPERTENSION: ICD-10-CM

## 2021-10-05 LAB
ANION GAP SERPL CALCULATED.3IONS-SCNC: 8.7 MMOL/L (ref 5–15)
BUN SERPL-MCNC: 18 MG/DL (ref 8–23)
BUN/CREAT SERPL: 12.1 (ref 7–25)
CALCIUM SPEC-SCNC: 9.1 MG/DL (ref 8.6–10.5)
CHLORIDE SERPL-SCNC: 108 MMOL/L (ref 98–107)
CO2 SERPL-SCNC: 26.3 MMOL/L (ref 22–29)
CREAT SERPL-MCNC: 1.49 MG/DL (ref 0.76–1.27)
GFR SERPL CREATININE-BSD FRML MDRD: 47 ML/MIN/1.73
GLUCOSE SERPL-MCNC: 81 MG/DL (ref 65–99)
POTASSIUM SERPL-SCNC: 4.3 MMOL/L (ref 3.5–5.2)
SODIUM SERPL-SCNC: 143 MMOL/L (ref 136–145)

## 2021-10-05 PROCEDURE — 36415 COLL VENOUS BLD VENIPUNCTURE: CPT

## 2021-10-05 PROCEDURE — 80048 BASIC METABOLIC PNL TOTAL CA: CPT

## 2021-10-06 ENCOUNTER — TELEPHONE (OUTPATIENT)
Dept: CARDIOLOGY | Facility: CLINIC | Age: 71
End: 2021-10-06

## 2021-10-06 NOTE — TELEPHONE ENCOUNTER
Patient's creatinine is little high on the losartan.  Would like to find out whether he has room to increase hydration and cut back on NSAIDs.  Would also like to find out how heart rate and blood pressure are running.  Based on this information will consider possible medication adjustments.    -----------------      Called to discuss with patient but no answer.  Left a voicemail asking him to call the office back.

## 2021-10-07 NOTE — TELEPHONE ENCOUNTER
10/7/21  Pt left [facility] - returning Elizabeth's call - his ph [phone].  He is going to send you a message through My Chart , along with his bp readings.  He is aware that you are not in the office this afternoon and may not call him until tomorrow/yo

## 2021-10-08 ENCOUNTER — TELEPHONE (OUTPATIENT)
Dept: CARDIOLOGY | Facility: CLINIC | Age: 71
End: 2021-10-08

## 2021-10-08 DIAGNOSIS — I10 ESSENTIAL HYPERTENSION: Primary | ICD-10-CM

## 2021-10-08 RX ORDER — NIFEDIPINE 60 MG/1
60 TABLET, EXTENDED RELEASE ORAL DAILY
Qty: 30 TABLET | Refills: 0
Start: 2021-10-08 | End: 2021-10-18 | Stop reason: SDUPTHER

## 2021-10-08 NOTE — TELEPHONE ENCOUNTER
----- Message from Larissa Witt MA sent at 10/7/2021  2:33 PM EDT -----  Regarding: FW: Test Results Question  Contact: [phone]    ----- Message -----  From: Wilmar Carmona  Sent: 10/7/2021   2:26 PM EDT  To: Faheem Clarke [facility]  Subject: Test Results Question                            Attn:  Elizabeth Trevino -   Re: New BP med, Losartan, 50mg, 1x day in lieu of morning dose of Nifedipine  BP machine: [id number]    Best time to call back tomorrow morning after 10a.       BP readings the last week ranged from 110/70 to 139/75 after Losartan.  Average was 121 and 68.  Not all readings could be taken 1-2 hours, but all these before noon.       BP readings later in the day,  e.g. 5p-6p, but before Nifedipine, were higher 139/83 was the lowest.  148/79 highest.   Obviously as Losartan wears off, BP increases.     Heart rate consistently ranged from 57-63.    No side effects from the Losartan.     Losartan working, just not lasting.  I would like to get off the NP entirely.      Thank you,   Jose Maria Carmona  1950  [phone]

## 2021-10-08 NOTE — TELEPHONE ENCOUNTER
SEE OTHER NOTE.  Not taking NSAIDs but not drinking nearly enough water.  Going to start taking nifedipine a little bit earlier in the day as well.  Changes likely to be made next week as indicated in other note.

## 2021-10-08 NOTE — TELEPHONE ENCOUNTER
Called and spoke with patient.  His kidney enzymes are elevated on the losartan.  He reports he really does not drink much water at all it is never been a habit of his.  He is also been told in the past that he has had other conditions it possibly could be worse due to not drinking water.  We discussed importance of increasing water intake.  We also discussed stopping the nifedipine and starting him on hydralazine.  Avoiding beta-blocker given heart rate already on the low side.  He would like to double check with his GI doctor that they really do feel that the calcium channel blocker is contributing to his GERD prior to stopping the nifedipine and looking at changing to a different medication.  He sees his GI doctor next week and will call and let us know.  At that point we will look at likely stopping the nifedipine and starting hydralazine.  He is also going to really increase his water intake and get a repeat metabolic panel in 1 week to ensure kidney enzymes improving.  If not then may need to go down on the losartan as well.

## 2021-10-12 NOTE — PROGRESS NOTES
"Chief Complaint   Patient presents with   • Heartburn         History of Present Illness  Patient is a 70-year-old male who presents today for follow-up.  He was last seen in the office June 2020.  He has a history of GERD, Finnegan's esophagus, colon polyps.    Patient presents today with concerns about worsening reflux symptoms. He reports he has gained weight over the last year.  He reports he has been experiencing heartburn that comes and goes and increased belching.  He has noted increased throat clearing which he previously had when his reflux symptoms were flared up.  He denies any dysphagia, nausea or vomiting.  He has noted episodes of epigastric pain.  He reports these are brief generally lasting only 10 to 5 minutes and come and go.    He denies any bowel complaints, rectal bleeding, or dark stools.    He continues on pantoprazole for GERD.  He also tried Nexium however was having persistent symptoms despite use of these medications.    He has been on nifedipine which is causing ankle swelling and also they were concerned could be contributing to his reflux.  They are working to get him off of this medication.       Result Review :    \plain   SCANNED PATHOLOGY (11/06/2019)   SCANNED - COLONOSCOPY (11/06/2019)   CT Abdomen With Contrast (08/07/2020 10:05)   Office Visit with Zoe Ortiz APRN (06/26/2020)       Vital Signs:   /64   Pulse 61   Temp 97.1 °F (36.2 °C)   Ht 172.7 cm (68\")   Wt 85.6 kg (188 lb 11.2 oz)   SpO2 96%   BMI 28.69 kg/m²     Body mass index is 28.69 kg/m².     Physical Exam  Vitals reviewed.   Constitutional:       General: He is not in acute distress.     Appearance: He is well-developed.   HENT:      Head: Normocephalic and atraumatic.   Pulmonary:      Effort: Pulmonary effort is normal. No respiratory distress.   Abdominal:      General: Abdomen is flat. Bowel sounds are normal. There is no distension.      Palpations: Abdomen is soft.      Tenderness: There is no " abdominal tenderness.   Skin:     General: Skin is dry.      Coloration: Skin is not pale.   Neurological:      Mental Status: He is alert and oriented to person, place, and time.   Psychiatric:         Thought Content: Thought content normal.           Assessment and Plan    Diagnoses and all orders for this visit:    1. Gastroesophageal reflux disease, unspecified whether esophagitis present (Primary)    2. Finnegan's esophagus without dysplasia    3. Epigastric pain         Discussion  Patient presents today with concerns about worsening reflux symptoms.  We will schedule updated EGD for further evaluation and for surveillance of Finnegan's esophagus.  Discussed with patient today weight loss will likely help his reflux symptoms and encouraged continued efforts.          Follow Up   Return for Follow up to review results after testing complete.    Patient Instructions   Schedule EGD for further evaluation of symptoms.

## 2021-10-14 RX ORDER — ACEBUTOLOL HYDROCHLORIDE 200 MG/1
CAPSULE ORAL
Qty: 180 CAPSULE | Refills: 0 | Status: SHIPPED | OUTPATIENT
Start: 2021-10-14 | End: 2022-01-14

## 2021-10-15 ENCOUNTER — TRANSCRIBE ORDERS (OUTPATIENT)
Dept: LAB | Facility: SURGERY CENTER | Age: 71
End: 2021-10-15

## 2021-10-15 ENCOUNTER — PREP FOR SURGERY (OUTPATIENT)
Dept: SURGERY | Facility: SURGERY CENTER | Age: 71
End: 2021-10-15

## 2021-10-15 ENCOUNTER — LAB (OUTPATIENT)
Dept: LAB | Facility: HOSPITAL | Age: 71
End: 2021-10-15

## 2021-10-15 ENCOUNTER — OFFICE VISIT (OUTPATIENT)
Dept: GASTROENTEROLOGY | Facility: CLINIC | Age: 71
End: 2021-10-15

## 2021-10-15 VITALS
DIASTOLIC BLOOD PRESSURE: 64 MMHG | OXYGEN SATURATION: 96 % | HEIGHT: 68 IN | HEART RATE: 61 BPM | SYSTOLIC BLOOD PRESSURE: 132 MMHG | WEIGHT: 188.7 LBS | TEMPERATURE: 97.1 F | BODY MASS INDEX: 28.6 KG/M2

## 2021-10-15 DIAGNOSIS — Z01.818 OTHER SPECIFIED PRE-OPERATIVE EXAMINATION: Primary | ICD-10-CM

## 2021-10-15 DIAGNOSIS — K21.9 GASTROESOPHAGEAL REFLUX DISEASE, UNSPECIFIED WHETHER ESOPHAGITIS PRESENT: Primary | ICD-10-CM

## 2021-10-15 DIAGNOSIS — I10 ESSENTIAL HYPERTENSION: ICD-10-CM

## 2021-10-15 DIAGNOSIS — K22.70 BARRETT'S ESOPHAGUS WITHOUT DYSPLASIA: ICD-10-CM

## 2021-10-15 DIAGNOSIS — R10.13 EPIGASTRIC PAIN: ICD-10-CM

## 2021-10-15 LAB
ANION GAP SERPL CALCULATED.3IONS-SCNC: 8.1 MMOL/L (ref 5–15)
BUN SERPL-MCNC: 19 MG/DL (ref 8–23)
BUN/CREAT SERPL: 13.5 (ref 7–25)
CALCIUM SPEC-SCNC: 9.4 MG/DL (ref 8.6–10.5)
CHLORIDE SERPL-SCNC: 107 MMOL/L (ref 98–107)
CO2 SERPL-SCNC: 26.9 MMOL/L (ref 22–29)
CREAT SERPL-MCNC: 1.41 MG/DL (ref 0.76–1.27)
GFR SERPL CREATININE-BSD FRML MDRD: 50 ML/MIN/1.73
GLUCOSE SERPL-MCNC: 88 MG/DL (ref 65–99)
POTASSIUM SERPL-SCNC: 4.1 MMOL/L (ref 3.5–5.2)
SODIUM SERPL-SCNC: 142 MMOL/L (ref 136–145)

## 2021-10-15 PROCEDURE — 36415 COLL VENOUS BLD VENIPUNCTURE: CPT

## 2021-10-15 PROCEDURE — 80048 BASIC METABOLIC PNL TOTAL CA: CPT

## 2021-10-15 PROCEDURE — 99214 OFFICE O/P EST MOD 30 MIN: CPT | Performed by: NURSE PRACTITIONER

## 2021-10-15 RX ORDER — CLOTRIMAZOLE AND BETAMETHASONE DIPROPIONATE 10; .64 MG/G; MG/G
CREAM TOPICAL AS NEEDED
COMMUNITY
Start: 2021-08-29 | End: 2023-02-23

## 2021-10-15 RX ORDER — SODIUM CHLORIDE, SODIUM LACTATE, POTASSIUM CHLORIDE, CALCIUM CHLORIDE 600; 310; 30; 20 MG/100ML; MG/100ML; MG/100ML; MG/100ML
30 INJECTION, SOLUTION INTRAVENOUS CONTINUOUS PRN
Status: CANCELLED | OUTPATIENT
Start: 2021-10-15

## 2021-10-15 RX ORDER — SODIUM CHLORIDE 0.9 % (FLUSH) 0.9 %
10 SYRINGE (ML) INJECTION AS NEEDED
Status: CANCELLED | OUTPATIENT
Start: 2021-10-15

## 2021-10-15 RX ORDER — SODIUM CHLORIDE 0.9 % (FLUSH) 0.9 %
3 SYRINGE (ML) INJECTION EVERY 12 HOURS SCHEDULED
Status: CANCELLED | OUTPATIENT
Start: 2021-10-15

## 2021-10-15 NOTE — ADDENDUM NOTE
Addended by: JENNIFER KESSLER on: 10/15/2021 02:50 PM     Modules accepted: Orders     wears eyeglasses for distance

## 2021-10-18 ENCOUNTER — LAB (OUTPATIENT)
Dept: LAB | Facility: SURGERY CENTER | Age: 71
End: 2021-10-18

## 2021-10-18 DIAGNOSIS — Z01.818 OTHER SPECIFIED PRE-OPERATIVE EXAMINATION: ICD-10-CM

## 2021-10-18 DIAGNOSIS — I10 ESSENTIAL HYPERTENSION: ICD-10-CM

## 2021-10-18 LAB — SARS-COV-2 ORF1AB RESP QL NAA+PROBE: NOT DETECTED

## 2021-10-18 PROCEDURE — C9803 HOPD COVID-19 SPEC COLLECT: HCPCS

## 2021-10-18 PROCEDURE — U0005 INFEC AGEN DETEC AMPLI PROBE: HCPCS | Performed by: INTERNAL MEDICINE

## 2021-10-18 PROCEDURE — U0004 COV-19 TEST NON-CDC HGH THRU: HCPCS | Performed by: INTERNAL MEDICINE

## 2021-10-18 RX ORDER — NIFEDIPINE 30 MG/1
30 TABLET, FILM COATED, EXTENDED RELEASE ORAL DAILY
Qty: 30 TABLET | Refills: 0 | Status: SHIPPED | OUTPATIENT
Start: 2021-10-18 | End: 2021-10-29

## 2021-10-18 RX ORDER — LOSARTAN POTASSIUM 25 MG/1
25 TABLET ORAL DAILY
Qty: 30 TABLET | Refills: 0 | Status: SHIPPED | OUTPATIENT
Start: 2021-10-18 | End: 2021-12-17

## 2021-10-18 RX ORDER — HYDRALAZINE HYDROCHLORIDE 25 MG/1
25 TABLET, FILM COATED ORAL 2 TIMES DAILY
Qty: 60 TABLET | Refills: 0 | Status: SHIPPED | OUTPATIENT
Start: 2021-10-18 | End: 2021-10-29 | Stop reason: SDUPTHER

## 2021-10-19 ENCOUNTER — TRANSCRIBE ORDERS (OUTPATIENT)
Dept: SURGERY | Facility: SURGERY CENTER | Age: 71
End: 2021-10-19

## 2021-10-20 ENCOUNTER — ANESTHESIA (OUTPATIENT)
Dept: SURGERY | Facility: SURGERY CENTER | Age: 71
End: 2021-10-20

## 2021-10-20 ENCOUNTER — ANESTHESIA EVENT (OUTPATIENT)
Dept: SURGERY | Facility: SURGERY CENTER | Age: 71
End: 2021-10-20

## 2021-10-20 ENCOUNTER — HOSPITAL ENCOUNTER (OUTPATIENT)
Facility: SURGERY CENTER | Age: 71
Setting detail: HOSPITAL OUTPATIENT SURGERY
Discharge: HOME | End: 2021-10-20
Attending: INTERNAL MEDICINE | Admitting: INTERNAL MEDICINE

## 2021-10-20 VITALS
RESPIRATION RATE: 17 BRPM | SYSTOLIC BLOOD PRESSURE: 144 MMHG | OXYGEN SATURATION: 96 % | TEMPERATURE: 98.4 F | HEART RATE: 54 BPM | DIASTOLIC BLOOD PRESSURE: 85 MMHG

## 2021-10-20 DIAGNOSIS — K22.70 BARRETT'S ESOPHAGUS WITHOUT DYSPLASIA: ICD-10-CM

## 2021-10-20 DIAGNOSIS — R10.13 EPIGASTRIC PAIN: ICD-10-CM

## 2021-10-20 DIAGNOSIS — K21.9 GASTROESOPHAGEAL REFLUX DISEASE, UNSPECIFIED WHETHER ESOPHAGITIS PRESENT: ICD-10-CM

## 2021-10-20 PROCEDURE — 25010000002 PROPOFOL 10 MG/ML EMULSION: Performed by: ANESTHESIOLOGY

## 2021-10-20 PROCEDURE — 43239 EGD BIOPSY SINGLE/MULTIPLE: CPT | Performed by: INTERNAL MEDICINE

## 2021-10-20 PROCEDURE — 88305 TISSUE EXAM BY PATHOLOGIST: CPT | Performed by: INTERNAL MEDICINE

## 2021-10-20 PROCEDURE — 0DB98ZX EXCISION OF DUODENUM, VIA NATURAL OR ARTIFICIAL OPENING ENDOSCOPIC, DIAGNOSTIC: ICD-10-PCS | Performed by: NURSE PRACTITIONER

## 2021-10-20 PROCEDURE — 87081 CULTURE SCREEN ONLY: CPT | Performed by: INTERNAL MEDICINE

## 2021-10-20 RX ORDER — SODIUM CHLORIDE 0.9 % (FLUSH) 0.9 %
3 SYRINGE (ML) INJECTION EVERY 12 HOURS SCHEDULED
Status: DISCONTINUED | OUTPATIENT
Start: 2021-10-20 | End: 2021-10-20 | Stop reason: HOSPADM

## 2021-10-20 RX ORDER — LIDOCAINE HYDROCHLORIDE 20 MG/ML
INJECTION, SOLUTION INFILTRATION; PERINEURAL AS NEEDED
Status: DISCONTINUED | OUTPATIENT
Start: 2021-10-20 | End: 2021-10-20 | Stop reason: SURG

## 2021-10-20 RX ORDER — SODIUM CHLORIDE 0.9 % (FLUSH) 0.9 %
10 SYRINGE (ML) INJECTION AS NEEDED
Status: DISCONTINUED | OUTPATIENT
Start: 2021-10-20 | End: 2021-10-20 | Stop reason: HOSPADM

## 2021-10-20 RX ORDER — PROPOFOL 10 MG/ML
VIAL (ML) INTRAVENOUS AS NEEDED
Status: DISCONTINUED | OUTPATIENT
Start: 2021-10-20 | End: 2021-10-20 | Stop reason: SURG

## 2021-10-20 RX ORDER — SODIUM CHLORIDE, SODIUM LACTATE, POTASSIUM CHLORIDE, CALCIUM CHLORIDE 600; 310; 30; 20 MG/100ML; MG/100ML; MG/100ML; MG/100ML
30 INJECTION, SOLUTION INTRAVENOUS CONTINUOUS PRN
Status: DISCONTINUED | OUTPATIENT
Start: 2021-10-20 | End: 2021-10-20 | Stop reason: HOSPADM

## 2021-10-20 RX ADMIN — SODIUM CHLORIDE, POTASSIUM CHLORIDE, SODIUM LACTATE AND CALCIUM CHLORIDE 30 ML/HR: 600; 310; 30; 20 INJECTION, SOLUTION INTRAVENOUS at 10:52

## 2021-10-20 RX ADMIN — PROPOFOL 100 MG: 10 INJECTION, EMULSION INTRAVENOUS at 11:22

## 2021-10-20 RX ADMIN — LIDOCAINE HYDROCHLORIDE 80 MG: 20 INJECTION, SOLUTION INFILTRATION; PERINEURAL at 11:22

## 2021-10-20 RX ADMIN — PROPOFOL INJECTABLE EMULSION 100 MCG/KG/MIN: 10 INJECTION, EMULSION INTRAVENOUS at 11:22

## 2021-10-20 NOTE — ANESTHESIA PREPROCEDURE EVALUATION
Anesthesia Evaluation     Nursing notes reviewed   no history of anesthetic complications:               Airway   Mallampati: III  TM distance: >3 FB  Neck ROM: full  Dental      Pulmonary - normal exam   (+) sleep apnea,   (-) not a smoker  Cardiovascular - normal exam    (+) hypertension, dysrhythmias Paroxysmal Atrial Fib,   (-) angina      Neuro/Psych  GI/Hepatic/Renal/Endo    (+)  GERD,  renal disease CRI,     Musculoskeletal     Abdominal    Substance History      OB/GYN          Other                        Anesthesia Plan    ASA 3     MAC       Anesthetic plan, all risks, benefits, and alternatives have been provided, discussed and informed consent has been obtained with: patient.

## 2021-10-20 NOTE — H&P
No chief complaint on file.      HPI  Epigastric pain  barretts  gerd  bloating         Problem List:    Patient Active Problem List   Diagnosis   • Abnormal electrocardiogram   • PAF (paroxysmal atrial fibrillation) ([facility])   • Hypertension   • Premature atrial contraction   • Hyperlipidemia   • Bronchitis with bronchospasm   • Contusion of bone   • Sprain of left ankle   • Coronary artery disease involving native coronary artery of native heart without angina pectoris   • Patellofemoral chondrosis of left knee   • Patellofemoral arthrosis   • Patellofemoral chondrosis of right knee   • Popping sound of knee joint   • Edema   • Obstructive sleep apnea on CPAP   • Renal insufficiency   • Vitreous floaters   • PVC (premature ventricular contraction)   • Prediabetes   • Gastroesophageal reflux disease   • Epigastric pain   • Finnegan's esophagus without dysplasia       Medical History:    Past Medical History:   Diagnosis Date   • Abnormal EKG    • Abnormal stress test    • Arthritis    • Atrial fibrillation ([facility])    • BPH (benign prostatic hypertrophy)    • CAD (coronary artery disease)    • Depression    • GERD (gastroesophageal reflux disease)    • H/O medial meniscus repair of right knee 2014    Partial Menisectomy right knee   • Heart murmur    • Hyperlipidemia    • Hypertension    • Intermittent knee pain     R<L knee from cycling and running in the past -    • Mild tricuspid regurgitation    • PAC (premature atrial contraction)    • PAF (paroxysmal atrial fibrillation) ([facility])    • Palpitations    • Sleep apnea    • Tremor         Social History:    Social History     Socioeconomic History   • Marital status:    Tobacco Use   • Smoking status: Never Smoker   • Smokeless tobacco: Never Used   Substance and Sexual Activity   • Alcohol use: Yes     Comment: Once Weekly   • Drug use: Not Currently   • Sexual activity: Defer       Family History:   Family History   Problem Relation Age of Onset   • Hypertension  Mother    • Colon polyps Mother    • Cancer Father         prostate   • Heart disease Father    • Coronary artery disease Father    • Stroke Father    • Cancer Brother         prostate   • Colon cancer Neg Hx    • Crohn's disease Neg Hx    • Irritable bowel syndrome Neg Hx    • Ulcerative colitis Neg Hx        Surgical History:   Past Surgical History:   Procedure Laterality Date   • CARDIAC CATHETERIZATION  2015   • COLONOSCOPY  10/20/2014    normal -dr leon..had egd at same time   • HERNIA REPAIR     • KNEE SURGERY Left    • TONSILLECTOMY     • VASECTOMY         No current facility-administered medications for this encounter.    Current Outpatient Medications:   •  acebutolol (SECTRAL) 200 MG capsule, TAKE 1 CAPSULE BY MOUTH TWICE DAILY, Disp: 180 capsule, Rfl: 0  •  clotrimazole-betamethasone (LOTRISONE) 1-0.05 % cream, APPLY TOPICALLY TO THE AFFECTED AREA EVERY 12 HOURS, Disp: , Rfl:   •  finasteride (PROSCAR) 5 MG tablet, TK 1 T PO ONCE DAILY, Disp: , Rfl: 2  •  hydrALAZINE (APRESOLINE) 25 MG tablet, Take 1 tablet by mouth 2 (two) times a day., Disp: 60 tablet, Rfl: 0  •  lamoTRIgine (LaMICtal) 200 MG tablet, Take 200 mg by mouth Daily., Disp: , Rfl:   •  losartan (Cozaar) 25 MG tablet, Take 1 tablet by mouth Daily., Disp: 30 tablet, Rfl: 0  •  NIFEdipine XL (ADALAT CC) 30 MG 24 hr tablet, Take 1 tablet by mouth Daily., Disp: 30 tablet, Rfl: 0  •  pantoprazole (PROTONIX) 40 MG EC tablet, Take 1 tablet by mouth Daily., Disp: 90 tablet, Rfl: 3  •  propafenone (RYTHMOL) 150 MG tablet, TAKE 1 TABLET BY MOUTH EVERY 8 HOURS, Disp: 270 tablet, Rfl: 0  •  simvastatin (ZOCOR) 20 MG tablet, TAKE 1 TABLET BY MOUTH EVERY NIGHT, Disp: 90 tablet, Rfl: 1    Allergies: No Known Allergies     The following portions of the patient's history were reviewed by me and updated as appropriate: review of systems, allergies, current medications, past family history, past medical history, past social history, past surgical history and  problem list.    There were no vitals filed for this visit.    PHYSICAL EXAM:    CONSTITUTIONAL:  today's vital signs reviewed by me  GASTROINTESTINAL: abdomen is soft nontender nondistended with normal active bowel sounds, no masses are appreciated    Assessment/ Plan  Epigastric pain and barretts  gerd  bloating    egd    Risks and benefits as well as alternatives to endoscopic evaluation were explained to the patient and they voiced understanding and wish to proceed.  These risks include but are not limited to the risk of bleeding, perforation, adverse reaction to sedation, and missed lesions.  The patient was given the opportunity to ask questions prior to the endoscopic procedure.

## 2021-10-21 LAB
LAB AP CASE REPORT: NORMAL
LAB AP CLINICAL INFORMATION: NORMAL
PATH REPORT.FINAL DX SPEC: NORMAL
PATH REPORT.GROSS SPEC: NORMAL

## 2021-10-22 LAB — UREASE TISS QL: NEGATIVE

## 2021-10-26 RX ORDER — PANTOPRAZOLE SODIUM 40 MG/1
40 TABLET, DELAYED RELEASE ORAL DAILY
Qty: 90 TABLET | Refills: 3 | Status: SHIPPED | OUTPATIENT
Start: 2021-10-26 | End: 2022-10-24

## 2021-10-29 DIAGNOSIS — I10 ESSENTIAL HYPERTENSION: Primary | ICD-10-CM

## 2021-10-29 RX ORDER — HYDRALAZINE HYDROCHLORIDE 50 MG/1
50 TABLET, FILM COATED ORAL 2 TIMES DAILY
Qty: 60 TABLET | Refills: 0 | Status: SHIPPED | OUTPATIENT
Start: 2021-10-29 | End: 2021-11-01 | Stop reason: SDUPTHER

## 2021-11-01 RX ORDER — HYDRALAZINE HYDROCHLORIDE 50 MG/1
50 TABLET, FILM COATED ORAL 3 TIMES DAILY
Qty: 90 TABLET | Refills: 0 | Status: SHIPPED | OUTPATIENT
Start: 2021-11-01 | End: 2021-11-08 | Stop reason: SDUPTHER

## 2021-11-02 ENCOUNTER — TELEPHONE (OUTPATIENT)
Dept: CARDIOLOGY | Facility: CLINIC | Age: 71
End: 2021-11-02

## 2021-11-02 NOTE — TELEPHONE ENCOUNTER
Called and spoke with patient. 135/77 after AM meds today.  No headache today. He will get increasing hydralazine as discussed. He feels strongly that the 75 mg of hydralazine should be the midday dose as it is before the evening dose of blood pressure is high. We will try this for a couple days and if we have to increase hydralazine to 75 mg three times a day if BP staying >150/90 then ok and he will notify the office. He will watch for any low blood pressure readings though.

## 2021-11-06 ENCOUNTER — TELEPHONE (OUTPATIENT)
Dept: CARDIOLOGY | Facility: CLINIC | Age: 71
End: 2021-11-06

## 2021-11-06 NOTE — TELEPHONE ENCOUNTER
Patient called regarding his BP.    He has been taking them religiously and was told to call if BP >150/90.  It has been greater than that all morning and he is having associated headache (which he apparently gets when he's hypertensive).    The plan was to try to wean off his nefidipine and transition him to hydralazine.    He currently is taking:  Mornin losartan and 50 hydralazine  Afternoon: 75 hydralazine  Nighttime: 50 hydralazine    Spoke with Dr. Mooney and going to increase one of this doses of 50 to 75 for now.    He is out of town, called prescription into pharmacy where he is visiting.  Called patient to update on plan.  Agreeable.

## 2021-11-08 ENCOUNTER — TELEPHONE (OUTPATIENT)
Dept: CARDIOLOGY | Facility: CLINIC | Age: 71
End: 2021-11-08

## 2021-11-08 DIAGNOSIS — I10 ESSENTIAL HYPERTENSION: ICD-10-CM

## 2021-11-08 DIAGNOSIS — N28.9 RENAL INSUFFICIENCY: Primary | ICD-10-CM

## 2021-11-08 RX ORDER — HYDRALAZINE HYDROCHLORIDE 100 MG/1
100 TABLET, FILM COATED ORAL 3 TIMES DAILY
Qty: 90 TABLET | Refills: 0 | Status: SHIPPED | OUTPATIENT
Start: 2021-11-08 | End: 2021-11-15 | Stop reason: SDUPTHER

## 2021-11-08 RX ORDER — NIFEDIPINE 30 MG/1
30 TABLET, EXTENDED RELEASE ORAL DAILY
Qty: 30 TABLET | Refills: 0 | Status: SHIPPED | OUTPATIENT
Start: 2021-11-08 | End: 2021-11-15 | Stop reason: SDUPTHER

## 2021-11-08 NOTE — TELEPHONE ENCOUNTER
Called patient to discuss blood pressure medications and adjustments but no answer.    SEE MYCHART MESSAGE

## 2021-11-15 RX ORDER — NIFEDIPINE 30 MG/1
60 TABLET, EXTENDED RELEASE ORAL DAILY
Qty: 60 TABLET | Refills: 0
Start: 2021-11-15 | End: 2021-11-24 | Stop reason: SDUPTHER

## 2021-11-15 RX ORDER — HYDRALAZINE HYDROCHLORIDE 50 MG/1
50 TABLET, FILM COATED ORAL 3 TIMES DAILY
Qty: 90 TABLET | Refills: 0
Start: 2021-11-15 | End: 2021-12-20

## 2021-11-24 RX ORDER — NIFEDIPINE 30 MG/1
90 TABLET, EXTENDED RELEASE ORAL DAILY
Qty: 90 TABLET | Refills: 0 | Status: SHIPPED | OUTPATIENT
Start: 2021-11-24 | End: 2021-12-23

## 2021-11-24 RX ORDER — HYDROCHLOROTHIAZIDE 25 MG/1
25 TABLET ORAL DAILY
Qty: 30 TABLET | Refills: 0 | Status: SHIPPED | OUTPATIENT
Start: 2021-11-24 | End: 2021-12-22

## 2021-11-24 RX ORDER — NIFEDIPINE 30 MG/1
30 TABLET, FILM COATED, EXTENDED RELEASE ORAL DAILY
Qty: 30 TABLET | Refills: 0 | OUTPATIENT
Start: 2021-11-24

## 2021-12-02 ENCOUNTER — LAB (OUTPATIENT)
Dept: LAB | Facility: HOSPITAL | Age: 71
End: 2021-12-02

## 2021-12-02 ENCOUNTER — OFFICE VISIT (OUTPATIENT)
Dept: GASTROENTEROLOGY | Facility: CLINIC | Age: 71
End: 2021-12-02

## 2021-12-02 VITALS
SYSTOLIC BLOOD PRESSURE: 120 MMHG | DIASTOLIC BLOOD PRESSURE: 70 MMHG | HEART RATE: 57 BPM | TEMPERATURE: 97.7 F | WEIGHT: 189.3 LBS | OXYGEN SATURATION: 97 % | BODY MASS INDEX: 28.69 KG/M2 | HEIGHT: 68 IN

## 2021-12-02 DIAGNOSIS — R97.20 ELEVATED PSA: ICD-10-CM

## 2021-12-02 DIAGNOSIS — E78.2 MIXED HYPERLIPIDEMIA: ICD-10-CM

## 2021-12-02 DIAGNOSIS — I10 ESSENTIAL HYPERTENSION: ICD-10-CM

## 2021-12-02 DIAGNOSIS — K44.9 HIATAL HERNIA: ICD-10-CM

## 2021-12-02 DIAGNOSIS — K21.9 GASTROESOPHAGEAL REFLUX DISEASE WITHOUT ESOPHAGITIS: Primary | ICD-10-CM

## 2021-12-02 DIAGNOSIS — I25.10 CORONARY ARTERY DISEASE INVOLVING NATIVE CORONARY ARTERY OF NATIVE HEART WITHOUT ANGINA PECTORIS: ICD-10-CM

## 2021-12-02 DIAGNOSIS — Z87.19 HISTORY OF BARRETT'S ESOPHAGUS: ICD-10-CM

## 2021-12-02 DIAGNOSIS — R73.09 ELEVATED HEMOGLOBIN A1C: ICD-10-CM

## 2021-12-02 LAB
ALBUMIN SERPL-MCNC: 4.3 G/DL (ref 3.5–5.2)
ALBUMIN/GLOB SERPL: 1.5 G/DL
ALP SERPL-CCNC: 40 U/L (ref 39–117)
ALT SERPL W P-5'-P-CCNC: 14 U/L (ref 1–41)
ANION GAP SERPL CALCULATED.3IONS-SCNC: 7.2 MMOL/L (ref 5–15)
AST SERPL-CCNC: 16 U/L (ref 1–40)
BASOPHILS # BLD AUTO: 0.07 10*3/MM3 (ref 0–0.2)
BASOPHILS NFR BLD AUTO: 1.2 % (ref 0–1.5)
BILIRUB SERPL-MCNC: 0.4 MG/DL (ref 0–1.2)
BILIRUB UR QL STRIP: NEGATIVE
BUN SERPL-MCNC: 20 MG/DL (ref 8–23)
BUN/CREAT SERPL: 13.8 (ref 7–25)
CALCIUM SPEC-SCNC: 9.8 MG/DL (ref 8.6–10.5)
CHLORIDE SERPL-SCNC: 102 MMOL/L (ref 98–107)
CHOLEST SERPL-MCNC: 176 MG/DL (ref 0–200)
CLARITY UR: CLEAR
CO2 SERPL-SCNC: 30.8 MMOL/L (ref 22–29)
COLOR UR: YELLOW
CREAT SERPL-MCNC: 1.45 MG/DL (ref 0.76–1.27)
DEPRECATED RDW RBC AUTO: 41.1 FL (ref 37–54)
EOSINOPHIL # BLD AUTO: 0.29 10*3/MM3 (ref 0–0.4)
EOSINOPHIL NFR BLD AUTO: 4.9 % (ref 0.3–6.2)
ERYTHROCYTE [DISTWIDTH] IN BLOOD BY AUTOMATED COUNT: 12.5 % (ref 12.3–15.4)
GFR SERPL CREATININE-BSD FRML MDRD: 48 ML/MIN/1.73
GLOBULIN UR ELPH-MCNC: 2.8 GM/DL
GLUCOSE SERPL-MCNC: 112 MG/DL (ref 65–99)
GLUCOSE UR STRIP-MCNC: NEGATIVE MG/DL
HBA1C MFR BLD: 5.81 % (ref 4.8–5.6)
HCT VFR BLD AUTO: 44.4 % (ref 37.5–51)
HDLC SERPL QL: 5.33
HDLC SERPL-MCNC: 33 MG/DL (ref 40–60)
HGB BLD-MCNC: 15 G/DL (ref 13–17.7)
HGB UR QL STRIP.AUTO: NEGATIVE
IMM GRANULOCYTES # BLD AUTO: 0.02 10*3/MM3 (ref 0–0.05)
IMM GRANULOCYTES NFR BLD AUTO: 0.3 % (ref 0–0.5)
KETONES UR QL STRIP: NEGATIVE
LDLC SERPL CALC-MCNC: 109 MG/DL (ref 0–100)
LEUKOCYTE ESTERASE UR QL STRIP.AUTO: NEGATIVE
LYMPHOCYTES # BLD AUTO: 2.21 10*3/MM3 (ref 0.7–3.1)
LYMPHOCYTES NFR BLD AUTO: 37.6 % (ref 19.6–45.3)
MCH RBC QN AUTO: 30.4 PG (ref 26.6–33)
MCHC RBC AUTO-ENTMCNC: 33.8 G/DL (ref 31.5–35.7)
MCV RBC AUTO: 89.9 FL (ref 79–97)
MONOCYTES # BLD AUTO: 0.49 10*3/MM3 (ref 0.1–0.9)
MONOCYTES NFR BLD AUTO: 8.3 % (ref 5–12)
NEUTROPHILS NFR BLD AUTO: 2.8 10*3/MM3 (ref 1.7–7)
NEUTROPHILS NFR BLD AUTO: 47.7 % (ref 42.7–76)
NITRITE UR QL STRIP: NEGATIVE
NRBC BLD AUTO-RTO: 0 /100 WBC (ref 0–0.2)
PH UR STRIP.AUTO: 7 [PH] (ref 5–8)
PLATELET # BLD AUTO: 242 10*3/MM3 (ref 140–450)
PMV BLD AUTO: 9.3 FL (ref 6–12)
POTASSIUM SERPL-SCNC: 4 MMOL/L (ref 3.5–5.2)
PROT SERPL-MCNC: 7.1 G/DL (ref 6–8.5)
PROT UR QL STRIP: NEGATIVE
RBC # BLD AUTO: 4.94 10*6/MM3 (ref 4.14–5.8)
SODIUM SERPL-SCNC: 140 MMOL/L (ref 136–145)
SP GR UR STRIP: 1.02 (ref 1–1.03)
T4 FREE SERPL-MCNC: 1.25 NG/DL (ref 0.93–1.7)
TRIGL SERPL-MCNC: 195 MG/DL (ref 0–150)
TSH SERPL DL<=0.05 MIU/L-ACNC: 1.46 UIU/ML (ref 0.27–4.2)
UROBILINOGEN UR QL STRIP: NORMAL
VIT B12 BLD-MCNC: 571 PG/ML (ref 211–946)
VLDLC SERPL-MCNC: 34 MG/DL (ref 5–40)
WBC NRBC COR # BLD: 5.88 10*3/MM3 (ref 3.4–10.8)

## 2021-12-02 PROCEDURE — 84439 ASSAY OF FREE THYROXINE: CPT

## 2021-12-02 PROCEDURE — 84443 ASSAY THYROID STIM HORMONE: CPT

## 2021-12-02 PROCEDURE — 85025 COMPLETE CBC W/AUTO DIFF WBC: CPT

## 2021-12-02 PROCEDURE — 83036 HEMOGLOBIN GLYCOSYLATED A1C: CPT

## 2021-12-02 PROCEDURE — 80061 LIPID PANEL: CPT

## 2021-12-02 PROCEDURE — 82607 VITAMIN B-12: CPT

## 2021-12-02 PROCEDURE — 81003 URINALYSIS AUTO W/O SCOPE: CPT

## 2021-12-02 PROCEDURE — 99214 OFFICE O/P EST MOD 30 MIN: CPT | Performed by: NURSE PRACTITIONER

## 2021-12-02 PROCEDURE — 80053 COMPREHEN METABOLIC PANEL: CPT

## 2021-12-02 PROCEDURE — 36415 COLL VENOUS BLD VENIPUNCTURE: CPT

## 2021-12-02 RX ORDER — HYDRALAZINE HYDROCHLORIDE 25 MG/1
25 TABLET, FILM COATED ORAL 3 TIMES DAILY
COMMUNITY
Start: 2021-11-06 | End: 2021-12-02

## 2021-12-02 NOTE — PATIENT INSTRUCTIONS
For surveillance of Finnegan's esophagus, EGD recommended at 3-year interval, due October 2024.    For GERD, continue pantoprazole daily as prescribed.

## 2021-12-07 ENCOUNTER — TELEPHONE (OUTPATIENT)
Dept: CARDIOLOGY | Facility: HOSPITAL | Age: 71
End: 2021-12-07

## 2021-12-07 ENCOUNTER — TELEPHONE (OUTPATIENT)
Dept: CARDIOLOGY | Facility: CLINIC | Age: 71
End: 2021-12-07

## 2021-12-07 NOTE — TELEPHONE ENCOUNTER
Called Temitope back with Dr. Tam's office. She had left  earlier for cardiac clearance. Pt is having a lesion removed tomorrow.    Temitope said she had just spoken to Payton in our office, and she was taking care of this.    Jannette DIAMOND  12/7/21

## 2021-12-07 NOTE — TELEPHONE ENCOUNTER
Payton-I am not sure that the message never got to the providers.  Please see my telephone correspondence.

## 2021-12-07 NOTE — TELEPHONE ENCOUNTER
I am the on-call provider for the practice [facility].    I received a call from the patient stating that Dr. Tam's office has not received perioperative cardiac risk assessment for eyelid surgery tomorrow morning at 10 AM.    He has a known history of nonobstructive coronary artery disease and questionable history of atrial fibrillation.  He denies chest pain, shortness of breath, palpitations, dizziness, or syncope.  He is not on blood thinner therapy.    Patient is considered at acceptable risk for surgery from a cardiovascular standpoint. According to the Regino's Revised Cardiac Risk Index, patient is considered very low risk (0.4%) of adverse cardiovascular events occurring with moderate risk surgery.     I have faxed the surgery letter to [phone] per the patient's request.

## 2021-12-08 NOTE — TELEPHONE ENCOUNTER
Called and spoke with Temitope and she has received clearance from both Dr. Durbin and Anitra Beard.  Nothing further needed.

## 2021-12-08 NOTE — TELEPHONE ENCOUNTER
Spoke with Temitope at Dr Carmona's office. They have not received clearance VIA fax. Temitope gave me two different fax numbers and I sent clearance to both.     [phone] &  [phone]

## 2021-12-08 NOTE — TELEPHONE ENCOUNTER
DIANA---   JALEN, my name in epic is Elizabeth Trevino so I never got your message.   Thanks for addressing.      SCOTTY---  Please call now to ensure they received the clearance

## 2021-12-08 NOTE — TELEPHONE ENCOUNTER
Renzo from Dr Tam  Office asking for Anitra or Radha to please call their office in regards to the clearance that was sent.       She can be reached at [phone]

## 2021-12-17 ENCOUNTER — OFFICE VISIT (OUTPATIENT)
Dept: INTERNAL MEDICINE | Facility: CLINIC | Age: 71
End: 2021-12-17

## 2021-12-17 VITALS
BODY MASS INDEX: 29.1 KG/M2 | WEIGHT: 192 LBS | HEART RATE: 60 BPM | OXYGEN SATURATION: 97 % | TEMPERATURE: 97.6 F | SYSTOLIC BLOOD PRESSURE: 132 MMHG | DIASTOLIC BLOOD PRESSURE: 64 MMHG | HEIGHT: 68 IN

## 2021-12-17 DIAGNOSIS — I48.0 PAF (PAROXYSMAL ATRIAL FIBRILLATION) (HCC): ICD-10-CM

## 2021-12-17 DIAGNOSIS — I10 PRIMARY HYPERTENSION: Primary | ICD-10-CM

## 2021-12-17 DIAGNOSIS — Z00.00 MEDICARE ANNUAL WELLNESS VISIT, SUBSEQUENT: ICD-10-CM

## 2021-12-17 DIAGNOSIS — E78.2 MIXED HYPERLIPIDEMIA: ICD-10-CM

## 2021-12-17 DIAGNOSIS — K21.9 GASTROESOPHAGEAL REFLUX DISEASE, UNSPECIFIED WHETHER ESOPHAGITIS PRESENT: ICD-10-CM

## 2021-12-17 PROCEDURE — 1170F FXNL STATUS ASSESSED: CPT | Performed by: FAMILY MEDICINE

## 2021-12-17 PROCEDURE — 99214 OFFICE O/P EST MOD 30 MIN: CPT | Performed by: FAMILY MEDICINE

## 2021-12-17 PROCEDURE — G0439 PPPS, SUBSEQ VISIT: HCPCS | Performed by: FAMILY MEDICINE

## 2021-12-17 PROCEDURE — 1126F AMNT PAIN NOTED NONE PRSNT: CPT | Performed by: FAMILY MEDICINE

## 2021-12-17 PROCEDURE — 1159F MED LIST DOCD IN RCRD: CPT | Performed by: FAMILY MEDICINE

## 2021-12-17 NOTE — PROGRESS NOTES
The ABCs of the Annual Wellness Visit  Subsequent Medicare Wellness Visit    Chief Complaint   Patient presents with   • Medicare Wellness-subsequent      Subjective    History of Present Illness:  Wilmar Carmona is a 71 y.o. male who presents for a Subsequent Medicare Wellness Visit.    The following portions of the patient's history were reviewed and   updated as appropriate: allergies, current medications, past family history, past medical history, past social history, past surgical history and problem list.    Compared to one year ago, the patient feels his physical   health is the same.    Compared to one year ago, the patient feels his mental   health is the same.    Recent Hospitalizations:  He was not admitted to the hospital during the last year.       Current Medical Providers:  Patient Care Team:  Darryl King Jr., MD as PCP - General (Family Medicine)  Sudeep George MD as Consulting Physician (Urology)  Diana Durbin MD as Consulting Physician (Cardiology)  Han Mcgovern MD as Consulting Physician (Pulmonary Disease)  Hemal Bravo MD as Consulting Physician (Gastroenterology)  Meghan Fierro MD (Psychiatry)  Zoe Ortiz APRN as Nurse Practitioner (Nurse Practitioner)    Outpatient Medications Prior to Visit   Medication Sig Dispense Refill   • acebutolol (SECTRAL) 200 MG capsule TAKE 1 CAPSULE BY MOUTH TWICE DAILY 180 capsule 0   • clotrimazole-betamethasone (LOTRISONE) 1-0.05 % cream APPLY TOPICALLY TO THE AFFECTED AREA EVERY 12 HOURS     • finasteride (PROSCAR) 5 MG tablet TK 1 T PO ONCE DAILY  2   • Glucosamine-Chondroitin (OSTEO BI-FLEX REGULAR STRENGTH PO)   Oral, Daily, 0 Refill(s)     • hydrALAZINE (APRESOLINE) 50 MG tablet Take 1 tablet by mouth 3 (Three) Times a Day. 90 tablet 0   • hydroCHLOROthiazide (HYDRODIURIL) 25 MG tablet Take 1 tablet by mouth Daily. 30 tablet 0   • lamoTRIgine (LaMICtal) 200 MG tablet Take 200 mg by mouth Daily.     • losartan (Cozaar) 25 MG  "tablet Take 1 tablet by mouth Daily. 30 tablet 0   • NIFEdipine XL (PROCARDIA XL) 30 MG 24 hr tablet Take 3 tablets by mouth Daily. 90 tablet 0   • pantoprazole (PROTONIX) 40 MG EC tablet TAKE 1 TABLET BY MOUTH DAILY 90 tablet 3   • propafenone (RYTHMOL) 150 MG tablet TAKE 1 TABLET BY MOUTH EVERY 8 HOURS 270 tablet 0   • simvastatin (ZOCOR) 20 MG tablet TAKE 1 TABLET BY MOUTH EVERY NIGHT 90 tablet 1     No facility-administered medications prior to visit.       No opioid medication identified on active medication list. I have reviewed chart for other potential  high risk medication/s and harmful drug interactions in the elderly.          Aspirin is not on active medication list.  Aspirin use is not indicated based on review of current medical condition/s. Risk of harm outweighs potential benefits.  .    Patient Active Problem List   Diagnosis   • Abnormal electrocardiogram   • PAF (paroxysmal atrial fibrillation) ([facility])   • Hypertension   • Premature atrial contraction   • Hyperlipidemia   • Bronchitis with bronchospasm   • Contusion of bone   • Sprain of left ankle   • Coronary artery disease involving native coronary artery of native heart without angina pectoris   • Patellofemoral chondrosis of left knee   • Patellofemoral arthrosis   • Patellofemoral chondrosis of right knee   • Popping sound of knee joint   • Edema   • Obstructive sleep apnea on CPAP   • Renal insufficiency   • Vitreous floaters   • PVC (premature ventricular contraction)   • Prediabetes   • Gastroesophageal reflux disease   • Epigastric pain   • Finnegan's esophagus without dysplasia     Advance Care Planning  Advance Directive is not on file.  ACP discussion was held with the patient during this visit. Patient has an advance directive (not in EMR), copy requested.          Objective    Vitals:    12/17/21 1011   BP: 132/64   Pulse: 60   Temp: 97.6 °F (36.4 °C)   SpO2: 97%   Weight: 87.1 kg (192 lb)   Height: 172.7 cm (68\")   PainSc: 0-No pain "     BMI Readings from Last 1 Encounters:   12/17/21 29.19 kg/m²   BMI is above normal parameters. Recommendations include: nutrition counseling    Does the patient have evidence of cognitive impairment? No    Physical Exam  Lab Results   Component Value Date    TRIG 195 (H) 12/02/2021    HDL 33 (L) 12/02/2021     (H) 12/02/2021    VLDL 34 12/02/2021    HGBA1C 5.81 (H) 12/02/2021            HEALTH RISK ASSESSMENT    Smoking Status:  Social History     Tobacco Use   Smoking Status Never Smoker   Smokeless Tobacco Never Used     Alcohol Consumption:  Social History     Substance and Sexual Activity   Alcohol Use Yes    Comment: Once Weekly     Fall Risk Screen:    TRENTADI Fall Risk Assessment was completed, and patient is at LOW risk for falls.Assessment completed on:12/17/2021    Depression Screening:  PHQ-2/PHQ-9 Depression Screening 12/17/2021   Little interest or pleasure in doing things 0   Feeling down, depressed, or hopeless 0   Trouble falling or staying asleep, or sleeping too much 1   Feeling tired or having little energy 0   Poor appetite or overeating 0   Feeling bad about yourself - or that you are a failure or have let yourself or your family down 0   Trouble concentrating on things, such as reading the newspaper or watching television 0   Moving or speaking so slowly that other people could have noticed. Or the opposite - being so fidgety or restless that you have been moving around a lot more than usual 0   Thoughts that you would be better off dead, or of hurting yourself in some way 0   Total Score 1   If you checked off any problems, how difficult have these problems made it for you to do your work, take care of things at home, or get along with other people? Not difficult at all       Health Habits and Functional and Cognitive Screening:  Functional & Cognitive Status 12/17/2021   Do you have difficulty preparing food and eating? No   Do you have difficulty bathing yourself, getting dressed or  grooming yourself? No   Do you have difficulty using the toilet? No   Do you have difficulty moving around from place to place? No   Do you have trouble with steps or getting out of a bed or a chair? No   Current Diet Well Balanced Diet   Dental Exam Up to date   Eye Exam Up to date   Exercise (times per week) 0 times per week   Current Exercises Include No Regular Exercise   Current Exercise Activities Include -   Do you need help using the phone?  No   Are you deaf or do you have serious difficulty hearing?  No   Do you need help with transportation? No   Do you need help shopping? No   Do you need help preparing meals?  No   Do you need help with housework?  No   Do you need help with laundry? No   Do you need help taking your medications? No   Do you need help managing money? No   Do you ever drive or ride in a car without wearing a seat belt? No   Have you felt unusual stress, anger or loneliness in the last month? No   Who do you live with? Spouse   If you need help, do you have trouble finding someone available to you? No   Have you been bothered in the last four weeks by sexual problems? No   Do you have difficulty concentrating, remembering or making decisions? No       Age-appropriate Screening Schedule:  Refer to the list below for future screening recommendations based on patient's age, sex and/or medical conditions. Orders for these recommended tests are listed in the plan section. The patient has been provided with a written plan.    Health Maintenance   Topic Date Due   • ZOSTER VACCINE (3 of 3) 09/24/2018   • LIPID PANEL  12/02/2022   • TDAP/TD VACCINES (3 - Td or Tdap) 08/29/2031   • INFLUENZA VACCINE  Completed              Assessment/Plan   CMS Preventative Services Quick Reference  Risk Factors Identified During Encounter  Cardiovascular Disease  The above risks/problems have been discussed with the patient.  Follow up actions/plans if indicated are seen below in the Assessment/Plan  Section.  Pertinent information has been shared with the patient in the After Visit Summary.    There are no diagnoses linked to this encounter.    Follow Up:   No follow-ups on file.     An After Visit Summary and PPPS were made available to the patient.

## 2021-12-17 NOTE — PATIENT INSTRUCTIONS
Medicare Wellness  Personal Prevention Plan of Service     Date of Office Visit:  2021  Encounter Provider:  Darryl King Jr, MD  Place of Service:  [facility] PRIMARY [facility]  Patient Name: Wilmar Carmona  :  1950    As part of the Medicare Wellness portion of your visit today, we are providing you with this personalized preventive plan of services (PPPS). This plan is based upon recommendations of the [place] Preventive Services Task Force (USPSTF) and the Advisory Committee on Immunization Practices (ACIP).    This lists the preventive care services that should be considered, and provides dates of when you are due. Items listed as completed are up-to-date and do not require any further intervention.    Health Maintenance   Topic Date Due   • GASTROSCOPY (EGD)  Never done   • HEPATITIS C SCREENING  Never done   • ZOSTER VACCINE (3 of 3) 2018   • ANNUAL WELLNESS VISIT  2021   • LIPID PANEL  2022   • COLORECTAL CANCER SCREENING  2024   • TDAP/TD VACCINES (3 - Td or Tdap) 2031   • COVID-19 Vaccine  Completed   • INFLUENZA VACCINE  Completed   • Pneumococcal Vaccine 65+  Completed       No orders of the defined types were placed in this encounter.      No follow-ups on file.

## 2021-12-17 NOTE — PROGRESS NOTES
"Chief Complaint  Medicare Wellness-subsequent    Subjective          Wilmar Carmona presents to [facility] PRIMARY [facility]ARE  Mr. Carmona is here for Medicare wellness and also recheck of blood pressure elevated creatinine and reflux as well as hyperlipidemia.    He states his diet is pretty awful his back cholesterol is crept up and worsening a message to Dr. Durbin about perhaps switching from simvastatin to Crestor.    Otherwise his creatinine is about 1.45 and he is to recheck a nonfasting well-hydrated creatinine BUN renal panel with Dr. Bell the nephrologist.  He has had his nifedipine XL reduced to 90 mg total with reduction in reflux thankfully.  We will try to get blood pressure under reasonable control with a combination of hydralazine and hydrochlorothiazide 25 mg plus vitamin XL 90 mg he is also on Sectral 200 mg twice daily.    The questions cannot be the correct dose of hydrochlorothiazide in reference to creatinine number from nephrology.  Eventually would like to get blood pressure control under minimum effective medicines without side effects.    The previous low alkaline phosphatase appears to have resolved.    He has had recent eyelid surgery on the right eye which is improved double vision.      Objective   Vital Signs:   /64   Pulse 60   Temp 97.6 °F (36.4 °C)   Ht 172.7 cm (68\")   Wt 87.1 kg (192 lb)   SpO2 97%   BMI 29.19 kg/m²     Physical Exam  Vitals reviewed.   Constitutional:       Appearance: He is well-developed.   HENT:      Head: Normocephalic and atraumatic.      Right Ear: Tympanic membrane and external ear normal.      Left Ear: Tympanic membrane and external ear normal.   Eyes:      Conjunctiva/sclera: Conjunctivae normal.      Pupils: Pupils are equal, round, and reactive to light.   Neck:      Thyroid: No thyromegaly.      Vascular: No JVD.   Cardiovascular:      Rate and Rhythm: Normal rate and regular rhythm.      Heart sounds: Normal heart sounds. "   Pulmonary:      Effort: Pulmonary effort is normal.      Breath sounds: Normal breath sounds.   Abdominal:      General: Bowel sounds are normal.      Palpations: Abdomen is soft.   Musculoskeletal:         General: Normal range of motion.      Cervical back: Normal range of motion and neck supple.   Lymphadenopathy:      Cervical: No cervical adenopathy.   Skin:     General: Skin is warm and dry.      Findings: No rash.   Neurological:      Mental Status: He is alert and oriented to person, place, and time.      Cranial Nerves: No cranial nerve deficit.      Coordination: Coordination normal.   Psychiatric:         Behavior: Behavior normal.         Thought Content: Thought content normal.         Judgment: Judgment normal.        Result Review :                 Assessment and Plan    Diagnoses and all orders for this visit:    1. Primary hypertension (Primary)  Comments:  Currently on Sectral 200 twice daily hydralazine 50 3 times daily hydrochlorothiazide 25 mg daily nifedipine XL 90 mg daily    2. Mixed hyperlipidemia  Comments:  Consider switch from simvastatin to Crestor    3. Gastroesophageal reflux disease, unspecified whether esophagitis present  Comments:  Improved with reduction nifedipine XL    4. PAF (paroxysmal atrial fibrillation) (HCC)  Comments:  Distant history of this noted by Dr. Bailey.  None since.  He is on Rythmol and Sectral 200 twice daily    5. Medicare annual wellness visit, subsequent        Follow Up   Return in about 3 months (around 3/17/2022).  Patient was given instructions and counseling regarding his condition or for health maintenance advice. Please see specific information pulled into the AVS if appropriate.

## 2021-12-20 DIAGNOSIS — G47.25 CIRCADIAN RHYTHM SLEEP DISORDER, JET LAG TYPE: ICD-10-CM

## 2021-12-20 RX ORDER — ZOLPIDEM TARTRATE 5 MG/1
5 TABLET ORAL NIGHTLY PRN
Qty: 25 TABLET | Refills: 5 | Status: SHIPPED | OUTPATIENT
Start: 2021-12-20 | End: 2022-09-27

## 2021-12-20 RX ORDER — HYDRALAZINE HYDROCHLORIDE 50 MG/1
TABLET, FILM COATED ORAL
Qty: 90 TABLET | Refills: 0 | Status: SHIPPED | OUTPATIENT
Start: 2021-12-20 | End: 2022-01-21

## 2021-12-22 ENCOUNTER — LAB (OUTPATIENT)
Dept: LAB | Facility: HOSPITAL | Age: 71
End: 2021-12-22

## 2021-12-22 ENCOUNTER — TRANSCRIBE ORDERS (OUTPATIENT)
Dept: ADMINISTRATIVE | Facility: HOSPITAL | Age: 71
End: 2021-12-22

## 2021-12-22 DIAGNOSIS — R79.89 HYPOURICEMIA: ICD-10-CM

## 2021-12-22 DIAGNOSIS — R79.89 HYPOURICEMIA: Primary | ICD-10-CM

## 2021-12-22 LAB
ALBUMIN SERPL-MCNC: 4.2 G/DL (ref 3.5–5.2)
ALBUMIN/GLOB SERPL: 2.1 G/DL
ALP SERPL-CCNC: 45 U/L (ref 39–117)
ALT SERPL W P-5'-P-CCNC: 19 U/L (ref 1–41)
ANION GAP SERPL CALCULATED.3IONS-SCNC: 9 MMOL/L (ref 5–15)
AST SERPL-CCNC: 18 U/L (ref 1–40)
BILIRUB SERPL-MCNC: 0.3 MG/DL (ref 0–1.2)
BILIRUB UR QL STRIP: NEGATIVE
BUN SERPL-MCNC: 19 MG/DL (ref 8–23)
BUN/CREAT SERPL: 9.9 (ref 7–25)
CALCIUM SPEC-SCNC: 9.4 MG/DL (ref 8.6–10.5)
CHLORIDE SERPL-SCNC: 103 MMOL/L (ref 98–107)
CLARITY UR: CLEAR
CO2 SERPL-SCNC: 30 MMOL/L (ref 22–29)
COLOR UR: YELLOW
CREAT SERPL-MCNC: 1.91 MG/DL (ref 0.76–1.27)
CREAT UR-MCNC: 29.8 MG/DL
GFR SERPL CREATININE-BSD FRML MDRD: 35 ML/MIN/1.73
GLOBULIN UR ELPH-MCNC: 2 GM/DL
GLUCOSE SERPL-MCNC: 77 MG/DL (ref 65–99)
GLUCOSE UR STRIP-MCNC: NEGATIVE MG/DL
HGB UR QL STRIP.AUTO: NEGATIVE
KETONES UR QL STRIP: NEGATIVE
LEUKOCYTE ESTERASE UR QL STRIP.AUTO: NEGATIVE
NITRITE UR QL STRIP: NEGATIVE
PH UR STRIP.AUTO: 5.5 [PH] (ref 5–8)
PHOSPHATE SERPL-MCNC: 4.3 MG/DL (ref 2.5–4.5)
POTASSIUM SERPL-SCNC: 3.9 MMOL/L (ref 3.5–5.2)
PROT ?TM UR-MCNC: <4 MG/DL
PROT SERPL-MCNC: 6.2 G/DL (ref 6–8.5)
PROT UR QL STRIP: NEGATIVE
PROT/CREAT UR: NORMAL MG/G{CREAT}
PTH-INTACT SERPL-MCNC: 30.8 PG/ML (ref 15–65)
SODIUM SERPL-SCNC: 142 MMOL/L (ref 136–145)
SP GR UR STRIP: 1.01 (ref 1–1.03)
URATE SERPL-MCNC: 7.6 MG/DL (ref 3.4–7)
UROBILINOGEN UR QL STRIP: NORMAL

## 2021-12-22 PROCEDURE — 82565 ASSAY OF CREATININE: CPT

## 2021-12-22 PROCEDURE — 83970 ASSAY OF PARATHORMONE: CPT

## 2021-12-22 PROCEDURE — 84550 ASSAY OF BLOOD/URIC ACID: CPT

## 2021-12-22 PROCEDURE — 80053 COMPREHEN METABOLIC PANEL: CPT

## 2021-12-22 PROCEDURE — 84100 ASSAY OF PHOSPHORUS: CPT

## 2021-12-22 PROCEDURE — 81003 URINALYSIS AUTO W/O SCOPE: CPT

## 2021-12-22 PROCEDURE — 36415 COLL VENOUS BLD VENIPUNCTURE: CPT

## 2021-12-22 PROCEDURE — 82610 CYSTATIN C: CPT

## 2021-12-22 PROCEDURE — 84156 ASSAY OF PROTEIN URINE: CPT

## 2021-12-22 PROCEDURE — 82570 ASSAY OF URINE CREATININE: CPT

## 2021-12-22 RX ORDER — HYDROCHLOROTHIAZIDE 25 MG/1
25 TABLET ORAL DAILY
Qty: 30 TABLET | Refills: 0 | Status: SHIPPED | OUTPATIENT
Start: 2021-12-22 | End: 2023-02-23

## 2021-12-23 LAB — CREAT SERPL-MCNC: 1.19 MG/DL (ref 0.76–1.27)

## 2021-12-23 RX ORDER — NIFEDIPINE 30 MG/1
90 TABLET, EXTENDED RELEASE ORAL DAILY
Qty: 90 TABLET | Refills: 0 | Status: SHIPPED | OUTPATIENT
Start: 2021-12-23 | End: 2022-01-24

## 2021-12-24 LAB — CYSTATIN C SERPL-MCNC: 1.06 MG/L (ref 0.62–1.16)

## 2022-01-13 ENCOUNTER — OFFICE VISIT (OUTPATIENT)
Dept: CARDIOLOGY | Facility: CLINIC | Age: 72
End: 2022-01-13

## 2022-01-13 VITALS
SYSTOLIC BLOOD PRESSURE: 130 MMHG | WEIGHT: 194 LBS | HEART RATE: 59 BPM | BODY MASS INDEX: 29.4 KG/M2 | HEIGHT: 68 IN | DIASTOLIC BLOOD PRESSURE: 74 MMHG

## 2022-01-13 DIAGNOSIS — I49.1 PREMATURE ATRIAL CONTRACTION: ICD-10-CM

## 2022-01-13 DIAGNOSIS — I10 PRIMARY HYPERTENSION: ICD-10-CM

## 2022-01-13 DIAGNOSIS — I25.10 CORONARY ARTERY DISEASE INVOLVING NATIVE CORONARY ARTERY OF NATIVE HEART WITHOUT ANGINA PECTORIS: Primary | ICD-10-CM

## 2022-01-13 DIAGNOSIS — I49.3 PVC'S (PREMATURE VENTRICULAR CONTRACTIONS): ICD-10-CM

## 2022-01-13 PROCEDURE — 99214 OFFICE O/P EST MOD 30 MIN: CPT | Performed by: NURSE PRACTITIONER

## 2022-01-13 PROCEDURE — 93000 ELECTROCARDIOGRAM COMPLETE: CPT | Performed by: NURSE PRACTITIONER

## 2022-01-13 RX ORDER — CHLORAL HYDRATE 500 MG
CAPSULE ORAL DAILY
COMMUNITY

## 2022-01-13 NOTE — PROGRESS NOTES
Date of Office Visit: 2022  Encounter Provider: Elizabeth Trevino, SANTO, APRN  Place of Service: [facility]ARD[facility]  Patient Name: Wilmar Carmona  :1950        Subjective:     Chief Complaint:  Paroxysmal atrial fibrillation, coronary artery disease, hypertension      History of Present Illness:  Wilmar Carmona is a 71 y.o. male patient of Dr. Durbin.  I am seeing this patient in the office today and I have reviewed his records.    Patient has a history of hypertension, coronary artery disease, atrial fibrillation, hyperlipidemia, obstructive sleep apnea, LVH.      Patient has a history of atrial fibrillation for many years and was previously followed by Dr. Bailey.  He has been maintained on Rythmol therapy and Sectral.  He had an abnormal stress test in  leading to a cardiac catheterization that was felt to show normal coronary arteries with about 10% ostial tapering and narrowing but this was felt to most likely be from catheter-induced spasm.  However CT scan 2016 showed calcification of all 3 major vessels.  Follow-up echocardiogram 2018 showed normal left ventricular size and systolic function without evidence of aneurysmal disease of the aortic root.  Abnormal structure noted in the liver, possibly a cyst.  Patient had 2 weeks Zio patch in 2018 that showed sinus rhythm with occasional PVCs that were symptomatic.  No atrial arrhythmia was noted. May 2019 he had a stress perfusion study that was negative for ischemia.      Patient later reported that his gastroenterologist felt that the nifedipine might be contributing to his GERD.  This was decreased and patient was started on losartan.  Kidney enzymes did increase on higher dose and dose was decreased.  Patient was started on hydralazine.  He did not feel that either the losartan or the hydralazine was working for his blood pressure.  He is started on low-dose hydrochlorothiazide and referred to  nephrology for further recommendations, especially as kidney function was limiting medication options.  He also had baseline mild bradycardia and unable to add additional medications that would affect heart rate.      Patient presents to office today for follow-up appointment.  Patient reports he is doing well since last visit.  He continues to stay active.  He denies any chest pain or discomfort, shortness of breath, shortness of breath with exertion, edema, dizziness, syncope, near syncope, falls, fatigue, or abnormal bleeding.  He gets a rare isolated PVC.  He feels that his acid reflux symptoms have improved significantly on the lower dose of nifedipine.  He still has to clear his throat which he reports he was told could still be a sign of reflux.  Follows with GI on reflux symptoms.  He is following with nephrology for renal insufficiency.        Past Medical History:   Diagnosis Date   • Abnormal EKG    • Abnormal stress test    • Arthritis    • Atrial fibrillation (HCC)    • BPH (benign prostatic hypertrophy)    • CAD (coronary artery disease)    • Depression    • GERD (gastroesophageal reflux disease)    • H/O medial meniscus repair of right knee 2014    Partial Menisectomy right knee   • Heart murmur    • Hyperlipidemia    • Hypertension    • Intermittent knee pain     R<L knee from cycling and running in the past -    • Mild tricuspid regurgitation    • PAC (premature atrial contraction)    • PAF (paroxysmal atrial fibrillation) (HCC)    • Palpitations    • Sleep apnea    • Tremor      Past Surgical History:   Procedure Laterality Date   • CARDIAC CATHETERIZATION  2015   • COLONOSCOPY  10/20/2014    normal -dr leon..had egd at same time   • ENDOSCOPY N/A 10/20/2021    Procedure: ESOPHAGOGASTRODUODENOSCOPY;  Surgeon: Hemal Bravo MD;  Location: [facility] OR;  Service: Gastroenterology;  Laterality: N/A;   • HERNIA REPAIR     • KNEE SURGERY Left    • TONSILLECTOMY     • VASECTOMY       Outpatient  Medications Prior to Visit   Medication Sig Dispense Refill   • acebutolol (SECTRAL) 200 MG capsule TAKE 1 CAPSULE BY MOUTH TWICE DAILY 180 capsule 0   • clotrimazole-betamethasone (LOTRISONE) 1-0.05 % cream As Needed.     • finasteride (PROSCAR) 5 MG tablet TK 1 T PO ONCE DAILY  2   • Glucosamine-Chondroitin (OSTEO BI-FLEX REGULAR STRENGTH PO) Daily.     • hydrALAZINE (APRESOLINE) 50 MG tablet TAKE 1 TABLET BY MOUTH THREE TIMES DAILY 90 tablet 0   • hydroCHLOROthiazide (HYDRODIURIL) 25 MG tablet TAKE 1 TABLET BY MOUTH DAILY 30 tablet 0   • lamoTRIgine (LaMICtal) 200 MG tablet Take 200 mg by mouth Daily.     • NIFEdipine XL (PROCARDIA XL) 30 MG 24 hr tablet TAKE 3 TABLETS BY MOUTH DAILY 90 tablet 0   • Omega-3 Fatty Acids (fish oil) 1000 MG capsule capsule Take  by mouth Daily.     • pantoprazole (PROTONIX) 40 MG EC tablet TAKE 1 TABLET BY MOUTH DAILY 90 tablet 3   • propafenone (RYTHMOL) 150 MG tablet TAKE 1 TABLET BY MOUTH EVERY 8 HOURS 270 tablet 0   • simvastatin (ZOCOR) 20 MG tablet TAKE 1 TABLET BY MOUTH EVERY NIGHT 90 tablet 1   • zolpidem (AMBIEN) 5 MG tablet TAKE 1 TABLET BY MOUTH AT NIGHT AS NEEDED FOR SLEEP 25 tablet 5     No facility-administered medications prior to visit.       Allergies as of 01/13/2022   • (No Known Allergies)     Social History     Socioeconomic History   • Marital status:    Tobacco Use   • Smoking status: Never Smoker   • Smokeless tobacco: Never Used   Vaping Use   • Vaping Use: Never used   Substance and Sexual Activity   • Alcohol use: Yes     Comment: Once Weekly   • Drug use: Not Currently   • Sexual activity: Defer     Family History   Problem Relation Age of Onset   • Hypertension Mother    • Colon polyps Mother    • Cancer Father         prostate   • Heart disease Father    • Coronary artery disease Father    • Stroke Father    • Cancer Brother         prostate   • Colon cancer Neg Hx    • Crohn's disease Neg Hx    • Irritable bowel syndrome Neg Hx    • Ulcerative  "colitis Neg Hx        Review of Systems   Constitutional: Negative for malaise/fatigue.   Cardiovascular:        SEE HPI   Respiratory: Negative for shortness of breath.    Hematologic/Lymphatic: Negative for bleeding problem.   Musculoskeletal: Negative for falls.   Gastrointestinal: Negative for melena.   Genitourinary: Negative for hematuria.   Neurological: Negative for dizziness.   Psychiatric/Behavioral: Negative for altered mental status.          Objective:     Vitals:    01/13/22 1011   BP: 130/74   Pulse: 59   Weight: 88 kg (194 lb)   Height: 172.7 cm (68\")     Body mass index is 29.5 kg/m².      PHYSICAL EXAM:  Constitutional:       General: Not in acute distress.     Appearance: Well-developed. Not diaphoretic.   Eyes:      Pupils: Pupils are equal, round, and reactive to light.   HENT:      Head: Normocephalic and atraumatic.   Neck:      Vascular: No JVD.   Pulmonary:      Effort: Pulmonary effort is normal. No respiratory distress.      Breath sounds: Normal breath sounds. No wheezing. No rales.   Cardiovascular:      Normal rate. Regular rhythm.      Murmurs: There is no murmur.      No gallop. No click. No rub.   Edema:     Peripheral edema absent.   Abdominal:      General: Bowel sounds are normal. There is no distension.      Palpations: Abdomen is soft.      Tenderness: There is no abdominal tenderness. There is no guarding or rebound.   Musculoskeletal: Normal range of motion.         General: No tenderness or deformity.      Cervical back: Neck supple. Skin:     General: Skin is warm and dry.      Findings: No erythema or rash.   Neurological:      Mental Status: Alert and oriented to person, place, and time.   Psychiatric:         Behavior: Behavior normal.         Judgment: Judgment normal.             ECG 12 Lead    Date/Time: 1/13/2022 10:21 AM  Performed by: Elizabeth Trevino DNP, APRN  Authorized by: Elizabeth Trevino DNP, APRN   Comparison: compared with previous ECG from " 7/13/2021  Rhythm: sinus rhythm  BPM: 59  Conduction: non-specific intraventricular conduction delay  Other findings: non-specific ST-T wave changes  Comments: No significant changes from previous EKG              Assessment:       Diagnosis Plan   1. Coronary artery disease involving native coronary artery of native heart without angina pectoris     2. Primary hypertension     3. Premature atrial contraction     4. PAF (paroxysmal atrial fibrillation) ([facility])           Plan:     1. PACs and PVCs and questionable hx paroxysmal atrial fibrillation: Maintained on Rythmol and Sectral.  He has questionable history of atrial fibrillation --- was listed as diagnosis in previous cardiologist's notes at [facility].  He does not recall ever being told he had atrial fibrillation.  No atrial fibrillation seen on monitor studies that we have access to or have done through our office.  He reports today that he believes he has been maintained on the Rythmol as he was having frequent symptomatic PVCs in the past and was told at one point that PVCs took up more than half of his heartbeats but significantly improved with the Rythmol and Sectral.  Last monitor study showed only occasional symptomatic PVCs.  He reports recently experiencing only rare isolated palpitations consistent with rare isolated PVC.  Continue current regimen and patient to notify office of any new or worsening symptoms prior to next visit.   2. Normal coronary arteries on 2015 heart cath: performed by Dr. Bailey at [facility].  Per heart cath report, coronary arteries and LV systolic function were normal.  There was about 10% ostial tapering and narrowing of the right coronary artery but this was felt to be due to to likely catheter induced spasm.  Stress test in 2019 negative for ischemia.  Denies anginal symptoms.  3. Nonspecific intraventricular conduction delay and incomplete LBBB: chronic, stable. Continue to monitor.   4. Hypertension: GERD symptoms improved on  decrease nifedipine dose but still present.  Believes he had headaches with higher dose of hydralazine in the past but now he would like to consider trying 75 mg of hydralazine 3 times daily to see if he is able to tolerate this without headaches, to see if this really was what was causing headaches or not.  He is tolerating the 50 mg 3 times daily without any headaches.  Would appreciate additional BP medication recommendations per nephrology or PCP as patient unable to tolerate increase nifedipine dose due to GERD, not a good candidate for additional AV je blocker therapy given mild baseline bradycardia, and other medication options limited by renal insufficiency.    5. Obstructive sleep apnea  6. Dilated right ventricle: Felt to likely be related to untreated sleep apnea  7. Dyslipidemia: PCP managing.  On simvastatin.  Patient reports PCP was wanting to consider stronger statin, this is fine from a cardiac standpoint.  Will defer to PCP as they have been managing labs.    Patient to keep July follow-up with Dr. Durbin as scheduled but follow-up sooner if needed for any new, recurrent, or worsening symptoms or other issues/ concerns. Discussed in detail signs/ symptoms that warrant sooner call or follow-up with the office or ER visit.         Records reviewed including but nor limited to 12/2021 nephrology note, 5/2019 stress test, 7/2021 EKG,            Your medication list          Accurate as of January 13, 2022 10:25 AM. If you have any questions, ask your nurse or doctor.            CONTINUE taking these medications      Instructions Last Dose Given Next Dose Due   acebutolol 200 MG capsule  Commonly known as: SECTRAL      TAKE 1 CAPSULE BY MOUTH TWICE DAILY       clotrimazole-betamethasone 1-0.05 % cream  Commonly known as: LOTRISONE      As Needed.       finasteride 5 MG tablet  Commonly known as: PROSCAR      TK 1 T PO ONCE DAILY       fish oil 1000 MG capsule capsule      Take  by mouth Daily.        hydrALAZINE 50 MG tablet  Commonly known as: APRESOLINE      TAKE 1 TABLET BY MOUTH THREE TIMES DAILY       hydroCHLOROthiazide 25 MG tablet  Commonly known as: HYDRODIURIL      TAKE 1 TABLET BY MOUTH DAILY       lamoTRIgine 200 MG tablet  Commonly known as: LaMICtal      Take 200 mg by mouth Daily.       NIFEdipine XL 30 MG 24 hr tablet  Commonly known as: PROCARDIA XL      TAKE 3 TABLETS BY MOUTH DAILY       OSTEO BI-FLEX REGULAR STRENGTH PO      Daily.       pantoprazole 40 MG EC tablet  Commonly known as: PROTONIX      TAKE 1 TABLET BY MOUTH DAILY       propafenone 150 MG tablet  Commonly known as: RYTHMOL      TAKE 1 TABLET BY MOUTH EVERY 8 HOURS       simvastatin 20 MG tablet  Commonly known as: ZOCOR      TAKE 1 TABLET BY MOUTH EVERY NIGHT       zolpidem 5 MG tablet  Commonly known as: AMBIEN      TAKE 1 TABLET BY MOUTH AT NIGHT AS NEEDED FOR SLEEP              The above medication changes may not have been made by this provider.  Patient's medication list was updated to reflect medications they are currently taking including medication changes made by other providers.            Thanks,    Elizabeth Trevino, SANTO, APRN  01/13/2022         Dictated utilizing Dragon dictation

## 2022-01-14 RX ORDER — ACEBUTOLOL HYDROCHLORIDE 200 MG/1
CAPSULE ORAL
Qty: 180 CAPSULE | Refills: 1 | Status: SHIPPED | OUTPATIENT
Start: 2022-01-14 | End: 2022-07-08

## 2022-01-14 RX ORDER — SIMVASTATIN 20 MG
20 TABLET ORAL NIGHTLY
Qty: 90 TABLET | Refills: 1 | Status: SHIPPED | OUTPATIENT
Start: 2022-01-14 | End: 2022-01-27 | Stop reason: ALTCHOICE

## 2022-01-21 RX ORDER — HYDRALAZINE HYDROCHLORIDE 50 MG/1
TABLET, FILM COATED ORAL
Qty: 90 TABLET | Refills: 5 | Status: SHIPPED | OUTPATIENT
Start: 2022-01-21 | End: 2022-07-28

## 2022-01-24 RX ORDER — NIFEDIPINE 30 MG/1
90 TABLET, EXTENDED RELEASE ORAL DAILY
Qty: 90 TABLET | Refills: 0 | Status: SHIPPED | OUTPATIENT
Start: 2022-01-24 | End: 2022-05-06

## 2022-01-27 DIAGNOSIS — R79.89 ELEVATED SERUM CREATININE: Primary | ICD-10-CM

## 2022-01-27 RX ORDER — ROSUVASTATIN CALCIUM 10 MG/1
10 TABLET, COATED ORAL DAILY
Qty: 90 TABLET | Refills: 2 | Status: SHIPPED | OUTPATIENT
Start: 2022-01-27 | End: 2022-11-04

## 2022-02-18 ENCOUNTER — LAB (OUTPATIENT)
Dept: LAB | Facility: HOSPITAL | Age: 72
End: 2022-02-18

## 2022-02-18 ENCOUNTER — TRANSCRIBE ORDERS (OUTPATIENT)
Dept: ADMINISTRATIVE | Facility: HOSPITAL | Age: 72
End: 2022-02-18

## 2022-02-18 DIAGNOSIS — R97.20 ELEVATED PSA: ICD-10-CM

## 2022-02-18 DIAGNOSIS — R97.20 ELEVATED PSA: Primary | ICD-10-CM

## 2022-02-18 LAB — PSA SERPL-MCNC: 3.47 NG/ML (ref 0–4)

## 2022-02-18 PROCEDURE — 36415 COLL VENOUS BLD VENIPUNCTURE: CPT

## 2022-02-18 PROCEDURE — 84153 ASSAY OF PSA TOTAL: CPT

## 2022-03-25 ENCOUNTER — TELEPHONE (OUTPATIENT)
Dept: INTERNAL MEDICINE | Facility: CLINIC | Age: 72
End: 2022-03-25

## 2022-04-02 DIAGNOSIS — E78.2 MIXED HYPERLIPIDEMIA: Primary | ICD-10-CM

## 2022-04-12 ENCOUNTER — TELEPHONE (OUTPATIENT)
Dept: INTERNAL MEDICINE | Facility: CLINIC | Age: 72
End: 2022-04-12

## 2022-04-12 DIAGNOSIS — R73.09 ELEVATED HEMOGLOBIN A1C: Primary | ICD-10-CM

## 2022-04-12 NOTE — TELEPHONE ENCOUNTER
Hub staff attempted to follow warm transfer process and was unsuccessful     Caller: Wilmar Carmona    Relationship to patient: Self    Best call back number: [phone]       Patient is needing: PATIENT IS NEEDING TO RESCHEDULE HIS LAB APPOINTMENT THAT IS TODAY 4/12/22 AT 9:45. PATIENT IS SICK, HE HAS A SORE THROAT, COUGHING AND SNEEZING.    PLEASE CALL PATIENT BACK TO RESCHEDULE

## 2022-04-12 NOTE — TELEPHONE ENCOUNTER
I called and spoke to Patient, he is going to have these done with [facility] point when he feels better, he is requesting an A1C to be added to his orders before his 5/6/2022 visit with Dr King.

## 2022-04-28 ENCOUNTER — LAB (OUTPATIENT)
Dept: LAB | Facility: HOSPITAL | Age: 72
End: 2022-04-28

## 2022-04-28 PROCEDURE — 80061 LIPID PANEL: CPT | Performed by: FAMILY MEDICINE

## 2022-04-28 PROCEDURE — 80053 COMPREHEN METABOLIC PANEL: CPT | Performed by: FAMILY MEDICINE

## 2022-04-28 PROCEDURE — 83036 HEMOGLOBIN GLYCOSYLATED A1C: CPT | Performed by: FAMILY MEDICINE

## 2022-05-06 ENCOUNTER — OFFICE VISIT (OUTPATIENT)
Dept: INTERNAL MEDICINE | Facility: CLINIC | Age: 72
End: 2022-05-06

## 2022-05-06 VITALS
OXYGEN SATURATION: 95 % | HEART RATE: 65 BPM | DIASTOLIC BLOOD PRESSURE: 80 MMHG | HEIGHT: 68 IN | SYSTOLIC BLOOD PRESSURE: 136 MMHG | WEIGHT: 195 LBS | BODY MASS INDEX: 29.55 KG/M2 | TEMPERATURE: 98.5 F

## 2022-05-06 DIAGNOSIS — I10 PRIMARY HYPERTENSION: Primary | ICD-10-CM

## 2022-05-06 PROCEDURE — 99214 OFFICE O/P EST MOD 30 MIN: CPT | Performed by: FAMILY MEDICINE

## 2022-05-06 RX ORDER — NIFEDIPINE 30 MG/1
90 TABLET, EXTENDED RELEASE ORAL 3 TIMES DAILY
Qty: 90 TABLET | Refills: 0 | Status: SHIPPED | OUTPATIENT
Start: 2022-05-06

## 2022-05-06 NOTE — ASSESSMENT & PLAN NOTE
Consider increasing hydralazine and decreasing nifedipine XL in the future and will confer with nephrology Dr. Kinza Bell.  Decreasing nifedipine XL would improve heartburn most likely.

## 2022-05-06 NOTE — PROGRESS NOTES
"Chief Complaint  Hyperlipidemia and Hypertension    Subjective          Wilmar Carmona presents to [facility] PRIMARY CARE  History of Present Illness    Blood pressure concerns of 40% of systolic blood pressures greater than 140 with normal diastolic blood pressures.  We reviewed his current blood pressure medicines as desirous to get the nifedipine XL from 30 mg 3 tablets daily down to less so that heartburn might not be such a problem.  Another option would be to increase hydralazine dosage and did reduce the nifedipine XL.  We will get an opinion from Dr. Bell and see what she thinks.  Overall is not doing too badly.    He has cholesterol panel is improved markedly on increased dose of Crestor.  Objective   Vital Signs:  /80   Pulse 65   Temp 98.5 °F (36.9 °C)   Ht 172.7 cm (68\")   Wt 88.5 kg (195 lb)   SpO2 95%   BMI 29.65 kg/m²           Physical Exam  Vitals reviewed.   Constitutional:       Appearance: He is well-developed.   HENT:      Head: Normocephalic and atraumatic.      Right Ear: Tympanic membrane and external ear normal.      Left Ear: Tympanic membrane and external ear normal.      Nose: Nose normal.   Eyes:      Conjunctiva/sclera: Conjunctivae normal.      Pupils: Pupils are equal, round, and reactive to light.   Neck:      Thyroid: No thyromegaly.      Vascular: No JVD.   Cardiovascular:      Rate and Rhythm: Normal rate and regular rhythm.      Heart sounds: Normal heart sounds.   Pulmonary:      Effort: Pulmonary effort is normal.      Breath sounds: Normal breath sounds.   Abdominal:      General: Bowel sounds are normal.      Palpations: Abdomen is soft.   Musculoskeletal:         General: Normal range of motion.      Cervical back: Normal range of motion and neck supple.   Lymphadenopathy:      Cervical: No cervical adenopathy.   Skin:     General: Skin is warm and dry.      Findings: No rash.   Neurological:      Mental Status: He is alert and oriented to " person, place, and time.      Cranial Nerves: No cranial nerve deficit.      Coordination: Coordination normal.   Psychiatric:         Behavior: Behavior normal.         Thought Content: Thought content normal.         Judgment: Judgment normal.        Result Review :   The following data was reviewed by: Darryl King MD on 05/06/2022:  Common labs    Common Labsle 12/22/21 12/22/21 12/22/21 2/18/22 4/28/22 4/28/22 4/28/22    1614 1614 1614  0933 0933 0933   Glucose 77    107 (A)     BUN 19    17     Creatinine 1.91 (A)  1.19  1.41 (A)     eGFR Non  Am 35 (A)  61       eGFR  Am   71       Sodium 142    142     Potassium 3.9    3.6     Chloride 103    104     Calcium 9.4    9.5     Albumin 4.20    4.30     Total Bilirubin 0.3    0.4     Alkaline Phosphatase 45    40     AST (SGOT) 18    19     ALT (SGPT) 19    17     Total Cholesterol       122   Triglycerides       147   HDL Cholesterol       30 (A)   LDL Cholesterol        66   Hemoglobin A1C      5.70 (A)    PSA    3.470      Uric Acid  7.6 (A)        (A) Abnormal value       Comments are available for some flowsheets but are not being displayed.                     Assessment and Plan    Diagnoses and all orders for this visit:    1. Primary hypertension (Primary)  Assessment & Plan:  Consider increasing hydralazine and decreasing nifedipine XL in the future and will confer with nephrology Dr. Kinza Bell.  Decreasing nifedipine XL would improve heartburn most likely.      Other orders  -     NIFEdipine XL (PROCARDIA XL) 30 MG 24 hr tablet; Take 3 tablets by mouth 3 (Three) Times a Day.  Dispense: 90 tablet; Refill: 0           Follow Up   Return in about 3 months (around 8/6/2022) for Recheck.  Patient was given instructions and counseling regarding his condition or for health maintenance advice. Please see specific information pulled into the AVS if appropriate.

## 2022-05-27 ENCOUNTER — LAB (OUTPATIENT)
Dept: LAB | Facility: HOSPITAL | Age: 72
End: 2022-05-27

## 2022-05-27 ENCOUNTER — TRANSCRIBE ORDERS (OUTPATIENT)
Dept: ADMINISTRATIVE | Facility: HOSPITAL | Age: 72
End: 2022-05-27

## 2022-05-27 ENCOUNTER — APPOINTMENT (OUTPATIENT)
Dept: SLEEP MEDICINE | Facility: HOSPITAL | Age: 72
End: 2022-05-27

## 2022-05-27 DIAGNOSIS — Z80.42 FAMILY HISTORY OF MALIGNANT NEOPLASM OF PROSTATE: ICD-10-CM

## 2022-05-27 DIAGNOSIS — Z80.42 FAMILY HISTORY OF MALIGNANT NEOPLASM OF PROSTATE: Primary | ICD-10-CM

## 2022-05-27 PROCEDURE — 36415 COLL VENOUS BLD VENIPUNCTURE: CPT

## 2022-05-27 PROCEDURE — 84153 ASSAY OF PSA TOTAL: CPT

## 2022-05-29 LAB
PSA SERPL-MCNC: 2.5 NG/ML (ref 0–4)
REFLEX: NORMAL

## 2022-06-14 ENCOUNTER — TELEPHONE (OUTPATIENT)
Dept: SLEEP MEDICINE | Facility: HOSPITAL | Age: 72
End: 2022-06-14

## 2022-06-14 NOTE — TELEPHONE ENCOUNTER
Patient fitted with a large [employer] FFM.  He currently uses a medium Airfit F30i but would like to try it in a large setting.  Additionally he would like his lower pressure increased to 8cm.  Will send message to Dr. Mcgovern at [facility].

## 2022-06-14 NOTE — TELEPHONE ENCOUNTER
Pt calling because his cough has gotten worse since he saw you Friday, he'd like something stronger than a zpack   Private Auto Walk in

## 2022-06-20 RX ORDER — PROPAFENONE HYDROCHLORIDE 150 MG/1
TABLET, COATED ORAL
Qty: 270 TABLET | Refills: 2 | Status: SHIPPED | OUTPATIENT
Start: 2022-06-20 | End: 2022-11-08

## 2022-07-06 ENCOUNTER — TELEPHONE (OUTPATIENT)
Dept: SLEEP MEDICINE | Facility: HOSPITAL | Age: 72
End: 2022-07-06

## 2022-07-06 NOTE — TELEPHONE ENCOUNTER
Received pressure change from Dr. Mcgovern.     Pressure change to AIRVIEW.    07/06/2022, 02:03 PM    by Fredy Lawrence    Settings sent to device    Set Mode to AutoSet  Set Min Pressure to 8.0 cmH2O  Set Max Pressure to 15.0 cmH2O  Set Response to Soft  Set EPR to Fulltime  Set EPR level to 3  Set Ramp enable to Off  Previous Settings    Set Mode to AutoSet  Set Min Pressure to 7.0 cmH2O  Set Max Pressure to 15.0 cmH2O  Set Response to Soft  Set EPR to Fulltime  Set EPR level to 3  Set Ramp enable to Off

## 2022-07-08 RX ORDER — ACEBUTOLOL HYDROCHLORIDE 200 MG/1
CAPSULE ORAL
Qty: 180 CAPSULE | Refills: 1 | Status: SHIPPED | OUTPATIENT
Start: 2022-07-08 | End: 2022-09-27

## 2022-07-13 ENCOUNTER — TELEPHONE (OUTPATIENT)
Dept: SLEEP MEDICINE | Facility: HOSPITAL | Age: 72
End: 2022-07-13

## 2022-07-13 NOTE — TELEPHONE ENCOUNTER
Patient called stating that he needed an order for the dreamwear full face he was given at mask fitting . Mask fitting was referred by MARLEN. Patient called [facility] and they told patient to call sleep center for order... I will have Dr write order for mask and fax to [facility], but patient is [facility] patient and has NEVER been seen here .

## 2022-07-28 RX ORDER — HYDRALAZINE HYDROCHLORIDE 50 MG/1
TABLET, FILM COATED ORAL
Qty: 90 TABLET | Refills: 3 | Status: SHIPPED | OUTPATIENT
Start: 2022-07-28

## 2022-07-28 NOTE — TELEPHONE ENCOUNTER
CC:  asthma    INTERVAL HISTORY:  Jai is a 7 y.o. male who is presenting today follow-up of his asthma.  He was last seen about 2 months ago and was started on Advair at that time.  He is significantly improved on this.  He is no longer having trouble with shortness of breath with exercise.  His nocturnal cough has resolved.  He has not had recent cough, wheeze, shortness of breath, chest pain, exercise intolerance, or activity limitations.    BIRTH HISTORY:   Full term. BW 7 lbs 14 oz.  Delivery complicated by small pneumothorax.  Was in NICU for about a day.    PAST MEDICAL HISTORY:    1) Wheezing    PAST SURGICAL HISTORY:  none    CURRENT MEDICATIONS:  Current Outpatient Medications   Medication Sig    cetirizine (ZYRTEC) 1 mg/mL syrup Take 1.3 mLs (1.3 mg total) by mouth daily as needed. (Patient taking differently: Take 5 mg by mouth daily as needed.)    fluticasone (FLONASE) 50 mcg/actuation nasal spray 1 spray by Each Nare route once daily.    fluticasone propion-salmeterol 115-21 mcg/dose (ADVAIR HFA) 115-21 mcg/actuation HFAA inhaler Inhale 2 puffs into the lungs 2 (two) times daily.    albuterol (PROAIR HFA) 90 mcg/actuation inhaler Inhale 2 puffs into the lungs every 4 (four) hours as needed for Shortness of Breath. Rescue (Patient not taking: Reported on 2022)    levalbuterol (XOPENEX HFA) 45 mcg/actuation inhaler Inhale 2 puffs into the lungs every 4 (four) hours as needed for Wheezing or Shortness of Breath (cough or chest pain). Rescue (Patient not taking: Reported on 2022)     No current facility-administered medications for this visit.       FAMILY HISTORY:  Mother, sister, and multiple other family members with asthma.  Has an uncle who  of an asthma attack at 19 years of age.  Father with asthma as a child.    SOCIAL HISTORY:  lives with mother, father, and sister.  Will be starting 2nd grade in the fall.  No pets.  No smoke exposure.    REVIEW OF SYSTEMS:  GEN:  negative   HEENT:   Patient said the pharmacy has been sending to Radha HALE for a week and she has not responded.  I see no record of the pharmacy sending the RX to her.  Paula   "negative   CV: negative  RESP:  negative   GI:  negative   :  negative   ALL/IMM:  negative   DEV: negative  MS: negative  SKIN: negative    PHYSICAL EXAM:  Pulse 88   Resp 22   Ht 4' 3" (1.295 m)   Wt 29.3 kg (64 lb 11.3 oz)   SpO2 99%   BMI 17.49 kg/m²    GEN: alert and interactive, no distress, well developed, well nourished  HEENT: normocephalic, atraumatic; sclera clear; neck supple without masses; no ear deformity  CV: regular rate and rhythm, no murmurs appreciated  RESP: lungs clear bilaterally, no accessory muscle use, no tactile fremitus  GI: soft, non-tender, non-distended  EXT: all 4 extremities warm and well perfused without clubbing, cyanosis, or edema; moves all 4 extremities equally well  SKIN:  no rashes or lesions palpated      LABORATORY/OTHER DATA:  No new    ASSESSMENT:  7 y.o. male with moderate persistent asthma that is currently well controlled.    PLAN:  Continue current medications as listed above.    RTC in 6 months, or sooner if concerns arise.    "

## 2022-08-17 ENCOUNTER — LAB (OUTPATIENT)
Dept: LAB | Facility: HOSPITAL | Age: 72
End: 2022-08-17

## 2022-08-17 ENCOUNTER — TRANSCRIBE ORDERS (OUTPATIENT)
Dept: ADMINISTRATIVE | Facility: HOSPITAL | Age: 72
End: 2022-08-17

## 2022-08-17 DIAGNOSIS — N18.30 STAGE 3 CHRONIC KIDNEY DISEASE, UNSPECIFIED WHETHER STAGE 3A OR 3B CKD: ICD-10-CM

## 2022-08-17 DIAGNOSIS — R73.03 DIABETES MELLITUS, LATENT: ICD-10-CM

## 2022-08-17 DIAGNOSIS — I12.9 HYPERTENSIVE NEPHROPATHY: ICD-10-CM

## 2022-08-17 DIAGNOSIS — E55.9 VITAMIN D DEFICIENCY: ICD-10-CM

## 2022-08-17 DIAGNOSIS — R73.03 DIABETES MELLITUS, LATENT: Primary | ICD-10-CM

## 2022-08-17 LAB
25(OH)D3 SERPL-MCNC: 47.5 NG/ML (ref 30–100)
ANION GAP SERPL CALCULATED.3IONS-SCNC: 11.9 MMOL/L (ref 5–15)
BUN SERPL-MCNC: 18 MG/DL (ref 8–23)
BUN/CREAT SERPL: 14.4 (ref 7–25)
CALCIUM SPEC-SCNC: 9.3 MG/DL (ref 8.6–10.5)
CHLORIDE SERPL-SCNC: 105 MMOL/L (ref 98–107)
CO2 SERPL-SCNC: 29.1 MMOL/L (ref 22–29)
CREAT SERPL-MCNC: 1.25 MG/DL (ref 0.76–1.27)
CREAT UR-MCNC: 187.7 MG/DL
DEPRECATED RDW RBC AUTO: 46.4 FL (ref 37–54)
EGFRCR SERPLBLD CKD-EPI 2021: 61.6 ML/MIN/1.73
ERYTHROCYTE [DISTWIDTH] IN BLOOD BY AUTOMATED COUNT: 13.7 % (ref 12.3–15.4)
GLUCOSE SERPL-MCNC: 119 MG/DL (ref 65–99)
HCT VFR BLD AUTO: 42.5 % (ref 37.5–51)
HGB BLD-MCNC: 14.7 G/DL (ref 13–17.7)
MCH RBC QN AUTO: 31.6 PG (ref 26.6–33)
MCHC RBC AUTO-ENTMCNC: 34.6 G/DL (ref 31.5–35.7)
MCV RBC AUTO: 91.4 FL (ref 79–97)
PLATELET # BLD AUTO: 254 10*3/MM3 (ref 140–450)
PMV BLD AUTO: 8.9 FL (ref 6–12)
POTASSIUM SERPL-SCNC: 3.7 MMOL/L (ref 3.5–5.2)
PROT ?TM UR-MCNC: 19.8 MG/DL
PROT/CREAT UR: 105.5 MG/G CREA (ref 0–200)
PTH-INTACT SERPL-MCNC: 37 PG/ML (ref 15–65)
RBC # BLD AUTO: 4.65 10*6/MM3 (ref 4.14–5.8)
SODIUM SERPL-SCNC: 146 MMOL/L (ref 136–145)
URATE SERPL-MCNC: 7.7 MG/DL (ref 3.4–7)
WBC NRBC COR # BLD: 6.29 10*3/MM3 (ref 3.4–10.8)

## 2022-08-17 PROCEDURE — 84550 ASSAY OF BLOOD/URIC ACID: CPT

## 2022-08-17 PROCEDURE — 80048 BASIC METABOLIC PNL TOTAL CA: CPT

## 2022-08-17 PROCEDURE — 83970 ASSAY OF PARATHORMONE: CPT

## 2022-08-17 PROCEDURE — 36415 COLL VENOUS BLD VENIPUNCTURE: CPT

## 2022-08-17 PROCEDURE — 85027 COMPLETE CBC AUTOMATED: CPT

## 2022-08-17 PROCEDURE — 82306 VITAMIN D 25 HYDROXY: CPT

## 2022-08-17 PROCEDURE — 84156 ASSAY OF PROTEIN URINE: CPT

## 2022-08-17 PROCEDURE — 82570 ASSAY OF URINE CREATININE: CPT

## 2022-08-25 ENCOUNTER — OFFICE VISIT (OUTPATIENT)
Dept: CARDIOLOGY | Facility: CLINIC | Age: 72
End: 2022-08-25

## 2022-08-25 VITALS
WEIGHT: 196.4 LBS | SYSTOLIC BLOOD PRESSURE: 122 MMHG | BODY MASS INDEX: 29.77 KG/M2 | DIASTOLIC BLOOD PRESSURE: 90 MMHG | HEIGHT: 68 IN | HEART RATE: 59 BPM

## 2022-08-25 DIAGNOSIS — I49.1 PREMATURE ATRIAL CONTRACTION: Primary | ICD-10-CM

## 2022-08-25 DIAGNOSIS — I49.3 PVC (PREMATURE VENTRICULAR CONTRACTION): ICD-10-CM

## 2022-08-25 PROCEDURE — 99214 OFFICE O/P EST MOD 30 MIN: CPT | Performed by: NURSE PRACTITIONER

## 2022-08-25 PROCEDURE — 93000 ELECTROCARDIOGRAM COMPLETE: CPT | Performed by: NURSE PRACTITIONER

## 2022-08-25 NOTE — PROGRESS NOTES
Date of Office Visit: 2022  Encounter Provider: Elizabeth Trevino, SANTO, APRN  Place of Service: [facility]  Patient Name: Wilmar Carmona  :1950        Subjective:     Chief Complaint:  Coronary artery disease, PACs/PVCs      History of Present Illness:  Wilmar Carmona is a 71 y.o. male patient of Dr. Durbin.  I am seeing this patient in the office today and I reviewed his records.    Patient has a history of hypertension, coronary artery disease, PVCs, hyperlipidemia, obstructive sleep apnea, LVH.      Patient has a (questionable) history of atrial fibrillation and was previously followed by Dr. Bailey.  He has been maintained on Rythmol therapy and Sectral.  He had an abnormal stress test in  leading to a cardiac catheterization that was felt to show normal coronary arteries with about 10% ostial tapering and narrowing but this was felt to most likely be from catheter-induced spasm.  However CT scan 2016 showed calcification of all 3 major vessels.  Follow-up echocardiogram 2018 showed normal left ventricular size and systolic function without evidence of aneurysmal disease of the aortic root.  Abnormal structure noted in the liver, possibly a cyst.  Patient had 2 weeks Zio patch in 2018 that showed sinus rhythm with occasional PVCs that were symptomatic.  No atrial arrhythmia was noted. May 2019 he had a stress perfusion study that was negative for ischemia.  Patient later reported that his gastroenterologist felt that the nifedipine might be contributing to his GERD.  This was decreased and patient was started on losartan.  Kidney enzymes did increase on higher dose and dose was decreased.  Patient was started on hydralazine.  He did not feel that either the losartan or the hydralazine was working for his blood pressure.  He is started on low-dose hydrochlorothiazide and referred to nephrology for further recommendations, especially as kidney  function was limiting medication options.  He also had baseline mild bradycardia and unable to add additional medications that would affect heart rate.       Patient presents to office today for follow-up appointment.  Patient reports he is doing well since last visit.  He is seeing nephrology who has been managing his blood pressure and increase his HCTZ yesterday.  He has been walking 20 to 25 minutes at a time a couple of times a week.  We discussed slowly increasing this.  He denies any exertional symptoms or concerns.  Denies chest pain or discomfort, shortness of breath, shortness of breath with exertion, palpitations, racing heartbeat sensation, lower extremity edema, dizziness, syncope, near syncope, falls, fatigue, or abnormal bleeding.          Past Medical History:   Diagnosis Date   • Abnormal EKG    • Abnormal stress test    • Arthritis    • Atrial fibrillation (HCC)    • BPH (benign prostatic hypertrophy)    • CAD (coronary artery disease)    • Depression    • GERD (gastroesophageal reflux disease)    • H/O medial meniscus repair of right knee 2014    Partial Menisectomy right knee   • Heart murmur    • Hyperlipidemia    • Hypertension    • Intermittent knee pain     R<L knee from cycling and running in the past -    • Mild tricuspid regurgitation    • PAC (premature atrial contraction)    • PAF (paroxysmal atrial fibrillation) (HCC)    • Palpitations    • Sleep apnea    • Tremor      Past Surgical History:   Procedure Laterality Date   • CARDIAC CATHETERIZATION  2015   • COLONOSCOPY  10/20/2014    normal -dr leon..had egd at same time   • ENDOSCOPY N/A 10/20/2021    Procedure: ESOPHAGOGASTRODUODENOSCOPY;  Surgeon: Hemal Bravo MD;  Location: [facility];  Service: Gastroenterology;  Laterality: N/A;   • HERNIA REPAIR     • KNEE SURGERY Left    • TONSILLECTOMY     • VASECTOMY       Outpatient Medications Prior to Visit   Medication Sig Dispense Refill   • acebutolol (SECTRAL) 200 MG capsule  TAKE 1 CAPSULE BY MOUTH TWICE DAILY 180 capsule 1   • clotrimazole-betamethasone (LOTRISONE) 1-0.05 % cream As Needed.     • finasteride (PROSCAR) 5 MG tablet TK 1 T PO ONCE DAILY  2   • Glucosamine-Chondroitin (OSTEO BI-FLEX REGULAR STRENGTH PO) Daily.     • hydrALAZINE (APRESOLINE) 50 MG tablet TAKE 1 TABLET BY MOUTH THREE TIMES DAILY 90 tablet 3   • hydroCHLOROthiazide (HYDRODIURIL) 25 MG tablet TAKE 1 TABLET BY MOUTH DAILY (Patient taking differently: Take 50 mg by mouth Daily.) 30 tablet 0   • lamoTRIgine (LaMICtal) 200 MG tablet Take 200 mg by mouth Daily.     • Levomefolate Glucosamine (METHYLFOLATE PO) Take 1 tablet by mouth.     • Methylcobalamin 1 MG chewable tablet Chew 509 mg.     • NIFEdipine XL (PROCARDIA XL) 30 MG 24 hr tablet Take 3 tablets by mouth 3 (Three) Times a Day. 90 tablet 0   • Omega-3 Fatty Acids (fish oil) 1000 MG capsule capsule Take  by mouth Daily.     • pantoprazole (PROTONIX) 40 MG EC tablet TAKE 1 TABLET BY MOUTH DAILY 90 tablet 3   • propafenone (RYTHMOL) 150 MG tablet TAKE 1 TABLET BY MOUTH EVERY 8 HOURS 270 tablet 2   • rosuvastatin (Crestor) 10 MG tablet Take 1 tablet by mouth Daily. 90 tablet 2   • zolpidem (AMBIEN) 5 MG tablet TAKE 1 TABLET BY MOUTH AT NIGHT AS NEEDED FOR SLEEP (Patient taking differently: Take 5 mg by mouth As Needed for Sleep.) 25 tablet 5     No facility-administered medications prior to visit.       Allergies as of 08/25/2022   • (No Known Allergies)     Social History     Socioeconomic History   • Marital status:    Tobacco Use   • Smoking status: Never Smoker   • Smokeless tobacco: Never Used   Vaping Use   • Vaping Use: Never used   Substance and Sexual Activity   • Alcohol use: Yes     Comment: Once Weekly   • Drug use: Not Currently   • Sexual activity: Defer     Family History   Problem Relation Age of Onset   • Hypertension Mother    • Colon polyps Mother    • Cancer Father         prostate   • Heart disease Father    • Coronary artery  "disease Father    • Stroke Father    • Cancer Brother         prostate   • Colon cancer Neg Hx    • Crohn's disease Neg Hx    • Irritable bowel syndrome Neg Hx    • Ulcerative colitis Neg Hx        Review of Systems   Constitutional: Negative for malaise/fatigue.   Cardiovascular:        SEE HPI   Respiratory: Negative for shortness of breath.    Hematologic/Lymphatic: Negative for bleeding problem.   Musculoskeletal: Negative for falls.   Gastrointestinal: Negative for melena.   Genitourinary: Negative for hematuria.   Neurological: Negative for dizziness.   Psychiatric/Behavioral: Negative for altered mental status.          Objective:     Vitals:    08/25/22 1408   BP: 122/90   BP Location: Left arm   Patient Position: Sitting   Pulse: 59   Weight: 89.1 kg (196 lb 6.4 oz)   Height: 172.7 cm (68\")     Body mass index is 29.86 kg/m².      PHYSICAL EXAM:  Constitutional:       General: Not in acute distress.     Appearance: Well-developed. Not diaphoretic.   Eyes:      Pupils: Pupils are equal, round, and reactive to light.   Neck:      Vascular: No JVD.   Pulmonary:      Effort: Pulmonary effort is normal. No respiratory distress.      Breath sounds: Normal breath sounds. No wheezing. No rales.   Cardiovascular:      Normal rate. Regular rhythm.      Murmurs: There is no murmur.      No gallop. No click. No rub.   Edema:     Peripheral edema absent.   Abdominal:      General: Bowel sounds are normal. There is no distension.      Palpations: Abdomen is soft.   Musculoskeletal: Normal range of motion.         General: No tenderness or deformity.      Cervical back: Neck supple. Skin:     General: Skin is warm and dry.      Findings: No erythema or rash.   Neurological:      Mental Status: Alert and oriented to person, place, and time.             ECG 12 Lead    Date/Time: 8/25/2022 2:38 PM  Performed by: Elizabeth Trevino DNP, APRN  Authorized by: Elizabeth Trevino DNP, APRN   Comparison: compared with previous ECG " from 1/13/2022  Rhythm: sinus rhythm  BPM: 59  Comments: No significant changes from previous EKGs              Assessment:       Diagnosis Plan   1. Premature atrial contraction  Ambulatory Referral to Cardiac Electrophysiology   2. PVC (premature ventricular contraction)           Plan:     1. PVCs, PACs, and questionable hx atrial fibrillation: Patient has history of atrial fibrillation noted in chart and from previous cardiologist notes from outside office though apparently no clear documentation of this but does have hx PACs and PVCs.  She has been maintained on Sectral and Rythmol.  Would recommend EP evaluation to ensure they are in agreement with continuing medication.  Patient on board with plan.  1. Normal coronary arteries on 2015 heart cath performed by Dr. Bailey at [place]. There was about 10% ostial tapering and narrowing of the right coronary artery but this was felt to be due to to likely catheter induced spasm.  Stress test in 2019 negative for ischemia.  Denies anginal symptoms.  2. Hypertension: Nephrology managing and patient reports hydrochlorothiazide was increased yesterday  3. Obstructive sleep apnea  4. Dyslipidemia: PCP managing.  On statin.    Patient to follow-up with Dr. Durbin in 6 months or follow-up sooner if needed for any new, recurrent, or worsening symptoms or concerns.  Discussed in detail signs/symptoms that warrant sooner call or follow-up to the office or ER visit.           Your medication list          Accurate as of August 25, 2022  2:47 PM. If you have any questions, ask your nurse or doctor.            CHANGE how you take these medications      Instructions Last Dose Given Next Dose Due   hydroCHLOROthiazide 25 MG tablet  Commonly known as: HYDRODIURIL  What changed: how much to take      TAKE 1 TABLET BY MOUTH DAILY       zolpidem 5 MG tablet  Commonly known as: AMBIEN  What changed: when to take this      TAKE 1 TABLET BY MOUTH AT NIGHT AS NEEDED FOR SLEEP          CONTINUE  taking these medications      Instructions Last Dose Given Next Dose Due   acebutolol 200 MG capsule  Commonly known as: SECTRAL      TAKE 1 CAPSULE BY MOUTH TWICE DAILY       clotrimazole-betamethasone 1-0.05 % cream  Commonly known as: LOTRISONE      As Needed.       finasteride 5 MG tablet  Commonly known as: PROSCAR      TK 1 T PO ONCE DAILY       fish oil 1000 MG capsule capsule      Take  by mouth Daily.       hydrALAZINE 50 MG tablet  Commonly known as: APRESOLINE      TAKE 1 TABLET BY MOUTH THREE TIMES DAILY       lamoTRIgine 200 MG tablet  Commonly known as: LaMICtal      Take 200 mg by mouth Daily.       Methylcobalamin 1 MG chewable tablet      Chew 509 mg.       METHYLFOLATE PO      Take 1 tablet by mouth.       NIFEdipine XL 30 MG 24 hr tablet  Commonly known as: PROCARDIA XL      Take 3 tablets by mouth 3 (Three) Times a Day.       OSTEO BI-FLEX REGULAR STRENGTH PO      Daily.       pantoprazole 40 MG EC tablet  Commonly known as: PROTONIX      TAKE 1 TABLET BY MOUTH DAILY       propafenone 150 MG tablet  Commonly known as: RYTHMOL      TAKE 1 TABLET BY MOUTH EVERY 8 HOURS       rosuvastatin 10 MG tablet  Commonly known as: Crestor      Take 1 tablet by mouth Daily.              The above medication changes may not have been made by this provider.  Patient's medication list was updated to reflect medications they are currently taking including medication changes made by other providers.            Thanks,    Elizabeth Trevino, SANTO, APRN  08/25/2022         Dictated utilizing Dragon dictation

## 2022-09-27 ENCOUNTER — TELEPHONE (OUTPATIENT)
Dept: CARDIOLOGY | Facility: CLINIC | Age: 72
End: 2022-09-27

## 2022-09-27 ENCOUNTER — OFFICE VISIT (OUTPATIENT)
Dept: CARDIOLOGY | Facility: CLINIC | Age: 72
End: 2022-09-27

## 2022-09-27 VITALS
WEIGHT: 199 LBS | DIASTOLIC BLOOD PRESSURE: 88 MMHG | HEIGHT: 68 IN | SYSTOLIC BLOOD PRESSURE: 128 MMHG | HEART RATE: 56 BPM | BODY MASS INDEX: 30.16 KG/M2

## 2022-09-27 DIAGNOSIS — I48.0 PAF (PAROXYSMAL ATRIAL FIBRILLATION): Primary | ICD-10-CM

## 2022-09-27 DIAGNOSIS — I49.3 PVC (PREMATURE VENTRICULAR CONTRACTION): ICD-10-CM

## 2022-09-27 DIAGNOSIS — I49.1 PREMATURE ATRIAL CONTRACTION: ICD-10-CM

## 2022-09-27 PROCEDURE — 99214 OFFICE O/P EST MOD 30 MIN: CPT

## 2022-09-27 PROCEDURE — 93000 ELECTROCARDIOGRAM COMPLETE: CPT

## 2022-09-27 NOTE — PROGRESS NOTES
"Date of Office Visit: 2022  Encounter Provider: GEOFFREY Faye  Place of Service: [facility]  Patient Name: Wilmar Carmona  :1950    Chief Complaint   Patient presents with   • PACs     New Patient Consult   :     HPI: Wilmar Carmona is a 71 y.o. male who was followed by Dr. Lin, now follows with Dr. Durbin. Referred to Dr. Barraza for PVCs.  He also has a history of PACs, HTN, BRITTA, and CAD.     Patient has possible history of PAF and PVCs and was previously followed by Dr. Lin.  He has been maintained on on rythmol and sectral for many years.  According to the patient at one point about 20 years ago he had \"40%\" PVC burden and was started on rhytmol.  He wore a monitor in 2018 which showed 1% burden of PVCs.  He saw Radha Trevino last month and there was question if whether or not he needed to be on both or any of these medications.              He presents today to establish care.  Dr. Barraza and I saw this patient.     He says that he was first diagnosed with PVCs about ~20 years ago.  At that time he was mildly symptomatic and only noted occasional skipped beats.  Apparently he wore a monitor which showed \"40%\" PVCs although I do not have that for review.    He says that during that he was under a lot of stress due to his job and felt like his PVCs would come and go based on his stress at work.  He was started on rhythmol at this time to suppress his PVCs.     He has done very well on rythmol since then and wore a monitor in 2018 which showed a 1% burden of PVCs.  He questions whether he should be on rhythmol and sectral.      When he has PVCs he is mildly symptomatic.  He really only notes \"skipped beats\".  He denies any chest pain, dyspnea, or palpitations.      Normal echo in 2018. EF of 60%.     Past Medical History:   Diagnosis Date   • Abnormal EKG    • Abnormal stress test    • Arthritis    • Atrial fibrillation (HCC)    • BPH (benign prostatic " hypertrophy)    • CAD (coronary artery disease)    • Depression    • GERD (gastroesophageal reflux disease)    • H/O medial meniscus repair of right knee 2014    Partial Menisectomy right knee   • Heart murmur    • Hyperlipidemia    • Hypertension    • Intermittent knee pain     R<L knee from cycling and running in the past -    • Mild tricuspid regurgitation    • PAC (premature atrial contraction)    • PAF (paroxysmal atrial fibrillation) (HCC)    • Palpitations    • Sleep apnea    • Tremor        Past Surgical History:   Procedure Laterality Date   • CARDIAC CATHETERIZATION  2015   • COLONOSCOPY  10/20/2014    normal -dr leon..had egd at same time   • ENDOSCOPY N/A 10/20/2021    Procedure: ESOPHAGOGASTRODUODENOSCOPY;  Surgeon: Hemal Bravo MD;  Location: [facility];  Service: Gastroenterology;  Laterality: N/A;   • HERNIA REPAIR     • KNEE SURGERY Left    • TONSILLECTOMY     • VASECTOMY         Social History     Socioeconomic History   • Marital status:    Tobacco Use   • Smoking status: Never Smoker   • Smokeless tobacco: Never Used   Vaping Use   • Vaping Use: Never used   Substance and Sexual Activity   • Alcohol use: Yes     Comment: Once Weekly   • Drug use: Not Currently   • Sexual activity: Defer       Family History   Problem Relation Age of Onset   • Hypertension Mother    • Colon polyps Mother    • Cancer Father         prostate   • Heart disease Father    • Coronary artery disease Father    • Stroke Father    • Cancer Brother         prostate   • Colon cancer Neg Hx    • Crohn's disease Neg Hx    • Irritable bowel syndrome Neg Hx    • Ulcerative colitis Neg Hx        Review of Systems   Constitutional: Negative for chills, fever and malaise/fatigue.   Cardiovascular: Negative for chest pain, dyspnea on exertion, leg swelling, near-syncope, orthopnea, palpitations, paroxysmal nocturnal dyspnea and syncope.   Respiratory: Negative for cough and shortness of breath.     Hematologic/Lymphatic: Negative.    Musculoskeletal: Negative for joint pain, joint swelling and myalgias.   Gastrointestinal: Negative for abdominal pain, diarrhea, melena, nausea and vomiting.   Genitourinary: Negative for frequency and hematuria.   Neurological: Negative for light-headedness, numbness, paresthesias and seizures.   Allergic/Immunologic: Negative.    All other systems reviewed and are negative.      No Known Allergies      Current Outpatient Medications:   •  acebutolol (SECTRAL) 200 MG capsule, TAKE 1 CAPSULE BY MOUTH TWICE DAILY, Disp: 180 capsule, Rfl: 1  •  clotrimazole-betamethasone (LOTRISONE) 1-0.05 % cream, As Needed., Disp: , Rfl:   •  finasteride (PROSCAR) 5 MG tablet, TK 1 T PO ONCE DAILY, Disp: , Rfl: 2  •  Glucosamine-Chondroitin (OSTEO BI-FLEX REGULAR STRENGTH PO), Daily., Disp: , Rfl:   •  hydrALAZINE (APRESOLINE) 50 MG tablet, TAKE 1 TABLET BY MOUTH THREE TIMES DAILY (Patient taking differently: Take 75 mg by mouth 3 (Three) Times a Day.), Disp: 90 tablet, Rfl: 3  •  hydroCHLOROthiazide (HYDRODIURIL) 25 MG tablet, TAKE 1 TABLET BY MOUTH DAILY, Disp: 30 tablet, Rfl: 0  •  lamoTRIgine (LaMICtal) 200 MG tablet, Take 200 mg by mouth Daily., Disp: , Rfl:   •  Levomefolate Glucosamine (METHYLFOLATE PO), Take 1 tablet by mouth., Disp: , Rfl:   •  Methylcobalamin 1 MG chewable tablet, Chew 509 mg., Disp: , Rfl:   •  NIFEdipine XL (PROCARDIA XL) 30 MG 24 hr tablet, Take 3 tablets by mouth 3 (Three) Times a Day., Disp: 90 tablet, Rfl: 0  •  Omega-3 Fatty Acids (fish oil) 1000 MG capsule capsule, Take  by mouth Daily., Disp: , Rfl:   •  pantoprazole (PROTONIX) 40 MG EC tablet, TAKE 1 TABLET BY MOUTH DAILY, Disp: 90 tablet, Rfl: 3  •  propafenone (RYTHMOL) 150 MG tablet, TAKE 1 TABLET BY MOUTH EVERY 8 HOURS, Disp: 270 tablet, Rfl: 2  •  rosuvastatin (Crestor) 10 MG tablet, Take 1 tablet by mouth Daily., Disp: 90 tablet, Rfl: 2      Objective:     Vitals:    09/27/22 0923   BP: 128/88   Pulse:  "56   Weight: 90.3 kg (199 lb)   Height: 172.7 cm (68\")     Body mass index is 30.26 kg/m².    PHYSICAL EXAM:    Vitals Reviewed.   General Appearance: No acute distress, well developed and well nourished.   Eyes: Conjunctiva and lids: No erythema, swelling, or discharge. Sclera non-icteric.   HENT: Atraumatic, normocephalic. External eyes, ears, and nose normal.   Respiratory: No signs of respiratory distress. Respiration rhythm and depth normal.   Clear to auscultation. No rales, crackles, rhonchi, or wheezing auscultated.   Cardiovascular:  Heart Rate and Rhythm: Normal, Heart Sounds: Normal S1 and S2. No S3 or S4 noted.  Gastrointestinal:  Abdomen soft, non-distended, non-tender.   Musculoskeletal: Normal movement of extremities  Skin: Warm and dry.   Psychiatric: Patient alert and oriented to person, place, and time. Speech and behavior appropriate. Normal mood and affect.       ECG 12 Lead    Date/Time: 9/27/2022 10:49 AM  Performed by: Erwin Paz APRN  Authorized by: Erwin Paz APRN   Comparison: compared with previous ECG   Similar to previous ECG  Rhythm: sinus rhythm  BPM: 56                Assessment:       Diagnosis Plan   1. PAF (paroxysmal atrial fibrillation) ([facility])  Cardiac Event Monitor   2. PVC (premature ventricular contraction)     3. Premature atrial contraction            Plan:   1. PAF-- noted by previous cardiologist but no clear documentation of atrial fibrillation.  Would not recommend AC.       2-3. PVC, PACS-- he has a 20 year history of PVC and has been on rhythmol and sectral since that time.  He had monitor in 2018 which showed a 1% burden of PVCs. When he does have them he is mildly symptomatic. Normal echo in 2018.         I discussed the patient with Dr. Barraza. We are going to have him stop sectral today, due to unfamiliarity with this medication.  He will come back in two weeks and have a two week event monitor placed.  If the monitor is normal then we will consider " stopping the rythmol as well.  If there is an increase in his PVCS then would consider a different BB (metoprolol, atenolol, carvedilol).       I will call with monitor results and further recommendations.         As always, it has been a pleasure to participate in your patient's care.      Sincerely,         GEOFFREY Faye

## 2022-09-29 ENCOUNTER — TELEPHONE (OUTPATIENT)
Dept: CARDIOLOGY | Facility: CLINIC | Age: 72
End: 2022-09-29

## 2022-09-29 NOTE — TELEPHONE ENCOUNTER
Patient seen on 9/27 by Erwin, sectral was stopped, and since then b/p has gone way up, 188/106.      Because of the high b/p, he started back on that medication.

## 2022-09-30 NOTE — TELEPHONE ENCOUNTER
Spoke with patient.      His heart rate was 74-75 off of the medication.    Today he checked his b/p & HR while on the phone with me, 136/71 HR 61.    Per verbal from Erwin, stay on this medication over the weekend and he will discuss with Dr. Barraza on Monday and they will call patient with further recommendations.    Patient was asking about propafenone, whether he was on too much of that medication, as previously suggested  by Radha Trevino.

## 2022-10-07 ENCOUNTER — TELEPHONE (OUTPATIENT)
Dept: INTERNAL MEDICINE | Facility: CLINIC | Age: 72
End: 2022-10-07

## 2022-10-07 RX ORDER — HYDROCORTISONE ACETATE 25 MG/1
25 SUPPOSITORY RECTAL 2 TIMES DAILY PRN
Qty: 12 SUPPOSITORY | Refills: 2 | Status: SHIPPED | OUTPATIENT
Start: 2022-10-07 | End: 2022-11-04

## 2022-10-07 NOTE — TELEPHONE ENCOUNTER
PATIENT IS CALLING BACK ON THIS REQUEST.  HE STATES HE WAS TOLD THAT DR. SAWYER IS OUT TODAY AND THAT IT WOULD BE SENT TO [facility] CARLSON TO TAKE CARE OF FOR HIM.  PATIENT IS CONCERNED BECAUSE THE WEEKEND IS COMING UP AND HE IS ALSO GOING OUT OF TOWN AND WOULD LIKE TO HAVE SOMETHING CALLED IN FOR HIS SYMPTOMS.  PLEASE CONTACT PATIENT TO ADVISE.

## 2022-10-07 NOTE — TELEPHONE ENCOUNTER
Caller: Wilmar Carmona    Relationship: Self    Best call back number: [phone]    What medication are you requesting: SOMETHING TO TREAT INTERNAL HEMORRHOID THAT BLEEDS DURING BOWEL MOVEMENT     What are your current symptoms: BLEEDING DURING BOWEL MOVEMENT    How long have you been experiencing symptoms: 10.05.22    Have you had these symptoms before:    [x] Yes, 20 YEARS AGO  Have you been treated for these symptoms before:   [x] Yes  [] No    If a prescription is needed, what is your preferred pharmacy and phone number: [id number] [id number] AT [facility] - [phone]  - [phone]   [phone]     Additional notes: PATIENT IS LEAVING TOWN TOMORROW MORNING AND IS NEEDING TO  THE PRESCRIPTION THIS AFTERNOON

## 2022-10-24 RX ORDER — PANTOPRAZOLE SODIUM 40 MG/1
40 TABLET, DELAYED RELEASE ORAL DAILY
Qty: 90 TABLET | Refills: 0 | Status: SHIPPED | OUTPATIENT
Start: 2022-10-24 | End: 2023-01-17

## 2022-10-28 ENCOUNTER — TELEPHONE (OUTPATIENT)
Dept: SLEEP MEDICINE | Facility: HOSPITAL | Age: 72
End: 2022-10-28

## 2022-10-28 ENCOUNTER — OFFICE VISIT (OUTPATIENT)
Dept: SLEEP MEDICINE | Facility: HOSPITAL | Age: 72
End: 2022-10-28

## 2022-10-28 VITALS
BODY MASS INDEX: 29.7 KG/M2 | OXYGEN SATURATION: 97 % | SYSTOLIC BLOOD PRESSURE: 135 MMHG | WEIGHT: 196 LBS | HEART RATE: 58 BPM | HEIGHT: 68 IN | DIASTOLIC BLOOD PRESSURE: 71 MMHG

## 2022-10-28 DIAGNOSIS — G47.33 OSA (OBSTRUCTIVE SLEEP APNEA): Primary | ICD-10-CM

## 2022-10-28 DIAGNOSIS — I48.0 PAF (PAROXYSMAL ATRIAL FIBRILLATION): ICD-10-CM

## 2022-10-28 PROCEDURE — G0463 HOSPITAL OUTPT CLINIC VISIT: HCPCS

## 2022-10-28 NOTE — PROGRESS NOTES
[facility]  Telephone: [phone] / Fax: [phone] [place]  Telephone: [phone] / Fax: [phone] Glenys Crowder    PCP: Darryl King MD    Reason for visit: BRITTA f/u    Wilmar Carmona is a 72 y.o.male  was last seen at [facility] sleep lab in May-at [facility].  He does not have any pulmonary problems and will therefore be seeing us here from now on. I reviewed prior records. 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 He feels that mask presses the side of his head and It causes eye muscle issues-such as double vision lasting for few minutes. He uses FFM. It works well. Visual issues triggered by laying on his side. Machine set at 7-15cm H2O. Wife reports he snores occasionally despite CPAP. He feels that initial pressures may be too low. His sleep schedule is 11pm-7:30am. ESS is 7.    SH- no tobacco, no alcohol, 1 tea per day, no energy drinks.    ROS negative.    FRANCOIS Morales    Current Medications:    Current Outpatient Medications:   •  clotrimazole-betamethasone (LOTRISONE) 1-0.05 % cream, As Needed., Disp: , Rfl:   •  finasteride (PROSCAR) 5 MG tablet, TK 1 T PO ONCE DAILY, Disp: , Rfl: 2  •  Glucosamine-Chondroitin (OSTEO BI-FLEX REGULAR STRENGTH PO), Daily., Disp: , Rfl:   •  hydrALAZINE (APRESOLINE) 50 MG tablet, TAKE 1 TABLET BY MOUTH THREE TIMES DAILY (Patient taking differently: Take 75 mg by mouth 3 (Three) Times a Day.), Disp: 90 tablet, Rfl: 3  •  hydroCHLOROthiazide (HYDRODIURIL) 25 MG tablet, TAKE 1 TABLET BY MOUTH DAILY, Disp: 30 tablet, Rfl: 0  •  hydrocortisone (ANUSOL-HC) 25 MG suppository, Insert 1 suppository into the rectum 2 (Two) Times a Day As Needed for Hemorrhoids., Disp: 12 suppository, Rfl: 2  •  lamoTRIgine (LaMICtal) 200 MG tablet, Take 200 mg by mouth Daily., Disp: , Rfl:   •  Levomefolate Glucosamine (METHYLFOLATE PO), Take 1 tablet by mouth., Disp: , Rfl:   •  Methylcobalamin 1 MG chewable tablet, Chew 509 mg., Disp:  ", Rfl:   •  NIFEdipine XL (PROCARDIA XL) 30 MG 24 hr tablet, Take 3 tablets by mouth 3 (Three) Times a Day., Disp: 90 tablet, Rfl: 0  •  Omega-3 Fatty Acids (fish oil) 1000 MG capsule capsule, Take  by mouth Daily., Disp: , Rfl:   •  pantoprazole (PROTONIX) 40 MG EC tablet, TAKE 1 TABLET BY MOUTH DAILY, Disp: 90 tablet, Rfl: 0  •  propafenone (RYTHMOL) 150 MG tablet, TAKE 1 TABLET BY MOUTH EVERY 8 HOURS, Disp: 270 tablet, Rfl: 2  •  rosuvastatin (Crestor) 10 MG tablet, Take 1 tablet by mouth Daily., Disp: 90 tablet, Rfl: 2   also entered in Sleep Questionnaire    Patient  has a past medical history of Abnormal EKG, Abnormal stress test, Arthritis, Atrial fibrillation (HCC), BPH (benign prostatic hypertrophy), CAD (coronary artery disease), Depression, GERD (gastroesophageal reflux disease), H/O medial meniscus repair of right knee (2014), Heart murmur, Hyperlipidemia, Hypertension, Intermittent knee pain, Mild tricuspid regurgitation, PAC (premature atrial contraction), PAF (paroxysmal atrial fibrillation) ([facility]), Palpitations, Sleep apnea, and Tremor.    I have reviewed the Past Medical History, Past Surgical History, Social History and Family History.    Vital Signs /71   Pulse 58   Ht 172.7 cm (68\")   Wt 88.9 kg (196 lb)   SpO2 97%   BMI 29.80 kg/m²  Body mass index is 29.8 kg/m².    General Alert and oriented. No acute distress noted   Pharynx/Throat Class 2  Mallampati airway, large tongue, no evidence of redundant lateral pharyngeal tissue. No oral lesions. No thrush. Moist mucous membranes.   Head Normocephalic. Symmetrical. Atraumatic.    Nose No septal deviation. No drainage   Chest Wall Normal shape. Symmetric expansion with respiration. No tenderness.   Neck Trachea midline, no thyromegaly or adenopathy    Lungs Clear to auscultation bilaterally. No wheezes. No rhonchi. No rales. Respirations regular, even and unlabored.   Heart Regular rhythm and normal rate. Normal S1 and S2. No murmur "   Abdomen Soft, non-tender and non-distended. Normal bowel sounds. No masses.   Extremities Moves all extremities well. No edema   Psychiatric Normal mood and affect.     Testing:  · Download 7/31/22-10/28/22 43% use with average nightly use of 7 hours and 5 minutes on auto CPAP 7-15cm H2O avg pr 10.5cm H2O, AHI 2.3, leak of 8.6L.min.    Study-[facility]-supine AHI 56/hr.    Impression:  1. BRITTA (obstructive sleep apnea)    2. PAF (paroxysmal atrial fibrillation) ([facility])          Plan:  Change pressure to 9-15 cm H2O as he feels that initial pressure may be too low.  I will order supplies from [employer]. His machine is about 4 years old. I advised Wilmar to increase compliance. He has PAF. Consistent CPAP usage will be very important to prevent adverse health related outcomes.  I reviewed original sleep study and recent download report with patient.  He uses the machine and benefits from its use in terms of reduction of hypersomnia and snoring.  AHI appears to be within adequate range. I reviewed download report and original sleep study report with the patient.  I will send order to [facility] to replace CPAP supplies.      Follow up with Dr. Mcgovern in 6 months    Thank you for allowing me to participate in your patient's care.      GEOFFREY Buckley  [facility]  Phone: [phone]      Part of this note may be an electronic transcription/translation of spoken language to printed text using the Dragon Dictation System.

## 2022-11-04 ENCOUNTER — OFFICE VISIT (OUTPATIENT)
Dept: INTERNAL MEDICINE | Facility: CLINIC | Age: 72
End: 2022-11-04

## 2022-11-04 VITALS
SYSTOLIC BLOOD PRESSURE: 132 MMHG | HEART RATE: 61 BPM | TEMPERATURE: 96.9 F | BODY MASS INDEX: 29.35 KG/M2 | DIASTOLIC BLOOD PRESSURE: 88 MMHG | OXYGEN SATURATION: 97 % | WEIGHT: 193 LBS

## 2022-11-04 DIAGNOSIS — K62.89 PERIANAL CYST: Primary | ICD-10-CM

## 2022-11-04 PROCEDURE — 99213 OFFICE O/P EST LOW 20 MIN: CPT | Performed by: FAMILY MEDICINE

## 2022-11-04 RX ORDER — ACEBUTOLOL HYDROCHLORIDE 200 MG/1
200 CAPSULE ORAL 2 TIMES DAILY
COMMUNITY
Start: 2022-10-14 | End: 2023-01-17

## 2022-11-04 RX ORDER — ROSUVASTATIN CALCIUM 10 MG/1
10 TABLET, COATED ORAL DAILY
Qty: 90 TABLET | Refills: 2 | Status: SHIPPED | OUTPATIENT
Start: 2022-11-04

## 2022-11-04 RX ORDER — TRETINOIN 1 MG/G
CREAM TOPICAL
COMMUNITY
Start: 2022-09-30 | End: 2023-01-17

## 2022-11-04 NOTE — PROGRESS NOTES
"Chief Complaint  Hemorrhoids    Subjective        Wilmar Carmona presents to [facility]  History of Present Illness  The patient presents for hemorrhoids.     Mr. Carmona states he has a small left-sided external bump. Denies pain. He reports that he has passed blood. Previously, he had an internal hemorrhoid that bled. The hemorrhoid went away after using a suppository.   Objective   Vital Signs:  /88 (BP Location: Left arm, Patient Position: Sitting, Cuff Size: Adult)   Pulse 61   Temp 96.9 °F (36.1 °C) (Tympanic)   Wt 87.5 kg (193 lb)   SpO2 97%   BMI 29.35 kg/m²   Estimated body mass index is 29.35 kg/m² as calculated from the following:    Height as of 10/28/22: 172.7 cm (68\").    Weight as of this encounter: 87.5 kg (193 lb).          Physical Exam  Vitals reviewed.   Constitutional:       Appearance: He is well-developed.   HENT:      Head: Normocephalic and atraumatic.      Right Ear: Tympanic membrane and external ear normal.      Left Ear: Tympanic membrane and external ear normal.      Nose: Nose normal.   Eyes:      Conjunctiva/sclera: Conjunctivae normal.      Pupils: Pupils are equal, round, and reactive to light.   Neck:      Thyroid: No thyromegaly.      Vascular: No JVD.   Cardiovascular:      Rate and Rhythm: Normal rate and regular rhythm.      Heart sounds: Normal heart sounds.   Pulmonary:      Effort: Pulmonary effort is normal.      Breath sounds: Normal breath sounds.   Abdominal:      General: Bowel sounds are normal.      Palpations: Abdomen is soft.   Musculoskeletal:         General: Normal range of motion.      Cervical back: Normal range of motion and neck supple.   Lymphadenopathy:      Cervical: No cervical adenopathy.   Skin:     General: Skin is warm and dry.      Findings: No rash.      Comments: perianal sebaceous cyst right at the very periphery of the beginning of the anus.   Neurological:      Mental Status: He is alert and oriented to " person, place, and time.      Cranial Nerves: No cranial nerve deficit.      Coordination: Coordination normal.   Psychiatric:         Behavior: Behavior normal.         Thought Content: Thought content normal.         Judgment: Judgment normal.        Result Review :                Assessment and Plan   Diagnoses and all orders for this visit:    1. Perianal cyst (Primary)  -     Ambulatory Referral to Colorectal Surgery    Other orders  -     hydrocortisone 2.5 % cream; Apply 1 application topically to the appropriate area as directed 2 (Two) Times a Day.  Dispense: 56 g; Refill: 2             Follow Up   No follow-ups on file.  Patient was given instructions and counseling regarding his condition or for health maintenance advice. Please see specific information pulled into the AVS if appropriate.     Transcribed from ambient dictation for Darryl King MD by Dania Liu.  11/04/22   09:57 EDT    Patient or patient representative verbalized consent to the visit recording.  I have personally performed the services described in this document as transcribed by the above individual, and it is both accurate and complete.

## 2022-11-06 NOTE — PROGRESS NOTES
I spoke with patient via telephone this morning.  His chest x-ray is showing it to possible hairline fractures in the right third and fourth ribs.  He will continue with deep breathing, lidocaine patches for discomfort.  His chest x-ray is also showing persistent consolidation of the left lower lung base which according to the radiologist is more prominent than his x-ray in February 2020.  He was treated for pneumonia at that time.  I discussed with Dr. spaulding, PCP.  We will treat with Levaquin 500 mg daily x10 days and order CT of the chest for further evaluation of persistent consolidation of the left lower lung field.  Patient is afebrile and feeling well besides the pain in his right rib cage.  He will let me know of any questions or concerns and we will get the CT ordered for about 2 weeks.  
show

## 2022-11-08 ENCOUNTER — TELEPHONE (OUTPATIENT)
Dept: CARDIOLOGY | Facility: CLINIC | Age: 72
End: 2022-11-08

## 2022-11-08 NOTE — TELEPHONE ENCOUNTER
Cancel appt with Dr. Mayes and schedule with Dr. Durbin around that time, he is a long time patient of hers

## 2022-11-16 ENCOUNTER — LAB (OUTPATIENT)
Dept: LAB | Facility: HOSPITAL | Age: 72
End: 2022-11-16

## 2022-11-16 ENCOUNTER — TRANSCRIBE ORDERS (OUTPATIENT)
Dept: LAB | Facility: HOSPITAL | Age: 72
End: 2022-11-16

## 2022-11-16 DIAGNOSIS — N18.30 STAGE 3 CHRONIC KIDNEY DISEASE, UNSPECIFIED WHETHER STAGE 3A OR 3B CKD: ICD-10-CM

## 2022-11-16 DIAGNOSIS — N18.30 STAGE 3 CHRONIC KIDNEY DISEASE, UNSPECIFIED WHETHER STAGE 3A OR 3B CKD: Primary | ICD-10-CM

## 2022-11-16 LAB
ANION GAP SERPL CALCULATED.3IONS-SCNC: 6.2 MMOL/L (ref 5–15)
BUN SERPL-MCNC: 17 MG/DL (ref 8–23)
BUN/CREAT SERPL: 14 (ref 7–25)
CALCIUM SPEC-SCNC: 9.6 MG/DL (ref 8.6–10.5)
CHLORIDE SERPL-SCNC: 105 MMOL/L (ref 98–107)
CO2 SERPL-SCNC: 31.8 MMOL/L (ref 22–29)
CREAT SERPL-MCNC: 1.21 MG/DL (ref 0.76–1.27)
EGFRCR SERPLBLD CKD-EPI 2021: 63.6 ML/MIN/1.73
GLUCOSE SERPL-MCNC: 85 MG/DL (ref 65–99)
POTASSIUM SERPL-SCNC: 3.8 MMOL/L (ref 3.5–5.2)
SODIUM SERPL-SCNC: 143 MMOL/L (ref 136–145)

## 2022-11-16 PROCEDURE — 80048 BASIC METABOLIC PNL TOTAL CA: CPT

## 2022-11-16 PROCEDURE — 36415 COLL VENOUS BLD VENIPUNCTURE: CPT

## 2022-11-18 ENCOUNTER — TELEPHONE (OUTPATIENT)
Dept: SLEEP MEDICINE | Facility: HOSPITAL | Age: 72
End: 2022-11-18

## 2022-11-18 NOTE — TELEPHONE ENCOUNTER
----- Message from NATASHA Chacon sent at 11/18/2022 11:04 AM EST -----  Done ,  programmed his dads machine   ----- Message -----  From: Vee Hartmann APRN  Sent: 11/17/2022   4:30 PM EST  To: NATASHA Chacon, #    Pt called [facility], but he is now being see at [facility]. Can we please ask him to bring the card to the sleep lab to get it reprogrammed to 9-15cm H2O, or give him a new card with 9-15cm H2O prescription? Please see below.    Thanks!    > From: Rosario Weems   > To: Froilan Lobo   > Sent: 11/17/2022 11:43 AM  >  Pt is wanting to know if card in his dad's cpap machine can be reprogrammed for him to use machine. Â Pt is currently using his own machine but he sleeps in two different places and does not want to keep having to move machine. Â He and his dad both use [employer] machines but they are different. Â Please call pt at above number to advise.

## 2022-11-30 ENCOUNTER — TRANSCRIBE ORDERS (OUTPATIENT)
Dept: ADMINISTRATIVE | Facility: HOSPITAL | Age: 72
End: 2022-11-30

## 2022-11-30 ENCOUNTER — LAB (OUTPATIENT)
Dept: LAB | Facility: HOSPITAL | Age: 72
End: 2022-11-30

## 2022-11-30 DIAGNOSIS — N40.1 BENIGN PROSTATIC HYPERPLASIA WITH LOWER URINARY TRACT SYMPTOMS, SYMPTOM DETAILS UNSPECIFIED: Primary | ICD-10-CM

## 2022-11-30 DIAGNOSIS — N40.1 BENIGN PROSTATIC HYPERPLASIA WITH LOWER URINARY TRACT SYMPTOMS, SYMPTOM DETAILS UNSPECIFIED: ICD-10-CM

## 2022-11-30 LAB — PSA SERPL-MCNC: 2.69 NG/ML (ref 0–4)

## 2022-11-30 PROCEDURE — 84153 ASSAY OF PSA TOTAL: CPT

## 2022-11-30 PROCEDURE — 36415 COLL VENOUS BLD VENIPUNCTURE: CPT

## 2022-12-22 ENCOUNTER — OFFICE VISIT (OUTPATIENT)
Dept: INTERNAL MEDICINE | Facility: CLINIC | Age: 72
End: 2022-12-22
Payer: MEDICARE

## 2022-12-22 VITALS
HEART RATE: 67 BPM | BODY MASS INDEX: 29.98 KG/M2 | HEIGHT: 68 IN | DIASTOLIC BLOOD PRESSURE: 68 MMHG | OXYGEN SATURATION: 95 % | SYSTOLIC BLOOD PRESSURE: 127 MMHG | TEMPERATURE: 98.9 F | WEIGHT: 197.8 LBS

## 2022-12-22 DIAGNOSIS — J06.9 UPPER RESPIRATORY TRACT INFECTION, UNSPECIFIED TYPE: Primary | ICD-10-CM

## 2022-12-22 PROCEDURE — 99213 OFFICE O/P EST LOW 20 MIN: CPT

## 2022-12-22 RX ORDER — HYDRALAZINE HYDROCHLORIDE 50 MG/1
75 TABLET, FILM COATED ORAL
COMMUNITY
Start: 2022-11-23 | End: 2023-01-17

## 2022-12-22 RX ORDER — BENZONATATE 100 MG/1
CAPSULE ORAL
COMMUNITY
Start: 2022-12-19 | End: 2023-01-17

## 2022-12-22 RX ORDER — ESCITALOPRAM OXALATE 10 MG/1
10 TABLET ORAL DAILY
COMMUNITY
Start: 2022-10-24

## 2022-12-22 RX ORDER — METHYLPREDNISOLONE 4 MG/1
TABLET ORAL
Qty: 21 TABLET | Refills: 0 | Status: SHIPPED | OUTPATIENT
Start: 2022-12-22 | End: 2023-01-17

## 2022-12-22 RX ORDER — AZITHROMYCIN 250 MG/1
TABLET, FILM COATED ORAL
Qty: 6 TABLET | Refills: 0 | Status: SHIPPED | OUTPATIENT
Start: 2022-12-22 | End: 2023-01-17

## 2022-12-22 RX ORDER — DEXTROMETHORPHAN HYDROBROMIDE AND PROMETHAZINE HYDROCHLORIDE 15; 6.25 MG/5ML; MG/5ML
5 SYRUP ORAL 4 TIMES DAILY PRN
Qty: 240 ML | Refills: 1 | Status: SHIPPED | OUTPATIENT
Start: 2022-12-22 | End: 2023-01-17

## 2022-12-22 RX ORDER — NIFEDIPINE 30 MG/1
30 TABLET, FILM COATED, EXTENDED RELEASE ORAL 3 TIMES DAILY
COMMUNITY
Start: 2022-11-23 | End: 2023-01-17

## 2022-12-22 NOTE — PROGRESS NOTES
"Chief Complaint  Cough and URI    Subjective        Wilmar Carmona presents to [facility]  History of Present Illness  72 y.o. male presenting cough and upper respiratory symptoms.  Patient Dr. King.  Symptoms started late Friday and got worse through Sunday.  States coughing his head off.  Allegra seems to help.  States some wheezing after bouts of coughing.  Denies fever or changes in appetite.  Went to the [facility] 2 days ago and was started on albuterol and Tessalon Perles.  Tested negative for COVID.  They did not test for flu.    Cough    URI   Associated symptoms include coughing.       Objective   Vital Signs:  /68 (BP Location: Left arm, Patient Position: Sitting, Cuff Size: Adult)   Pulse 67   Temp 98.9 °F (37.2 °C) (Infrared)   Ht 172.7 cm (68\")   Wt 89.7 kg (197 lb 12.8 oz)   SpO2 95%   BMI 30.08 kg/m²   Estimated body mass index is 30.08 kg/m² as calculated from the following:    Height as of this encounter: 172.7 cm (68\").    Weight as of this encounter: 89.7 kg (197 lb 12.8 oz).          Physical Exam  Vitals reviewed.   Constitutional:       Appearance: Normal appearance.   HENT:      Head: Normocephalic.      Right Ear: Tympanic membrane normal.      Left Ear: Tympanic membrane normal.      Nose: Congestion present.      Mouth/Throat:      Mouth: Mucous membranes are moist.      Pharynx: Posterior oropharyngeal erythema present.   Eyes:      Pupils: Pupils are equal, round, and reactive to light.   Cardiovascular:      Rate and Rhythm: Normal rate.      Pulses: Normal pulses.      Heart sounds: Normal heart sounds.   Pulmonary:      Effort: Pulmonary effort is normal.      Breath sounds: Wheezing present.   Musculoskeletal:         General: Normal range of motion.      Cervical back: Normal range of motion.   Skin:     General: Skin is warm.      Capillary Refill: Capillary refill takes less than 2 seconds.   Neurological:      General: No focal deficit " present.      Mental Status: He is alert and oriented to person, place, and time.   Psychiatric:         Mood and Affect: Mood normal.         Behavior: Behavior normal.         Thought Content: Thought content normal.         Judgment: Judgment normal.        Result Review :    Common labs    Common Labs 8/17/22 8/17/22 8/17/22 11/16/22 11/30/22    1110 1110 1110     Glucose  119 (A)  85    BUN  18  17    Creatinine  1.25  1.21    Sodium  146 (A)  143    Potassium  3.7  3.8    Chloride  105  105    Calcium  9.3  9.6    WBC 6.29       Hemoglobin 14.7       Hematocrit 42.5       Platelets 254       PSA     2.690   Uric Acid   7.7 (A)     (A) Abnormal value            Current Outpatient Medications on File Prior to Visit   Medication Sig Dispense Refill   • acebutolol (SECTRAL) 200 MG capsule Take 1 capsule by mouth 2 (Two) Times a Day.     • benzonatate (TESSALON) 100 MG capsule TAKE 1 CAPSULE BY MOUTH THREE TIMES DAILY FOR 5 DAYS AS NEEDED     • clotrimazole-betamethasone (LOTRISONE) 1-0.05 % cream As Needed.     • escitalopram (LEXAPRO) 10 MG tablet Take 10 mg by mouth Daily.     • finasteride (PROSCAR) 5 MG tablet TK 1 T PO ONCE DAILY  2   • Glucosamine-Chondroitin (OSTEO BI-FLEX REGULAR STRENGTH PO) Daily.     • hydrALAZINE (APRESOLINE) 50 MG tablet TAKE 1 TABLET BY MOUTH THREE TIMES DAILY (Patient taking differently: Take 75 mg by mouth 3 (Three) Times a Day.) 90 tablet 3   • hydrALAZINE (APRESOLINE) 50 MG tablet Take 75 mg by mouth.     • hydroCHLOROthiazide (HYDRODIURIL) 25 MG tablet TAKE 1 TABLET BY MOUTH DAILY 30 tablet 0   • lamoTRIgine (LaMICtal) 200 MG tablet Take 200 mg by mouth Daily.     • Levomefolate Glucosamine (METHYLFOLATE PO) Take 1 tablet by mouth.     • Methylcobalamin 1000 MCG tablet dispersible Take 0.5 tablets by mouth.     • NIFEdipine CC (ADALAT CC) 30 MG 24 hr tablet Take 30 mg by mouth 3 (Three) Times a Day.     • NIFEdipine XL (PROCARDIA XL) 30 MG 24 hr tablet Take 3 tablets by mouth 3  (Three) Times a Day. 90 tablet 0   • Omega-3 Fatty Acids (fish oil) 1000 MG capsule capsule Take  by mouth Daily.     • pantoprazole (PROTONIX) 40 MG EC tablet TAKE 1 TABLET BY MOUTH DAILY 90 tablet 0   • rosuvastatin (CRESTOR) 10 MG tablet TAKE 1 TABLET BY MOUTH DAILY 90 tablet 2   • hydrocortisone 2.5 % cream Apply 1 application topically to the appropriate area as directed 2 (Two) Times a Day. 56 g 2   • Methylcobalamin 1 MG chewable tablet Chew 509 mg.     • tretinoin (RETIN-A) 0.1 % cream APPLY TOPICALLY TO NOSE EVERY NIGHT AT BEDTIME       No current facility-administered medications on file prior to visit.                 Assessment and Plan   Diagnoses and all orders for this visit:    1. Upper respiratory tract infection, unspecified type (Primary)  -     methylPREDNISolone (MEDROL) 4 MG dose pack; Take as directed on package instructions.  Dispense: 21 tablet; Refill: 0  -     promethazine-dextromethorphan (PROMETHAZINE-DM) 6.25-15 MG/5ML syrup; Take 5 mL by mouth 4 (Four) Times a Day As Needed for Cough.  Dispense: 240 mL; Refill: 1  -     azithromycin (Zithromax Z-Juan) 250 MG tablet; Take 2 tablets by mouth on day 1, then 1 tablet daily on days 2-5  Dispense: 6 tablet; Refill: 0      Patient Instructions   Start azithromycin and take as directed. Start medrol dose pack and take as directed. Recommend 2 puffs of albuterol every 4 hours as needed. Take promethazine DM 5 mL 4 times a day as needed for cough. Recommend a dose before bed at minimum. Stay hydrated with water. Deep breaths throughout the day.     Seek emergency medical treatment if difficulty breathing with a sense of panic or bluish discoloration around mouth or fingertips.            Follow Up   Return if symptoms worsen or fail to improve.  Patient was given instructions and counseling regarding his condition or for health maintenance advice. Please see specific information pulled into the AVS if appropriate.

## 2022-12-22 NOTE — PATIENT INSTRUCTIONS
Start azithromycin and take as directed. Start medrol dose pack and take as directed. Recommend 2 puffs of albuterol every 4 hours as needed. Take promethazine DM 5 mL 4 times a day as needed for cough. Recommend a dose before bed at minimum. Stay hydrated with water. Deep breaths throughout the day.     Seek emergency medical treatment if difficulty breathing with a sense of panic or bluish discoloration around mouth or fingertips.

## 2023-01-06 ENCOUNTER — TELEPHONE (OUTPATIENT)
Dept: INTERNAL MEDICINE | Facility: CLINIC | Age: 73
End: 2023-01-06
Payer: MEDICARE

## 2023-01-06 NOTE — TELEPHONE ENCOUNTER
Patient asking for lab orders to be placed, he wants to go [place] prior to his appointment here on 1-13-23 , wants to be sure an A1c is ordered

## 2023-01-12 DIAGNOSIS — R74.8 LOW SERUM ALKALINE PHOSPHATASE: ICD-10-CM

## 2023-01-12 DIAGNOSIS — R73.09 ELEVATED HEMOGLOBIN A1C: ICD-10-CM

## 2023-01-12 DIAGNOSIS — I10 PRIMARY HYPERTENSION: ICD-10-CM

## 2023-01-12 DIAGNOSIS — E78.2 MIXED HYPERLIPIDEMIA: ICD-10-CM

## 2023-01-12 DIAGNOSIS — E55.9 VITAMIN D DEFICIENCY: Primary | ICD-10-CM

## 2023-01-12 DIAGNOSIS — R79.89 ELEVATED SERUM CREATININE: ICD-10-CM

## 2023-01-14 DIAGNOSIS — G47.25 CIRCADIAN RHYTHM SLEEP DISORDER, JET LAG TYPE: ICD-10-CM

## 2023-01-16 NOTE — TELEPHONE ENCOUNTER
I don't see a contract on file-not sure if you've ever filled?    Rx Refill Note  Requested Prescriptions     Pending Prescriptions Disp Refills   • zolpidem (AMBIEN) 5 MG tablet [Pharmacy Med Name: ZOLPIDEM 5MG TABLETS] 25 tablet      Sig: TAKE 1 TABLET BY MOUTH AT NIGHT AS NEEDED FOR SLEEP      Last office visit with prescribing clinician: 11/4/2022   Last telemedicine visit with prescribing clinician: 3/3/2023   Next office visit with prescribing clinician: 3/3/2023   {    Melodie Giron MA  01/16/23, 10:51 EST

## 2023-01-17 ENCOUNTER — OFFICE VISIT (OUTPATIENT)
Dept: ORTHOPEDIC SURGERY | Facility: CLINIC | Age: 73
End: 2023-01-17
Payer: MEDICARE

## 2023-01-17 ENCOUNTER — TELEPHONE (OUTPATIENT)
Dept: ORTHOPEDIC SURGERY | Facility: CLINIC | Age: 73
End: 2023-01-17

## 2023-01-17 VITALS — BODY MASS INDEX: 29.57 KG/M2 | TEMPERATURE: 97.7 F | HEIGHT: 68 IN | WEIGHT: 195.1 LBS

## 2023-01-17 DIAGNOSIS — M75.22 BICEPS TENDINITIS OF LEFT SHOULDER: ICD-10-CM

## 2023-01-17 DIAGNOSIS — M25.512 LEFT SHOULDER PAIN, UNSPECIFIED CHRONICITY: ICD-10-CM

## 2023-01-17 DIAGNOSIS — M75.42 IMPINGEMENT SYNDROME OF LEFT SHOULDER: ICD-10-CM

## 2023-01-17 DIAGNOSIS — M75.82 ROTATOR CUFF TENDINITIS, LEFT: Primary | ICD-10-CM

## 2023-01-17 PROCEDURE — 99214 OFFICE O/P EST MOD 30 MIN: CPT | Performed by: ORTHOPAEDIC SURGERY

## 2023-01-17 PROCEDURE — 73030 X-RAY EXAM OF SHOULDER: CPT | Performed by: ORTHOPAEDIC SURGERY

## 2023-01-17 RX ORDER — ACEBUTOLOL HYDROCHLORIDE 200 MG/1
CAPSULE ORAL
Qty: 180 CAPSULE | Refills: 1 | Status: SHIPPED | OUTPATIENT
Start: 2023-01-17

## 2023-01-17 RX ORDER — ZOLPIDEM TARTRATE 5 MG/1
5 TABLET ORAL NIGHTLY PRN
Qty: 25 TABLET | Refills: 0 | Status: SHIPPED | OUTPATIENT
Start: 2023-01-17 | End: 2023-02-17

## 2023-01-17 RX ORDER — ALBUTEROL SULFATE 90 UG/1
AEROSOL, METERED RESPIRATORY (INHALATION)
COMMUNITY
Start: 2022-12-19 | End: 2023-02-22

## 2023-01-17 RX ORDER — PANTOPRAZOLE SODIUM 40 MG/1
40 TABLET, DELAYED RELEASE ORAL DAILY
Qty: 90 TABLET | Refills: 0 | Status: SHIPPED | OUTPATIENT
Start: 2023-01-17 | End: 2023-03-27 | Stop reason: SDUPTHER

## 2023-01-17 NOTE — PROGRESS NOTES
General Exam    Patient: Wilmar Carmona    YOB: 1950    Medical Record Number: [id number]     Chief Complaints: Left shoulder pain    History of Present Illness:     72 y.o. male patient who presents for evaluation treatment left shoulder pain.  Patient states for several months he has noticed some shoulder discomfort mainly superiorly and laterally about the shoulder is not all the time more so with certain movements tickly when he is driving and he has his arm stretched out on top of the wheel for prolonged time it bothers him with any changes positions it feels better.  Reaching back and tried to put on a coat does bother him some.  Reaching up and out bothers him at times.  Denies any injury.  Denies any pain at night or wake him up at this time.  Overall he states he is functional is just want to see was going on.    Denies any numbness or tingling.  Denies any fevers, cough or shortness of breath.    Allergies: No Known Allergies    Home Medications:      Current Outpatient Medications:   •  acebutolol (SECTRAL) 200 MG capsule, TAKE 1 CAPSULE BY MOUTH TWICE DAILY, Disp: 180 capsule, Rfl: 1  •  albuterol sulfate  (90 Base) MCG/ACT inhaler, INHALE ONE PUFF BY MOUTH EVERY FOUR HOURS AS NEEDED, Disp: , Rfl:   •  clotrimazole-betamethasone (LOTRISONE) 1-0.05 % cream, As Needed., Disp: , Rfl:   •  escitalopram (LEXAPRO) 10 MG tablet, Take 10 mg by mouth Daily., Disp: , Rfl:   •  finasteride (PROSCAR) 5 MG tablet, TK 1 T PO ONCE DAILY, Disp: , Rfl: 2  •  Glucosamine-Chondroitin (OSTEO BI-FLEX REGULAR STRENGTH PO), Daily., Disp: , Rfl:   •  hydrALAZINE (APRESOLINE) 50 MG tablet, TAKE 1 TABLET BY MOUTH THREE TIMES DAILY (Patient taking differently: Take 75 mg by mouth 3 (Three) Times a Day.), Disp: 90 tablet, Rfl: 3  •  hydroCHLOROthiazide (HYDRODIURIL) 25 MG tablet, TAKE 1 TABLET BY MOUTH DAILY, Disp: 30 tablet, Rfl: 0  •  lamoTRIgine (LaMICtal) 200 MG tablet, Take 200 mg by mouth Daily., Disp: ,  Rfl:   •  Levomefolate Glucosamine (METHYLFOLATE PO), Take 1 tablet by mouth., Disp: , Rfl:   •  Methylcobalamin 1 MG chewable tablet, Chew 509 mg., Disp: , Rfl:   •  NIFEdipine XL (PROCARDIA XL) 30 MG 24 hr tablet, Take 3 tablets by mouth 3 (Three) Times a Day., Disp: 90 tablet, Rfl: 0  •  Omega-3 Fatty Acids (fish oil) 1000 MG capsule capsule, Take  by mouth Daily., Disp: , Rfl:   •  pantoprazole (PROTONIX) 40 MG EC tablet, TAKE 1 TABLET BY MOUTH DAILY, Disp: 90 tablet, Rfl: 0  •  rosuvastatin (CRESTOR) 10 MG tablet, TAKE 1 TABLET BY MOUTH DAILY, Disp: 90 tablet, Rfl: 2    Past Medical History:   Diagnosis Date   • Abnormal EKG    • Abnormal stress test    • Arthritis    • Atrial fibrillation (HCC)    • BPH (benign prostatic hypertrophy)    • CAD (coronary artery disease)    • Depression    • GERD (gastroesophageal reflux disease)    • H/O medial meniscus repair of right knee 2014    Partial Menisectomy right knee   • Heart murmur    • Hyperlipidemia    • Hypertension    • Intermittent knee pain     R<L knee from cycling and running in the past -    • Mild tricuspid regurgitation    • PAC (premature atrial contraction)    • PAF (paroxysmal atrial fibrillation) (HCC)    • Palpitations    • Sleep apnea    • Tremor        Past Surgical History:   Procedure Laterality Date   • CARDIAC CATHETERIZATION  2015   • COLONOSCOPY  10/20/2014    normal -dr leon..had egd at same time   • ENDOSCOPY N/A 10/20/2021    Procedure: ESOPHAGOGASTRODUODENOSCOPY;  Surgeon: Hemal Bravo MD;  Location: [facility];  Service: Gastroenterology;  Laterality: N/A;   • HERNIA REPAIR     • KNEE SURGERY Left    • TONSILLECTOMY     • VASECTOMY         Social History     Occupational History   • Not on file   Tobacco Use   • Smoking status: Never   • Smokeless tobacco: Never   Vaping Use   • Vaping Use: Never used   Substance and Sexual Activity   • Alcohol use: Yes     Comment: Once Weekly   • Drug use: Not Currently   • Sexual activity:  "Defer      Social History     Social History Narrative   • Not on file       Family History   Problem Relation Age of Onset   • Hypertension Mother    • Colon polyps Mother    • Cancer Father         prostate   • Heart disease Father    • Coronary artery disease Father    • Stroke Father    • Cancer Brother         prostate   • Colon cancer Neg Hx    • Crohn's disease Neg Hx    • Irritable bowel syndrome Neg Hx    • Ulcerative colitis Neg Hx        Review of Systems:      Constitutional: Denies fever, shaking or chills     All other pertinent positives and negatives as noted above in HPI.    Physical Exam: 72 y.o. male    Vitals:    01/17/23 1339   Temp: 97.7 °F (36.5 °C)   Weight: 88.5 kg (195 lb 1.6 oz)   Height: 172.7 cm (68\")       General:  Patient is awake and alert.  Appears in no acute distress or discomfort.      Musculoskeletal/Extremities:    Left upper extremity examined no overt tenderness palpation.  Overall range of motion is full in regards to abduction forward flexion is full but does have some pain once he gets past 90 degrees.  Positive Yergason's and speeds and tenderness along the biceps tendon.  Positive liftoff test.  Motor and sensory intact distally.  Rotator cuff strength does appear to be intact.  Positive Gutiérrez and Neer's.         Radiology:       3 views left shoulder AP, scapular Y and axillary taken reviewed to evaluate the patient's complaint/s.    Imaging demonstrates overall preservation of glenohumeral joint space.  Slight possible superior migration of the humeral head in reference to the glenoid.  No acute fractures noted.     No imaging for comparison.    Assessment: Left shoulder rotator cuff tendinitis, biceps tendinitis, impingement      Plan:      Discussed the findings with the patient and would recommend conservative treatment at this time consisting of rest, ice, activity modification, anti-inflammatories, formal physical therapy and possible injection.  At this time he " does not want doing therapy or injection but he will try some anti-inflammatories as needed some activity modifications things are going.  States overall he is functional and does not really have any deficits with any of this however if things or not improving will consider more aggressive therapy evaluation as needed.           We will plan for follow up as needed.    All questions were answered.  Patient understands and agrees with the plan.    Blas Robbins MD    01/17/2023    CC to Darryl King MD

## 2023-01-17 NOTE — TELEPHONE ENCOUNTER
Caller: SUJATHA CINTRON    Relationship to patient: SELF    Best call back number: [phone]    Patient is needing: PATIENT ACCIDENTALLY WENT TO THE [facility] LOCATION TO BE SEEN FOR HIS APPOINTMENT WITH DR. GEORGE TODAY, 01/17/2023.  IT IS SUPPOSED TO BE [place].  I COULD NOT RESCHEDULE APPOINTMENT DUE TO THERE BEING NO REFERRAL IN HIS CHART.  UNABLE TO WARM TRANSFER TO THE OFFICE.  PATIENT WOULD LIKE A CALL BACK FOR SOME ALTERNATIVES.

## 2023-02-17 DIAGNOSIS — G47.25 CIRCADIAN RHYTHM SLEEP DISORDER, JET LAG TYPE: ICD-10-CM

## 2023-02-17 RX ORDER — ZOLPIDEM TARTRATE 5 MG/1
5 TABLET ORAL NIGHTLY PRN
Qty: 25 TABLET | Refills: 2 | Status: SHIPPED | OUTPATIENT
Start: 2023-02-17

## 2023-02-17 NOTE — TELEPHONE ENCOUNTER
No contract on file-pt needs to sign at his next visit     Rx Refill Note  Requested Prescriptions     Pending Prescriptions Disp Refills   • zolpidem (AMBIEN) 5 MG tablet [Pharmacy Med Name: ZOLPIDEM 5MG TABLETS] 25 tablet 2     Sig: TAKE 1 TABLET BY MOUTH AT NIGHT AS NEEDED FOR SLEEP      Last office visit with prescribing clinician: 11/4/2022   Last telemedicine visit with prescribing clinician: 3/3/2023   Next office visit with prescribing clinician: 3/3/2023       Melodie Giron MA  02/17/23, 14:21 EST

## 2023-02-22 PROBLEM — F32.A DEPRESSIVE DISORDER: Status: ACTIVE | Noted: 2022-05-03

## 2023-02-22 PROBLEM — H10.13 PERENNIAL ALLERGIC CONJUNCTIVITIS OF BOTH EYES: Status: ACTIVE | Noted: 2018-06-22

## 2023-02-22 PROBLEM — I12.9 HYPERTENSIVE CHRONIC KIDNEY DISEASE: Status: ACTIVE | Noted: 2021-12-15

## 2023-02-22 PROBLEM — N18.30 STAGE 3 CHRONIC KIDNEY DISEASE: Status: ACTIVE | Noted: 2021-12-15

## 2023-02-22 PROBLEM — F41.9 ANXIETY: Status: ACTIVE | Noted: 2022-05-03

## 2023-02-22 PROBLEM — N40.0 ENLARGED PROSTATE: Status: ACTIVE | Noted: 2023-02-22

## 2023-02-22 PROBLEM — H43.813 BILATERAL VITREOUS DETACHMENT: Status: ACTIVE | Noted: 2018-07-20

## 2023-02-22 PROBLEM — H26.40 AFTER CATARACT: Status: ACTIVE | Noted: 2020-01-10

## 2023-02-22 PROBLEM — C44.90 MALIGNANT NEOPLASM OF SKIN: Status: ACTIVE | Noted: 2023-02-22

## 2023-02-22 RX ORDER — HYDROCHLOROTHIAZIDE 50 MG/1
1 TABLET ORAL DAILY
COMMUNITY
Start: 2023-01-22

## 2023-02-23 ENCOUNTER — OFFICE VISIT (OUTPATIENT)
Dept: SURGERY | Facility: CLINIC | Age: 73
End: 2023-02-23
Payer: MEDICARE

## 2023-02-23 VITALS
OXYGEN SATURATION: 96 % | BODY MASS INDEX: 29.92 KG/M2 | DIASTOLIC BLOOD PRESSURE: 76 MMHG | SYSTOLIC BLOOD PRESSURE: 120 MMHG | HEART RATE: 79 BPM | WEIGHT: 197.4 LBS | HEIGHT: 68 IN

## 2023-02-23 DIAGNOSIS — K64.4 EXTERNAL HEMORRHOID: ICD-10-CM

## 2023-02-23 DIAGNOSIS — L72.3 SEBACEOUS CYST: Primary | ICD-10-CM

## 2023-02-23 PROCEDURE — 99213 OFFICE O/P EST LOW 20 MIN: CPT | Performed by: PHYSICIAN ASSISTANT

## 2023-02-23 NOTE — PROGRESS NOTES
Wilmar Carmona is a 72 y.o. male who is seen as a consult at the request of Darryl King MD for Rectal Pain.      HPI:  Pt presents today for a left sided perianal bump.  States it appeared 4-5 months ago. Has not changed in size.   Denies any pain, bleeding, or drainage.   Was seen by his PCP on 11/04/2022 and told he had a sebaceous cyst.   Pt is here to discuss treatment options.     Past Medical History:   Diagnosis Date   • Abnormal EKG 01/31/2020   • Abnormal stress test    • Acne    • Actinic keratosis     FOLLOWED BY DR. INGRID GAYLE   • Allergic rhinitis    • Anxiety 05/03/2022   • Arthritis    • Atrial premature depolarization 05/2018    ALSO VENTRICULAR PREMATURE DEPOLARIZATION   • Finnegan's esophagus without dysplasia 10/15/2021    FOLLOWED BY DR. MATTIE COREAS   • BCC (basal cell carcinoma) 12/2011    RIGHT LATERAL SUPERIOR NECK, S/P MOHS   • Biceps tendinitis of left shoulder 01/2023   • Bilateral vitreous detachment 07/20/2018   • Blepharitis of both eyes     FOLLOWED BY DR. CHARU CHEEK   • BPH (benign prostatic hypertrophy)     FOLLOWED BY DR. 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 MATTIE COREAS   • Consolidation of left lower lobe of lung (HCC) 05/05/2020   • Coronary atherosclerosis 04/09/2018   • Cyclothymic disorder    • Depression    • Diplopia    • Enlarged prostate 02/22/2023    FOLLOWED BY DR. SUE THOMPSON   • Ganglion cyst of left foot 09/2017   • Gastric polyps     FOLLOWED BY DR. MATTIE COREAS   • Geographical tongue    • GERD (gastroesophageal reflux disease)    • Globus sensation    •  Heart murmur    • Hiatal hernia    • Hordeolum internum of left eye 03/2022   • Hyperlipidemia     MIXED HLD   • Hypersomnia    • Hypertension    • Hypertensive chronic kidney disease 12/15/2021   • Hypouricemia 12/2021   • IgG deficiency ([facility])    • Impingement syndrome of left shoulder 01/2023   • Jet lag syndrome    • Keratoconjunctivitis sicca of both eyes ([facility])    • Lesion of spleen 06/2020   • Low serum alkaline phosphatase 01/2023    FOLLOWED BY DR. DULCE SHEEHAN   • Meibomian gland dysfunction (MGD) of both eyes     UPPER EYES BILATERAL   • Mild tricuspid regurgitation    • Nodular prostate     WITH ELEVATED PSA'S, FOLLOWED BY DR. SUE THOMPSON   • Olecranon bursitis of right elbow 05/06/2021    SEEN AT    • Oropharyngeal dysphagia    • BRITTA on CPAP     FOLLOWED BY DR. 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 INGRID GAYLE   • Snoring    • Sprain of left ankle 05/03/2018    SEEN AT [facility] ER   • Stage 3 chronic kidney disease (HCC) 12/15/2021    FOLLOWED BY DR. DULCE SHEEHAN   • Thoracic aortic aneurysm without rupture 04/2018   • Tremor    • Trichiasis without entropion left lower eyelid    • Urge incontinence 02/2020   • Ventricular premature beats 04/17/2020   • Vertical  strabismus, right eye    • Vitamin D deficiency    • Vitreous floaters 05/17/2019       Past Surgical History:   Procedure Laterality Date   • CARDIAC CATHETERIZATION Left 10/09/2015    WNL, DR. SHARATH REYNOLDS AT [facility]   • COLONOSCOPY N/A 11/30/2004    d/t thickening of sigmoid found on ct scan, entire colon wnl, Dr.Marc Sherman at [facility]   • ENDOSCOPY N/A 10/20/2021    Z LINE IRREGULAR AT 38 CM FROM INCISORS, 2 CM HIATAL HERNIA, DR. MATTIE COREAS AT [facility]   • ENDOSCOPY N/A 11/13/2017    IRREGULAR Z LINE, GASTRITIS, PATH BENIGN, DR. MATTIE COREAS AT [facility]   • ENDOSCOPY N/A 01/13/2017    GASTRITIS, BENIGN GASTRIC POLYP, (+) BARRETTS ESOPHAGUS, DR. MATTIE COREAS AT [facility]   • ENDOSCOPY N/A 03/22/2012    PATH BENIGN, DR. CONSTANTINO SHERMAN AT [facility]   • ENDOSCOPY AND COLONOSCOPY N/A 10/20/2014    BENIGN GASTRIC POLYP, COLON WNL, DR. CONSTANTINO SHERMAN T [facility]   • ENDOSCOPY AND COLONOSCOPY N/A 11/06/2019    BENIGN GASTRIC POLYP, BARRETTS ESOPHAGUS, 6 MM TUBULAR ADENOMA POLYP IN SIGMOID, INTERNAL HEMORRHOIDS, RESCOPE IN 5 YRS, DR. MATTIE GRAY AT [facility]   • ENDOSCOPY AND COLONOSCOPY N/A 06/11/2008    2 BENIGN GASTRIC POLYP, CHRONIC GASTRITIS, COLON WNL, DR. CONSTANTINO SHERMAN AT [facility]   • EYE SURGERY Bilateral 12/16/2021    ECTROPION REPAIR BILATERAL, LOWER EYELID STENT, UPPER BLEPHAROPLASTY WITH BROW AND NASAL FAT PAD, DR. KEVIN CHEEK AT [facility]   • EYE SURGERY Bilateral 06/06/2018    BILATERAL UPPER AND LOWER BLEPHAROPLASTY AND ENTROPION REPAIR LEFT LOWER EYELID, DR. KEVIN CHEEK AT [facility]   • INGUINAL HERNIA REPAIR  1981    DR. SUNG   • KNEE ARTHROSCOPY W/ MENISCAL REPAIR Left 2014    DR. PINON   • MOHS SURGERY Right 12/13/2011    BCC OF RIGHT LATERAL NECK, DR. CHAU ZARATE   • PROSTATE BIOPSY Bilateral 09/13/2004    BENIGN, DR. NA CANALES AT [facility]   • PROSTATE BIOPSY Bilateral 10/22/2007    BENIGN, DR. NA CANALES AT [facility]   • PROSTATE BIOPSY Bilateral 03/07/2011    BENIGN, DR. NA CANALES AT [facility]   • SKIN BIOPSY Left  10/15/2017    LEFT MEDIAL SHIN, PATH: SEBORRHEIC KERATOSIS, DR. INGRID GAYLE   • SKIN BIOPSY N/A 03/03/2017    NASAL TIP, PATH: BENIGN, DR. INGRID GAYLE   • SKIN CANCER EXCISION Right 09/14/2012    SCC OF RIGHT UPPER ARM, DR. INGRID GAYLE   • TONSILLECTOMY Bilateral    • VASECTOMY N/A        Social History:   reports that he has never smoked. He has never been exposed to tobacco smoke. He has never used smokeless tobacco. He reports current alcohol use. He reports that he does not currently use drugs.      Marriage status:     Family History   Problem Relation Age of Onset   • Heart disease Mother    • Hypertension Mother    • Colon polyps Mother    • Depression Father    • Prostate cancer Father    • Cancer Father         prostate   • Heart disease Father    • Coronary artery disease Father    • Stroke Father    • Cancer Brother         prostate   • Prostate cancer Brother    • Kidney disease Daughter    • Colon cancer Neg Hx    • Crohn's disease Neg Hx    • Irritable bowel syndrome Neg Hx    • Ulcerative colitis Neg Hx          Current Outpatient Medications:   •  acebutolol (SECTRAL) 200 MG capsule, TAKE 1 CAPSULE BY MOUTH TWICE DAILY, Disp: 180 capsule, Rfl: 1  •  escitalopram (LEXAPRO) 10 MG tablet, Take 10 mg by mouth Daily., Disp: , Rfl:   •  finasteride (PROSCAR) 5 MG tablet, TK 1 T PO ONCE DAILY, Disp: , Rfl: 2  •  Glucosamine-Chondroitin (OSTEO BI-FLEX REGULAR STRENGTH PO), Daily., Disp: , Rfl:   •  hydrALAZINE (APRESOLINE) 50 MG tablet, TAKE 1 TABLET BY MOUTH THREE TIMES DAILY (Patient taking differently: Take 75 mg by mouth 3 (Three) Times a Day.), Disp: 90 tablet, Rfl: 3  •  hydroCHLOROthiazide (HYDRODIURIL) 50 MG tablet, Take 1 tablet by mouth Daily., Disp: , Rfl:   •  lamoTRIgine (LaMICtal) 200 MG tablet, Take 200 mg by mouth Daily., Disp: , Rfl:   •  Levomefolate Glucosamine (METHYLFOLATE PO), Take 1 tablet by mouth., Disp: , Rfl:   •  Methylcobalamin 1 MG chewable tablet,  Chew 509 mg., Disp: , Rfl:   •  NIFEdipine XL (PROCARDIA XL) 30 MG 24 hr tablet, Take 3 tablets by mouth 3 (Three) Times a Day., Disp: 90 tablet, Rfl: 0  •  Omega-3 Fatty Acids (fish oil) 1000 MG capsule capsule, Take  by mouth Daily., Disp: , Rfl:   •  pantoprazole (PROTONIX) 40 MG EC tablet, TAKE 1 TABLET BY MOUTH DAILY, Disp: 90 tablet, Rfl: 0  •  rosuvastatin (CRESTOR) 10 MG tablet, TAKE 1 TABLET BY MOUTH DAILY, Disp: 90 tablet, Rfl: 2  •  zolpidem (AMBIEN) 5 MG tablet, TAKE 1 TABLET BY MOUTH AT NIGHT AS NEEDED FOR SLEEP, Disp: 25 tablet, Rfl: 2    Allergy  Patient has no known allergies.    Review of Systems   Constitutional: Negative for decreased appetite and weight gain.   HENT: Negative for congestion, hearing loss and hoarse voice.    Eyes: Negative for blurred vision, discharge and visual disturbance.   Cardiovascular: Negative for chest pain, cyanosis and leg swelling.   Respiratory: Negative for cough, shortness of breath, sleep disturbances due to breathing and snoring.    Endocrine: Negative for cold intolerance and heat intolerance.   Hematologic/Lymphatic: Does not bruise/bleed easily.   Skin: Negative for itching, poor wound healing and skin cancer.   Musculoskeletal: Negative for arthritis, back pain, joint pain and joint swelling.   Gastrointestinal: Negative for abdominal pain, change in bowel habit, bowel incontinence and constipation.   Genitourinary: Negative for bladder incontinence, dysuria and hematuria.   Neurological: Negative for brief paralysis, excessive daytime sleepiness, dizziness, focal weakness, headaches, light-headedness and weakness.   Psychiatric/Behavioral: Positive for depression. Negative for altered mental status and hallucinations. The patient does not have insomnia.    Allergic/Immunologic: Negative for HIV exposure and persistent infections.   All other systems reviewed and are negative.      Vitals:    02/23/23 1059   BP: 120/76   Pulse: 79   SpO2: 96%     Body mass  index is 30.01 kg/m².    Physical Exam  Exam conducted with a chaperone present.   Constitutional:       General: He is not in acute distress.     Appearance: He is well-developed.   HENT:      Head: Normocephalic and atraumatic.      Nose: Nose normal.   Eyes:      Conjunctiva/sclera: Conjunctivae normal.      Pupils: Pupils are equal, round, and reactive to light.   Neck:      Trachea: No tracheal deviation.   Pulmonary:      Effort: Pulmonary effort is normal. No respiratory distress.      Breath sounds: Normal breath sounds.   Abdominal:      General: Bowel sounds are normal. There is no distension.      Palpations: Abdomen is soft.   Genitourinary:     Comments: Perianal exam: Multiple small sebaceous cyst along perianal skin. Right posterior external hemorrhoid mild-moderately enlarged. Soft, non-thrombosed. No active bleeding.   Musculoskeletal:         General: No deformity. Normal range of motion.      Cervical back: Normal range of motion.   Skin:     General: Skin is warm and dry.   Neurological:      Mental Status: He is alert and oriented to person, place, and time.      Cranial Nerves: No cranial nerve deficit.      Coordination: Coordination normal.      Gait: Gait normal.   Psychiatric:         Behavior: Behavior normal.         Judgment: Judgment normal.         Review of Medical Record:  I reviewed PMHx, surgical Hx, FHx, and current medications     Colonoscopy 11/06/2019:  - 6 mm Tubular adenoma, Sigmoid colon  - Non-bleeding internal hemorrhoids  - [facility]    Mother: Hx of colon polyps    Assessment:  1. Sebaceous cyst    2. External hemorrhoid    - New    Plan:  - Pt with multiple small sebaceous cyst along the perianal skin. Applied gentle pressure to some of the larger ones and was able to remove the contents.  - Enlarged external hemorrhoid noted in the right posterior position. Offered Rx for Anusol-HC 2.5% cream. Pt states his hemorrhoids are not  bothersome and declined Rx at this time.   - Follow up as needed for any new or worsening symptoms.

## 2023-02-24 ENCOUNTER — LAB (OUTPATIENT)
Dept: LAB | Facility: HOSPITAL | Age: 73
End: 2023-02-24
Payer: MEDICARE

## 2023-02-24 ENCOUNTER — TELEPHONE (OUTPATIENT)
Dept: INTERNAL MEDICINE | Facility: CLINIC | Age: 73
End: 2023-02-24
Payer: MEDICARE

## 2023-02-24 LAB
25(OH)D3 SERPL-MCNC: 42.8 NG/ML (ref 30–100)
ALBUMIN SERPL-MCNC: 4.1 G/DL (ref 3.5–5.2)
ALBUMIN/GLOB SERPL: 1.4 G/DL
ALP SERPL-CCNC: 40 U/L (ref 39–117)
ALT SERPL W P-5'-P-CCNC: 25 U/L (ref 1–41)
ANION GAP SERPL CALCULATED.3IONS-SCNC: 7.1 MMOL/L (ref 5–15)
AST SERPL-CCNC: 26 U/L (ref 1–40)
BASOPHILS # BLD AUTO: 0.06 10*3/MM3 (ref 0–0.2)
BASOPHILS NFR BLD AUTO: 0.9 % (ref 0–1.5)
BILIRUB SERPL-MCNC: 0.5 MG/DL (ref 0–1.2)
BILIRUB UR QL STRIP: NEGATIVE
BUN SERPL-MCNC: 14 MG/DL (ref 8–23)
BUN/CREAT SERPL: 10.1 (ref 7–25)
CALCIUM SPEC-SCNC: 9.3 MG/DL (ref 8.6–10.5)
CHLORIDE SERPL-SCNC: 103 MMOL/L (ref 98–107)
CHOLEST SERPL-MCNC: 135 MG/DL (ref 0–200)
CLARITY UR: CLEAR
CO2 SERPL-SCNC: 31.9 MMOL/L (ref 22–29)
COLOR UR: YELLOW
CREAT SERPL-MCNC: 1.38 MG/DL (ref 0.76–1.27)
DEPRECATED RDW RBC AUTO: 45.4 FL (ref 37–54)
EGFRCR SERPLBLD CKD-EPI 2021: 54.3 ML/MIN/1.73
EOSINOPHIL # BLD AUTO: 0.24 10*3/MM3 (ref 0–0.4)
EOSINOPHIL NFR BLD AUTO: 3.7 % (ref 0.3–6.2)
ERYTHROCYTE [DISTWIDTH] IN BLOOD BY AUTOMATED COUNT: 13.3 % (ref 12.3–15.4)
FOLATE SERPL-MCNC: >20 NG/ML (ref 4.78–24.2)
GLOBULIN UR ELPH-MCNC: 2.9 GM/DL
GLUCOSE SERPL-MCNC: 116 MG/DL (ref 65–99)
GLUCOSE UR STRIP-MCNC: NEGATIVE MG/DL
HBA1C MFR BLD: 5.7 % (ref 4.8–5.6)
HCT VFR BLD AUTO: 44.4 % (ref 37.5–51)
HDLC SERPL QL: 3.86
HDLC SERPL-MCNC: 35 MG/DL (ref 40–60)
HGB BLD-MCNC: 14.5 G/DL (ref 13–17.7)
HGB UR QL STRIP.AUTO: NEGATIVE
IMM GRANULOCYTES # BLD AUTO: 0.02 10*3/MM3 (ref 0–0.05)
IMM GRANULOCYTES NFR BLD AUTO: 0.3 % (ref 0–0.5)
KETONES UR QL STRIP: NEGATIVE
LDLC SERPL CALC-MCNC: 75 MG/DL (ref 0–100)
LEUKOCYTE ESTERASE UR QL STRIP.AUTO: NEGATIVE
LYMPHOCYTES # BLD AUTO: 1.7 10*3/MM3 (ref 0.7–3.1)
LYMPHOCYTES NFR BLD AUTO: 26.4 % (ref 19.6–45.3)
MCH RBC QN AUTO: 30 PG (ref 26.6–33)
MCHC RBC AUTO-ENTMCNC: 32.7 G/DL (ref 31.5–35.7)
MCV RBC AUTO: 91.7 FL (ref 79–97)
MONOCYTES # BLD AUTO: 0.64 10*3/MM3 (ref 0.1–0.9)
MONOCYTES NFR BLD AUTO: 9.9 % (ref 5–12)
NEUTROPHILS NFR BLD AUTO: 3.78 10*3/MM3 (ref 1.7–7)
NEUTROPHILS NFR BLD AUTO: 58.8 % (ref 42.7–76)
NITRITE UR QL STRIP: NEGATIVE
NRBC BLD AUTO-RTO: 0 /100 WBC (ref 0–0.2)
PH UR STRIP.AUTO: 7.5 [PH] (ref 5–8)
PLATELET # BLD AUTO: 225 10*3/MM3 (ref 140–450)
PMV BLD AUTO: 8.8 FL (ref 6–12)
POTASSIUM SERPL-SCNC: 3.9 MMOL/L (ref 3.5–5.2)
PROT SERPL-MCNC: 7 G/DL (ref 6–8.5)
PROT UR QL STRIP: NEGATIVE
RBC # BLD AUTO: 4.84 10*6/MM3 (ref 4.14–5.8)
SODIUM SERPL-SCNC: 142 MMOL/L (ref 136–145)
SP GR UR STRIP: 1.02 (ref 1–1.03)
T3FREE SERPL-MCNC: 3.25 PG/ML (ref 2–4.4)
T4 FREE SERPL-MCNC: 1.16 NG/DL (ref 0.93–1.7)
TRIGL SERPL-MCNC: 141 MG/DL (ref 0–150)
TSH SERPL DL<=0.05 MIU/L-ACNC: 1.15 UIU/ML (ref 0.27–4.2)
UROBILINOGEN UR QL STRIP: NORMAL
VIT B12 BLD-MCNC: 815 PG/ML (ref 211–946)
VLDLC SERPL-MCNC: 25 MG/DL (ref 5–40)
WBC NRBC COR # BLD: 6.44 10*3/MM3 (ref 3.4–10.8)

## 2023-02-24 PROCEDURE — 81003 URINALYSIS AUTO W/O SCOPE: CPT | Performed by: FAMILY MEDICINE

## 2023-02-24 PROCEDURE — 82607 VITAMIN B-12: CPT | Performed by: FAMILY MEDICINE

## 2023-02-24 PROCEDURE — 84439 ASSAY OF FREE THYROXINE: CPT | Performed by: FAMILY MEDICINE

## 2023-02-24 PROCEDURE — 82746 ASSAY OF FOLIC ACID SERUM: CPT | Performed by: FAMILY MEDICINE

## 2023-02-24 PROCEDURE — 84443 ASSAY THYROID STIM HORMONE: CPT | Performed by: FAMILY MEDICINE

## 2023-02-24 PROCEDURE — 83036 HEMOGLOBIN GLYCOSYLATED A1C: CPT | Performed by: FAMILY MEDICINE

## 2023-02-24 PROCEDURE — 80053 COMPREHEN METABOLIC PANEL: CPT | Performed by: FAMILY MEDICINE

## 2023-02-24 PROCEDURE — 85025 COMPLETE CBC W/AUTO DIFF WBC: CPT | Performed by: FAMILY MEDICINE

## 2023-02-24 PROCEDURE — 84481 FREE ASSAY (FT-3): CPT | Performed by: FAMILY MEDICINE

## 2023-02-24 PROCEDURE — 82306 VITAMIN D 25 HYDROXY: CPT | Performed by: FAMILY MEDICINE

## 2023-02-24 PROCEDURE — 80061 LIPID PANEL: CPT | Performed by: FAMILY MEDICINE

## 2023-02-24 PROCEDURE — 36415 COLL VENOUS BLD VENIPUNCTURE: CPT | Performed by: FAMILY MEDICINE

## 2023-02-24 NOTE — TELEPHONE ENCOUNTER
Hub staff attempted to follow warm transfer process and was unsuccessful     Caller: Wilmar Carmona    Relationship to patient: Self    Best call back number: [phone]    Patient is needing: TO MAKE SURE HIS ORDERS FOR LABS HAS BEEN SENT TO THE [facility]  AT [address] [facility] [address]     PATIENT IS REQUESTING FOR HIS A1C TO BE CHECKED ALONG WITH HIS BLOOD WORK.    PLEASE CALL TO ADVISE WHEN THIS IS DONE.

## 2023-02-24 NOTE — TELEPHONE ENCOUNTER
Pt advised the orders are in his chart and since he is going to a [facility] location they should be able to see his orders.

## 2023-03-02 ENCOUNTER — OFFICE VISIT (OUTPATIENT)
Dept: CARDIOLOGY | Facility: CLINIC | Age: 73
End: 2023-03-02
Payer: MEDICARE

## 2023-03-02 VITALS
SYSTOLIC BLOOD PRESSURE: 158 MMHG | WEIGHT: 191 LBS | HEIGHT: 68 IN | HEART RATE: 59 BPM | BODY MASS INDEX: 28.95 KG/M2 | DIASTOLIC BLOOD PRESSURE: 78 MMHG

## 2023-03-02 DIAGNOSIS — I49.3 PVC (PREMATURE VENTRICULAR CONTRACTION): Primary | ICD-10-CM

## 2023-03-02 DIAGNOSIS — Z99.89 OBSTRUCTIVE SLEEP APNEA ON CPAP: ICD-10-CM

## 2023-03-02 DIAGNOSIS — G47.33 OBSTRUCTIVE SLEEP APNEA ON CPAP: ICD-10-CM

## 2023-03-02 DIAGNOSIS — E78.2 MIXED HYPERLIPIDEMIA: ICD-10-CM

## 2023-03-02 DIAGNOSIS — I25.10 CORONARY ARTERY DISEASE INVOLVING NATIVE CORONARY ARTERY OF NATIVE HEART WITHOUT ANGINA PECTORIS: ICD-10-CM

## 2023-03-02 DIAGNOSIS — I10 PRIMARY HYPERTENSION: ICD-10-CM

## 2023-03-02 PROCEDURE — 99214 OFFICE O/P EST MOD 30 MIN: CPT | Performed by: NURSE PRACTITIONER

## 2023-03-02 PROCEDURE — 93000 ELECTROCARDIOGRAM COMPLETE: CPT | Performed by: NURSE PRACTITIONER

## 2023-03-02 NOTE — PROGRESS NOTES
Date of Office Visit: 2023  Encounter Provider: GEOFFREY Rowell  Place of Service: [facility] CARDIOLOGY  Patient Name: Wilmar Carmona  :1950    Chief complaint  Follow-up abnormal EKG, paroxysmal atrial fibrillation, hypertension, hyperlipidemia, coronary atherosclerosis, PVCs    History of Present Illness  Patient is a 72-year-old male patient of Dr. Durbin.  Past medical history includes hypertension, coronary artery disease, PVCs, hyperlipidemia, obstructive sleep apnea, and LVH.  Patient has questionable history of atrial fibrillation and was previously followed by Dr. Bailey.  He has been maintained on Rythmol therapy and Sectral.  He had abnormal stress test in  with subsequent cardiac cath that was felt to show normal coronary arteries with about 10 post stent ostial tapering and narrowing but this was felt to most likely be from catheter induced spasm.  CT scan in 2016 showed calcification of all 3 major vessels.  Follow-up echocardiogram in 2018 showed normal LV size and systolic function without evidence of aneurysmal disease of the aortic root.  Patient had a 2-week Zio patch in 2018 that showed sinus rhythm with occasional PVCs that were symptomatic.  No atrial arrhythmia was noted.  In May 2019 he had stress perfusion study that was negative for ischemia.  Patient later reported that his gastroenterologist felt that the nifedipine might be contributing to his GERD.  This was decreased and patient was started on losartan.  Kidney enzymes did increase on higher dose and dose was decreased.  Patient was then started on hydralazine however patient did not feel that the losartan or the hydralazine was helping his blood pressure.  Started on low-dose hydrochlorothiazide and referred to nephrology for further recommendations since kidney function was limiting medication options.  He also had baseline mild bradycardia and we were unable to add  additional medications that would affect heart rate.  He was last seen in August 2022 and was doing well at that time.    Interval history  He presents today for routine follow-up.  I will visit with him for the first time today and have reviewed his medical record.  Since last visit he has been doing well.  He reports exercise by walking on the treadmill 3 days/week with no anginal symptoms with exertion.  He denies shortness of breath, edema, dizziness, chest pain or pressure, fatigue, syncope or presyncope.  He does report stable palpitations.  Blood pressure is elevated today. He remains on nifedipine 90 mg 3 times daily, HCTZ 50 mg daily, hydralazine 75 mg 3 times daily and acebutolol 200 mg twice daily.  He has not been checking his blood pressure at home regularly, however he reports that blood pressure at recent office visits has been in the 120s/70s.  Hypertension is managed by Dr. Bell with nephrology.      Labs 2/24/2023 show CMP with creatinine 1.38, sodium 142, potassium 3.9, calcium 9.3.  CBC unremarkable with hemoglobin 14.5.  Lipids with total cholesterol 135, triglycerides 141, HDL 35, LDL 75.  TSH 1.150, T4 1.16, T3 3.25.  A1c 5.7.  Vitamin D 42.8  Past Medical History:   Diagnosis Date   • Abnormal EKG 01/31/2020   • Abnormal stress test    • Acne    • Actinic keratosis     FOLLOWED BY DR. INGRID GAYLE   • Allergic rhinitis    • Anxiety 05/03/2022   • Arthritis    • Atrial premature depolarization 05/2018    ALSO VENTRICULAR PREMATURE DEPOLARIZATION   • Finnegan's esophagus without dysplasia 10/15/2021    FOLLOWED BY DR. MATTIE COREAS   • BCC (basal cell carcinoma) 12/2011    RIGHT LATERAL SUPERIOR NECK, S/P MOHS   • Biceps tendinitis of left shoulder 01/2023   • Bilateral vitreous detachment 07/20/2018   • Blepharitis of both eyes     FOLLOWED BY DR. CHARU CHEEK   • BPH (benign prostatic hypertrophy)     FOLLOWED BY DR. 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 MATTEI COREAS   • Consolidation of left lower lobe of lung (HCC) 05/05/2020   • Coronary atherosclerosis 04/09/2018   • Cyclothymic disorder    • Depression    • Diplopia    • Enlarged prostate 02/22/2023    FOLLOWED BY DR. SUE THOMPSON   • Ganglion cyst of left foot 09/2017   • Gastric polyps     FOLLOWED BY DR. MATTIE COREAS   • Geographical tongue    • GERD (gastroesophageal reflux disease)    • Globus sensation    • Heart murmur    • Hiatal hernia    • Hordeolum internum of left eye 03/2022   • Hyperlipidemia     MIXED HLD   • Hypersomnia    • Hypertension    • Hypertensive chronic kidney disease 12/15/2021   • Hypouricemia 12/2021   • IgG deficiency ([facility])    • Impingement syndrome of left shoulder 01/2023   • Jet lag syndrome    • Keratoconjunctivitis sicca of both eyes ([facility])    • Lesion of spleen 06/2020   • Low serum alkaline phosphatase 01/2023    FOLLOWED BY DR. DULCE SHEEHAN   • Meibomian gland dysfunction (MGD) of both eyes     UPPER EYES BILATERAL   • Mild tricuspid regurgitation    • Nodular prostate     WITH ELEVATED PSA'S, FOLLOWED BY DR. SUE THOMPSON   • Olecranon bursitis of right elbow 05/06/2021    SEEN AT    • Oropharyngeal dysphagia    • BRITTA on CPAP     FOLLOWED BY DR. SANDRA WILLIS   • PAC (premature atrial contraction)    • PAF (paroxysmal atrial fibrillation) ([facility])    • Palpitations    • Patellofemoral arthrosis 08/13/2018    Right knee   • Patellofemoral chondrosis 08/13/2018    BILATERAL KNEES   • Pedal edema 05/2020   • Perianal cyst 11/2022   • Plantar fascial fibromatosis 10/2017   • PLMD (periodic limb movement  disorder)    • PNA (pneumonia) 04/08/2020    LEFT LOWER LOBE   • Posterior tibial tendinitis of left leg 02/2018   • Prediabetes 08/07/2020   • Ptosis of both eyelids 01/2018    S/P REPAIR   • Renal insufficiency 01/10/2018   • Rotator cuff tendinitis, left 01/2023   • SCC (squamous cell carcinoma) 09/2012    RIGHT POSTERIOR UPPER ARM, S/P EXCISION   • Seasonal allergies    • Seborrheic keratosis     FOLLOWED BY DR. INGRID GAYLE   • Snoring    • Sprain of left ankle 05/03/2018    SEEN AT [facility] ER   • Stage 3 chronic kidney disease (HCC) 12/15/2021    FOLLOWED BY DR. DULCE SHEEHAN   • Thoracic aortic aneurysm without rupture 04/2018   • Tremor    • Trichiasis without entropion left lower eyelid    • Urge incontinence 02/2020   • Ventricular premature beats 04/17/2020   • Vertical strabismus, right eye    • Vitamin D deficiency    • Vitreous floaters 05/17/2019     Past Surgical History:   Procedure Laterality Date   • CARDIAC CATHETERIZATION Left 10/09/2015    WNL, DR. SHARATH REYNOLDS AT [facility]   • COLONOSCOPY N/A 11/30/2004    d/t thickening of sigmoid found on ct scan, entire colon wnl, Dr.Marc Sherman at [facility]   • ENDOSCOPY N/A 10/20/2021    Z LINE IRREGULAR AT 38 CM FROM INCISORS, 2 CM HIATAL HERNIA, DR. MATTIE COREAS AT [facility]   • ENDOSCOPY N/A 11/13/2017    IRREGULAR Z LINE, GASTRITIS, PATH BENIGN, DR. MATTIE COREAS AT [facility]   • ENDOSCOPY N/A 01/13/2017    GASTRITIS, BENIGN GASTRIC POLYP, (+) BARRETTS ESOPHAGUS, DR. MATTIE COREAS AT [facility]   • ENDOSCOPY N/A 03/22/2012    PATH BENIGN, DR. CONSTANTINO SHERMAN AT [facility]   • ENDOSCOPY AND COLONOSCOPY N/A 10/20/2014    BENIGN GASTRIC POLYP, COLON WNL, DR. CONSTANTINO SHERMAN T [facility]   • ENDOSCOPY AND COLONOSCOPY N/A 11/06/2019    BENIGN GASTRIC POLYP, BARRETTS ESOPHAGUS, 6 MM TUBULAR ADENOMA POLYP IN SIGMOID, INTERNAL HEMORRHOIDS, RESCOPE IN 5 YRS, DR. MATTIE GRAY AT [facility]   • ENDOSCOPY AND COLONOSCOPY N/A 06/11/2008    2 BENIGN GASTRIC POLYP, CHRONIC GASTRITIS,  COLON WNL, DR. CONSTANTINO PAYNE AT [facility]   • EYE SURGERY Bilateral 12/16/2021    ECTROPION REPAIR BILATERAL, LOWER EYELID STENT, UPPER BLEPHAROPLASTY WITH BROW AND NASAL FAT PAD, DR. KEVIN CHEEK AT [facility]   • EYE SURGERY Bilateral 06/06/2018    BILATERAL UPPER AND LOWER BLEPHAROPLASTY AND ENTROPION REPAIR LEFT LOWER EYELID, DR. KEVIN CHEEK AT [facility]   • INGUINAL HERNIA REPAIR  1981    DR. SUNG   • KNEE ARTHROSCOPY W/ MENISCAL REPAIR Left 2014    DR. PINON   • MOHS SURGERY Right 12/13/2011    BCC OF RIGHT LATERAL NECK, DR. CHAU ZARATE   • PROSTATE BIOPSY Bilateral 09/13/2004    BENIGN, DR. NA CANALES AT [facility]   • PROSTATE BIOPSY Bilateral 10/22/2007    BENIGN, DR. NA CANALES AT [facility]   • PROSTATE BIOPSY Bilateral 03/07/2011    BENIGN, DR. NA CANALES AT [facility]   • SKIN BIOPSY Left 10/15/2017    LEFT MEDIAL SHIN, PATH: SEBORRHEIC KERATOSIS, DR. INGRID GAYLE   • SKIN BIOPSY N/A 03/03/2017    NASAL TIP, PATH: BENIGN, DR. INGRID GAYLE   • SKIN CANCER EXCISION Right 09/14/2012    SCC OF RIGHT UPPER ARM, DR. INGRID GAYLE   • TONSILLECTOMY Bilateral    • VASECTOMY N/A      Outpatient Medications Prior to Visit   Medication Sig Dispense Refill   • acebutolol (SECTRAL) 200 MG capsule TAKE 1 CAPSULE BY MOUTH TWICE DAILY 180 capsule 1   • escitalopram (LEXAPRO) 10 MG tablet Take 1 tablet by mouth Daily.     • finasteride (PROSCAR) 5 MG tablet TK 1 T PO ONCE DAILY  2   • Glucosamine-Chondroitin (OSTEO BI-FLEX REGULAR STRENGTH PO) Daily.     • hydrALAZINE (APRESOLINE) 50 MG tablet TAKE 1 TABLET BY MOUTH THREE TIMES DAILY (Patient taking differently: Take 1.5 tablets by mouth 3 (Three) Times a Day.) 90 tablet 3   • hydroCHLOROthiazide (HYDRODIURIL) 50 MG tablet Take 1 tablet by mouth Daily.     • lamoTRIgine (LaMICtal) 200 MG tablet Take 1 tablet by mouth Daily.     • Levomefolate Glucosamine (METHYLFOLATE PO) Take 1 tablet by mouth.     • NIFEdipine XL (PROCARDIA XL) 30 MG 24 hr tablet Take 3 tablets by  "mouth 3 (Three) Times a Day. 90 tablet 0   • Omega-3 Fatty Acids (fish oil) 1000 MG capsule capsule Take  by mouth Daily.     • pantoprazole (PROTONIX) 40 MG EC tablet TAKE 1 TABLET BY MOUTH DAILY 90 tablet 0   • rosuvastatin (CRESTOR) 10 MG tablet TAKE 1 TABLET BY MOUTH DAILY 90 tablet 2   • zolpidem (AMBIEN) 5 MG tablet TAKE 1 TABLET BY MOUTH AT NIGHT AS NEEDED FOR SLEEP 25 tablet 2   • Methylcobalamin 1 MG chewable tablet Chew 509 mg.       No facility-administered medications prior to visit.       Allergies as of 03/02/2023   • (No Known Allergies)     Social History     Socioeconomic History   • Marital status:    Tobacco Use   • Smoking status: Never     Passive exposure: Never   • Smokeless tobacco: Never   Vaping Use   • Vaping Use: Never used   Substance and Sexual Activity   • Alcohol use: Yes     Comment: Once Weekly   • Drug use: Not Currently   • Sexual activity: Defer     Family History   Problem Relation Age of Onset   • Heart disease Mother    • Hypertension Mother    • Colon polyps Mother    • Depression Father    • Prostate cancer Father    • Cancer Father         prostate   • Heart disease Father    • Coronary artery disease Father    • Stroke Father    • Cancer Brother         prostate   • Prostate cancer Brother    • Kidney disease Daughter    • Colon cancer Neg Hx    • Crohn's disease Neg Hx    • Irritable bowel syndrome Neg Hx    • Ulcerative colitis Neg Hx      Review of Systems   Cardiovascular: Positive for palpitations. Negative for chest pain, claudication, dyspnea on exertion, leg swelling, near-syncope, orthopnea, paroxysmal nocturnal dyspnea and syncope.   Respiratory: Negative for shortness of breath.    Neurological: Negative for brief paralysis, dizziness, headaches and light-headedness.   All other systems reviewed and are negative.       Objective:     Vitals:    03/02/23 1241   BP: 158/78   Pulse: 59   Weight: 86.6 kg (191 lb)   Height: 172.7 cm (68\")     Body mass index " is 29.04 kg/m².    Vitals reviewed.   Constitutional:       General: Not in acute distress.     Appearance: Healthy appearance. Well-developed and not in distress. Not diaphoretic.   HENT:      Head: Normocephalic.   Pulmonary:      Effort: Pulmonary effort is normal. No respiratory distress.      Breath sounds: Normal breath sounds. No wheezing. No rhonchi. No rales.   Cardiovascular:      Normal rate. Regular rhythm.      Murmurs: There is no murmur.   Pulses:     Intact distal pulses.   Edema:     Peripheral edema absent.   Skin:     General: Skin is warm and dry. There is no cyanosis.      Findings: No rash.   Neurological:      Mental Status: Alert and oriented to person, place, and time.   Psychiatric:         Behavior: Behavior normal.         Thought Content: Thought content normal.         Judgment: Judgment normal.       Lab Review:     ECG 12 Lead    Date/Time: 3/2/2023 2:10 PM  Performed by: Genesis Albert APRN  Authorized by: Genesis Albert APRN   Comparison: compared with previous ECG   Similar to previous ECG  Rhythm: sinus rhythm  Rate: normal  BPM: 54  Comments: Similar to prior          Assessment:       Diagnosis Plan   1. PVC (premature ventricular contraction)  ECG 12 Lead      2. Coronary artery disease involving native coronary artery of native heart without angina pectoris  ECG 12 Lead      3. Primary hypertension  ECG 12 Lead      4. Obstructive sleep apnea on CPAP  ECG 12 Lead      5. Mixed hyperlipidemia  ECG 12 Lead        Plan:       1.  PVCs, PACs and questionable history of atrial fibrillation.  He was referred to EP after last visit for medication recommendations as he had been maintained on Sectral and Rythmol for many years.  Rythmol was discontinued but he remains on Sectral as well as nifedipine.  2.  Coronary artery disease.  Normal coronary arteries on 2015 heart cath, however subsequent CT scan showed calcification of all 3 major coronary arteries.  Stress test in 2019  was negative for ischemia.  He denies anginal symptoms.  3.  Hypertension.  Nephrology managing.  He follows with Dr. Kinza Bell.  4.  Obstructive sleep apnea on CPAP.  Managed by Dr. Mcgovern.  5.  Dyslipidemia on rosuvastatin with lipids at goal.  Per PCP      Time Spent: I spent 30 minutes caring for Wilmar on this date of service. This time includes time spent by me in the following activities: preparing for the visit, reviewing tests, performing a medically appropriate examination and/or evaluation, counseling and educating the patient/family/caregiver and documenting information in the medical record.   I spent 1 minutes on the separately reported service of ECG. This time is not included in the time used to support the E/M service also reported today.        Your medication list          Accurate as of March 2, 2023  2:14 PM. If you have any questions, ask your nurse or doctor.            CHANGE how you take these medications      Instructions Last Dose Given Next Dose Due   hydrALAZINE 50 MG tablet  Commonly known as: APRESOLINE  What changed: how much to take      TAKE 1 TABLET BY MOUTH THREE TIMES DAILY          CONTINUE taking these medications      Instructions Last Dose Given Next Dose Due   acebutolol 200 MG capsule  Commonly known as: SECTRAL      TAKE 1 CAPSULE BY MOUTH TWICE DAILY       escitalopram 10 MG tablet  Commonly known as: LEXAPRO      Take 1 tablet by mouth Daily.       finasteride 5 MG tablet  Commonly known as: PROSCAR      TK 1 T PO ONCE DAILY       fish oil 1000 MG capsule capsule      Take  by mouth Daily.       hydroCHLOROthiazide 50 MG tablet  Commonly known as: HYDRODIURIL      Take 1 tablet by mouth Daily.       lamoTRIgine 200 MG tablet  Commonly known as: LaMICtal      Take 1 tablet by mouth Daily.       METHYLFOLATE PO      Take 1 tablet by mouth.       NIFEdipine XL 30 MG 24 hr tablet  Commonly known as: PROCARDIA XL      Take 3 tablets by mouth 3 (Three) Times a Day.        OSTEO BI-FLEX REGULAR STRENGTH PO      Daily.       pantoprazole 40 MG EC tablet  Commonly known as: PROTONIX      TAKE 1 TABLET BY MOUTH DAILY       rosuvastatin 10 MG tablet  Commonly known as: CRESTOR      TAKE 1 TABLET BY MOUTH DAILY       zolpidem 5 MG tablet  Commonly known as: AMBIEN      TAKE 1 TABLET BY MOUTH AT NIGHT AS NEEDED FOR SLEEP          STOP taking these medications    Methylcobalamin 1 MG chewable tablet  Stopped by: GEOFFREY Rowell               Patient is no longer taking -.  I corrected the med list to reflect this.  I did not stop these medications.      Dictated utilizing Dragon dictation

## 2023-03-03 ENCOUNTER — OFFICE VISIT (OUTPATIENT)
Dept: INTERNAL MEDICINE | Facility: CLINIC | Age: 73
End: 2023-03-03
Payer: MEDICARE

## 2023-03-03 VITALS
WEIGHT: 190 LBS | TEMPERATURE: 98.2 F | SYSTOLIC BLOOD PRESSURE: 146 MMHG | BODY MASS INDEX: 28.89 KG/M2 | DIASTOLIC BLOOD PRESSURE: 84 MMHG | HEART RATE: 56 BPM | OXYGEN SATURATION: 96 %

## 2023-03-03 DIAGNOSIS — E78.2 MIXED HYPERLIPIDEMIA: ICD-10-CM

## 2023-03-03 DIAGNOSIS — J20.9 BRONCHITIS WITH BRONCHOSPASM: Chronic | ICD-10-CM

## 2023-03-03 DIAGNOSIS — I10 PRIMARY HYPERTENSION: Primary | ICD-10-CM

## 2023-03-03 DIAGNOSIS — N18.31 STAGE 3A CHRONIC KIDNEY DISEASE: ICD-10-CM

## 2023-03-03 PROCEDURE — 99214 OFFICE O/P EST MOD 30 MIN: CPT | Performed by: FAMILY MEDICINE

## 2023-03-03 RX ORDER — CLARITHROMYCIN 500 MG/1
500 TABLET, COATED ORAL 2 TIMES DAILY
Qty: 20 TABLET | Refills: 0 | Status: SHIPPED | OUTPATIENT
Start: 2023-03-03 | End: 2023-03-27

## 2023-03-03 RX ORDER — METHYLPREDNISOLONE 4 MG/1
TABLET ORAL
COMMUNITY
Start: 2023-02-26 | End: 2023-03-03

## 2023-03-03 RX ORDER — DEXAMETHASONE 2 MG/1
TABLET ORAL
Qty: 12 TABLET | Refills: 0 | Status: SHIPPED | OUTPATIENT
Start: 2023-03-03 | End: 2023-03-11

## 2023-03-03 RX ORDER — DEXTROMETHORPHAN HYDROBROMIDE AND PROMETHAZINE HYDROCHLORIDE 15; 6.25 MG/5ML; MG/5ML
SYRUP ORAL
COMMUNITY
Start: 2023-02-26 | End: 2023-03-27

## 2023-03-03 NOTE — PROGRESS NOTES
"Chief Complaint  Hypertension, Hyperlipidemia, Heartburn, Cough, and URI    Subjective        Wilmar Carmona presents to [facility]  History of Present Illness  Jose Maria is a delightful glenis who has a follow-up appointment today we reviewed his labs which look quite stable.  He is following with nephrology for a slight increase every now and creatinine.  Also following up for blood pressure.  Otherwise his 10 mg Crestor rosuvastatin is working great for cholesterol.  He has had genetically relatively low HDL as chronic finding.    About 2 weeks ago developed a respiratory illness without fever chills with a significant cough and some wheezing and was seen in prompt care last week with Medrol Dosepak and some prescription cough syrup.  He is still has a cough some sputum production at times and also some wheezing.  He has an albuterol inhaler he uses occasionally.  Given the wheezing and rhonchi noted particularly in the right lower lobe we will treat empirically with Biaxin 500 mg twice daily for 10 days and also taper dose dexamethasone given slight rise in blood pressure with Medrol Dosepak.  Met dexamethasone will be 2 mg twice daily for 4 days then 2 mg once daily for 4 days.  Hypertension  This is a chronic problem.   Hyperlipidemia    Heartburn  He complains of coughing.   Cough    URI   Associated symptoms include coughing.       Objective   Vital Signs:  /84 (BP Location: Left arm, Patient Position: Sitting, Cuff Size: Adult)   Pulse 56   Temp 98.2 °F (36.8 °C) (Tympanic)   Wt 86.2 kg (190 lb)   SpO2 96%   BMI 28.89 kg/m²   Estimated body mass index is 28.89 kg/m² as calculated from the following:    Height as of 3/2/23: 172.7 cm (68\").    Weight as of this encounter: 86.2 kg (190 lb).             Physical Exam  Vitals reviewed.   Constitutional:       Appearance: He is well-developed.   HENT:      Head: Normocephalic and atraumatic.      Right Ear: Tympanic membrane and " external ear normal.      Left Ear: Tympanic membrane and external ear normal.      Nose: Nose normal.   Eyes:      Conjunctiva/sclera: Conjunctivae normal.      Pupils: Pupils are equal, round, and reactive to light.   Neck:      Thyroid: No thyromegaly.      Vascular: No JVD.   Cardiovascular:      Rate and Rhythm: Normal rate and regular rhythm.      Heart sounds: Normal heart sounds.   Pulmonary:      Effort: Pulmonary effort is normal.      Breath sounds: Examination of the right-lower field reveals wheezing. Examination of the left-lower field reveals wheezing. Wheezing present.   Abdominal:      General: Bowel sounds are normal.      Palpations: Abdomen is soft.   Musculoskeletal:         General: Normal range of motion.      Cervical back: Normal range of motion and neck supple.   Lymphadenopathy:      Cervical: No cervical adenopathy.   Skin:     General: Skin is warm and dry.      Findings: No rash.   Neurological:      Mental Status: He is alert and oriented to person, place, and time.      Cranial Nerves: No cranial nerve deficit.      Coordination: Coordination normal.   Psychiatric:         Behavior: Behavior normal.         Thought Content: Thought content normal.         Judgment: Judgment normal.        Result Review :  The following data was reviewed by: Darryl King MD on 03/03/2023:  Common labs    Common Labs 11/16/22 11/30/22 2/24/23 2/24/23 2/24/23 2/24/23      1127 1127 1127 1127   Glucose 85   116 (A)     BUN 17   14     Creatinine 1.21   1.38 (A)     Sodium 143   142     Potassium 3.8   3.9     Chloride 105   103     Calcium 9.6   9.3     Albumin    4.1     Total Bilirubin    0.5     Alkaline Phosphatase    40     AST (SGOT)    26     ALT (SGPT)    25     WBC   6.44      Hemoglobin   14.5      Hematocrit   44.4      Platelets   225      Total Cholesterol     135    Triglycerides     141    HDL Cholesterol     35 (A)    LDL Cholesterol      75    Hemoglobin A1C      5.70 (A)   PSA  2.690        (A) Abnormal value                         Assessment and Plan   Diagnoses and all orders for this visit:    1. Primary hypertension (Primary)  Comments:  Sectral 200 mg twice daily, hydralazine 75 3 times a day hydrochlorothiazide 25 mg daily/half of a 50, nifedipine XL 30 mg 3 times a day.    2. Mixed hyperlipidemia  Comments:  Crestor 10 mg daily which is rosuvastatin    3. Bronchitis with bronchospasm  Comments:  Empiric treatment with clarithromycin/Biaxin 500 mg twice daily for 10 days plus taper dose dexamethasone 2 mg twice daily for 4 days once daily for 4 days.    4. Stage 3a chronic kidney disease (HCC)  Comments:  Routine follow-up with Dr. Bell nephrology    Other orders  -     clarithromycin (BIAXIN) 500 MG tablet; Take 1 tablet by mouth 2 (Two) Times a Day.  Dispense: 20 tablet; Refill: 0  -     dexamethasone (DECADRON) 2 MG tablet; Take 1 tablet by mouth 2 (Two) Times a Day With Meals for 4 days, THEN 1 tablet Daily With Breakfast for 4 days.  Dispense: 12 tablet; Refill: 0             Follow Up   No follow-ups on file.  Patient was given instructions and counseling regarding his condition or for health maintenance advice. Please see specific information pulled into the AVS if appropriate.       Answers for HPI/ROS submitted by the patient on 3/2/2023  What is the primary reason for your visit?: Other  Please describe your symptoms.: Routine visit to review labs, medications.  Have you had these symptoms before?: No  How long have you been having these symptoms?: 1-4 days

## 2023-03-27 ENCOUNTER — OFFICE VISIT (OUTPATIENT)
Dept: GASTROENTEROLOGY | Facility: CLINIC | Age: 73
End: 2023-03-27
Payer: MEDICARE

## 2023-03-27 VITALS
HEIGHT: 68 IN | HEART RATE: 65 BPM | TEMPERATURE: 97.5 F | OXYGEN SATURATION: 96 % | BODY MASS INDEX: 28.67 KG/M2 | DIASTOLIC BLOOD PRESSURE: 80 MMHG | WEIGHT: 189.2 LBS | SYSTOLIC BLOOD PRESSURE: 120 MMHG

## 2023-03-27 DIAGNOSIS — K44.9 HIATAL HERNIA: ICD-10-CM

## 2023-03-27 DIAGNOSIS — Z87.19 HISTORY OF BARRETT'S ESOPHAGUS: ICD-10-CM

## 2023-03-27 DIAGNOSIS — Z86.010 HISTORY OF COLON POLYPS: ICD-10-CM

## 2023-03-27 DIAGNOSIS — K21.9 GASTROESOPHAGEAL REFLUX DISEASE WITHOUT ESOPHAGITIS: Primary | ICD-10-CM

## 2023-03-27 PROCEDURE — 3079F DIAST BP 80-89 MM HG: CPT | Performed by: NURSE PRACTITIONER

## 2023-03-27 PROCEDURE — 1160F RVW MEDS BY RX/DR IN RCRD: CPT | Performed by: NURSE PRACTITIONER

## 2023-03-27 PROCEDURE — 99213 OFFICE O/P EST LOW 20 MIN: CPT | Performed by: NURSE PRACTITIONER

## 2023-03-27 PROCEDURE — 1159F MED LIST DOCD IN RCRD: CPT | Performed by: NURSE PRACTITIONER

## 2023-03-27 PROCEDURE — 3074F SYST BP LT 130 MM HG: CPT | Performed by: NURSE PRACTITIONER

## 2023-03-27 RX ORDER — PANTOPRAZOLE SODIUM 40 MG/1
40 TABLET, DELAYED RELEASE ORAL DAILY
Qty: 90 TABLET | Refills: 3 | Status: SHIPPED | OUTPATIENT
Start: 2023-03-27

## 2023-03-27 NOTE — PROGRESS NOTES
"Chief Complaint   Patient presents with   • Heartburn         History of Present Illness  Patient is a 72-year-old male who presents today forFollow-up.  He was last seen in the office December 2021.  He has a history of colon polyps, GERD, hiatal hernia, and Finnegan's esophagus.  His most recent EGD was October 2021 and most recent colonoscopy was November 2019.    Patient presents today for follow-up.  He continues on pantoprazole for GERD.  He reports throat clearing that occurs intermittently but otherwise feels symptoms are controlled.  Denies any heartburn, reflux, nausea, or vomiting.  Denies any abdominal pain.  Denies any bowel complaints, blood in the stool, or dark stools.     Result Review :           Vital Signs:   /80   Pulse 65   Temp 97.5 °F (36.4 °C)   Ht 172.7 cm (67.99\")   Wt 85.8 kg (189 lb 3.2 oz)   SpO2 96%   BMI 28.77 kg/m²     Body mass index is 28.77 kg/m².     Physical Exam  Vitals reviewed.   Constitutional:       General: He is not in acute distress.     Appearance: He is well-developed.   HENT:      Head: Normocephalic and atraumatic.   Pulmonary:      Effort: Pulmonary effort is normal. No respiratory distress.   Abdominal:      General: Abdomen is flat. Bowel sounds are normal. There is no distension.      Palpations: Abdomen is soft.      Tenderness: There is no abdominal tenderness.   Skin:     General: Skin is dry.      Coloration: Skin is not pale.   Neurological:      Mental Status: He is alert and oriented to person, place, and time.   Psychiatric:         Thought Content: Thought content normal.           Assessment and Plan    Diagnoses and all orders for this visit:    1. Gastroesophageal reflux disease without esophagitis (Primary)    2. History of Finnegan's esophagus    3. Hiatal hernia    4. History of colon polyps    Other orders  -     pantoprazole (PROTONIX) 40 MG EC tablet; Take 1 tablet by mouth Daily.  Dispense: 90 tablet; Refill: 3     "     Discussion  Patient presents today for follow-up.  Symptoms are stable and controlled at this time and will continue current treatment.  Surveillance exams are up-to-date, we will plan on EGD and colonoscopy to be performed together October 2024 for surveillance of Finnegan's esophagus and colon polyps.          Follow Up   Return in about 1 year (around 3/27/2024), or if symptoms worsen or fail to improve.    Patient Instructions   For GERD with history of Finnegan's esophagus, continue pantoprazole daily as prescribed.    For surveillance of Finnegan's esophagus and colon polyps, we will plan on EGD and colonoscopy to be performed October 2024.

## 2023-03-27 NOTE — PATIENT INSTRUCTIONS
For GERD with history of Finnegan's esophagus, continue pantoprazole daily as prescribed.    For surveillance of Finnegan's esophagus and colon polyps, we will plan on EGD and colonoscopy to be performed October 2024.

## 2023-04-07 ENCOUNTER — TELEPHONE (OUTPATIENT)
Dept: INTERNAL MEDICINE | Facility: CLINIC | Age: 73
End: 2023-04-07
Payer: MEDICARE

## 2023-04-07 NOTE — TELEPHONE ENCOUNTER
"  Caller: Wilmar Carmona \"ZEKE\"    Relationship: Self    Best call back number: [phone]    What is the best time to reach you: ANYTIME     Who are you requesting to speak with (clinical staff, provider,  specific staff member): CLINICAL STAFF    What was the call regarding: PATIENT IS REQUESTING A TOPICAL CREAM THAT WAS DISCUSS AT HIS MOST RECENT APPOINTMENT.     PLEASE ADVISE     Do you require a callback: YES  "

## 2023-04-25 ENCOUNTER — OFFICE VISIT (OUTPATIENT)
Dept: SLEEP MEDICINE | Facility: HOSPITAL | Age: 73
End: 2023-04-25
Payer: MEDICARE

## 2023-04-25 VITALS
OXYGEN SATURATION: 96 % | SYSTOLIC BLOOD PRESSURE: 120 MMHG | DIASTOLIC BLOOD PRESSURE: 61 MMHG | WEIGHT: 191 LBS | HEIGHT: 68 IN | BODY MASS INDEX: 28.95 KG/M2 | HEART RATE: 58 BPM

## 2023-04-25 DIAGNOSIS — G47.33 OBSTRUCTIVE SLEEP APNEA: Primary | ICD-10-CM

## 2023-04-25 DIAGNOSIS — E66.9 OBESITY (BMI 30-39.9): ICD-10-CM

## 2023-04-25 PROCEDURE — G0463 HOSPITAL OUTPT CLINIC VISIT: HCPCS

## 2023-04-25 NOTE — PROGRESS NOTES
"[facility]  [address]  [place] [address] [phone]    PCP: Darryl King MD    Reason for visit:  Sleep disorders: BRITTA    Wilmar \"REJI" is a 72 y.o.male who was seen in the [facility] today. States he has been mostly compliant with CPAP. He sleeps from 10:30pm to 7:30am. He likes the current ffm, it fits well and doesn't leak. He wakes up fairly rested. He uses restroom about 2 times a night. Denies dry mouth when he awakens.  He is prescribed Ambien but rarely uses.  Berry Sleepiness Scale is 6. Caffeine 0 per day. Alcohol 2 per week.    Wilmar \"REJI"  reports that he has never smoked. He has never been exposed to tobacco smoke. He has never used smokeless tobacco.    Pertinent Positive Review of Systems of denies  Rest of Review of Systems was negative as recorded in Sleep Questionnaire.    Patient  has a past medical history of Abnormal EKG (01/31/2020), Abnormal stress test, Acne, Actinic keratosis, Allergic rhinitis, Anxiety (05/03/2022), Arthritis, Atrial premature depolarization (05/2018), Finnegan's esophagus without dysplasia (10/15/2021), BCC (basal cell carcinoma) (12/2011), Biceps tendinitis of left shoulder (01/2023), Bilateral vitreous detachment (07/20/2018), Blepharitis of both eyes, BPH (benign prostatic hypertrophy), Bronchitis with bronchospasm (11/18/2016), CAD (coronary artery disease), Candidal stomatitis (10/2018), Cataract, Chalazion left upper eyelid (03/2022), 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), 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), Closed fracture of multiple ribs of right side (05/2020), Colon polyps, Consolidation of left lower lobe of lung (05/05/2020), Coronary atherosclerosis (04/09/2018), Cyclothymic disorder, Depression, Diplopia, Enlarged prostate (02/22/2023), Ganglion cyst of left foot (09/2017), Gastric polyps, Geographical tongue, GERD (gastroesophageal reflux disease), Globus " sensation, Heart murmur, Hiatal hernia, Hordeolum internum of left eye (03/2022), Hyperlipidemia, Hypersomnia, Hypertension, Hypertensive chronic kidney disease (12/15/2021), Hypouricemia (12/2021), IgG deficiency, Impingement syndrome of left shoulder (01/2023), Jet lag syndrome, Keratoconjunctivitis sicca of both eyes, Lesion of spleen (06/2020), Low serum alkaline phosphatase (01/2023), Meibomian gland dysfunction (MGD) of both eyes, Mild tricuspid regurgitation, Nodular prostate, Olecranon bursitis of right elbow (05/06/2021), Oropharyngeal dysphagia, BRITTA on CPAP, PAC (premature atrial contraction), PAF (paroxysmal atrial fibrillation), Palpitations, 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), 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), Pedal edema (05/2020), Perianal cyst (11/2022), Plantar fascial fibromatosis (10/2017), PLMD (periodic limb movement disorder), PNA (pneumonia) (04/08/2020), Posterior tibial tendinitis of left leg (02/2018), Prediabetes (08/07/2020), Ptosis of both eyelids (01/2018), Renal insufficiency (01/10/2018), Rotator cuff tendinitis, left (01/2023), SCC (squamous cell carcinoma) (09/2012), Seasonal allergies, Seborrheic keratosis, Snoring, Sprain of left ankle (05/03/2018), Stage 3 chronic kidney disease (12/15/2021), Thoracic aortic aneurysm without rupture (04/2018), Tremor, Trichiasis without entropion left lower eyelid, Urge incontinence (02/2020), Ventricular premature beats (04/17/2020), Vertical strabismus, right eye, Vitamin D deficiency, and Vitreous floaters (05/17/2019).     Current Medications:    Current Outpatient Medications:   •  acebutolol (SECTRAL) 200 MG capsule, TAKE 1 CAPSULE BY MOUTH TWICE DAILY, Disp: 180 capsule, Rfl: 1  •  escitalopram (LEXAPRO) 10 MG tablet, Take 1 tablet by mouth Daily., Disp: , Rfl:   •  finasteride (PROSCAR) 5 MG tablet, TK 1 T PO ONCE DAILY, Disp: , Rfl: 2  •  Glucosamine-Chondroitin (OSTEO BI-FLEX REGULAR STRENGTH PO), Daily., Disp: , Rfl:   •   "hydrALAZINE (APRESOLINE) 50 MG tablet, TAKE 1 TABLET BY MOUTH THREE TIMES DAILY (Patient taking differently: Take 1.5 tablets by mouth 3 (Three) Times a Day.), Disp: 90 tablet, Rfl: 3  •  hydroCHLOROthiazide (HYDRODIURIL) 50 MG tablet, Take 1 tablet by mouth Daily., Disp: , Rfl:   •  hydrocortisone 2.5 % cream, Apply 1 application topically to the appropriate area as directed 2 (Two) Times a Day., Disp: 56 g, Rfl: 1  •  lamoTRIgine (LaMICtal) 200 MG tablet, Take 1 tablet by mouth Daily., Disp: , Rfl:   •  Levomefolate Glucosamine (METHYLFOLATE PO), Take 1 tablet by mouth., Disp: , Rfl:   •  NIFEdipine XL (PROCARDIA XL) 30 MG 24 hr tablet, Take 3 tablets by mouth 3 (Three) Times a Day., Disp: 90 tablet, Rfl: 0  •  Omega-3 Fatty Acids (fish oil) 1000 MG capsule capsule, Take  by mouth Daily., Disp: , Rfl:   •  pantoprazole (PROTONIX) 40 MG EC tablet, Take 1 tablet by mouth Daily., Disp: 90 tablet, Rfl: 3  •  rosuvastatin (CRESTOR) 10 MG tablet, TAKE 1 TABLET BY MOUTH DAILY, Disp: 90 tablet, Rfl: 2  •  zolpidem (AMBIEN) 5 MG tablet, TAKE 1 TABLET BY MOUTH AT NIGHT AS NEEDED FOR SLEEP, Disp: 25 tablet, Rfl: 2   also entered in Sleep Questionnaire         Vital Signs: /61   Pulse 58   Ht 172.7 cm (67.99\")   Wt 86.6 kg (191 lb)   SpO2 96%   BMI 29.05 kg/m²     Body mass index is 29.05 kg/m².       Tongue: Large       Dentition: good       Pharynx: Posterior pharyngeal pillars are wide   Mallampatti: I (soft palate, uvula, fauces, and tonsillar pillars visible)        General: Alert. Cooperative. Well developed. No acute distress.             Head:  Normocephalic. Symmetrical. Atraumatic.              Nose: No septal deviation. No drainage.          Throat: No oral lesions. No thrush. Moist mucous membranes.    Chest Wall:  Normal shape. Symmetric expansion with respiration. No tenderness.             Neck:  Trachea midline.           Lungs:  Clear to auscultation bilaterally. No wheezes. No rhonchi. No rales. " "Respirations regular, even and unlabored.            Heart:  Regular rhythm and normal rate. Normal S1 and S2. No murmur.     Abdomen:  Soft, non-tender and non-distended. Normal bowel sounds. No masses.  Extremities:  Moves all extremities well. No edema.    Psychiatric: Normal mood and affect.    Diagnostic data available to date is as below and was reviewed on current visit:   He was diagnosed with severe supine related BRITTA in 2022 with supine AHI of 56/hr.     No results found for: IRON, TIBC, FERRITIN    Most current available usage data reviewed on 2023:  · No data recorded since 2022    DME Company: [employer]    Prescription to [facility] for replacement supplies as below:    full face mask      Description Replacement    Nasal PILLOWS      A 7034 Nasal Pillows  every 3 mth    A 7033 Repl Nasal Pillows  2 per mth    Nasal MASK/CUSHION      A 7034 Nasal Mask/Cushion  every 3 mth    A 7032 Repl Nasal Mask/Cushion  2 per mth    Full Face MASK     x A 7030 Full Face Mask  every 3 mth   x A 7031 Repl Face Mask  1 per mth      A 4604 Heated Tubing  every 3 mth    A 7037 Standard Tubing  every 3 mth   x A 7035 Headgear  every 3 mth   x A 7046 Repl Humidifier Chamber  every 6 yrs   x A 7038 Disposable Filters  2 per mth   x A 7039 Non-disposable Filter  every 6 mth   x A 7036 Chin Strap  every 6 mth     No orders of the defined types were placed in this encounter.         Impression:  1. Obstructive sleep apnea    2. Obesity (BMI 30-39.9)        Plan:  Wilmar \"ZEKE\" has a machine that is over 5 years old.  He does not appear to be recording his usage.  Patient is quite adamant that his usage has been at least 75% leading up to this visit.  His current machine is not recording any data since 2022.  I will order a new machine auto .  In addition patient has a device through his father who has since .  This is a auto [employer] device.  We have set the Feniks card on that to similar settings " auto 6-12.  I cautioned patient that some of the DreamStation machines are part of the Ozzie recall.  He has the information to check the serial number online to see if this Ozzie machine requires submission for recall by [employer].    I reiterated the importance of effective treatment of obstructive sleep apnea with PAP machine.  Cardiovascular health risks of untreated sleep apnea were again reviewed.  Patient was asked to remain cautious if there is persistent hypersomnolence. The benefit of weight loss in reducing severity of obstructive sleep apnea was discussed.  Patient would benefit from adhering to a strict diet to achieve ideal BMI.     Change of PAP supplies regularly is important for effective use.  Change of cushion on the mask or plugs on nasal pillows along with disposable filters once every month and change of mask frame, tubing, headgear and Velcro straps every 6 months at the minimum was reiterated.    Patient will follow up in this clinic in 3 months  APRN    Thank you for allowing me to participate in your patient's care.    Electronically signed by Han Mcgovern MD, 04/25/23, 2:22 PM EDT.    Part of this note may be an electronic transcription/translation of spoken language to printed text using the Dragon Dictation System.

## 2023-05-01 RX ORDER — ATENOLOL 50 MG/1
50 TABLET ORAL DAILY
Qty: 90 TABLET | Refills: 3 | Status: SHIPPED | OUTPATIENT
Start: 2023-05-01

## 2023-05-12 ENCOUNTER — OFFICE VISIT (OUTPATIENT)
Dept: SURGERY | Facility: CLINIC | Age: 73
End: 2023-05-12
Payer: MEDICARE

## 2023-05-12 VITALS
BODY MASS INDEX: 29.13 KG/M2 | HEIGHT: 68 IN | DIASTOLIC BLOOD PRESSURE: 70 MMHG | HEART RATE: 61 BPM | SYSTOLIC BLOOD PRESSURE: 140 MMHG | OXYGEN SATURATION: 97 % | WEIGHT: 192.2 LBS

## 2023-05-12 DIAGNOSIS — L72.3 SEBACEOUS CYST: Primary | ICD-10-CM

## 2023-05-12 NOTE — PROGRESS NOTES
"Wilmar Carmona is a 72 y.o. male in for follow up of Sebaceous cyst    Pt was last seen in the office on 02/23/2023 for perianal sebaceous cyst.  He was found to have multiple small cyst, with the larger ones removed with gentle compression.   Pt states he did well after this, but has since developed some left-sided tenderness similar to what he experienced previously.   Denies any bleeding, drainage, or fevers.     /70 (BP Location: Left arm, Patient Position: Sitting, Cuff Size: Adult)   Pulse 61   Ht 172.7 cm (68\")   Wt 87.2 kg (192 lb 3.2 oz)   SpO2 97%   BMI 29.22 kg/m²   Body mass index is 29.22 kg/m².      PE:  Physical Exam  Exam conducted with a chaperone present.   Constitutional:       General: He is not in acute distress.     Appearance: He is well-developed.   HENT:      Head: Normocephalic and atraumatic.   Abdominal:      General: There is no distension.      Palpations: Abdomen is soft.   Genitourinary:     Comments: Perianal exam: Multiple tiny sebaceous cyst along the perianal skin.   Musculoskeletal:         General: Normal range of motion.   Neurological:      Mental Status: He is alert.   Psychiatric:         Thought Content: Thought content normal.       Review of Medical Record:      Colonoscopy 11/06/2019:  - 6 mm Tubular adenoma, Sigmoid colon  - Non-bleeding internal hemorrhoids  - Rescope: 5 Years  - Dr. Hemal Bravo, [facility]    FHx:  Mother: Hx of colon polyps    Assessment:   1. Sebaceous cyst    - Recurrrent    Plan:  - Multiple sebaceous cysts removed using gentle compression.   - Recommended OTC Lidocaine 5% ointment PRN for perianal discomfort  - Follow up as needed.           "

## 2023-05-30 ENCOUNTER — TELEPHONE (OUTPATIENT)
Dept: INTERNAL MEDICINE | Facility: CLINIC | Age: 73
End: 2023-05-30

## 2023-05-30 NOTE — TELEPHONE ENCOUNTER
"     Caller: Wilmar Carmona \"ZEKE\"    Relationship: Self    Best call back number: [phone]    What orders are you requesting (i.e. lab or imaging): THYROID LABS     In what timeframe would the patient need to come in: AS SOON AS POSSIBLE       Where will you receive your lab/imaging services: [facility]     Additional notes: PATIENT STATES THAT HE HAS BEEN MORE FATIGUED THAN USUAL, HE HAS BEEN NAPPING THROUGHOUT THE DAY AND SLEEPING MORE AT NIGHT.     PATIENT WOULD LIKE TO KNOW IF IT COULD HAVE ANYTHING TO DO WITH HIS THYROID.     "

## 2023-05-31 ENCOUNTER — TELEPHONE (OUTPATIENT)
Dept: INTERNAL MEDICINE | Facility: CLINIC | Age: 73
End: 2023-05-31

## 2023-05-31 DIAGNOSIS — R73.09 ELEVATED HEMOGLOBIN A1C: Primary | ICD-10-CM

## 2023-05-31 DIAGNOSIS — R53.83 LETHARGY: ICD-10-CM

## 2023-06-06 ENCOUNTER — TRANSCRIBE ORDERS (OUTPATIENT)
Dept: ADMINISTRATIVE | Facility: HOSPITAL | Age: 73
End: 2023-06-06
Payer: MEDICARE

## 2023-06-06 ENCOUNTER — LAB (OUTPATIENT)
Dept: LAB | Facility: HOSPITAL | Age: 73
End: 2023-06-06
Payer: MEDICARE

## 2023-06-06 DIAGNOSIS — R97.20 ELEVATED PSA: Primary | ICD-10-CM

## 2023-06-06 DIAGNOSIS — R73.09 ELEVATED HEMOGLOBIN A1C: ICD-10-CM

## 2023-06-06 DIAGNOSIS — R97.20 ELEVATED PSA: ICD-10-CM

## 2023-06-06 DIAGNOSIS — R53.83 LETHARGY: ICD-10-CM

## 2023-06-06 LAB
ALBUMIN SERPL-MCNC: 3.7 G/DL (ref 3.5–5.2)
ALBUMIN/GLOB SERPL: 1.3 G/DL
ALP SERPL-CCNC: 36 U/L (ref 39–117)
ALT SERPL W P-5'-P-CCNC: 21 U/L (ref 1–41)
ANION GAP SERPL CALCULATED.3IONS-SCNC: 11.3 MMOL/L (ref 5–15)
AST SERPL-CCNC: 27 U/L (ref 1–40)
BACTERIA UR QL AUTO: NORMAL /HPF
BASOPHILS # BLD AUTO: 0.07 10*3/MM3 (ref 0–0.2)
BASOPHILS NFR BLD AUTO: 1.1 % (ref 0–1.5)
BILIRUB SERPL-MCNC: 0.3 MG/DL (ref 0–1.2)
BILIRUB UR QL STRIP: ABNORMAL
BUN SERPL-MCNC: 15 MG/DL (ref 8–23)
BUN/CREAT SERPL: 12.1 (ref 7–25)
CALCIUM SPEC-SCNC: 9.4 MG/DL (ref 8.6–10.5)
CHLORIDE SERPL-SCNC: 105 MMOL/L (ref 98–107)
CLARITY UR: CLEAR
CO2 SERPL-SCNC: 27.7 MMOL/L (ref 22–29)
COLOR UR: YELLOW
CREAT SERPL-MCNC: 1.24 MG/DL (ref 0.76–1.27)
CRP SERPL-MCNC: 0.32 MG/DL (ref 0–0.5)
DEPRECATED RDW RBC AUTO: 44.8 FL (ref 37–54)
EGFRCR SERPLBLD CKD-EPI 2021: 61.8 ML/MIN/1.73
EOSINOPHIL # BLD AUTO: 0.27 10*3/MM3 (ref 0–0.4)
EOSINOPHIL NFR BLD AUTO: 4.1 % (ref 0.3–6.2)
ERYTHROCYTE [DISTWIDTH] IN BLOOD BY AUTOMATED COUNT: 13.4 % (ref 12.3–15.4)
ERYTHROCYTE [SEDIMENTATION RATE] IN BLOOD: 9 MM/HR (ref 0–20)
GLOBULIN UR ELPH-MCNC: 2.9 GM/DL
GLUCOSE SERPL-MCNC: 126 MG/DL (ref 65–99)
GLUCOSE UR STRIP-MCNC: NEGATIVE MG/DL
HBA1C MFR BLD: 5.6 % (ref 4.8–5.6)
HCT VFR BLD AUTO: 41.3 % (ref 37.5–51)
HGB BLD-MCNC: 14.2 G/DL (ref 13–17.7)
HGB UR QL STRIP.AUTO: NEGATIVE
HYALINE CASTS UR QL AUTO: NORMAL /LPF
IMM GRANULOCYTES # BLD AUTO: 0.02 10*3/MM3 (ref 0–0.05)
IMM GRANULOCYTES NFR BLD AUTO: 0.3 % (ref 0–0.5)
KETONES UR QL STRIP: ABNORMAL
LEUKOCYTE ESTERASE UR QL STRIP.AUTO: NEGATIVE
LYMPHOCYTES # BLD AUTO: 2.23 10*3/MM3 (ref 0.7–3.1)
LYMPHOCYTES NFR BLD AUTO: 34 % (ref 19.6–45.3)
MCH RBC QN AUTO: 31.1 PG (ref 26.6–33)
MCHC RBC AUTO-ENTMCNC: 34.4 G/DL (ref 31.5–35.7)
MCV RBC AUTO: 90.6 FL (ref 79–97)
MONOCYTES # BLD AUTO: 0.58 10*3/MM3 (ref 0.1–0.9)
MONOCYTES NFR BLD AUTO: 8.9 % (ref 5–12)
NEUTROPHILS NFR BLD AUTO: 3.38 10*3/MM3 (ref 1.7–7)
NEUTROPHILS NFR BLD AUTO: 51.6 % (ref 42.7–76)
NITRITE UR QL STRIP: NEGATIVE
NRBC BLD AUTO-RTO: 0 /100 WBC (ref 0–0.2)
PH UR STRIP.AUTO: 6.5 [PH] (ref 5–8)
PLATELET # BLD AUTO: 223 10*3/MM3 (ref 140–450)
PMV BLD AUTO: 9 FL (ref 6–12)
POTASSIUM SERPL-SCNC: 3.7 MMOL/L (ref 3.5–5.2)
PROT SERPL-MCNC: 6.6 G/DL (ref 6–8.5)
PROT UR QL STRIP: ABNORMAL
PSA SERPL-MCNC: 2.23 NG/ML (ref 0–4)
RBC # BLD AUTO: 4.56 10*6/MM3 (ref 4.14–5.8)
RBC # UR STRIP: NORMAL /HPF
REF LAB TEST METHOD: NORMAL
SODIUM SERPL-SCNC: 144 MMOL/L (ref 136–145)
SP GR UR STRIP: 1.02 (ref 1–1.03)
SQUAMOUS #/AREA URNS HPF: NORMAL /HPF
T3FREE SERPL-MCNC: 3.21 PG/ML (ref 2–4.4)
T4 FREE SERPL-MCNC: 0.92 NG/DL (ref 0.93–1.7)
TSH SERPL DL<=0.05 MIU/L-ACNC: 1.25 UIU/ML (ref 0.27–4.2)
UROBILINOGEN UR QL STRIP: ABNORMAL
WBC # UR STRIP: NORMAL /HPF
WBC NRBC COR # BLD: 6.55 10*3/MM3 (ref 3.4–10.8)

## 2023-06-06 PROCEDURE — 80053 COMPREHEN METABOLIC PANEL: CPT

## 2023-06-06 PROCEDURE — 84481 FREE ASSAY (FT-3): CPT

## 2023-06-06 PROCEDURE — 83036 HEMOGLOBIN GLYCOSYLATED A1C: CPT

## 2023-06-06 PROCEDURE — 81001 URINALYSIS AUTO W/SCOPE: CPT

## 2023-06-06 PROCEDURE — 85652 RBC SED RATE AUTOMATED: CPT

## 2023-06-06 PROCEDURE — 84153 ASSAY OF PSA TOTAL: CPT

## 2023-06-06 PROCEDURE — 85025 COMPLETE CBC W/AUTO DIFF WBC: CPT

## 2023-06-06 PROCEDURE — 86140 C-REACTIVE PROTEIN: CPT

## 2023-06-06 PROCEDURE — 36415 COLL VENOUS BLD VENIPUNCTURE: CPT

## 2023-06-06 PROCEDURE — 84443 ASSAY THYROID STIM HORMONE: CPT

## 2023-06-06 PROCEDURE — 84439 ASSAY OF FREE THYROXINE: CPT

## 2023-06-28 PROBLEM — I49.3 VENTRICULAR PREMATURE BEATS: Status: ACTIVE | Noted: 2023-06-28

## 2023-07-12 ENCOUNTER — TELEPHONE (OUTPATIENT)
Dept: INTERNAL MEDICINE | Facility: CLINIC | Age: 73
End: 2023-07-12

## 2023-07-12 NOTE — TELEPHONE ENCOUNTER
"  Caller: Wilmar Carmona \"ZEKE\"    Relationship: Self    Best call back number: [phone]     What is the best time to reach you: ANYTIME    Who are you requesting to speak with (clinical staff, provider,  specific staff member):     What was the call regarding: [facility] [place] AS THAT LOCATION IS CLOSER TO HIM THAN THE OFFICE.     PLEASE CALL TO ADVISE IF LABS ARE ACTIVE AND ALREADY IN CHART.       "

## 2023-07-24 ENCOUNTER — TELEPHONE (OUTPATIENT)
Dept: INTERNAL MEDICINE | Facility: CLINIC | Age: 73
End: 2023-07-24
Payer: MEDICARE

## 2023-07-24 NOTE — TELEPHONE ENCOUNTER
Pt advised there are no current orders in the system and no need to keep the lab appt  Lab appt cancelled for 7/25/23

## 2023-07-24 NOTE — TELEPHONE ENCOUNTER
"  Caller: Wilmar Carmona \"ZEKE\"    Relationship: Self    Best call back number: [phone]     What is the best time to reach you: ANYTIME    Who are you requesting to speak with (clinical staff, provider,  specific staff member): CLINICAL    What was the call regarding: PATIENT IS SCHEDULED FOR LABS ON 07/25/23 BUT HAD LABS DONE ON 07/17/23 FOR THE UPCOMING APPOINTMENT WITH DR SAWYER. DOES HE STILL NEED TO COME IN ON 07/25/23?     PLEASE CALL PATIENT TO DISCUSS AND ADVISE     Is it okay if the provider responds through [place]: NO PLEASE RETURN THE CALL      "

## 2023-07-25 RX ORDER — ACEBUTOLOL HYDROCHLORIDE 200 MG/1
CAPSULE ORAL
Qty: 180 CAPSULE | Refills: 1 | OUTPATIENT
Start: 2023-07-25

## 2023-07-27 ENCOUNTER — TELEPHONE (OUTPATIENT)
Dept: CARDIOLOGY | Facility: CLINIC | Age: 73
End: 2023-07-27
Payer: MEDICARE

## 2023-07-27 RX ORDER — ACEBUTOLOL HYDROCHLORIDE 200 MG/1
200 CAPSULE ORAL 2 TIMES DAILY
Qty: 180 CAPSULE | Refills: 3 | Status: SHIPPED | OUTPATIENT
Start: 2023-07-27

## 2023-07-27 NOTE — TELEPHONE ENCOUNTER
Can you find out how he is taking the Acebutolol? This is a patient advice request from 2 months ago, and he never followed up in the message to say what dose he ended up on or how his blood pressure is running?

## 2023-07-27 NOTE — TELEPHONE ENCOUNTER
"Regarding: Acebutolol vs Propranolol    Contact: [phone]  ----- Message from Genesis Arnold MA sent at 7/27/2023 10:32 AM EDT -----  Pt is calling asking for a refill on his Acebutolol. Can you please send this into [facility] [address]. He states this is a cardiac medication that he would like for our office to takeover management     ----- Message sent from Betsy Gutiérrez APRN to Wilmar Carmona \"ZEKE\" at 5/8/2023 10:38 AM -----   Sounds good. Let me know how it goes.    Betsy      ----- Message -----       From:Wilmar Carmona       Sent:5/8/2023  8:09 AM EDT         To:Patient Medical Advice Request Message List    Subject:Acebutolol vs Propranolol      Betsy, I've been using the Atenolol for a week now and it seems to lower my BP a little lower than with Acebutolol, but my pulse rate is lower - down to the lower 50s and even down to 49 at times.  I'm tired a lot, sleeping 8+ hours at night, taking naps during the day, no energy, et.al.  Acebutolol kept my pulse in the  low-to-mid 60s/upper 50s, but also caused some fatigue.    My systolic is the issue, can run as high as mid 140's on Acebutolol, typically in the 130s. Diastolic always in the mid 70s.  With Atenolol systolic is in the lower 130s,  diastolic in the low 70s.     I'm going to try an experiment today and only take 1 Acebutolol and watch my BP and pulse closely.  I'll let you know how that goes.     Thanks you,   Zeke Mathew  [phone]      ----- Message -----       From:Wilmar Carmona       Sent:5/1/2023  3:01 PM EDT         To:Patient Medical Advice Request Message List    Subject:Acebutolol vs Propranolol      Thank you!       ----- Message -----       From:Betsy Gutiérrez       Sent:5/1/2023  2:50 PM EDT         To:Wilmar Carmona    Subject:Acebutolol vs Propranolol      I have sent a prescription for atenolol 50 mg daily. This is pretty compatible to the dose you are taking of acebutolol. We can titrate up if needed. Please note this " is a once a day medication as opposed to twice daily as the acebutolol. I would monitor your BP and heart rate over the next few days and let us know how you are doing towards the end of the week. If you have any problems, please let us know.    Betsy      ----- Message -----       From:Wilmar Carmona       Sent:5/1/2023  2:42 PM EDT         To:Patient Medical Advice Request Message List    Subject:Acebutolol vs Propranolol      Please send on a prescription.  My current Acebutolol is 200 mg, 2x/ day.  The new prescription would need it to be a compatible dose.      Thank you!   Jose Maria Carmona       ----- Message -----       From:Betsy Gutiérrez       Sent:5/1/2023  2:38 PM EDT         To:Wilmar Carmona    Subject:Acebutolol vs Propranolol      I would recommend starting with atenolol 25 mg daily and we can titrate up if needed. This is great for blood pressure and rate control. I can send in a new prescription if you would like.    Thanks,   Betsy      ----- Message -----       From:Wilmar Carmona       Sent:5/1/2023  2:22 PM EDT         To:Patient Medical Advice Request Message List    Subject:Acebutolol vs Propranolol      My BP is not bad, normally in the 129-137 systolic, mid-70s diastolic. Heart rate upper 50s to lower 60s.  I'm trying to get my  BP down to the new standard - 120/80.  My nephrologist (Kinza Bell) has me on 12.5 mg hydrochlorothiazide that she and I would like to eliminate as it's raising my creatine level.  She also has me on 30mg of Nifedipine ER 3x a day.  I'd like to go to 2x/day (improves reflux and puffy gums).       Acebutolol also regulates my heart rate, which seems OK.  My goal is to lower my BP without significantly increasing my heart rate.  Will the meds you suggest do that?  My daughter, 37 has much too high BP and her doctor put her on the Propranolol, which helped her a lot, which is why I asked.      Thank you,   Jose Maria Carmona      ----- Message -----       From:Betsy Gutiérrez        Sent:5/1/2023  1:06 PM EDT         To:Wilmar Carmona    Subject:Acebutolol vs Propranolol      Penny  Genesis is out of the office and I am covering her messages today. Propranolol actually does not have a very significant effect on blood pressure. I would recommend trying atenolol or metoprolol instead. How is your blood pressure and heart rate at home?     GEOFFREY Xavier      ----- Message -----       From:Wilmar Carmona       Sent:5/1/2023 11:03 AM EDT         To:Genesis Albert    Subject:Acebutolol vs Propranolol      Genesis, I've been on Acebutolol for so many years I can't remember not being on it.  I understand Propranolol  is also a beta blocker and that it's somewhat better than Acebutolol for helping with blood pressure.   I use Acebutolol to help control blood pressure, but it still runs a little high, even with 2 additional meds.   Can I get a prescription to try Propranolol?

## 2023-07-28 ENCOUNTER — OFFICE VISIT (OUTPATIENT)
Dept: SLEEP MEDICINE | Facility: HOSPITAL | Age: 73
End: 2023-07-28
Payer: MEDICARE

## 2023-07-28 VITALS
BODY MASS INDEX: 28.79 KG/M2 | HEART RATE: 59 BPM | WEIGHT: 190 LBS | HEIGHT: 68 IN | DIASTOLIC BLOOD PRESSURE: 68 MMHG | OXYGEN SATURATION: 95 % | SYSTOLIC BLOOD PRESSURE: 107 MMHG

## 2023-07-28 DIAGNOSIS — G47.10 HYPERSOMNIA: ICD-10-CM

## 2023-07-28 DIAGNOSIS — E66.9 OBESITY (BMI 30-39.9): ICD-10-CM

## 2023-07-28 DIAGNOSIS — J42 CHRONIC BRONCHITIS, UNSPECIFIED CHRONIC BRONCHITIS TYPE: ICD-10-CM

## 2023-07-28 DIAGNOSIS — G47.33 OBSTRUCTIVE SLEEP APNEA: Primary | ICD-10-CM

## 2023-07-28 PROCEDURE — G0463 HOSPITAL OUTPT CLINIC VISIT: HCPCS

## 2023-07-28 NOTE — PROGRESS NOTES
[facility]  Telephone: [phone] / Fax: [phone] [place]  Telephone: [phone] / Fax: [phone] Glenys Crowder    PCP: Darryl King MD    Reason for visit: BRITTA f/u    Wilmar Carmona is a 72 y.o.male  was last seen at [facility] sleep lab in  January 2023. He is doing great on auto CPAP 8-12cm H2O. He received the machine from [employer] and now has Air Sense 11 Auto Set unit that he loves.  He uses the machine for 8 hours and 13 minutes. He benefits from its use in terms of reduction of hypersomnia and snoring.  However, on some days he still feels tired/sleepy despite getting enough sleep and takes a 30min-1hr nap.  He had recent blood work with Dr King to evaluate fatigue. Thyroid labs came back normal. CBC was unremarkable in Feb 2023 also.     He also mentions daily sputum production. 2 weeks ago he took prednisone and Zpack.  He recovered. CXR showed NAD. He was never diagnosed with asthma in the past.    SH- no tobacco, 2 alc per week, 0 caffeine per day, no energy drinks.    ROS- +cough, rest is negative.    DME Andrew    Current Medications:    Current Outpatient Medications:     acebutolol (SECTRAL) 200 MG capsule, Take 1 capsule by mouth 2 (Two) Times a Day., Disp: 180 capsule, Rfl: 3    azithromycin (Zithromax) 250 MG tablet, Take 2 tablets the first day, then 1 tablet daily for 4 days., Disp: 6 tablet, Rfl: 0    benzonatate (TESSALON) 200 MG capsule, Take 1 capsule by mouth 3 (Three) Times a Day As Needed for Cough for up to 15 days., Disp: 45 capsule, Rfl: 0    cetirizine (zyrTEC) 10 MG tablet, Take 1 tablet by mouth Daily for 30 days., Disp: 30 tablet, Rfl: 0    Crestor 10 MG tablet, , Disp: , Rfl:     escitalopram (LEXAPRO) 10 MG tablet, Take 1 tablet by mouth Daily., Disp: , Rfl:     finasteride (PROSCAR) 5 MG tablet, TK 1 T PO ONCE DAILY, Disp: , Rfl: 2    Glucosamine-Chondroitin (OSTEO BI-FLEX REGULAR STRENGTH PO), Daily., Disp: , Rfl:     hydrALAZINE (APRESOLINE)  50 MG tablet, TAKE 1 TABLET BY MOUTH THREE TIMES DAILY (Patient taking differently: Take 1.5 tablets by mouth 3 (Three) Times a Day.), Disp: 90 tablet, Rfl: 3    hydroCHLOROthiazide (HYDRODIURIL) 50 MG tablet, Take 1 tablet by mouth Daily., Disp: , Rfl:     hydrocortisone 2.5 % cream, Apply 1 application topically to the appropriate area as directed 2 (Two) Times a Day., Disp: 56 g, Rfl: 1    lamoTRIgine (LaMICtal) 200 MG tablet, Take 1 tablet by mouth Daily., Disp: , Rfl:     Levomefolate Glucosamine (METHYLFOLATE PO), Take 1 tablet by mouth., Disp: , Rfl:     NIFEdipine XL (PROCARDIA XL) 30 MG 24 hr tablet, Take 3 tablets by mouth 3 (Three) Times a Day., Disp: 90 tablet, Rfl: 0    Omega-3 Fatty Acids (fish oil) 1000 MG capsule capsule, Take  by mouth Daily., Disp: , Rfl:     pantoprazole (PROTONIX) 40 MG EC tablet, Take 1 tablet by mouth Daily., Disp: 90 tablet, Rfl: 3    rosuvastatin (CRESTOR) 10 MG tablet, TAKE 1 TABLET BY MOUTH DAILY, Disp: 90 tablet, Rfl: 2    silodosin (RAPAFLO) 8 MG capsule capsule, Take 1 capsule by mouth Daily. (Patient not taking: Reported on 6/28/2023), Disp: , Rfl:     zolpidem (AMBIEN) 5 MG tablet, TAKE 1 TABLET BY MOUTH AT NIGHT AS NEEDED FOR SLEEP, Disp: 25 tablet, Rfl: 2   also entered in Sleep Questionnaire    Patient  has a past medical history of Abnormal EKG (01/31/2020), Abnormal stress test, Acne, Actinic keratosis, Allergic rhinitis, Anxiety (05/03/2022), Arthritis, Atrial premature depolarization (05/2018), Finnegan's esophagus without dysplasia (10/15/2021), BCC (basal cell carcinoma) (12/2011), Biceps tendinitis of left shoulder (01/2023), Bilateral vitreous detachment (07/20/2018), Blepharitis of both eyes, BPH (benign prostatic hypertrophy), Bronchitis with bronchospasm (11/18/2016), CAD (coronary artery disease), Candidal stomatitis (10/2018), Cataract, Chalazion left upper eyelid (03/2022), 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), 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), Closed fracture of multiple ribs of right side (05/2020), Colon polyps, Consolidation of left lower lobe of lung (05/05/2020), Coronary atherosclerosis (04/09/2018), Cyclothymic disorder, Depression, Diplopia, Enlarged prostate (02/22/2023), Ganglion cyst of left foot (09/2017), Gastric polyps, Geographical tongue, GERD (gastroesophageal reflux disease), Globus sensation, Heart murmur, Hiatal hernia, Hordeolum internum of left eye (03/2022), Hyperlipidemia, Hypersomnia, Hypertension, Hypertensive chronic kidney disease (12/15/2021), Hypouricemia (12/2021), IgG deficiency, Impingement syndrome of left shoulder (01/2023), Jet lag syndrome, Keratoconjunctivitis sicca of both eyes, Lesion of spleen (06/2020), Low serum alkaline phosphatase (01/2023), Meibomian gland dysfunction (MGD) of both eyes, Mild tricuspid regurgitation, Nodular prostate, Olecranon bursitis of right elbow (05/06/2021), Oropharyngeal dysphagia, BRITTA on CPAP, PAC (premature atrial contraction), PAF (paroxysmal atrial fibrillation), Palpitations, 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), 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), Pedal edema (05/2020), Perianal cyst (11/2022), Plantar fascial fibromatosis (10/2017), PLMD (periodic limb movement disorder), PNA (pneumonia) (04/08/2020), Posterior tibial tendinitis of left leg (02/2018), Prediabetes (08/07/2020), Ptosis of both eyelids (01/2018), Renal insufficiency (01/10/2018), Rotator cuff tendinitis, left (01/2023), SCC (squamous cell carcinoma) (09/2012), Seasonal allergies, Seborrheic keratosis, Snoring, Sprain of left ankle (05/03/2018), Stage 3 chronic kidney disease (12/15/2021), Thoracic aortic aneurysm without rupture (04/2018), Tremor, Trichiasis without entropion left lower eyelid, Urge incontinence (02/2020), Ventricular premature beats (04/17/2020), Vertical strabismus, right eye, Vitamin D deficiency, and Vitreous floaters (05/17/2019).    I have reviewed the Past  "Medical History, Past Surgical History, Social History and Family History.    Vital Signs /68   Pulse 59   Ht 172.7 cm (67.99\")   Wt 86.2 kg (190 lb)   SpO2 95%   BMI 28.90 kg/m²  Body mass index is 28.9 kg/m².    General Alert and oriented. No acute distress noted   Pharynx/Throat Class IV  Mallampati airway, large tongue, no evidence of redundant lateral pharyngeal tissue. No oral lesions. No thrush. Moist mucous membranes.   Head Normocephalic. Symmetrical. Atraumatic.    Nose No septal deviation. No drainage   Chest Wall Normal shape. Symmetric expansion with respiration. No tenderness.   Neck Trachea midline, no thyromegaly or adenopathy    Lungs Clear to auscultation bilaterally. No wheezes. No rhonchi. No rales. Respirations regular, even and unlabored.   Heart Regular rhythm and normal rate. Normal S1 and S2. No murmur   Abdomen Soft, non-tender and non-distended. Normal bowel sounds. No masses.   Extremities Moves all extremities well. No edema   Psychiatric Normal mood and affect.     Testing:  Download   5/6/23-7/25/23 90% use with average nightly use of 8 hours and 13 minutes on auto CPAP 8-12cm H2O, avg pr is 10.8cm H2O, AHI 1.9/hr.    Study-March 2022-severe supine related BRITTA in March 2022 with supine AHI of 56/hr.     Study Result    Narrative & Impression   TWO-VIEW CHEST     HISTORY: Cough.     FINDINGS: The lungs are well-expanded and clear and the heart and hilar  structures are normal. There is no evidence of pneumonia or change from  12/20/2015.     This report was finalized on 7/17/2023 11:24 AM by Dr. Hudson Landaverde M.D.       Impression:  1. Obstructive sleep apnea    2. Obesity (BMI 30-39.9)    3. Chronic bronchitis, unspecified chronic bronchitis type    4. Hypersomnia          Plan:  Ovn oximetry on CPAP RA was ordered to evaluate hypersomnia despite CPAP. AHI on treatment is normal. BRITTA is well controlled. I am puzzled by the desire to take naps and sleep longer hours. I " reviewed original sleep study and recent download report with patient. Continue auto CPAP 8-12cm H2O. Consider pressures adjustments if some hypoxia is present despite CPAP.    I contacted [facility] and asked our staff to reach out to patient and schedule him to have pre post PFTs done to evaluate for possible asthma. He will see Dr Mcgovern at our pulmonary care location to evaluate recurrent bronchitis.         Thank you for allowing me to participate in your patient's care.      GEOFFREY Buckley  [facility]  Phone: [phone]      Part of this note may be an electronic transcription/translation of spoken language to printed text using the Dragon Dictation System.

## 2023-07-31 ENCOUNTER — OFFICE VISIT (OUTPATIENT)
Dept: INTERNAL MEDICINE | Facility: CLINIC | Age: 73
End: 2023-07-31
Payer: MEDICARE

## 2023-07-31 VITALS
SYSTOLIC BLOOD PRESSURE: 112 MMHG | BODY MASS INDEX: 29.05 KG/M2 | OXYGEN SATURATION: 97 % | WEIGHT: 191 LBS | DIASTOLIC BLOOD PRESSURE: 68 MMHG | HEART RATE: 55 BPM

## 2023-07-31 DIAGNOSIS — I10 PRIMARY HYPERTENSION: ICD-10-CM

## 2023-07-31 DIAGNOSIS — E78.2 MIXED HYPERLIPIDEMIA: ICD-10-CM

## 2023-07-31 DIAGNOSIS — R53.82 CHRONIC FATIGUE: Primary | ICD-10-CM

## 2023-07-31 PROCEDURE — 3074F SYST BP LT 130 MM HG: CPT | Performed by: FAMILY MEDICINE

## 2023-07-31 PROCEDURE — 99214 OFFICE O/P EST MOD 30 MIN: CPT | Performed by: FAMILY MEDICINE

## 2023-07-31 PROCEDURE — 3078F DIAST BP <80 MM HG: CPT | Performed by: FAMILY MEDICINE

## 2023-07-31 RX ORDER — HYDROCHLOROTHIAZIDE 25 MG/1
12.5 TABLET ORAL DAILY
COMMUNITY

## 2023-07-31 RX ORDER — HYDROCHLOROTHIAZIDE 50 MG/1
25 TABLET ORAL DAILY
Status: SHIPPED | COMMUNITY
Start: 2023-07-31 | End: 2023-07-31 | Stop reason: SDUPTHER

## 2023-08-01 RX ORDER — ROSUVASTATIN CALCIUM 10 MG/1
10 TABLET, COATED ORAL DAILY
Qty: 90 TABLET | Refills: 2 | Status: SHIPPED | OUTPATIENT
Start: 2023-08-01

## 2023-08-15 ENCOUNTER — TELEPHONE (OUTPATIENT)
Dept: INTERNAL MEDICINE | Facility: CLINIC | Age: 73
End: 2023-08-15
Payer: MEDICARE

## 2023-08-15 NOTE — TELEPHONE ENCOUNTER
Left pt a msg that he would need to reach out to Pulmonary and or see them or he could call our office and schedule first available or he may also go to his local urgent/immediate care center for treatment.    OK FOR HUB TO READ

## 2023-08-15 NOTE — TELEPHONE ENCOUNTER
"Caller: Wilmar Carmona \"ZEKE\"    Relationship: Self    Best call back number: [phone]    What medication are you requesting: PREDNISONE PACK     What are your current symptoms: PRODUCTIVE COUGH    How long have you been experiencing symptoms: OFF AND ON     Have you had these symptoms before:    [x] Yes  [] No    Have you been treated for these symptoms before:   [x] Yes  [] No    If a prescription is needed, what is your preferred pharmacy and phone number: [id number] [place], [place] - [address] AT [facility] [place] - [phone] [facility] [phone] FX     Additional notes:PATIENT STATES THAT HE'S SEEN PULMONOLOGIST ABOUT COUGH AND WAS PRESCRIBED AN INHALER THAT WAS EXPENSIVE. PATIENT GOING OUT OF TOWN THURSDAY AND REQUESTING SOMETHING TO HELP DECREASE SYMPTOMS.     "

## 2023-08-16 ENCOUNTER — HOSPITAL ENCOUNTER (OUTPATIENT)
Facility: HOSPITAL | Age: 73
Discharge: HOME | End: 2023-08-16
Attending: STUDENT IN AN ORGANIZED HEALTH CARE EDUCATION/TRAINING PROGRAM | Admitting: STUDENT IN AN ORGANIZED HEALTH CARE EDUCATION/TRAINING PROGRAM
Payer: MEDICARE

## 2023-08-16 ENCOUNTER — TELEPHONE (OUTPATIENT)
Dept: CARDIOLOGY | Facility: CLINIC | Age: 73
End: 2023-08-16
Payer: MEDICARE

## 2023-08-16 VITALS
SYSTOLIC BLOOD PRESSURE: 136 MMHG | OXYGEN SATURATION: 96 % | TEMPERATURE: 98.6 F | BODY MASS INDEX: 28.79 KG/M2 | RESPIRATION RATE: 20 BRPM | WEIGHT: 190 LBS | HEART RATE: 59 BPM | HEIGHT: 68 IN | DIASTOLIC BLOOD PRESSURE: 74 MMHG

## 2023-08-16 DIAGNOSIS — R05.9 COUGH, UNSPECIFIED TYPE: Primary | ICD-10-CM

## 2023-08-16 LAB
FLUAV SUBTYP SPEC NAA+PROBE: NOT DETECTED
FLUBV RNA ISLT QL NAA+PROBE: NOT DETECTED
SARS-COV-2 RNA RESP QL NAA+PROBE: NOT DETECTED

## 2023-08-16 PROCEDURE — 87636 SARSCOV2 & INF A&B AMP PRB: CPT | Performed by: STUDENT IN AN ORGANIZED HEALTH CARE EDUCATION/TRAINING PROGRAM

## 2023-08-16 PROCEDURE — G0463 HOSPITAL OUTPT CLINIC VISIT: HCPCS | Performed by: STUDENT IN AN ORGANIZED HEALTH CARE EDUCATION/TRAINING PROGRAM

## 2023-08-16 RX ORDER — METHYLPREDNISOLONE 4 MG/1
TABLET ORAL
Qty: 21 TABLET | Refills: 0 | Status: SHIPPED | OUTPATIENT
Start: 2023-08-16

## 2023-08-16 NOTE — TELEPHONE ENCOUNTER
Pt stated that he is trying to get life insurance and it keep coming up as afib from the insurance.  Asked pt to question the insurance if the are just pulling this from the medical records or actually getting statement from MD's that he has afib.  He will inquire my with the company.  He will call if anything else is needed.  (Done)

## 2023-08-16 NOTE — TELEPHONE ENCOUNTER
In the oldest note I can find from Dr. Bailey, whom he previously followed with, he mentions symptomatic PACs and patient reported atrial fibrillation that had never been recorded in his chart or documentation on rhythm strips. There are no monitor studies in Epic that I can find with atrial fibrillation clearly documented.

## 2023-08-16 NOTE — FSED PROVIDER NOTE
Subjective   History of Present Illness  Patient is a 72-year-old male who presents complaining of cough x10 days.  He states he has had an intermittent chronic cough for the last 3 years.  He does see a pulmonologist and a cardiologist.  Reports history of chronic bronchitis.  Denies shortness of breath or chest pain.  States he was prescribed Breo Ellipta but states it was too expensive so never picked it up in order to see if it would help.    Review of Systems   Respiratory:  Positive for cough.    All other systems reviewed and are negative.    Past Medical History:   Diagnosis Date    Abnormal EKG 01/31/2020    Abnormal stress test     Acne     Actinic keratosis     FOLLOWED BY DR. INGRID GAYLE    Allergic rhinitis     Anxiety 05/03/2022    Arthritis     Atrial premature depolarization 05/2018    ALSO VENTRICULAR PREMATURE DEPOLARIZATION    Finnegan's esophagus without dysplasia 10/15/2021    FOLLOWED BY DR. MATTIE COREAS    BCC (basal cell carcinoma) 12/2011    RIGHT LATERAL SUPERIOR NECK, S/P MOHS    Biceps tendinitis of left shoulder 01/2023    Bilateral vitreous detachment 07/20/2018    Blepharitis of both eyes     FOLLOWED BY DR. CHARU CHEEK    BPH (benign prostatic hypertrophy)     FOLLOWED BY DR. 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 MATTIE COREAS    Consolidation of left lower lobe of lung 05/05/2020    Coronary atherosclerosis 04/09/2018    Cyclothymic disorder     Depression     Diplopia     Enlarged prostate 02/22/2023    FOLLOWED BY DR. SUE THOMPSON    Ganglion cyst of left foot 09/2017    Gastric polyps      FOLLOWED BY DR. MATTIE COREAS    Geographical tongue     GERD (gastroesophageal reflux disease)     Globus sensation     Heart murmur     Hiatal hernia     Hordeolum internum of left eye 03/2022    Hyperlipidemia     MIXED HLD    Hypersomnia     Hypertension     Hypertensive chronic kidney disease 12/15/2021    Hypouricemia 12/2021    IgG deficiency     Impingement syndrome of left shoulder 01/2023    Jet lag syndrome     Keratoconjunctivitis sicca of both eyes     Lesion of spleen 06/2020    Low serum alkaline phosphatase 01/2023    FOLLOWED BY DR. DULCE SHEEHAN    Meibomian gland dysfunction (MGD) of both eyes     UPPER EYES BILATERAL    Mild tricuspid regurgitation     Nodular prostate     WITH ELEVATED PSA'S, FOLLOWED BY DR. SUE THOMPSON    Olecranon bursitis of right elbow 05/06/2021    SEEN AT     Oropharyngeal dysphagia     BRITTA on CPAP     FOLLOWED BY DR. SANDRA WILLIS    PAC (premature atrial contraction)     PAF (paroxysmal atrial fibrillation)     Palpitations     Patellofemoral arthrosis 08/13/2018    Right knee    Patellofemoral chondrosis 08/13/2018    BILATERAL KNEES    Pedal edema 05/2020    Perianal cyst 11/2022    Plantar fascial fibromatosis 10/2017    PLMD (periodic limb movement disorder)     PNA (pneumonia) 04/08/2020    LEFT LOWER LOBE    Posterior tibial tendinitis of left leg 02/2018    Prediabetes 08/07/2020    Ptosis of both eyelids 01/2018    S/P REPAIR    Renal insufficiency 01/10/2018    Rotator cuff tendinitis, left 01/2023    SCC (squamous cell carcinoma) 09/2012    RIGHT POSTERIOR UPPER ARM, S/P EXCISION    Seasonal allergies     Seborrheic keratosis     FOLLOWED BY DR. INGRID GAYLE    Snoring     Sprain of left ankle 05/03/2018    SEEN AT [facility] ER    Stage 3 chronic kidney disease 12/15/2021    FOLLOWED BY DR. DULCE SHEEHAN    Thoracic aortic aneurysm without rupture 04/2018    Tremor     Trichiasis without entropion left lower eyelid     Urge incontinence 02/2020     Ventricular premature beats 04/17/2020    Vertical strabismus, right eye     Vitamin D deficiency     Vitreous floaters 05/17/2019       No Known Allergies    Past Surgical History:   Procedure Laterality Date    CARDIAC CATHETERIZATION Left 10/09/2015    WNL, DR. SHARATH REYNOLDS AT [facility]    COLONOSCOPY N/A 11/30/2004    d/t thickening of sigmoid found on ct scan, entire colon wnl, Dr.Marc Sherman at [facility]    ENDOSCOPY N/A 10/20/2021    Z LINE IRREGULAR AT 38 CM FROM INCISORS, 2 CM HIATAL HERNIA, DR. MATTIE COREAS AT [facility]    ENDOSCOPY N/A 11/13/2017    IRREGULAR Z LINE, GASTRITIS, PATH BENIGN, DR. MATTIE COREAS AT [facility]    ENDOSCOPY N/A 01/13/2017    GASTRITIS, BENIGN GASTRIC POLYP, (+) BARRETTS ESOPHAGUS, DR. MATTIE COREAS AT [facility]    ENDOSCOPY N/A 03/22/2012    PATH BENIGN, DR. CONSTANTINO SHERMAN AT [facility]    ENDOSCOPY AND COLONOSCOPY N/A 10/20/2014    BENIGN GASTRIC POLYP, COLON WNL, DR. CONSTANTINO SHERMAN T [facility]    ENDOSCOPY AND COLONOSCOPY N/A 11/06/2019    BENIGN GASTRIC POLYP, BARRETTS ESOPHAGUS, 6 MM TUBULAR ADENOMA POLYP IN SIGMOID, INTERNAL HEMORRHOIDS, RESCOPE IN 5 YRS, DR. MATTIE GRAY AT [facility]    ENDOSCOPY AND COLONOSCOPY N/A 06/11/2008    2 BENIGN GASTRIC POLYP, CHRONIC GASTRITIS, COLON WNL, DR. CONSTANTINO SHERMAN AT [facility]    EYE SURGERY Bilateral 12/16/2021    ECTROPION REPAIR BILATERAL, LOWER EYELID STENT, UPPER BLEPHAROPLASTY WITH BROW AND NASAL FAT PAD, DR. KEVIN CHEEK AT [facility]    EYE SURGERY Bilateral 06/06/2018    BILATERAL UPPER AND LOWER BLEPHAROPLASTY AND ENTROPION REPAIR LEFT LOWER EYELID, DR. KEVIN CHEEK AT [facility]    INGUINAL HERNIA REPAIR  1981    DR. SUNG    KNEE ARTHROSCOPY W/ MENISCAL REPAIR Left 2014    DR. PINON    MOHS SURGERY Right 12/13/2011    BCC OF RIGHT LATERAL NECK, DR. CHAU ZARATE    PROSTATE BIOPSY Bilateral 09/13/2004    BENIGN, DR. NA CANALES AT [facility]    PROSTATE BIOPSY Bilateral 10/22/2007    BENIGN, DR. NA CANALES AT [facility]    PROSTATE BIOPSY Bilateral 03/07/2011     BENIGN, DR. NA CANALES AT [facility]    SKIN BIOPSY Left 10/15/2017    LEFT MEDIAL SHIN, PATH: SEBORRHEIC KERATOSIS, DR. INGRID GAYLE    SKIN BIOPSY N/A 03/03/2017    NASAL TIP, PATH: BENIGN, DR. INGRID GAYLE    SKIN CANCER EXCISION Right 09/14/2012    SCC OF RIGHT UPPER ARM, DR. INGRID GAYLE    TONSILLECTOMY Bilateral     VASECTOMY N/A        Family History   Problem Relation Age of Onset    Heart disease Mother     Hypertension Mother     Colon polyps Mother     Depression Father     Prostate cancer Father     Cancer Father         prostate    Heart disease Father     Coronary artery disease Father     Stroke Father     Cancer Brother         prostate    Prostate cancer Brother     Kidney disease Daughter     Colon cancer Neg Hx     Crohn's disease Neg Hx     Irritable bowel syndrome Neg Hx     Ulcerative colitis Neg Hx        Social History     Socioeconomic History    Marital status:    Tobacco Use    Smoking status: Never     Passive exposure: Never    Smokeless tobacco: Never   Vaping Use    Vaping Use: Never used   Substance and Sexual Activity    Alcohol use: Yes     Alcohol/week: 2.0 standard drinks     Types: 2 Drinks containing 0.5 oz of alcohol per week     Comment: Once Weekly    Drug use: Not Currently    Sexual activity: Not Currently     Partners: Female     Birth control/protection: Vasectomy     Comment: .           Objective   Physical Exam  Vitals and nursing note reviewed.   Constitutional:       Appearance: Normal appearance.   HENT:      Head: Normocephalic and atraumatic.   Cardiovascular:      Rate and Rhythm: Normal rate and regular rhythm.      Pulses: Normal pulses.   Pulmonary:      Effort: Pulmonary effort is normal.      Breath sounds: Normal breath sounds.   Musculoskeletal:      Cervical back: Normal range of motion and neck supple.   Skin:     General: Skin is warm and dry.      Capillary Refill: Capillary refill takes less than 2 seconds.    Neurological:      General: No focal deficit present.      Mental Status: He is alert and oriented to person, place, and time.       Procedures           ED Course                                           Medical Decision Making  Negative flu and COVID.  Patient is nontoxic-appearing.  He would just like a steroid pack to see if that would help.  He is to follow-up with his pulmonologist as needed.  Given strict return precautions.    Problems Addressed:  Cough, unspecified type: complicated acute illness or injury    Risk  Prescription drug management.        Final diagnoses:   Cough, unspecified type       ED Disposition  ED Disposition       ED Disposition   Discharge    Condition   Stable    Comment   --               Darryl King MD  [address] [id number] [phone]    Call   If symptoms worsen         Medication List        New Prescriptions      methylPREDNISolone 4 MG dose pack  Commonly known as: MEDROL  Take as directed on package instructions.            Changed      hydrALAZINE 50 MG tablet  Commonly known as: APRESOLINE  TAKE 1 TABLET BY MOUTH THREE TIMES DAILY  What changed: how much to take               Where to Get Your Medications        These medications were sent to [id number] - [place], [place] - [address] AT [facility] - [phone] [facility] [phone]   [address], [facility] [address]      Phone: [phone]   methylPREDNISolone 4 MG dose pack         Attestation:  Sun James MD - Patient was evaluated and treated by the midlevel. I was not involved in this patient's care. I was available in the ER for consult at the time.

## 2023-08-16 NOTE — TELEPHONE ENCOUNTER
"    Caller: Wilmar Carmona \"ZEKE\"    Relationship to patient: Self    Best call back number: [phone]    Patient is needing: PATIENT STATED THAT [email or web address] IS SENDING A FORM FOR DR. DE DIOS TO FILL OUT AND HE WANTS TO MAKE SURE THAT IT DOES NOT HAVE AFIB PUT ON IT BECAUSE HAS NOT HAD AFIB AND THIS CAUSES HIS INSURANCE TO COST MORE. PATIENT ASKS THAT SOMEONE GIVE HIM A CALL BACK ABOUT THIS ISSUE. THANK YOU.           "

## 2023-08-31 ENCOUNTER — HOSPITAL ENCOUNTER (OUTPATIENT)
Facility: HOSPITAL | Age: 73
Discharge: HOME | End: 2023-08-31
Attending: EMERGENCY MEDICINE | Admitting: EMERGENCY MEDICINE
Payer: MEDICARE

## 2023-08-31 VITALS
SYSTOLIC BLOOD PRESSURE: 152 MMHG | RESPIRATION RATE: 14 BRPM | OXYGEN SATURATION: 96 % | WEIGHT: 190 LBS | DIASTOLIC BLOOD PRESSURE: 77 MMHG | TEMPERATURE: 98.1 F | HEART RATE: 56 BPM | HEIGHT: 68 IN | BODY MASS INDEX: 28.79 KG/M2

## 2023-08-31 DIAGNOSIS — B02.9 HERPES ZOSTER WITHOUT COMPLICATION: Primary | ICD-10-CM

## 2023-08-31 PROCEDURE — G0463 HOSPITAL OUTPT CLINIC VISIT: HCPCS | Performed by: EMERGENCY MEDICINE

## 2023-08-31 RX ORDER — HYDROCODONE BITARTRATE AND ACETAMINOPHEN 5; 325 MG/1; MG/1
1 TABLET ORAL EVERY 6 HOURS PRN
Qty: 8 TABLET | Refills: 0 | OUTPATIENT
Start: 2023-08-31 | End: 2023-09-01

## 2023-08-31 RX ORDER — SODIUM CHLORIDE 0.9 % (FLUSH) 0.9 %
10 SYRINGE (ML) INJECTION AS NEEDED
Status: DISCONTINUED | OUTPATIENT
Start: 2023-08-31 | End: 2023-08-31

## 2023-08-31 RX ORDER — VALACYCLOVIR HYDROCHLORIDE 1 G/1
1000 TABLET, FILM COATED ORAL 3 TIMES DAILY
Qty: 21 TABLET | Refills: 0 | Status: SHIPPED | OUTPATIENT
Start: 2023-08-31 | End: 2023-09-06 | Stop reason: SDUPTHER

## 2023-08-31 NOTE — FSED PROVIDER NOTE
Subjective   History of Present Illness  Patient complains of left-sided flank pain.  He feels as if his symptoms are related to shingles, once again.  He had shingles in this same distribution in the past.  He feels some numbness and itching on the left side of his body along the flank.  No injury or trauma.  No  issues.  No true abdominal or back pain.  Is having issues sleeping due to this pain.    Review of Systems    Past Medical History:   Diagnosis Date    Abnormal EKG 01/31/2020    Abnormal stress test     Acne     Actinic keratosis     FOLLOWED BY DR. INGRID GAYLE    Allergic rhinitis     Anxiety 05/03/2022    Arthritis     Atrial premature depolarization 05/2018    ALSO VENTRICULAR PREMATURE DEPOLARIZATION    Finnegan's esophagus without dysplasia 10/15/2021    FOLLOWED BY DR. MATTIE COREAS    BCC (basal cell carcinoma) 12/2011    RIGHT LATERAL SUPERIOR NECK, S/P MOHS    Biceps tendinitis of left shoulder 01/2023    Bilateral vitreous detachment 07/20/2018    Blepharitis of both eyes     FOLLOWED BY DR. CHARU CHEEK    BPH (benign prostatic hypertrophy)     FOLLOWED BY DR. 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 MATTIE COREAS    Consolidation of left lower lobe of lung 05/05/2020    Coronary atherosclerosis 04/09/2018    Cyclothymic disorder     Depression     Diplopia     Enlarged prostate 02/22/2023    FOLLOWED BY DR. SUE THOMPSON    Ganglion cyst of left foot 09/2017    Gastric polyps     FOLLOWED BY DR. MATTIE COREAS    Geographical tongue     GERD (gastroesophageal reflux disease)     Globus sensation      Heart murmur     Hiatal hernia     Hordeolum internum of left eye 03/2022    Hyperlipidemia     MIXED HLD    Hypersomnia     Hypertension     Hypertensive chronic kidney disease 12/15/2021    Hypouricemia 12/2021    IgG deficiency     Impingement syndrome of left shoulder 01/2023    Jet lag syndrome     Keratoconjunctivitis sicca of both eyes     Lesion of spleen 06/2020    Low serum alkaline phosphatase 01/2023    FOLLOWED BY DR. DULCE SHEEHAN    Meibomian gland dysfunction (MGD) of both eyes     UPPER EYES BILATERAL    Mild tricuspid regurgitation     Nodular prostate     WITH ELEVATED PSA'S, FOLLOWED BY DR. SUE THOMPSON    Olecranon bursitis of right elbow 05/06/2021    SEEN AT     Oropharyngeal dysphagia     BRITTA on CPAP     FOLLOWED BY DR. SANDRA WILLIS    PAC (premature atrial contraction)     PAF (paroxysmal atrial fibrillation)     Palpitations     Patellofemoral arthrosis 08/13/2018    Right knee    Patellofemoral chondrosis 08/13/2018    BILATERAL KNEES    Pedal edema 05/2020    Perianal cyst 11/2022    Plantar fascial fibromatosis 10/2017    PLMD (periodic limb movement disorder)     PNA (pneumonia) 04/08/2020    LEFT LOWER LOBE    Posterior tibial tendinitis of left leg 02/2018    Prediabetes 08/07/2020    Ptosis of both eyelids 01/2018    S/P REPAIR    Renal insufficiency 01/10/2018    Rotator cuff tendinitis, left 01/2023    SCC (squamous cell carcinoma) 09/2012    RIGHT POSTERIOR UPPER ARM, S/P EXCISION    Seasonal allergies     Seborrheic keratosis     FOLLOWED BY DR. INGRID GAYLE    Snoring     Sprain of left ankle 05/03/2018    SEEN AT [facility] ER    Stage 3 chronic kidney disease 12/15/2021    FOLLOWED BY DR. DULCE SHEEHAN    Thoracic aortic aneurysm without rupture 04/2018    Tremor     Trichiasis without entropion left lower eyelid     Urge incontinence 02/2020    Ventricular premature beats 04/17/2020    Vertical strabismus, right eye     Vitamin D deficiency     Vitreous floaters  05/17/2019       No Known Allergies    Past Surgical History:   Procedure Laterality Date    CARDIAC CATHETERIZATION Left 10/09/2015    WNL, DR. SHARATH REYNOLDS AT [facility]    COLONOSCOPY N/A 11/30/2004    d/t thickening of sigmoid found on ct scan, entire colon wnl, Dr.Marc Sherman at [facility]    ENDOSCOPY N/A 10/20/2021    Z LINE IRREGULAR AT 38 CM FROM INCISORS, 2 CM HIATAL HERNIA, DR. MATTIE COREAS AT [facility]    ENDOSCOPY N/A 11/13/2017    IRREGULAR Z LINE, GASTRITIS, PATH BENIGN, DR. MATTIE COREAS AT [facility]    ENDOSCOPY N/A 01/13/2017    GASTRITIS, BENIGN GASTRIC POLYP, (+) BARRETTS ESOPHAGUS, DR. MATTIE COREAS AT [facility]    ENDOSCOPY N/A 03/22/2012    PATH BENIGN, DR. CONSTANTINO SHERMAN AT [facility]    ENDOSCOPY AND COLONOSCOPY N/A 10/20/2014    BENIGN GASTRIC POLYP, COLON WNL, DR. CONSTANTINO SHERMAN T [facility]    ENDOSCOPY AND COLONOSCOPY N/A 11/06/2019    BENIGN GASTRIC POLYP, BARRETTS ESOPHAGUS, 6 MM TUBULAR ADENOMA POLYP IN SIGMOID, INTERNAL HEMORRHOIDS, RESCOPE IN 5 YRS, DR. MATTIE GRAY AT [facility]    ENDOSCOPY AND COLONOSCOPY N/A 06/11/2008    2 BENIGN GASTRIC POLYP, CHRONIC GASTRITIS, COLON WNL, DR. CONSTANTINO SHERMAN AT [facility]    EYE SURGERY Bilateral 12/16/2021    ECTROPION REPAIR BILATERAL, LOWER EYELID STENT, UPPER BLEPHAROPLASTY WITH BROW AND NASAL FAT PAD, DR. KEVIN CHEEK AT [facility]    EYE SURGERY Bilateral 06/06/2018    BILATERAL UPPER AND LOWER BLEPHAROPLASTY AND ENTROPION REPAIR LEFT LOWER EYELID, DR. KEVIN CHEEK AT [facility]    INGUINAL HERNIA REPAIR  1981    DR. SUNG    KNEE ARTHROSCOPY W/ MENISCAL REPAIR Left 2014    DR. PINON    MOHS SURGERY Right 12/13/2011    BCC OF RIGHT LATERAL NECK, DR. CHAU ZARATE    PROSTATE BIOPSY Bilateral 09/13/2004    BENIGN, DR. NA CANALES AT [facility]    PROSTATE BIOPSY Bilateral 10/22/2007    BENIGN, DR. NA CANALES AT [facility]    PROSTATE BIOPSY Bilateral 03/07/2011    BENIGN, DR. NA CANALES AT [facility]    SKIN BIOPSY Left 10/15/2017    LEFT MEDIAL SHIN, PATH: SEBORRHEIC KERATOSIS,   INGRID GAYLE    SKIN BIOPSY N/A 03/03/2017    NASAL TIP, PATH: BENIGN, DR. INGRID GAYLE    SKIN CANCER EXCISION Right 09/14/2012    SCC OF RIGHT UPPER ARM, DR. INGRID GAYLE    TONSILLECTOMY Bilateral     VASECTOMY N/A        Family History   Problem Relation Age of Onset    Heart disease Mother     Hypertension Mother     Colon polyps Mother     Depression Father     Prostate cancer Father     Cancer Father         prostate    Heart disease Father     Coronary artery disease Father     Stroke Father     Cancer Brother         prostate    Prostate cancer Brother     Kidney disease Daughter     Colon cancer Neg Hx     Crohn's disease Neg Hx     Irritable bowel syndrome Neg Hx     Ulcerative colitis Neg Hx        Social History     Socioeconomic History    Marital status:    Tobacco Use    Smoking status: Never     Passive exposure: Never    Smokeless tobacco: Never   Vaping Use    Vaping Use: Never used   Substance and Sexual Activity    Alcohol use: Yes     Alcohol/week: 2.0 standard drinks     Types: 2 Drinks containing 0.5 oz of alcohol per week     Comment: Once Weekly    Drug use: Not Currently    Sexual activity: Not Currently     Partners: Female     Birth control/protection: Vasectomy     Comment: .           Objective   Physical Exam  Vitals and nursing note reviewed.   Constitutional:       Appearance: Normal appearance.   HENT:      Head: Normocephalic.      Right Ear: External ear normal.      Left Ear: External ear normal.      Nose: Nose normal.   Eyes:      Conjunctiva/sclera: Conjunctivae normal.   Cardiovascular:      Rate and Rhythm: Normal rate.   Pulmonary:      Effort: Pulmonary effort is normal.   Abdominal:      General: There is no distension.   Musculoskeletal:      Cervical back: Normal range of motion.   Skin:     Findings: No rash (Faint red rash without vesicles mid thoracic on the left.).   Neurological:      Mental Status: He is alert and oriented to  person, place, and time.   Psychiatric:         Mood and Affect: Mood normal.       Procedures           ED Course                                   BETHANY reviewed by Vinicio Panda MD       Medical Decision Making  We will start valacyclovir.  There is no rash eruption yet, hopefully catching early.  He states that his pain is causing sleep disturbance which is certainly affecting his lifestyle.  Lack of sleep may also not allow him to get well soon.  Norco prescribed.  [place] inspected    Made patient aware that if his issues continue or worsen or change, he may need higher level studies and this may indeed not be shingles.  This can be done through PCP of course.  ER is always available.  May need CT ... or MRI of the thoracic spine at this point    Problems Addressed:  Herpes zoster without complication: complicated acute illness or injury    Risk  Prescription drug management.        Final diagnoses:   Herpes zoster without complication       ED Disposition  ED Disposition       ED Disposition   Discharge    Condition   Stable    Comment   --               No follow-up provider specified.       Medication List        New Prescriptions      HYDROcodone-acetaminophen 5-325 MG per tablet  Commonly known as: NORCO  Take 1 tablet by mouth Every 6 (Six) Hours As Needed for Moderate Pain.     valACYclovir 1000 MG tablet  Commonly known as: VALTREX  Take 1 tablet by mouth 3 (Three) Times a Day for 7 days.            Changed      hydrALAZINE 50 MG tablet  Commonly known as: APRESOLINE  TAKE 1 TABLET BY MOUTH THREE TIMES DAILY  What changed: how much to take               Where to Get Your Medications        These medications were sent to [id number] - [place], [place] - [address] AT [facility] - [phone] [facility] [phone] fx [address], [facility] [address]      Phone: [phone]   HYDROcodone-acetaminophen 5-325 MG per  tablet  valACYclovir 1000 MG tablet

## 2023-08-31 NOTE — DISCHARGE INSTRUCTIONS
See PCP for follow up next week.  If rash doesn't develop and issues continue, may need MRI of T spine and more.

## 2023-09-01 ENCOUNTER — HOSPITAL ENCOUNTER (EMERGENCY)
Facility: HOSPITAL | Age: 73
Discharge: HOME | End: 2023-09-01
Attending: EMERGENCY MEDICINE
Payer: MEDICARE

## 2023-09-01 ENCOUNTER — APPOINTMENT (OUTPATIENT)
Dept: CT IMAGING | Facility: HOSPITAL | Age: 73
End: 2023-09-01
Payer: MEDICARE

## 2023-09-01 VITALS
WEIGHT: 190 LBS | SYSTOLIC BLOOD PRESSURE: 125 MMHG | RESPIRATION RATE: 18 BRPM | HEART RATE: 62 BPM | DIASTOLIC BLOOD PRESSURE: 71 MMHG | TEMPERATURE: 98.2 F | HEIGHT: 68 IN | OXYGEN SATURATION: 96 % | BODY MASS INDEX: 28.79 KG/M2
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
## 2023-09-01 DIAGNOSIS — R10.9 LEFT FLANK PAIN: Primary | ICD-10-CM

## 2023-09-01 DIAGNOSIS — R52 UNCONTROLLED PAIN: ICD-10-CM

## 2023-09-01 LAB
ALBUMIN SERPL-MCNC: 4.1 G/DL (ref 3.5–5.2)
ALBUMIN/GLOB SERPL: 1.9 G/DL
ALP SERPL-CCNC: 38 U/L (ref 39–117)
ALT SERPL W P-5'-P-CCNC: 20 U/L (ref 1–41)
ANION GAP SERPL CALCULATED.3IONS-SCNC: 8.4 MMOL/L (ref 5–15)
AST SERPL-CCNC: 19 U/L (ref 1–40)
BASOPHILS # BLD AUTO: 0.03 10*3/MM3 (ref 0–0.2)
BASOPHILS NFR BLD AUTO: 0.4 % (ref 0–1.5)
BILIRUB SERPL-MCNC: 0.3 MG/DL (ref 0–1.2)
BUN SERPL-MCNC: 17 MG/DL (ref 8–23)
BUN/CREAT SERPL: 12.6 (ref 7–25)
CALCIUM SPEC-SCNC: 9.2 MG/DL (ref 8.6–10.5)
CHLORIDE SERPL-SCNC: 105 MMOL/L (ref 98–107)
CO2 SERPL-SCNC: 26.6 MMOL/L (ref 22–29)
CREAT SERPL-MCNC: 1.35 MG/DL (ref 0.76–1.27)
DEPRECATED RDW RBC AUTO: 46.3 FL (ref 37–54)
EGFRCR SERPLBLD CKD-EPI 2021: 55.8 ML/MIN/1.73
EOSINOPHIL # BLD AUTO: 0.22 10*3/MM3 (ref 0–0.4)
EOSINOPHIL NFR BLD AUTO: 3.1 % (ref 0.3–6.2)
ERYTHROCYTE [DISTWIDTH] IN BLOOD BY AUTOMATED COUNT: 13.6 % (ref 12.3–15.4)
GLOBULIN UR ELPH-MCNC: 2.2 GM/DL
GLUCOSE SERPL-MCNC: 128 MG/DL (ref 65–99)
HCT VFR BLD AUTO: 45.1 % (ref 37.5–51)
HGB BLD-MCNC: 14.8 G/DL (ref 13–17.7)
HOLD SPECIMEN: NORMAL
HOLD SPECIMEN: NORMAL
IMM GRANULOCYTES # BLD AUTO: 0.02 10*3/MM3 (ref 0–0.05)
IMM GRANULOCYTES NFR BLD AUTO: 0.3 % (ref 0–0.5)
LIPASE SERPL-CCNC: 38 U/L (ref 13–60)
LYMPHOCYTES # BLD AUTO: 2.19 10*3/MM3 (ref 0.7–3.1)
LYMPHOCYTES NFR BLD AUTO: 31 % (ref 19.6–45.3)
MCH RBC QN AUTO: 30.3 PG (ref 26.6–33)
MCHC RBC AUTO-ENTMCNC: 32.8 G/DL (ref 31.5–35.7)
MCV RBC AUTO: 92.2 FL (ref 79–97)
MONOCYTES # BLD AUTO: 0.53 10*3/MM3 (ref 0.1–0.9)
MONOCYTES NFR BLD AUTO: 7.5 % (ref 5–12)
NEUTROPHILS NFR BLD AUTO: 4.08 10*3/MM3 (ref 1.7–7)
NEUTROPHILS NFR BLD AUTO: 57.7 % (ref 42.7–76)
PLATELET # BLD AUTO: 232 10*3/MM3 (ref 140–450)
PMV BLD AUTO: 8.8 FL (ref 6–12)
POTASSIUM SERPL-SCNC: 3.7 MMOL/L (ref 3.5–5.2)
PROT SERPL-MCNC: 6.3 G/DL (ref 6–8.5)
RBC # BLD AUTO: 4.89 10*6/MM3 (ref 4.14–5.8)
SODIUM SERPL-SCNC: 140 MMOL/L (ref 136–145)
WBC NRBC COR # BLD: 7.07 10*3/MM3 (ref 3.4–10.8)
WHOLE BLOOD HOLD COAG: NORMAL
WHOLE BLOOD HOLD SPECIMEN: NORMAL

## 2023-09-01 PROCEDURE — 25010000002 MORPHINE PER 10 MG: Performed by: EMERGENCY MEDICINE

## 2023-09-01 PROCEDURE — 25010000002 KETOROLAC TROMETHAMINE PER 15 MG: Performed by: EMERGENCY MEDICINE

## 2023-09-01 PROCEDURE — 72128 CT CHEST SPINE W/O DYE: CPT

## 2023-09-01 PROCEDURE — 96374 THER/PROPH/DIAG INJ IV PUSH: CPT

## 2023-09-01 PROCEDURE — 72131 CT LUMBAR SPINE W/O DYE: CPT

## 2023-09-01 PROCEDURE — 25010000002 ONDANSETRON PER 1 MG: Performed by: EMERGENCY MEDICINE

## 2023-09-01 PROCEDURE — 83690 ASSAY OF LIPASE: CPT | Performed by: EMERGENCY MEDICINE

## 2023-09-01 PROCEDURE — 80053 COMPREHEN METABOLIC PANEL: CPT | Performed by: EMERGENCY MEDICINE

## 2023-09-01 PROCEDURE — 96375 TX/PRO/DX INJ NEW DRUG ADDON: CPT

## 2023-09-01 PROCEDURE — 74176 CT ABD & PELVIS W/O CONTRAST: CPT

## 2023-09-01 PROCEDURE — 99284 EMERGENCY DEPT VISIT MOD MDM: CPT

## 2023-09-01 PROCEDURE — 85025 COMPLETE CBC W/AUTO DIFF WBC: CPT | Performed by: EMERGENCY MEDICINE

## 2023-09-01 RX ORDER — ONDANSETRON 2 MG/ML
4 INJECTION INTRAMUSCULAR; INTRAVENOUS ONCE
Status: COMPLETED | OUTPATIENT
Start: 2023-09-01 | End: 2023-09-01

## 2023-09-01 RX ORDER — OXYCODONE HYDROCHLORIDE AND ACETAMINOPHEN 5; 325 MG/1; MG/1
1 TABLET ORAL EVERY 4 HOURS PRN
Qty: 12 TABLET | Refills: 0 | Status: SHIPPED | OUTPATIENT
Start: 2023-09-01

## 2023-09-01 RX ORDER — NALOXONE HYDROCHLORIDE 4 MG/.1ML
SPRAY NASAL
Qty: 2 EACH | Refills: 0 | Status: SHIPPED | OUTPATIENT
Start: 2023-09-01

## 2023-09-01 RX ORDER — CYCLOBENZAPRINE HCL 5 MG
5-10 TABLET ORAL 3 TIMES DAILY PRN
Qty: 20 TABLET | Refills: 0 | Status: SHIPPED | OUTPATIENT
Start: 2023-09-01 | End: 2023-09-06

## 2023-09-01 RX ORDER — MORPHINE SULFATE 4 MG/ML
4 INJECTION, SOLUTION INTRAMUSCULAR; INTRAVENOUS ONCE
Status: COMPLETED | OUTPATIENT
Start: 2023-09-01 | End: 2023-09-01

## 2023-09-01 RX ORDER — KETOROLAC TROMETHAMINE 15 MG/ML
15 INJECTION, SOLUTION INTRAMUSCULAR; INTRAVENOUS ONCE
Status: COMPLETED | OUTPATIENT
Start: 2023-09-01 | End: 2023-09-01

## 2023-09-01 RX ORDER — SODIUM CHLORIDE 0.9 % (FLUSH) 0.9 %
10 SYRINGE (ML) INJECTION AS NEEDED
Status: DISCONTINUED | OUTPATIENT
Start: 2023-09-01 | End: 2023-09-01 | Stop reason: HOSPADM

## 2023-09-01 RX ADMIN — KETOROLAC TROMETHAMINE 15 MG: 15 INJECTION, SOLUTION INTRAMUSCULAR; INTRAVENOUS at 12:27

## 2023-09-01 RX ADMIN — ONDANSETRON 4 MG: 2 INJECTION INTRAMUSCULAR; INTRAVENOUS at 11:21

## 2023-09-01 RX ADMIN — MORPHINE SULFATE 4 MG: 4 INJECTION, SOLUTION INTRAMUSCULAR; INTRAVENOUS at 11:19

## 2023-09-01 NOTE — FSED PROVIDER NOTE
Subjective   History of Present Illness  Patient feeling as if his left abdominal/flank pain has not improved.  He was seen yesterday and diagnosed with shingles, sent home with Norco and Valtrex.  Norco is not helping much with his pain.  He still has not presented with a classic shingles rash but does feel numb and itching to a dermatome relating to the abdomen.  He now states that he had a fall a couple days ago and injured his left back.  He denies any bowel or bladder dysfunction or saddle anesthesia.  No blood thinner use.  No chest pain reproducible chest symptoms    Review of Systems    Past Medical History:   Diagnosis Date    Abnormal EKG 01/31/2020    Abnormal stress test     Acne     Actinic keratosis     FOLLOWED BY DR. INGRID GAYLE    Allergic rhinitis     Anxiety 05/03/2022    Arthritis     Atrial premature depolarization 05/2018    ALSO VENTRICULAR PREMATURE DEPOLARIZATION    Finnegan's esophagus without dysplasia 10/15/2021    FOLLOWED BY DR. MATTIE COREAS    BCC (basal cell carcinoma) 12/2011    RIGHT LATERAL SUPERIOR NECK, S/P MOHS    Biceps tendinitis of left shoulder 01/2023    Bilateral vitreous detachment 07/20/2018    Blepharitis of both eyes     FOLLOWED BY DR. CHARU CHEEK    BPH (benign prostatic hypertrophy)     FOLLOWED BY DR. 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 MATTIE COREAS    Consolidation of left lower lobe of lung 05/05/2020    Coronary atherosclerosis 04/09/2018    Cyclothymic disorder     Depression     Diplopia     Enlarged prostate 02/22/2023    FOLLOWED BY DR. SUE THOMPSON    Ganglion  cyst of left foot 09/2017    Gastric polyps     FOLLOWED BY DR. MATTIE COREAS    Geographical tongue     GERD (gastroesophageal reflux disease)     Globus sensation     Heart murmur     Hiatal hernia     Hordeolum internum of left eye 03/2022    Hyperlipidemia     MIXED HLD    Hypersomnia     Hypertension     Hypertensive chronic kidney disease 12/15/2021    Hypouricemia 12/2021    IgG deficiency     Impingement syndrome of left shoulder 01/2023    Jet lag syndrome     Keratoconjunctivitis sicca of both eyes     Lesion of spleen 06/2020    Low serum alkaline phosphatase 01/2023    FOLLOWED BY DR. DULCE SHEEHAN    Meibomian gland dysfunction (MGD) of both eyes     UPPER EYES BILATERAL    Mild tricuspid regurgitation     Nodular prostate     WITH ELEVATED PSA'S, FOLLOWED BY DR. SEU THOMPSON    Olecranon bursitis of right elbow 05/06/2021    SEEN AT     Oropharyngeal dysphagia     BRITTA on CPAP     FOLLOWED BY DR. SANDRA WILLIS    PAC (premature atrial contraction)     PAF (paroxysmal atrial fibrillation)     Palpitations     Patellofemoral arthrosis 08/13/2018    Right knee    Patellofemoral chondrosis 08/13/2018    BILATERAL KNEES    Pedal edema 05/2020    Perianal cyst 11/2022    Plantar fascial fibromatosis 10/2017    PLMD (periodic limb movement disorder)     PNA (pneumonia) 04/08/2020    LEFT LOWER LOBE    Posterior tibial tendinitis of left leg 02/2018    Prediabetes 08/07/2020    Ptosis of both eyelids 01/2018    S/P REPAIR    Renal insufficiency 01/10/2018    Rotator cuff tendinitis, left 01/2023    SCC (squamous cell carcinoma) 09/2012    RIGHT POSTERIOR UPPER ARM, S/P EXCISION    Seasonal allergies     Seborrheic keratosis     FOLLOWED BY DR. INGRID GAYLE    Snoring     Sprain of left ankle 05/03/2018    SEEN AT [facility] ER    Stage 3 chronic kidney disease 12/15/2021    FOLLOWED BY DR. DULCE SHEEHAN    Thoracic aortic aneurysm without rupture 04/2018    Tremor     Trichiasis without entropion left  lower eyelid     Urge incontinence 02/2020    Ventricular premature beats 04/17/2020    Vertical strabismus, right eye     Vitamin D deficiency     Vitreous floaters 05/17/2019       No Known Allergies    Past Surgical History:   Procedure Laterality Date    CARDIAC CATHETERIZATION Left 10/09/2015    WNL, DR. SHARATH REYNOLDS AT [facility]    COLONOSCOPY N/A 11/30/2004    d/t thickening of sigmoid found on ct scan, entire colon wnl, Dr.Marc Sherman at [facility]    ENDOSCOPY N/A 10/20/2021    Z LINE IRREGULAR AT 38 CM FROM INCISORS, 2 CM HIATAL HERNIA, DR. MATTIE COREAS AT [facility]    ENDOSCOPY N/A 11/13/2017    IRREGULAR Z LINE, GASTRITIS, PATH BENIGN, DR. MATTIE COREAS AT [facility]    ENDOSCOPY N/A 01/13/2017    GASTRITIS, BENIGN GASTRIC POLYP, (+) BARRETTS ESOPHAGUS, DR. MATTIE COREAS AT [facility]    ENDOSCOPY N/A 03/22/2012    PATH BENIGN, DR. CONSTANTINO SHERMAN AT [facility]    ENDOSCOPY AND COLONOSCOPY N/A 10/20/2014    BENIGN GASTRIC POLYP, COLON WNL, DR. CONSTANTINO SHERMAN T [facility]    ENDOSCOPY AND COLONOSCOPY N/A 11/06/2019    BENIGN GASTRIC POLYP, BARRETTS ESOPHAGUS, 6 MM TUBULAR ADENOMA POLYP IN SIGMOID, INTERNAL HEMORRHOIDS, RESCOPE IN 5 YRS, DR. MATTIE GRAY AT [facility]    ENDOSCOPY AND COLONOSCOPY N/A 06/11/2008    2 BENIGN GASTRIC POLYP, CHRONIC GASTRITIS, COLON WNL, DR. CONSTANTINO SHERMAN AT [facility]    EYE SURGERY Bilateral 12/16/2021    ECTROPION REPAIR BILATERAL, LOWER EYELID STENT, UPPER BLEPHAROPLASTY WITH BROW AND NASAL FAT PAD, DR. KEVIN CHEEK AT [facility]    EYE SURGERY Bilateral 06/06/2018    BILATERAL UPPER AND LOWER BLEPHAROPLASTY AND ENTROPION REPAIR LEFT LOWER EYELID, DR. KEVIN CHEEK AT [facility]    INGUINAL HERNIA REPAIR  1981    DR. SUNG    KNEE ARTHROSCOPY W/ MENISCAL REPAIR Left 2014    DR. PINON    MOHS SURGERY Right 12/13/2011    BCC OF RIGHT LATERAL NECK, DR. CHAU ZARATE    PROSTATE BIOPSY Bilateral 09/13/2004    BENIGN, DR. NA CANALES AT [facility]    PROSTATE BIOPSY Bilateral 10/22/2007    BENIGN, DR. NA CANALES AT  [facility]    PROSTATE BIOPSY Bilateral 03/07/2011    BENIGN, DR. NA CANALES AT [facility]    SKIN BIOPSY Left 10/15/2017    LEFT MEDIAL SHIN, PATH: SEBORRHEIC KERATOSIS, DR. INGRID GAYLE    SKIN BIOPSY N/A 03/03/2017    NASAL TIP, PATH: BENIGN, DR. INGRID GAYLE    SKIN CANCER EXCISION Right 09/14/2012    SCC OF RIGHT UPPER ARM, DR. INGRID GAYLE    TONSILLECTOMY Bilateral     VASECTOMY N/A        Family History   Problem Relation Age of Onset    Heart disease Mother     Hypertension Mother     Colon polyps Mother     Depression Father     Prostate cancer Father     Cancer Father         prostate    Heart disease Father     Coronary artery disease Father     Stroke Father     Cancer Brother         prostate    Prostate cancer Brother     Kidney disease Daughter     Colon cancer Neg Hx     Crohn's disease Neg Hx     Irritable bowel syndrome Neg Hx     Ulcerative colitis Neg Hx        Social History     Socioeconomic History    Marital status:    Tobacco Use    Smoking status: Never     Passive exposure: Never    Smokeless tobacco: Never   Vaping Use    Vaping Use: Never used   Substance and Sexual Activity    Alcohol use: Yes     Alcohol/week: 2.0 standard drinks     Types: 2 Drinks containing 0.5 oz of alcohol per week     Comment: Once Weekly    Drug use: Not Currently    Sexual activity: Not Currently     Partners: Female     Birth control/protection: Vasectomy     Comment: .           Objective   Physical Exam  Vitals and nursing note reviewed.   Constitutional:       Appearance: Normal appearance.   HENT:      Head: Normocephalic.      Right Ear: External ear normal.      Left Ear: External ear normal.      Nose: Nose normal.   Eyes:      Conjunctiva/sclera: Conjunctivae normal.   Cardiovascular:      Rate and Rhythm: Normal rate.   Pulmonary:      Effort: Pulmonary effort is normal.   Abdominal:      General: There is no distension.   Musculoskeletal:         General: No tenderness (No chest  wall tenderness).      Cervical back: Normal range of motion.   Skin:     Findings: No rash (Faint erythema on a dermatome on the left abdomen).   Neurological:      Mental Status: He is alert and oriented to person, place, and time.   Psychiatric:         Mood and Affect: Mood normal.       Procedures           ED Course                                           Medical Decision Making  CTs do not see any acute traumatic injury.  Still suspect likely shingles or some type of neuropathic pain.  He states that the Norco is not helping.  Suggested discontinuing and starting Percocet.  He is aware that pharmacy may not fill this.  [place] inspected.    We will also try muscle relaxant at this point    Problems Addressed:  Fall, initial encounter: complicated acute illness or injury  Left flank pain: complicated acute illness or injury  Uncontrolled pain: complicated acute illness or injury    Amount and/or Complexity of Data Reviewed  Labs: ordered.  Radiology: ordered.    Risk  Prescription drug management.        Final diagnoses:   Left flank pain   Uncontrolled pain   Fall, initial encounter       ED Disposition  ED Disposition       ED Disposition   Discharge    Condition   Stable    Comment   --               Darryl King MD  [address] [address]  [phone]               Medication List        New Prescriptions      cyclobenzaprine 5 MG tablet  Commonly known as: FLEXERIL  Take 1-2 tablets by mouth 3 (Three) Times a Day As Needed for Muscle Spasms.     naloxone 4 MG/0.1ML nasal spray  Commonly known as: NARCAN  Call 911. Don't prime. [place] in 1 nostril for overdose. Repeat in 2-3 minutes in other nostril if no or minimal breathing/responsiveness.     oxyCODONE-acetaminophen 5-325 MG per tablet  Commonly known as: PERCOCET  Take 1 tablet by mouth Every 4 (Four) Hours As Needed for Severe Pain.            Changed      hydrALAZINE 50 MG tablet  Commonly known as: APRESOLINE  TAKE 1 TABLET  BY MOUTH THREE TIMES DAILY  What changed: how much to take            Stop      HYDROcodone-acetaminophen 5-325 MG per tablet  Commonly known as: NORCO               Where to Get Your Medications        These medications were sent to [id number] - [place], [place] - [address] AT [facility] - [phone]  - [phone] FX  [address], [facility] [address]      Phone: [phone]   cyclobenzaprine 5 MG tablet  naloxone 4 MG/0.1ML nasal spray  oxyCODONE-acetaminophen 5-325 MG per tablet

## 2023-09-06 ENCOUNTER — OFFICE VISIT (OUTPATIENT)
Dept: INTERNAL MEDICINE | Facility: CLINIC | Age: 73
End: 2023-09-06
Payer: MEDICARE

## 2023-09-06 VITALS
SYSTOLIC BLOOD PRESSURE: 142 MMHG | OXYGEN SATURATION: 97 % | RESPIRATION RATE: 20 BRPM | BODY MASS INDEX: 30.16 KG/M2 | HEIGHT: 68 IN | HEART RATE: 56 BPM | DIASTOLIC BLOOD PRESSURE: 80 MMHG | WEIGHT: 199 LBS

## 2023-09-06 DIAGNOSIS — Z00.00 MEDICARE ANNUAL WELLNESS VISIT, SUBSEQUENT: ICD-10-CM

## 2023-09-06 DIAGNOSIS — B02.9 HERPES ZOSTER WITHOUT COMPLICATION: ICD-10-CM

## 2023-09-06 DIAGNOSIS — M54.14 THORACIC RADICULOPATHY: Primary | ICD-10-CM

## 2023-09-06 DIAGNOSIS — I10 PRIMARY HYPERTENSION: ICD-10-CM

## 2023-09-06 DIAGNOSIS — E78.2 MIXED HYPERLIPIDEMIA: ICD-10-CM

## 2023-09-06 DIAGNOSIS — K22.70 BARRETT'S ESOPHAGUS WITHOUT DYSPLASIA: ICD-10-CM

## 2023-09-06 RX ORDER — ALBUTEROL SULFATE 90 UG/1
AEROSOL, METERED RESPIRATORY (INHALATION)
COMMUNITY
Start: 2023-08-02

## 2023-09-06 RX ORDER — GABAPENTIN 300 MG/1
300 CAPSULE ORAL 3 TIMES DAILY PRN
Qty: 90 CAPSULE | Refills: 1 | Status: SHIPPED | OUTPATIENT
Start: 2023-09-06

## 2023-09-06 RX ORDER — VALACYCLOVIR HYDROCHLORIDE 1 G/1
1000 TABLET, FILM COATED ORAL 3 TIMES DAILY
Qty: 21 TABLET | Refills: 0 | Status: SHIPPED | OUTPATIENT
Start: 2023-09-06 | End: 2023-09-13

## 2023-09-06 RX ORDER — TAMSULOSIN HYDROCHLORIDE 0.4 MG/1
1 CAPSULE ORAL DAILY
COMMUNITY
Start: 2023-08-22

## 2023-09-06 RX ORDER — FLUTICASONE FUROATE AND VILANTEROL TRIFENATATE 100; 25 UG/1; UG/1
POWDER RESPIRATORY (INHALATION)
COMMUNITY
Start: 2023-08-16

## 2023-09-06 NOTE — PROGRESS NOTES
The ABCs of the Annual Wellness Visit  Subsequent Medicare Wellness Visit    Subjective      Wilmar Carmona is a 72 y.o. male who presents for a Subsequent Medicare Wellness Visit.    The following portions of the patient's history were reviewed and   updated as appropriate: allergies, current medications, past family history, past medical history, past social history, past surgical history, and problem list.    Compared to one year ago, the patient feels his physical   health is worse.    Compared to one year ago, the patient feels his mental   health is the same.    Recent Hospitalizations:  This patient has had a [facility] admission record on file within the last 365 days.    Current Medical Providers:  Patient Care Team:  Darryl King MD as PCP - General (Family Medicine)  Sudeep George MD as Consulting Physician (Urology)  Diana Durbin MD as Consulting Physician (Cardiology)  Han Mcgovern MD as Consulting Physician (Pulmonary Disease)  Hemal Bravo MD as Consulting Physician (Gastroenterology)  Meghan Fierro MD (Psychiatry)  Zoe Ortiz APRN as Nurse Practitioner (Nurse Practitioner)  Kinza Bell MD as Consulting Physician (Nephrology)  Dedra Cid MD as Consulting Physician (Dermatology)  Bryan Nelson MD as Consulting Physician (Otolaryngology)  Alvina Menjivar PA-C as Physician Assistant (Colon and Rectal Surgery)    Outpatient Medications Prior to Visit   Medication Sig Dispense Refill    acebutolol (SECTRAL) 200 MG capsule Take 1 capsule by mouth 2 (Two) Times a Day. 180 capsule 3    albuterol sulfate  (90 Base) MCG/ACT inhaler       Breo Ellipta 100-25 MCG/ACT aerosol powder  INHALE 1 PUFF BY MOUTH DAILY. RINSE MOUTH AFTER USE      escitalopram (LEXAPRO) 10 MG tablet Take 1 tablet by mouth Daily.      finasteride (PROSCAR) 5 MG tablet TK 1 T PO ONCE DAILY  2    Glucosamine-Chondroitin (OSTEO BI-FLEX REGULAR STRENGTH PO) Daily.       hydrALAZINE (APRESOLINE) 50 MG tablet TAKE 1 TABLET BY MOUTH THREE TIMES DAILY (Patient taking differently: Take 1.5 tablets by mouth 3 (Three) Times a Day.) 90 tablet 3    hydroCHLOROthiazide (HYDRODIURIL) 25 MG tablet Take 0.5 tablets by mouth Daily.      lamoTRIgine (LaMICtal) 200 MG tablet Take 1 tablet by mouth Daily.      naloxone (NARCAN) 4 MG/0.1ML nasal spray Call 911. Don't prime. [place] in 1 nostril for overdose. Repeat in 2-3 minutes in other nostril if no or minimal breathing/responsiveness. 2 each 0    NIFEdipine XL (PROCARDIA XL) 30 MG 24 hr tablet Take 3 tablets by mouth 3 (Three) Times a Day. 90 tablet 0    Omega-3 Fatty Acids (fish oil) 1000 MG capsule capsule Take  by mouth Daily.      oxyCODONE-acetaminophen (PERCOCET) 5-325 MG per tablet Take 1 tablet by mouth Every 4 (Four) Hours As Needed for Severe Pain. 12 tablet 0    pantoprazole (PROTONIX) 40 MG EC tablet Take 1 tablet by mouth Daily. 90 tablet 3    rosuvastatin (CRESTOR) 10 MG tablet TAKE 1 TABLET BY MOUTH DAILY 90 tablet 2    tamsulosin (FLOMAX) 0.4 MG capsule 24 hr capsule Take 1 capsule by mouth Daily.      valACYclovir (VALTREX) 1000 MG tablet Take 1 tablet by mouth 3 (Three) Times a Day for 7 days. 21 tablet 0    zolpidem (AMBIEN) 5 MG tablet TAKE 1 TABLET BY MOUTH AT NIGHT AS NEEDED FOR SLEEP 25 tablet 2    cyclobenzaprine (FLEXERIL) 5 MG tablet Take 1-2 tablets by mouth 3 (Three) Times a Day As Needed for Muscle Spasms. (Patient not taking: Reported on 9/6/2023) 20 tablet 0    Levomefolate Glucosamine (METHYLFOLATE PO) Take 1 tablet by mouth. (Patient not taking: Reported on 9/6/2023)      methylPREDNISolone (MEDROL) 4 MG dose pack Take as directed on package instructions. (Patient not taking: Reported on 9/6/2023) 21 tablet 0     No facility-administered medications prior to visit.       Opioid medication/s are on active medication list.  and I have evaluated his active treatment plan and pain score trends (see table).  Vitals:  "   09/06/23 1115   PainSc:   6   PainLoc: Back     I have reviewed the chart for potential of high risk medication and harmful drug interactions in the elderly.          Aspirin is not on active medication list.  Aspirin use is not indicated based on review of current medical condition/s. Risk of harm outweighs potential benefits.  .    Patient Active Problem List   Diagnosis    Abnormal electrocardiogram    PAF (paroxysmal atrial fibrillation)    Hypertension    Premature atrial contraction    Hyperlipidemia    Bronchitis with bronchospasm    Contusion of bone    Sprain of left ankle    Coronary atherosclerosis    Patellofemoral chondrosis of left knee    Patellofemoral arthrosis    Patellofemoral chondrosis of right knee    Popping sound of knee joint    Edema    Obstructive sleep apnea syndrome    Renal insufficiency    Vitreous floaters    Ventricular premature beats    Prediabetes    Gastroesophageal reflux disease    Epigastric pain    Finnegan's esophagus without dysplasia    Stage 3 chronic kidney disease    Hypertensive chronic kidney disease    Malignant neoplasm of skin    Depressive disorder    Bilateral vitreous detachment    Anxiety    Enlarged prostate    Perennial allergic conjunctivitis of both eyes    After cataract    Ventricular premature beats     Advance Care Planning   Advance Care Planning     Advance Directive is not on file.  ACP discussion was held with the patient during this visit. Patient has an advance directive (not in EMR), copy requested.     Objective    Vitals:    09/06/23 1115   BP: 142/80   BP Location: Right arm   Pulse: 56   Resp: 20   SpO2: 97%   Weight: 90.3 kg (199 lb)   Height: 172.7 cm (68\")   PainSc:   6   PainLoc: Back     Estimated body mass index is 30.26 kg/m² as calculated from the following:    Height as of this encounter: 172.7 cm (68\").    Weight as of this encounter: 90.3 kg (199 lb).    BMI is >= 30 and <35. (Class 1 Obesity). The following options were offered " after discussion;: weight loss educational material (shared in after visit summary) and exercise counseling/recommendations      Does the patient have evidence of cognitive impairment?   No            HEALTH RISK ASSESSMENT    Smoking Status:  Social History     Tobacco Use   Smoking Status Never    Passive exposure: Never   Smokeless Tobacco Never     Alcohol Consumption:  Social History     Substance and Sexual Activity   Alcohol Use Yes    Alcohol/week: 2.0 standard drinks    Types: 2 Drinks containing 0.5 oz of alcohol per week    Comment: Once Weekly     Fall Risk Screen:    ARIANNE Fall Risk Assessment was completed, and patient is at LOW risk for falls.Assessment completed on:2023    Depression Screenin/6/2023    11:00 AM   PHQ-2/PHQ-9 Depression Screening   Little Interest or Pleasure in Doing Things 0-->not at all   Feeling Down, Depressed or Hopeless 0-->not at all   PHQ-9: Brief Depression Severity Measure Score 0       Health Habits and Functional and Cognitive Screenin/6/2023    11:00 AM   Functional & Cognitive Status   Do you have difficulty preparing food and eating? No   Do you have difficulty bathing yourself, getting dressed or grooming yourself? No   Do you have difficulty using the toilet? No   Do you have difficulty moving around from place to place? No   Do you have trouble with steps or getting out of a bed or a chair? No   Current Diet Well Balanced Diet   Dental Exam Up to date   Eye Exam Up to date   Exercise (times per week) 2 times per week   Current Exercises Include Treadmill;Walking   Do you need help using the phone?  No   Are you deaf or do you have serious difficulty hearing?  No   Do you need help to go to places out of walking distance? No   Do you need help shopping? No   Do you need help preparing meals?  No   Do you need help with housework?  No   Do you need help with laundry? No   Do you need help taking your medications? No   Do you need help managing  "money? No   Do you ever drive or ride in a car without wearing a seat belt? No   Have you felt unusual stress, anger or loneliness in the last month? No   Who do you live with? Spouse   If you need help, do you have trouble finding someone available to you? No   Have you been bothered in the last four weeks by sexual problems? No   Do you have difficulty concentrating, remembering or making decisions? No       Age-appropriate Screening Schedule:  Refer to the list below for future screening recommendations based on patient's age, sex and/or medical conditions. Orders for these recommended tests are listed in the plan section. The patient has been provided with a written plan.    Health Maintenance   Topic Date Due    HEPATITIS C SCREENING  Never done    ZOSTER VACCINE (3 of 3) 09/24/2018    ANNUAL WELLNESS VISIT  12/17/2022    INFLUENZA VACCINE  10/01/2023    BMI FOLLOWUP  02/23/2024    LIPID PANEL  02/24/2024    GASTROSCOPY (EGD)  10/20/2024    COLORECTAL CANCER SCREENING  11/06/2024    TDAP/TD VACCINES (3 - Td or Tdap) 08/29/2031    COVID-19 Vaccine  Completed    Pneumococcal Vaccine 65+  Completed                  CMS Preventative Services Quick Reference  Risk Factors Identified During Encounter:    Fall Risk-High or Moderate: Discussed Fall Prevention in the [facility] Complaint  Medicare Wellness-subsequent    Subjective        Wilmar SANDRA Carmona presents to [facility]  History of Present Illness    Objective   Vital Signs:  /80 (BP Location: Right arm)   Pulse 56   Resp 20   Ht 172.7 cm (68\")   Wt 90.3 kg (199 lb)   SpO2 97%   BMI 30.26 kg/m²   Estimated body mass index is 30.26 kg/m² as calculated from the following:    Height as of this encounter: 172.7 cm (68\").    Weight as of this encounter: 90.3 kg (199 lb).             Physical Exam   Result Review :                   Assessment and Plan   There are no diagnoses linked to this encounter.         Follow Up   No " follow-ups on file.  Patient was given instructions and counseling regarding his condition or for health maintenance advice. Please see specific information pulled into the AVS if appropriate.           The above risks/problems have been discussed with the patient.  Pertinent information has been shared with the patient in the After Visit Summary.    There are no diagnoses linked to this encounter.    Follow Up:   Next Medicare Wellness visit to be scheduled in 1 year.      An After Visit Summary and PPPS were made available to the patient.

## 2023-09-06 NOTE — PATIENT INSTRUCTIONS
Medicare Wellness  Personal Prevention Plan of Service     Date of Office Visit:    Encounter Provider:  Darryl King MD  Place of Service:  [facility]  Patient Name: Wilmar Carmona  :  1950    As part of the Medicare Wellness portion of your visit today, we are providing you with this personalized preventive plan of services (PPPS). This plan is based upon recommendations of the [place] Preventive Services Task Force (USPSTF) and the Advisory Committee on Immunization Practices (ACIP).    This lists the preventive care services that should be considered, and provides dates of when you are due. Items listed as completed are up-to-date and do not require any further intervention.    Health Maintenance   Topic Date Due    HEPATITIS C SCREENING  Never done    ZOSTER VACCINE (3 of 3) 2018    ANNUAL WELLNESS VISIT  2022    INFLUENZA VACCINE  10/01/2023    BMI FOLLOWUP  2024    LIPID PANEL  2024    GASTROSCOPY (EGD)  10/20/2024    COLORECTAL CANCER SCREENING  2024    TDAP/TD VACCINES (3 - Td or Tdap) 2031    COVID-19 Vaccine  Completed    Pneumococcal Vaccine 65+  Completed       No orders of the defined types were placed in this encounter.      No follow-ups on file.

## 2023-09-06 NOTE — PROGRESS NOTES
"Chief Complaint  Medicare Wellness-subsequent    Subjective        Wilmar Carmona presents to [facility]  History of Present Illness  Jose Maria is a delightful gentleman whose had 10 days of thoracic pain which proceeded to falling down and striking the thorax after stepping off a couple of steps.  Evaluation emergency room revealed a CT scan which was relatively unrevealing and did not show a kidney stone either.  Labs are pretty unremarkable.  Question was herpes zoster in a dermatomal distribution on the left at about T9-T10 without rash.  He is treated empirically with valacyclovir thousand 3 times daily for a week and we will refill that given the continuing slow resolution of pain without rash.  CT scan is reviewed with some degree of arthritis in the thoracic spine.  We will get an MRI to help elucidate the radiculopathy and dermatomal pain in the left chest wall from the spine to the front.  There is also some sensation of numbness and tingling.    Otherwise he is here for Medicare wellness visit which we will deal with in also continue treatment of hypertension hyperlipidemia.  Noting historically that Dr. Bailey cardiology felt like he had possible paroxysmal atrial fibrillation but that has never been proven out with previous testing and he will have follow-up with cardiology very soon.        Objective   Vital Signs:  /80 (BP Location: Right arm)   Pulse 56   Resp 20   Ht 172.7 cm (68\")   Wt 90.3 kg (199 lb)   SpO2 97%   BMI 30.26 kg/m²   Estimated body mass index is 30.26 kg/m² as calculated from the following:    Height as of this encounter: 172.7 cm (68\").    Weight as of this encounter: 90.3 kg (199 lb).             Physical Exam  Vitals reviewed.   Constitutional:       Appearance: He is well-developed.   HENT:      Head: Normocephalic and atraumatic.      Right Ear: Tympanic membrane and external ear normal.      Left Ear: Tympanic membrane and external ear " normal.      Nose: Nose normal.   Eyes:      Conjunctiva/sclera: Conjunctivae normal.      Pupils: Pupils are equal, round, and reactive to light.   Neck:      Thyroid: No thyromegaly.      Vascular: No JVD.   Cardiovascular:      Rate and Rhythm: Normal rate and regular rhythm.      Heart sounds: Normal heart sounds.   Pulmonary:      Effort: Pulmonary effort is normal.      Breath sounds: Normal breath sounds.      Comments: Left side chest wall flank without rash there is some seborrheic keratosis but I could not see anything bordering a blistering rash.  There is some tenderness to palpation at paraspinal area at about thoracic T9-T10 to a certain extent.  Abdominal:      General: Bowel sounds are normal.      Palpations: Abdomen is soft.   Musculoskeletal:         General: Normal range of motion.      Cervical back: Normal range of motion and neck supple.   Lymphadenopathy:      Cervical: No cervical adenopathy.   Skin:     General: Skin is warm and dry.      Findings: No rash.   Neurological:      Mental Status: He is alert and oriented to person, place, and time.      Cranial Nerves: No cranial nerve deficit.      Coordination: Coordination normal.   Psychiatric:         Behavior: Behavior normal.         Thought Content: Thought content normal.         Judgment: Judgment normal.      Result Review :                   Assessment and Plan   Diagnoses and all orders for this visit:    1. Thoracic radiculopathy (Primary)  Comments:  left side  Orders:  -     gabapentin (NEURONTIN) 300 MG capsule; Take 1 capsule by mouth 3 (Three) Times a Day As Needed (nerve pain).  Dispense: 90 capsule; Refill: 1  -     MRI thoracic spine wo contrast; Future    2. Herpes zoster without complication  Comments:  Presumptive thoracic left-sided without rash.  Orders:  -     gabapentin (NEURONTIN) 300 MG capsule; Take 1 capsule by mouth 3 (Three) Times a Day As Needed (nerve pain).  Dispense: 90 capsule; Refill: 1    3. Primary  hypertension  Comments:  Secondary to her milligrams daily hydrochlorothiazide 25 mg daily nifedipine XL 30 mg daily    4. Mixed hyperlipidemia  Comments:  Rosuvastatin 10 mg daily    5. Finnegan's esophagus without dysplasia  Comments:  Pantoprazole 40 mg daily    6. Medicare annual wellness visit, subsequent    We discussed the difficulty and lifestyle changes nursing home and weight gain.         Follow Up   No follow-ups on file.  Patient was given instructions and counseling regarding his condition or for health maintenance advice. Please see specific information pulled into the AVS if appropriate.

## 2023-09-07 ENCOUNTER — HOSPITAL ENCOUNTER (OUTPATIENT)
Dept: MRI IMAGING | Facility: HOSPITAL | Age: 73
Discharge: HOME | End: 2023-09-07
Admitting: FAMILY MEDICINE
Payer: MEDICARE

## 2023-09-07 DIAGNOSIS — M54.14 THORACIC RADICULOPATHY: ICD-10-CM

## 2023-09-07 PROCEDURE — 72146 MRI CHEST SPINE W/O DYE: CPT

## 2023-09-11 ENCOUNTER — LAB (OUTPATIENT)
Dept: LAB | Facility: HOSPITAL | Age: 73
End: 2023-09-11
Payer: MEDICARE

## 2023-09-11 ENCOUNTER — TRANSCRIBE ORDERS (OUTPATIENT)
Dept: ADMINISTRATIVE | Facility: HOSPITAL | Age: 73
End: 2023-09-11
Payer: MEDICARE

## 2023-09-11 DIAGNOSIS — I25.10 DISEASE OF CARDIOVASCULAR SYSTEM: ICD-10-CM

## 2023-09-11 DIAGNOSIS — I12.9 HYPERTENSIVE NEPHROPATHY: ICD-10-CM

## 2023-09-11 DIAGNOSIS — N18.30 STAGE 3 CHRONIC KIDNEY DISEASE, UNSPECIFIED WHETHER STAGE 3A OR 3B CKD: Primary | ICD-10-CM

## 2023-09-11 DIAGNOSIS — N18.30 STAGE 3 CHRONIC KIDNEY DISEASE, UNSPECIFIED WHETHER STAGE 3A OR 3B CKD: ICD-10-CM

## 2023-09-11 LAB
ANION GAP SERPL CALCULATED.3IONS-SCNC: 12.2 MMOL/L (ref 5–15)
BUN SERPL-MCNC: 20 MG/DL (ref 8–23)
BUN/CREAT SERPL: 15.2 (ref 7–25)
CALCIUM SPEC-SCNC: 9.2 MG/DL (ref 8.6–10.5)
CHLORIDE SERPL-SCNC: 106 MMOL/L (ref 98–107)
CO2 SERPL-SCNC: 24.8 MMOL/L (ref 22–29)
CREAT SERPL-MCNC: 1.32 MG/DL (ref 0.76–1.27)
CREAT UR-MCNC: 80.3 MG/DL
EGFRCR SERPLBLD CKD-EPI 2021: 57.3 ML/MIN/1.73
GLUCOSE SERPL-MCNC: 84 MG/DL (ref 65–99)
POTASSIUM SERPL-SCNC: 3.7 MMOL/L (ref 3.5–5.2)
PROT ?TM UR-MCNC: 7 MG/DL
PROT/CREAT UR: 87.2 MG/G CREA (ref 0–200)
SODIUM SERPL-SCNC: 143 MMOL/L (ref 136–145)

## 2023-09-11 PROCEDURE — 84156 ASSAY OF PROTEIN URINE: CPT

## 2023-09-11 PROCEDURE — 82570 ASSAY OF URINE CREATININE: CPT

## 2023-09-11 PROCEDURE — 80048 BASIC METABOLIC PNL TOTAL CA: CPT

## 2023-09-11 PROCEDURE — 36415 COLL VENOUS BLD VENIPUNCTURE: CPT

## 2023-09-12 ENCOUNTER — TELEPHONE (OUTPATIENT)
Dept: CARDIOLOGY | Facility: CLINIC | Age: 73
End: 2023-09-12

## 2023-09-12 ENCOUNTER — OFFICE VISIT (OUTPATIENT)
Dept: CARDIOLOGY | Facility: CLINIC | Age: 73
End: 2023-09-12
Payer: MEDICARE

## 2023-09-12 VITALS
BODY MASS INDEX: 29.83 KG/M2 | HEART RATE: 60 BPM | WEIGHT: 196.8 LBS | HEIGHT: 68 IN | DIASTOLIC BLOOD PRESSURE: 76 MMHG | SYSTOLIC BLOOD PRESSURE: 140 MMHG

## 2023-09-12 DIAGNOSIS — I25.10 CORONARY ARTERY DISEASE INVOLVING NATIVE CORONARY ARTERY OF NATIVE HEART WITHOUT ANGINA PECTORIS: Primary | ICD-10-CM

## 2023-09-12 PROCEDURE — 3078F DIAST BP <80 MM HG: CPT | Performed by: INTERNAL MEDICINE

## 2023-09-12 PROCEDURE — 99214 OFFICE O/P EST MOD 30 MIN: CPT | Performed by: INTERNAL MEDICINE

## 2023-09-12 PROCEDURE — 3077F SYST BP >= 140 MM HG: CPT | Performed by: INTERNAL MEDICINE

## 2023-09-12 NOTE — PROGRESS NOTES
Date of Office Visit: 2023  Encounter Provider: Diana Durbin MD  Place of Service: [facility] CARDIOLOGY  Patient Name: Wilmar Carmona  :1950    Chief complaint  Follow of paroxysmal atrial fibrillation, left ventricular hypertrophy    History of Present Illness  Patient is a pleasant 72-year-old gentleman with history of hypertension, hyperlipidemia, obstructive sleep apnea.  He Punnett diagnosis atrial fibrillation on outside prior records for many years and previously was followed by Dr. Bailey.  However this is not clearly documented.  With no strips or monitors indicating this.  He has been maintained on Rythmol therapy and Sectral.  He had an abnormal stress test in  leading to cardiac catheterization that revealed minimal vessel disease up to 10%.  However there is a CT scan from 2016 that revealed calcification of all 3 major vessels.  He follow-up echocardiogram in 2018 that showed normal left ventricular size and systolic function with out evidence of aneurysmal disease of the aortic root.  There was an abnormal structure noted in the liver possibly a cyst.  He had a 2 weeks patch in 2018 that showed sinus rhythm with occasional PVCs that were symptomatic.  There is no atrial arrhythmia noted.  In May 2019 he had a stress perfusion study that was negative for ischemia.  Last CT chest without contrast performed on 2020 showed no pulmonary process.  Aneurysm size aortic root size was not mentioned.  Old rib fractures were noted.  On 10/2022 with paroxysmal atrial fibrillation patient had a 14-day monitor that showed 19 patient triggered events with fatigue and fluttering is associated with either PACs PVCs.  No other tachy arrhythmia was present.    Since last visit patient is using his CPAP consistently fatigue has improved.  Occasionally has PVCs he has had no shortness of breath edema dizziness or chest pain.    Past Medical History:    Diagnosis Date    Abnormal EKG 01/31/2020    Abnormal stress test     Acne     Actinic keratosis     FOLLOWED BY DR. INGRID GAYLE    Allergic rhinitis     Anxiety 05/03/2022    Arthritis     Atrial premature depolarization 05/2018    ALSO VENTRICULAR PREMATURE DEPOLARIZATION    Finnegan's esophagus without dysplasia 10/15/2021    FOLLOWED BY DR. MATTIE COREAS    BCC (basal cell carcinoma) 12/2011    RIGHT LATERAL SUPERIOR NECK, S/P MOHS    Biceps tendinitis of left shoulder 01/2023    Bilateral vitreous detachment 07/20/2018    Blepharitis of both eyes     FOLLOWED BY DR. CHARU CHEEK    BPH (benign prostatic hypertrophy)     FOLLOWED BY DR. 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 MATTIE COREAS    Consolidation of left lower lobe of lung 05/05/2020    Coronary atherosclerosis 04/09/2018    Cyclothymic disorder     Depression     Diplopia     Enlarged prostate 02/22/2023    FOLLOWED BY DR. SUE THOMPSON    Ganglion cyst of left foot 09/2017    Gastric polyps     FOLLOWED BY DR. MATTIE COREAS    Geographical tongue     GERD (gastroesophageal reflux disease)     Globus sensation     Heart murmur     Hiatal hernia     Hordeolum internum of left eye 03/2022    Hyperlipidemia     MIXED HLD    Hypersomnia     Hypertension     Hypertensive chronic kidney disease 12/15/2021    Hypouricemia 12/2021    IgG deficiency     Impingement syndrome of left shoulder 01/2023    Jet lag syndrome     Keratoconjunctivitis sicca of both eyes     Lesion of spleen 06/2020    Low serum alkaline phosphatase 01/2023    FOLLOWED BY DR. DULCE SHEEHAN     Meibomian gland dysfunction (MGD) of both eyes     UPPER EYES BILATERAL    Mild tricuspid regurgitation     Nodular prostate     WITH ELEVATED PSA'S, FOLLOWED BY DR. SUE THOMPSON    Olecranon bursitis of right elbow 05/06/2021    SEEN AT     Oropharyngeal dysphagia     BRITTA on CPAP     FOLLOWED BY DR. SANDRA WILLIS    PAC (premature atrial contraction)     PAF (paroxysmal atrial fibrillation)     Palpitations     Patellofemoral arthrosis 08/13/2018    Right knee    Patellofemoral chondrosis 08/13/2018    BILATERAL KNEES    Pedal edema 05/2020    Perianal cyst 11/2022    Plantar fascial fibromatosis 10/2017    PLMD (periodic limb movement disorder)     PNA (pneumonia) 04/08/2020    LEFT LOWER LOBE    Posterior tibial tendinitis of left leg 02/2018    Prediabetes 08/07/2020    Ptosis of both eyelids 01/2018    S/P REPAIR    Renal insufficiency 01/10/2018    Rotator cuff tendinitis, left 01/2023    SCC (squamous cell carcinoma) 09/2012    RIGHT POSTERIOR UPPER ARM, S/P EXCISION    Seasonal allergies     Seborrheic keratosis     FOLLOWED BY DR. INGRID GAYLE    Snoring     Sprain of left ankle 05/03/2018    SEEN AT [facility] ER    Stage 3 chronic kidney disease 12/15/2021    FOLLOWED BY DR. DULCE SHEEHAN    Thoracic aortic aneurysm without rupture 04/2018    Tremor     Trichiasis without entropion left lower eyelid     Urge incontinence 02/2020    Ventricular premature beats 04/17/2020    Vertical strabismus, right eye     Vitamin D deficiency     Vitreous floaters 05/17/2019     Past Surgical History:   Procedure Laterality Date    CARDIAC CATHETERIZATION Left 10/09/2015    WNL, DR. SHARATH REYNOLDS AT [facility]    COLONOSCOPY N/A 11/30/2004    d/t thickening of sigmoid found on ct scan, entire colon wnl, Dr.Marc Sherman at [facility]    ENDOSCOPY N/A 10/20/2021    Z LINE IRREGULAR AT 38 CM FROM INCISORS, 2 CM HIATAL HERNIA, DR. MATTIE COREAS AT [facility]    ENDOSCOPY N/A 11/13/2017    IRREGULAR Z LINE, GASTRITIS, PATH BENIGN,  DR. MATTIE COREAS AT [facility]    ENDOSCOPY N/A 01/13/2017    GASTRITIS, BENIGN GASTRIC POLYP, (+) BARRETTS ESOPHAGUS, DR. MATTIE COREAS AT [facility]    ENDOSCOPY N/A 03/22/2012    PATH BENIGN, DR. CONSTANTINO PAYNE AT [facility]    ENDOSCOPY AND COLONOSCOPY N/A 10/20/2014    BENIGN GASTRIC POLYP, COLON WNL, DR. CONSTANTINO PAYNE T [facility]    ENDOSCOPY AND COLONOSCOPY N/A 11/06/2019    BENIGN GASTRIC POLYP, BARRETTS ESOPHAGUS, 6 MM TUBULAR ADENOMA POLYP IN SIGMOID, INTERNAL HEMORRHOIDS, RESCOPE IN 5 YRS, DR. MATTIE GRAY AT [facility]    ENDOSCOPY AND COLONOSCOPY N/A 06/11/2008    2 BENIGN GASTRIC POLYP, CHRONIC GASTRITIS, COLON WNL, DR. CONSTANTINO PAYNE AT [facility]    EYE SURGERY Bilateral 12/16/2021    ECTROPION REPAIR BILATERAL, LOWER EYELID STENT, UPPER BLEPHAROPLASTY WITH BROW AND NASAL FAT PAD, DR. KEVIN CHEEK AT [facility]    EYE SURGERY Bilateral 06/06/2018    BILATERAL UPPER AND LOWER BLEPHAROPLASTY AND ENTROPION REPAIR LEFT LOWER EYELID, DR. KEVIN CHEEK AT [facility]    INGUINAL HERNIA REPAIR  1981    DR. SUNG    KNEE ARTHROSCOPY W/ MENISCAL REPAIR Left 2014    DR. PINON    MOHS SURGERY Right 12/13/2011    BCC OF RIGHT LATERAL NECK, DR. CHAU ZARATE    PROSTATE BIOPSY Bilateral 09/13/2004    BENIGN, DR. NA CANALES AT [facility]    PROSTATE BIOPSY Bilateral 10/22/2007    BENIGN, DR. NA CANALES AT [facility]    PROSTATE BIOPSY Bilateral 03/07/2011    BENIGN, DR. NA CANALES AT [facility]    SKIN BIOPSY Left 10/15/2017    LEFT MEDIAL SHIN, PATH: SEBORRHEIC KERATOSIS, DR. INGRID GAYLE    SKIN BIOPSY N/A 03/03/2017    NASAL TIP, PATH: BENIGN, DR. INGRID GAYLE    SKIN CANCER EXCISION Right 09/14/2012    SCC OF RIGHT UPPER ARM, DR. INGRID GAYLE    TONSILLECTOMY Bilateral     VASECTOMY N/A      Outpatient Medications Prior to Visit   Medication Sig Dispense Refill    acebutolol (SECTRAL) 200 MG capsule Take 1 capsule by mouth 2 (Two) Times a Day. 180 capsule 3    albuterol sulfate  (90 Base) MCG/ACT inhaler As Needed.      Breo  Ellipta 100-25 MCG/ACT aerosol powder  INHALE 1 PUFF BY MOUTH DAILY. RINSE MOUTH AFTER USE      escitalopram (LEXAPRO) 10 MG tablet Take 1 tablet by mouth Daily.      finasteride (PROSCAR) 5 MG tablet TK 1 T PO ONCE DAILY  2    gabapentin (NEURONTIN) 300 MG capsule Take 1 capsule by mouth 3 (Three) Times a Day As Needed (nerve pain). 90 capsule 1    Glucosamine-Chondroitin (OSTEO BI-FLEX REGULAR STRENGTH PO) Daily.      hydrALAZINE (APRESOLINE) 50 MG tablet TAKE 1 TABLET BY MOUTH THREE TIMES DAILY (Patient taking differently: Take 1.5 tablets by mouth 3 (Three) Times a Day.) 90 tablet 3    hydroCHLOROthiazide (HYDRODIURIL) 25 MG tablet Take 0.5 tablets by mouth Daily.      lamoTRIgine (LaMICtal) 200 MG tablet Take 1 tablet by mouth Daily.      naloxone (NARCAN) 4 MG/0.1ML nasal spray Call 911. Don't prime. [place] in 1 nostril for overdose. Repeat in 2-3 minutes in other nostril if no or minimal breathing/responsiveness. 2 each 0    Omega-3 Fatty Acids (fish oil) 1000 MG capsule capsule Take  by mouth Daily.      oxyCODONE-acetaminophen (PERCOCET) 5-325 MG per tablet Take 1 tablet by mouth Every 4 (Four) Hours As Needed for Severe Pain. 12 tablet 0    pantoprazole (PROTONIX) 40 MG EC tablet Take 1 tablet by mouth Daily. 90 tablet 3    rosuvastatin (CRESTOR) 10 MG tablet TAKE 1 TABLET BY MOUTH DAILY 90 tablet 2    tamsulosin (FLOMAX) 0.4 MG capsule 24 hr capsule Take 1 capsule by mouth Daily.      valACYclovir (VALTREX) 1000 MG tablet Take 1 tablet by mouth 3 (Three) Times a Day for 7 days. 21 tablet 0    zolpidem (AMBIEN) 5 MG tablet TAKE 1 TABLET BY MOUTH AT NIGHT AS NEEDED FOR SLEEP 25 tablet 2    NIFEdipine XL (PROCARDIA XL) 30 MG 24 hr tablet Take 3 tablets by mouth 3 (Three) Times a Day. (Patient taking differently: Take 1 tablet by mouth 3 (Three) Times a Day. Dr Bell) 90 tablet 0     No facility-administered medications prior to visit.       Allergies as of 09/12/2023    (No Known Allergies)     Social  "History     Socioeconomic History    Marital status:    Tobacco Use    Smoking status: Never     Passive exposure: Never    Smokeless tobacco: Never   Vaping Use    Vaping Use: Never used   Substance and Sexual Activity    Alcohol use: Yes     Alcohol/week: 2.0 standard drinks     Types: 2 Drinks containing 0.5 oz of alcohol per week     Comment: Once Weekly    Drug use: Not Currently    Sexual activity: Not Currently     Partners: Female     Birth control/protection: Vasectomy     Comment: .     Family History   Problem Relation Age of Onset    Heart disease Mother     Hypertension Mother     Colon polyps Mother     Depression Father     Prostate cancer Father     Cancer Father         prostate    Heart disease Father     Coronary artery disease Father     Stroke Father     Cancer Brother         prostate    Prostate cancer Brother     Kidney disease Daughter     Colon cancer Neg Hx     Crohn's disease Neg Hx     Irritable bowel syndrome Neg Hx     Ulcerative colitis Neg Hx      Review of Systems   Constitutional: Negative for chills, fever, weight gain and weight loss.   Cardiovascular:  Negative for leg swelling.   Respiratory:  Negative for cough, snoring and wheezing.    Hematologic/Lymphatic: Negative for bleeding problem. Does not bruise/bleed easily.   Skin:  Negative for color change.   Musculoskeletal:  Negative for falls, joint pain and myalgias.   Gastrointestinal:  Negative for melena.   Genitourinary:  Negative for hematuria.   Neurological:  Negative for excessive daytime sleepiness.   Psychiatric/Behavioral:  Negative for depression. The patient is not nervous/anxious.       Objective:     Vitals:    09/12/23 0915   BP: 140/76   BP Location: Left arm   Pulse: 60   Weight: 89.3 kg (196 lb 12.8 oz)   Height: 172.7 cm (68\")     Body mass index is 29.92 kg/m².    Vitals reviewed.   Constitutional:       Appearance: Well-developed.   Eyes:      General: No scleral icterus.        Right eye: " No discharge.      Conjunctiva/sclera: Conjunctivae normal.      Pupils: Pupils are equal, round, and reactive to light.   HENT:      Head: Normocephalic.      Nose: Nose normal.   Neck:      Thyroid: No thyromegaly.      Vascular: No JVD.   Pulmonary:      Effort: Pulmonary effort is normal. No respiratory distress.      Breath sounds: Normal breath sounds. No wheezing. No rales.   Cardiovascular:      Normal rate. Regular rhythm. Normal S1. Normal S2.       No gallop.    Pulses:     Intact distal pulses.      Carotid: 2+ bilaterally.     Radial: 2+ bilaterally.     Femoral: 2+ bilaterally.     Popliteal: 2+ bilaterally.     Dorsalis pedis: 2+ bilaterally.     Posterior tibial: 2+ bilaterally.  Edema:     Peripheral edema absent.   Abdominal:      General: Bowel sounds are normal. There is no distension.      Palpations: Abdomen is soft.      Tenderness: There is no abdominal tenderness. There is no rebound.   Musculoskeletal: Normal range of motion.         General: No tenderness.      Cervical back: Normal range of motion and neck supple. Skin:     General: Skin is warm and dry.      Findings: No erythema or rash.   Neurological:      Mental Status: Alert and oriented to person, place, and time.   Psychiatric:         Behavior: Behavior normal.         Thought Content: Thought content normal.         Judgment: Judgment normal.     Lab Review:   Lab Results - Last 18 Months   Lab Units 09/01/23  1043 06/06/23  0951   WBC 10*3/mm3 7.07 6.55   RBC 10*6/mm3 4.89 4.56   HEMOGLOBIN g/dL 14.8 14.2   HEMATOCRIT % 45.1 41.3   MCV fL 92.2 90.6   MCH pg 30.3 31.1   MCHC g/dL 32.8 34.4   RDW % 13.6 13.4   PLATELETS 10*3/mm3 232 223   NEUTROPHIL % % 57.7 51.6   LYMPHOCYTE % % 31.0 34.0   MONOCYTES % % 7.5 8.9   EOSINOPHIL % % 3.1 4.1   BASOPHIL % % 0.4 1.1   NEUTROS ABS 10*3/mm3 4.08 3.38   LYMPHS ABS 10*3/mm3 2.19 2.23   MONOS ABS 10*3/mm3 0.53 0.58   EOS ABS 10*3/mm3 0.22 0.27   BASOS ABS 10*3/mm3 0.03 0.07   RDW-SD fl 46.3  44.8   MPV fL 8.8 9.0       Lab Results - Last 18 Months   Lab Units 09/11/23  1310 09/01/23  1043 06/06/23  0951   GLUCOSE mg/dL 84 128* 126*   BUN mg/dL 20 17 15   CREATININE mg/dL 1.32* 1.35* 1.24   SODIUM mmol/L 143 140 144   POTASSIUM mmol/L 3.7 3.7 3.7   CHLORIDE mmol/L 106 105 105   CO2 mmol/L 24.8 26.6 27.7   CALCIUM mg/dL 9.2 9.2 9.4   TOTAL PROTEIN g/dL  --  6.3 6.6   ALBUMIN g/dL  --  4.1 3.7   ALT (SGPT) U/L  --  20 21   AST (SGOT) U/L  --  19 27   ALK PHOS U/L  --  38* 36*   BILIRUBIN mg/dL  --  0.3 0.3   GLOBULIN gm/dL  --  2.2 2.9   A/G RATIO g/dL  --  1.9 1.3   BUN / CREAT RATIO  15.2 12.6 12.1   ANION GAP mmol/L 12.2 8.4 11.3   EGFR mL/min/1.73 57.3* 55.8* 61.8     Lab Results - Last 18 Months   Lab Units 02/24/23  1127 04/28/22  0933   CHOLESTEROL mg/dL 135 122   TRIGLYCERIDES mg/dL 141 147   HDL CHOL mg/dL 35* 30*   LDL CHOL mg/dL 75 66   VLDL CHOL mg/dL 25 26       Lab Results - Last 18 Months   Lab Units 07/17/23  1047 06/06/23  0951   TSH uIU/mL 1.380 1.250         ECG 12 Lead    Date/Time: 9/14/2023 1:30 AM  Performed by: Diana Durbin MD  Authorized by: Diana Durbin MD   Comparison: compared with previous ECG   Similar to previous ECG  Comparison to previous ECG: Alternate placement of inferior limb leads  Rhythm: sinus rhythm  Conduction: non-specific intraventricular conduction delay  Other findings: non-specific ST-T wave changes    Clinical impression: abnormal EKG      Assessment:    No diagnosis found.  Plan:       1.  Recurrent PACs, PVCs questionable atrial fibrillation.  No clear documentation of atrial fibrillation with just references to this by other providers in the past.  Seen by EP service who also not documented prior A-fib.  He does have PACs and PVCs.  Rythmol was felt to be of benefit in continued.  It was also he stop Sectral however patient developed hypertension was placed back on Sectral.  Currently he is doing relatively well with occasional episodes once every 3 months  and palpitations that are quite brief.  2.  Mild coronary artery disease.  Last stress test negative for ischemia in 2019.  Continue current regimen respect modification until symptoms  3.  Dilated right ventricle, as above likely due to sleep apnea.,  Improved  4.  Obstructive sleep apnea.on CPAP therapy  5.  Hypertension.  Blood pressures at home are much lower typically in the 120s to 130s diastolics in the 70s.  Patient is also quite confused as to how much nifedipine he is taking.  Is listed he is taking any milligrams 3 times daily but he is home and verify this as it is quite unusually large dose.  He is not following low-salt diet to pursue this.  6.  Dyslipidemia.  Fairly reasonable with low HDL.  Of asked him to increase his exercise  7.  Hepatic cysts  8.  Nonspecific interventricular conduction delay and incomplete left bundle branch block pattern.  This is unchanged.  Observe for now.      Time Spent: I spent 35 minutes caring for Wilmar on this date of service. This time includes time spent by me in the following activities: preparing for the visit, reviewing tests, obtaining and/or reviewing a separately obtained history, performing a medically appropriate examination and/or evaluation, counseling and educating the patient/family/caregiver, ordering medications, tests, or procedures, documenting information in the medical record, and independently interpreting results and communicating that information with the patient/family/caregiver.   I spent 1 minutes on the separately reported service of ECG. This time is not included in the time used to support the E/M service also reported today.        Your medication list            Accurate as of September 12, 2023 11:59 PM. If you have any questions, ask your nurse or doctor.                CHANGE how you take these medications        Instructions Last Dose Given Next Dose Due   hydrALAZINE 50 MG tablet  Commonly known as: APRESOLINE  What changed: how  much to take      TAKE 1 TABLET BY MOUTH THREE TIMES DAILY              CONTINUE taking these medications        Instructions Last Dose Given Next Dose Due   acebutolol 200 MG capsule  Commonly known as: SECTRAL      Take 1 capsule by mouth 2 (Two) Times a Day.       albuterol sulfate  (90 Base) MCG/ACT inhaler  Commonly known as: PROVENTIL HFA;VENTOLIN HFA;PROAIR HFA      As Needed.       Breo Ellipta 100-25 MCG/ACT aerosol powder   Generic drug: Fluticasone Furoate-Vilanterol      INHALE 1 PUFF BY MOUTH DAILY. RINSE MOUTH AFTER USE       escitalopram 10 MG tablet  Commonly known as: LEXAPRO      Take 1 tablet by mouth Daily.       finasteride 5 MG tablet  Commonly known as: PROSCAR      TK 1 T PO ONCE DAILY       fish oil 1000 MG capsule capsule      Take  by mouth Daily.       gabapentin 300 MG capsule  Commonly known as: NEURONTIN      Take 1 capsule by mouth 3 (Three) Times a Day As Needed (nerve pain).       hydroCHLOROthiazide 25 MG tablet  Commonly known as: HYDRODIURIL      Take 0.5 tablets by mouth Daily.       lamoTRIgine 200 MG tablet  Commonly known as: LaMICtal      Take 1 tablet by mouth Daily.       naloxone 4 MG/0.1ML nasal spray  Commonly known as: NARCAN      Call 911. Don't prime. [place] in 1 nostril for overdose. Repeat in 2-3 minutes in other nostril if no or minimal breathing/responsiveness.       NIFEdipine XL 30 MG 24 hr tablet  Commonly known as: PROCARDIA XL      Take 1 tablet by mouth 3 (Three) Times a Day. Dr Arabella BURCH BI-FLEX REGULAR STRENGTH PO      Daily.       oxyCODONE-acetaminophen 5-325 MG per tablet  Commonly known as: PERCOCET      Take 1 tablet by mouth Every 4 (Four) Hours As Needed for Severe Pain.       pantoprazole 40 MG EC tablet  Commonly known as: PROTONIX      Take 1 tablet by mouth Daily.       rosuvastatin 10 MG tablet  Commonly known as: CRESTOR      TAKE 1 TABLET BY MOUTH DAILY       tamsulosin 0.4 MG capsule 24 hr capsule  Commonly known as: FLOMAX       Take 1 capsule by mouth Daily.       valACYclovir 1000 MG tablet  Commonly known as: VALTREX      Take 1 tablet by mouth 3 (Three) Times a Day for 7 days.       zolpidem 5 MG tablet  Commonly known as: AMBIEN      TAKE 1 TABLET BY MOUTH AT NIGHT AS NEEDED FOR SLEEP                 Where to Get Your Medications        These medications were sent to [id number] - [place], [place] - [address] AT [facility] - [phone]  - [phone] fx [id number], [facility] [id number]      Phone: [phone]   NIFEdipine XL 30 MG 24 hr tablet         Patient is no longer taking -.  I corrected the med list to reflect this.  I did not stop these medications.      Dictated utilizing Dragon dictation

## 2023-09-14 ENCOUNTER — TELEPHONE (OUTPATIENT)
Dept: INTERNAL MEDICINE | Facility: CLINIC | Age: 73
End: 2023-09-14
Payer: MEDICARE

## 2023-09-14 PROCEDURE — 93000 ELECTROCARDIOGRAM COMPLETE: CPT | Performed by: INTERNAL MEDICINE

## 2023-09-14 RX ORDER — NIFEDIPINE 30 MG/1
30 TABLET, EXTENDED RELEASE ORAL 3 TIMES DAILY
Qty: 270 TABLET | Refills: 3 | Status: SHIPPED | OUTPATIENT
Start: 2023-09-14

## 2023-09-14 NOTE — TELEPHONE ENCOUNTER
"Caller: Wilmar Carmona \"ZEKE\"    Relationship: Self    Best call back number: [phone]     What is the best time to reach you: AS SOON AS POSSIBLE    Who are you requesting to speak with (clinical staff, provider,  specific staff member): DR. SAWYER OR MEDICAL ASSISTANT    What was the call regarding: PATIENT WOULD LIKE TO DISCUSS MRI RESULTS, AS WELL AS SYMPTOMS THAT ARE NOT BEING IMPROVED BY THE PRESCRIBED MEDICATION.    Is it okay if the provider responds through MyChart: NO.      "

## 2023-09-20 ENCOUNTER — OFFICE VISIT (OUTPATIENT)
Dept: INTERNAL MEDICINE | Facility: CLINIC | Age: 73
End: 2023-09-20
Payer: MEDICARE

## 2023-09-20 ENCOUNTER — TELEPHONE (OUTPATIENT)
Dept: SLEEP MEDICINE | Facility: HOSPITAL | Age: 73
End: 2023-09-20
Payer: MEDICARE

## 2023-09-20 VITALS
DIASTOLIC BLOOD PRESSURE: 68 MMHG | WEIGHT: 194 LBS | SYSTOLIC BLOOD PRESSURE: 114 MMHG | BODY MASS INDEX: 29.5 KG/M2 | HEART RATE: 64 BPM | OXYGEN SATURATION: 96 %

## 2023-09-20 DIAGNOSIS — R07.81 RIB PAIN ON LEFT SIDE: ICD-10-CM

## 2023-09-20 DIAGNOSIS — M89.9 RIB LESION: Primary | ICD-10-CM

## 2023-09-20 PROCEDURE — 3078F DIAST BP <80 MM HG: CPT | Performed by: FAMILY MEDICINE

## 2023-09-20 PROCEDURE — 99214 OFFICE O/P EST MOD 30 MIN: CPT | Performed by: FAMILY MEDICINE

## 2023-09-20 PROCEDURE — 3074F SYST BP LT 130 MM HG: CPT | Performed by: FAMILY MEDICINE

## 2023-09-20 RX ORDER — LIDOCAINE 50 MG/G
2 PATCH TOPICAL EVERY 24 HOURS
Qty: 60 PATCH | Refills: 1 | Status: SHIPPED | OUTPATIENT
Start: 2023-09-20

## 2023-09-20 RX ORDER — IBUPROFEN 800 MG/1
800 TABLET ORAL EVERY 6 HOURS PRN
Qty: 60 TABLET | Refills: 1 | Status: SHIPPED | OUTPATIENT
Start: 2023-09-20

## 2023-09-20 NOTE — PROGRESS NOTES
"Chief Complaint  Results    Subjective        Wilmar Carmona presents to [facility]  History of Present Illness  Jose Maria is a delightful glenis who has had 3 weeks of unrelenting and waxing and waning pain and numbness in the left chest wall thoracic region emanating from a point tenderness over approximately the left posterior ninth rib inferior to the scapula.  There is some puffiness in the soft tissue in this area.  MRI of the thoracic spine is unrevealing.  There is no skin rash or blistering.  Injury occurred after the sensation and pain started which was a minor fall.  Currently he is using ibuprofen over-the-counter 800 mg up to 2 or 3 times daily and I will give him some gabapentin and will give him some lidocaine patches to try.  Given the severity of the tenderness with no skin rash we will get a bone scan to look for any areas on the ninth rib posteriorly on the left with a referral to chest surgery for an opinion as well.        Objective   Vital Signs:  /68 (BP Location: Left arm, Patient Position: Sitting, Cuff Size: Adult)   Pulse 64   Wt 88 kg (194 lb)   SpO2 96%   BMI 29.50 kg/m²   Estimated body mass index is 29.5 kg/m² as calculated from the following:    Height as of 9/12/23: 172.7 cm (68\").    Weight as of this encounter: 88 kg (194 lb).               Physical Exam  Vitals reviewed.   Constitutional:       Appearance: He is well-developed.   HENT:      Head: Normocephalic and atraumatic.      Right Ear: Tympanic membrane and external ear normal.      Left Ear: Tympanic membrane and external ear normal.      Nose: Nose normal.   Eyes:      Conjunctiva/sclera: Conjunctivae normal.      Pupils: Pupils are equal, round, and reactive to light.   Neck:      Thyroid: No thyromegaly.      Vascular: No JVD.   Cardiovascular:      Rate and Rhythm: Normal rate and regular rhythm.      Heart sounds: Normal heart sounds.   Pulmonary:      Effort: Pulmonary effort is normal. "      Breath sounds: Normal breath sounds.      Comments: Point tenderness ninth posterior rib inferior to the scapula with some puffiness soft tissue at the paravertebral area near the ninth rib  Abdominal:      General: Bowel sounds are normal.      Palpations: Abdomen is soft.   Musculoskeletal:         General: Normal range of motion.      Cervical back: Normal range of motion and neck supple.   Lymphadenopathy:      Cervical: No cervical adenopathy.   Skin:     General: Skin is warm and dry.      Findings: No rash.   Neurological:      Mental Status: He is alert and oriented to person, place, and time.      Cranial Nerves: No cranial nerve deficit.      Coordination: Coordination normal.   Psychiatric:         Behavior: Behavior normal.         Thought Content: Thought content normal.         Judgment: Judgment normal.      Result Review :                   Assessment and Plan   Diagnoses and all orders for this visit:    1. Rib lesion (Primary)  -     Ambulatory Referral to Thoracic Surgery  -     NM bone scan whole body; Future    2. Rib pain on left side  -     Ambulatory Referral to Thoracic Surgery  -     NM bone scan whole body; Future    Other orders  -     lidocaine (LIDODERM) 5 %; Place 2 patches on the skin as directed by provider Daily. Remove & Discard patch within 12 hours or as directed by MD  Dispense: 60 patch; Refill: 1             Follow Up   No follow-ups on file.  Patient was given instructions and counseling regarding his condition or for health maintenance advice. Please see specific information pulled into the AVS if appropriate.

## 2023-09-20 NOTE — TELEPHONE ENCOUNTER
Over night oximetry dated 8/28/2023 was reviewed by GEOFFREY Baxter. No significant desaturations detected. Sending report to scan.

## 2023-09-25 ENCOUNTER — TELEPHONE (OUTPATIENT)
Dept: INTERNAL MEDICINE | Facility: CLINIC | Age: 73
End: 2023-09-25

## 2023-09-25 NOTE — TELEPHONE ENCOUNTER
"    Caller: Wilmar Carmona \"ZEKE\"    Relationship: Self    Any additional details: PATIENT WOULD LIKE TO KNOW THE STATUS OF REFERRAL REQUEST FOR BONE SCAN PLEASE    "

## 2023-10-05 ENCOUNTER — TELEPHONE (OUTPATIENT)
Dept: SURGERY | Facility: CLINIC | Age: 73
End: 2023-10-05
Payer: MEDICARE

## 2023-10-05 NOTE — TELEPHONE ENCOUNTER
Called patient to schedule new patient appointment no answer. Left a message explaining new patient referral and need to schedule. Left office call back number as well.

## 2023-10-26 ENCOUNTER — HOSPITAL ENCOUNTER (OUTPATIENT)
Dept: NUCLEAR MEDICINE | Facility: HOSPITAL | Age: 73
Discharge: HOME | End: 2023-10-26
Payer: MEDICARE

## 2023-10-26 DIAGNOSIS — G47.25 CIRCADIAN RHYTHM SLEEP DISORDER, JET LAG TYPE: ICD-10-CM

## 2023-10-26 DIAGNOSIS — R07.81 RIB PAIN ON LEFT SIDE: ICD-10-CM

## 2023-10-26 DIAGNOSIS — M89.9 RIB LESION: ICD-10-CM

## 2023-10-26 PROCEDURE — A9503 TC99M MEDRONATE: HCPCS | Performed by: FAMILY MEDICINE

## 2023-10-26 PROCEDURE — 0 TECHNETIUM MEDRONATE KIT: Performed by: FAMILY MEDICINE

## 2023-10-26 PROCEDURE — 78306 BONE IMAGING WHOLE BODY: CPT

## 2023-10-26 RX ORDER — TC 99M MEDRONATE 20 MG/10ML
19.7 INJECTION, POWDER, LYOPHILIZED, FOR SOLUTION INTRAVENOUS
Status: COMPLETED | OUTPATIENT
Start: 2023-10-26 | End: 2023-10-26

## 2023-10-26 RX ADMIN — Medication 19.7 MILLICURIE: at 10:45

## 2023-10-27 RX ORDER — ZOLPIDEM TARTRATE 5 MG/1
5 TABLET ORAL NIGHTLY PRN
Qty: 25 TABLET | Refills: 2 | Status: SHIPPED | OUTPATIENT
Start: 2023-10-27

## 2023-10-27 NOTE — TELEPHONE ENCOUNTER
Contract on file    Rx Refill Note  Requested Prescriptions     Pending Prescriptions Disp Refills    zolpidem (AMBIEN) 5 MG tablet [Pharmacy Med Name: ZOLPIDEM 5MG TABLETS] 25 tablet      Sig: TAKE 1 TABLET BY MOUTH AT NIGHT AS NEEDED FOR SLEEP      Last office visit with prescribing clinician: 9/20/2023   Last telemedicine visit with prescribing clinician: Visit date not found   Next office visit with prescribing clinician: 10/30/2023       Melodie Giron MA  10/27/23, 09:32 EDT

## 2023-10-30 ENCOUNTER — OFFICE VISIT (OUTPATIENT)
Dept: INTERNAL MEDICINE | Facility: CLINIC | Age: 73
End: 2023-10-30
Payer: MEDICARE

## 2023-10-30 VITALS
SYSTOLIC BLOOD PRESSURE: 112 MMHG | DIASTOLIC BLOOD PRESSURE: 70 MMHG | OXYGEN SATURATION: 95 % | BODY MASS INDEX: 31.02 KG/M2 | HEART RATE: 62 BPM | WEIGHT: 204 LBS

## 2023-10-30 DIAGNOSIS — I10 PRIMARY HYPERTENSION: ICD-10-CM

## 2023-10-30 DIAGNOSIS — R10.9 LEFT FLANK PAIN: ICD-10-CM

## 2023-10-30 DIAGNOSIS — R22.33: Primary | ICD-10-CM

## 2023-10-30 PROCEDURE — 1159F MED LIST DOCD IN RCRD: CPT | Performed by: FAMILY MEDICINE

## 2023-10-30 PROCEDURE — 3074F SYST BP LT 130 MM HG: CPT | Performed by: FAMILY MEDICINE

## 2023-10-30 PROCEDURE — 99214 OFFICE O/P EST MOD 30 MIN: CPT | Performed by: FAMILY MEDICINE

## 2023-10-30 PROCEDURE — 1160F RVW MEDS BY RX/DR IN RCRD: CPT | Performed by: FAMILY MEDICINE

## 2023-10-30 PROCEDURE — 3078F DIAST BP <80 MM HG: CPT | Performed by: FAMILY MEDICINE

## 2023-10-30 NOTE — ASSESSMENT & PLAN NOTE
Continue nifedipine XL 30 mg daily plus acebutolol 200  milligrams daily with Jose Maria experimenting with tapering off hydralazine and perhaps hydrochlorothiazide with monitoring of blood pressure.

## 2023-10-30 NOTE — PROGRESS NOTES
"Chief Complaint  Results    Subjective        Wilmar Carmona presents to [facility]  History of Present Illness    Objective   Vital Signs:  /70 (BP Location: Left arm, Patient Position: Sitting, Cuff Size: Adult)   Pulse 62   Wt 92.5 kg (204 lb)   SpO2 95%   BMI 31.02 kg/m²   Estimated body mass index is 31.02 kg/m² as calculated from the following:    Height as of 9/12/23: 172.7 cm (68\").    Weight as of this encounter: 92.5 kg (204 lb).               Physical Exam  Vitals reviewed.   Constitutional:       Appearance: He is well-developed.   HENT:      Head: Normocephalic and atraumatic.      Right Ear: Tympanic membrane and external ear normal.      Left Ear: Tympanic membrane and external ear normal.      Nose: Nose normal.   Eyes:      Conjunctiva/sclera: Conjunctivae normal.      Pupils: Pupils are equal, round, and reactive to light.   Neck:      Thyroid: No thyromegaly.      Vascular: No JVD.   Cardiovascular:      Rate and Rhythm: Normal rate and regular rhythm.      Heart sounds: Normal heart sounds.   Pulmonary:      Effort: Pulmonary effort is normal.      Breath sounds: Normal breath sounds.   Abdominal:      General: Bowel sounds are normal.      Palpations: Abdomen is soft.   Musculoskeletal:         General: Normal range of motion.      Cervical back: Normal range of motion and neck supple.   Lymphadenopathy:      Cervical: No cervical adenopathy.   Skin:     General: Skin is warm and dry.      Findings: No rash.   Neurological:      Mental Status: He is alert and oriented to person, place, and time.      Cranial Nerves: No cranial nerve deficit.      Coordination: Coordination normal.   Psychiatric:         Behavior: Behavior normal.         Thought Content: Thought content normal.         Judgment: Judgment normal.        Result Review :  The following data was reviewed by: Darryl King MD on 10/30/2023:  Common labs          6/6/2023    09:51 9/1/2023    " 10:43 9/11/2023    13:10   Common Labs   Glucose 126  128  84    BUN 15  17  20    Creatinine 1.24  1.35  1.32    Sodium 144  140  143    Potassium 3.7  3.7  3.7    Chloride 105  105  106    Calcium 9.4  9.2  9.2    Albumin 3.7  4.1     Total Bilirubin 0.3  0.3     Alkaline Phosphatase 36  38     AST (SGOT) 27  19     ALT (SGPT) 21  20     WBC 6.55  7.07     Hemoglobin 14.2  14.8     Hematocrit 41.3  45.1     Platelets 223  232     Hemoglobin A1C 5.60      PSA 2.230                     Assessment and Plan   Diagnoses and all orders for this visit:    1. Palmar nodule, bilateral (Primary)  Comments:  Could be early Dupuytren's contracture but no contracture at this point.    2. Primary hypertension  Comments:  Currently weaning off hydralazine and perhaps hydrochlorothiazide given slight elevation of creatinine.  We will continue monitor blood pressure on other meds  Assessment & Plan:  Continue nifedipine XL 30 mg daily plus acebutolol 200  milligrams daily with Jose Maria experimenting with tapering off hydralazine and perhaps hydrochlorothiazide with monitoring of blood pressure.      3. Left flank pain  Comments:  Appears to be nerve entrapment on the left with negative MR thoracic spine and negative bone scan for the area.  No evidence of metastasis.             Follow Up   Return in about 3 months (around 1/30/2024) for Recheck.  Patient was given instructions and counseling regarding his condition or for health maintenance advice. Please see specific information pulled into the AVS if appropriate.

## 2024-01-23 ENCOUNTER — TRANSCRIBE ORDERS (OUTPATIENT)
Dept: ADMINISTRATIVE | Facility: HOSPITAL | Age: 74
End: 2024-01-23
Payer: MEDICARE

## 2024-01-23 ENCOUNTER — LAB (OUTPATIENT)
Dept: LAB | Facility: HOSPITAL | Age: 74
End: 2024-01-23
Payer: MEDICARE

## 2024-01-23 DIAGNOSIS — R53.83 TIREDNESS: Primary | ICD-10-CM

## 2024-01-23 DIAGNOSIS — N13.8 ENLARGED PROSTATE WITH URINARY OBSTRUCTION: ICD-10-CM

## 2024-01-23 DIAGNOSIS — N40.1 ENLARGED PROSTATE WITH URINARY OBSTRUCTION: ICD-10-CM

## 2024-01-23 DIAGNOSIS — R53.83 TIREDNESS: ICD-10-CM

## 2024-01-23 LAB
PSA SERPL-MCNC: 2.61 NG/ML (ref 0–4)
TESTOST SERPL-MCNC: 421 NG/DL (ref 193–740)

## 2024-01-23 PROCEDURE — 36415 COLL VENOUS BLD VENIPUNCTURE: CPT

## 2024-01-23 PROCEDURE — 84153 ASSAY OF PSA TOTAL: CPT

## 2024-01-23 PROCEDURE — 84403 ASSAY OF TOTAL TESTOSTERONE: CPT

## 2024-02-19 ENCOUNTER — TELEPHONE (OUTPATIENT)
Dept: INTERNAL MEDICINE | Facility: CLINIC | Age: 74
End: 2024-02-19
Payer: MEDICARE

## 2024-02-19 NOTE — TELEPHONE ENCOUNTER
"    Caller: Wilmar Carmona \"Jose Maria\"    Relationship: Self    Best call back number: [phone]    What orders are you requesting (i.e. lab or imaging): A1C, LABS THAT DR. SAWYER REQUESTS (BASIC PANEL)    In what timeframe would the patient need to come in: BEFORE 02/23, PATIENT WOULD LIKE TO DO THIS TODAY OR TOMORROW IF POSSIBLE    Where will you receive your lab/imaging services: OFFICE    Additional notes: PLEASE ADVISE   "

## 2024-02-19 NOTE — TELEPHONE ENCOUNTER
Please order what labs you would like done and send back to me so Ic an call and get him scheduled.

## 2024-02-19 NOTE — TELEPHONE ENCOUNTER
"    Caller: Wilmar Carmona \"Jose Maria\"    Relationship: Self    Best call back number: [phone]    What orders are you requesting (i.e. lab or imaging): A1C, LABS THAT DR. SAWYER REQUESTS (BASIC PANEL)    In what timeframe would the patient need to come in: BEFORE 02/23, PATIENT WOULD LIKE TO DO THIS TODAY OR TOMORROW IF POSSIBLE    Where will you receive your lab/imaging services: OFFICE    Additional notes: PLEASE ADVISE       "

## 2024-02-19 NOTE — PROGRESS NOTES
Date of Office Visit: 2024  Encounter Provider: GEOFFREY Plunkett  Place of Service: [facility] CARDIOLOGY  Patient Name: Wilmar Carmona  :1950    Chief complaint  paroxysmal atrial fibrillation, left ventricular hypertrophy     History of Present Illness  Patient is a 73 y.o. year old male  patient of Dr. Durbin. Past medical history includes hypertension, hyperlipidemia, obstructive sleep apnea.  He Punnett diagnosis atrial fibrillation on outside prior records for many years and previously was followed by Dr. Bailey.  However this is not clearly documented.  With no strips or monitors indicating this.  He has been maintained on Rythmol therapy and Sectral.  He had an abnormal stress test in  leading to cardiac catheterization that revealed minimal vessel disease up to 10%.  However there is a CT scan from 2016 that revealed calcification of all 3 major vessels.  He follow-up echocardiogram in 2018 that showed normal left ventricular size and systolic function with out evidence of aneurysmal disease of the aortic root.  There was an abnormal structure noted in the liver possibly a cyst.  He had a 2 weeks patch in 2018 that showed sinus rhythm with occasional PVCs that were symptomatic.  There is no atrial arrhythmia noted.  In May 2019 he had a stress perfusion study that was negative for ischemia.  Last CT chest without contrast performed on 2020 showed no pulmonary process.  Aneurysm size aortic root size was not mentioned.  Old rib fractures were noted.  On 10/2022 with paroxysmal atrial fibrillation patient had a 14-day monitor that showed 19 patient triggered events with fatigue and fluttering is associated with either PACs PVCs. No other tachy arrhythmia was present.      Interval history  Patient presents today for routine follow-up.  I will visit with him today and have reviewed his medical record.  Since last visit he is using CPAP  consistently at least 8 hours per night.  He had a recent overnight oximetry that was normal.  He denies palpitations, shortness of breath, edema, dizziness, chest pain or chest pressure, syncope or presyncope.  He is walking about 9000 steps per day and denies exertional symptoms.  Blood pressure today is at goal and he reports similar readings at home.  His concern today is persistent fatigue.  He notes that he often goes to bed very late and sleeps late in the morning but notes he needs an afternoon nap.  He has discussed this with sleep medicine who does not feel it is a CPAP issue.  Recently, he moved Lamictal dosing from morning to evening and also went to bed early and woke to an alarm the next morning and felt better that day.  He also notes that he usually watches TV until bedtime.  He notes that he did not have these issues until after he retired, and on days that he is active he does not note the symptoms.    Past Medical History:   Diagnosis Date    Abnormal EKG 01/31/2020    Abnormal stress test     Acne     Actinic keratosis     FOLLOWED BY DR. INGRID GAYLE    Allergic rhinitis     Anxiety 05/03/2022    Arthritis     Atrial premature depolarization 05/2018    ALSO VENTRICULAR PREMATURE DEPOLARIZATION    Finnegan's esophagus without dysplasia 10/15/2021    FOLLOWED BY DR. MATTIE COREAS    BCC (basal cell carcinoma) 12/2011    RIGHT LATERAL SUPERIOR NECK, S/P MOHS    Biceps tendinitis of left shoulder 01/2023    Bilateral vitreous detachment 07/20/2018    Blepharitis of both eyes     FOLLOWED BY DR. CHARU CHEEK    BPH (benign prostatic hypertrophy)     FOLLOWED BY DR. 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 MATTIE COREAS    Consolidation of left lower lobe of lung 05/05/2020    Coronary atherosclerosis 04/09/2018    Cyclothymic disorder     Depression     Diplopia     Enlarged prostate 02/22/2023    FOLLOWED BY DR. SUE THOMPSON    Ganglion cyst of left foot 09/2017    Gastric polyps     FOLLOWED BY DR. MATTIE COREAS    Geographical tongue     GERD (gastroesophageal reflux disease)     Globus sensation     Heart murmur     Hiatal hernia     Hordeolum internum of left eye 03/2022    Hyperlipidemia     MIXED HLD    Hypersomnia     Hypertension     Hypertensive chronic kidney disease 12/15/2021    Hypouricemia 12/2021    IgG deficiency     Impingement syndrome of left shoulder 01/2023    Jet lag syndrome     Keratoconjunctivitis sicca of both eyes     Lesion of spleen 06/2020    Low serum alkaline phosphatase 01/2023    FOLLOWED BY DR. DULCE SHEEHAN    Meibomian gland dysfunction (MGD) of both eyes     UPPER EYES BILATERAL    Mild tricuspid regurgitation     Nodular prostate     WITH ELEVATED PSA'S, FOLLOWED BY DR. SUE THOMPSON    Olecranon bursitis of right elbow 05/06/2021    SEEN AT     Oropharyngeal dysphagia     BRITTA on CPAP     FOLLOWED BY DR. SANDRA WILLIS    PAC (premature atrial contraction)     PAF (paroxysmal atrial fibrillation)     Palpitations     Patellofemoral arthrosis 08/13/2018    Right knee    Patellofemoral chondrosis 08/13/2018    BILATERAL KNEES    Pedal edema 05/2020    Perianal cyst 11/2022    Plantar fascial fibromatosis 10/2017    PLMD (periodic limb movement disorder)     PNA (pneumonia) 04/08/2020    LEFT LOWER LOBE    Posterior tibial tendinitis of left leg 02/2018    Prediabetes 08/07/2020    Ptosis of both eyelids 01/2018    S/P REPAIR    Renal insufficiency 01/10/2018    Rotator cuff tendinitis, left 01/2023    SCC (squamous cell carcinoma) 09/2012    RIGHT POSTERIOR UPPER ARM, S/P EXCISION     Seasonal allergies     Seborrheic keratosis     FOLLOWED BY DR. INGRID GAYLE    Snoring     Sprain of left ankle 05/03/2018    SEEN AT [facility] ER    Stage 3 chronic kidney disease 12/15/2021    FOLLOWED BY DR. DULCE SHEEHAN    Thoracic aortic aneurysm without rupture 04/2018    Tremor     Trichiasis without entropion left lower eyelid     Urge incontinence 02/2020    Ventricular premature beats 04/17/2020    Vertical strabismus, right eye     Vitamin D deficiency     Vitreous floaters 05/17/2019     Past Surgical History:   Procedure Laterality Date    CARDIAC CATHETERIZATION Left 10/09/2015    WNL, DR. SHARATH REYNOLDS AT [facility]    COLONOSCOPY N/A 11/30/2004    d/t thickening of sigmoid found on ct scan, entire colon wnl, Dr.Marc Sherman at [facility]    ENDOSCOPY N/A 10/20/2021    Z LINE IRREGULAR AT 38 CM FROM INCISORS, 2 CM HIATAL HERNIA, DR. MATTIE COREAS AT [facility]    ENDOSCOPY N/A 11/13/2017    IRREGULAR Z LINE, GASTRITIS, PATH BENIGN, DR. MATTIE COREAS AT [facility]    ENDOSCOPY N/A 01/13/2017    GASTRITIS, BENIGN GASTRIC POLYP, (+) BARRETTS ESOPHAGUS, DR. MATTIE COREAS AT [facility]    ENDOSCOPY N/A 03/22/2012    PATH BENIGN, DR. CONSTANTINO SHERMAN AT [facility]    ENDOSCOPY AND COLONOSCOPY N/A 10/20/2014    BENIGN GASTRIC POLYP, COLON WNL, DR. CONSTANTINO SHERMAN T [facility]    ENDOSCOPY AND COLONOSCOPY N/A 11/06/2019    BENIGN GASTRIC POLYP, BARRETTS ESOPHAGUS, 6 MM TUBULAR ADENOMA POLYP IN SIGMOID, INTERNAL HEMORRHOIDS, RESCOPE IN 5 YRS, DR. MATTIE GRAY AT [facility]    ENDOSCOPY AND COLONOSCOPY N/A 06/11/2008    2 BENIGN GASTRIC POLYP, CHRONIC GASTRITIS, COLON WNL, DR. CONSTANTINO SHERMAN AT [facility]    EYE SURGERY Bilateral 12/16/2021    ECTROPION REPAIR BILATERAL, LOWER EYELID STENT, UPPER BLEPHAROPLASTY WITH BROW AND NASAL FAT PAD, DR. KEVIN CHEEK AT [facility]    EYE SURGERY Bilateral 06/06/2018    BILATERAL UPPER AND LOWER BLEPHAROPLASTY AND ENTROPION REPAIR LEFT LOWER EYELID, DR. KEVIN CHEEK AT [facility]    INGUINAL HERNIA REPAIR   1981    DR. SUNG    KNEE ARTHROSCOPY W/ MENISCAL REPAIR Left 2014    DR. PINON    MOHS SURGERY Right 12/13/2011    BCC OF RIGHT LATERAL NECK, DR. CHAU ZARATE    PROSTATE BIOPSY Bilateral 09/13/2004    BENIGN, DR. NA CANALES AT [facility]    PROSTATE BIOPSY Bilateral 10/22/2007    BENIGN, DR. NA CANALES AT [facility]    PROSTATE BIOPSY Bilateral 03/07/2011    BENIGN, DR. NA CANALES AT [facility]    SKIN BIOPSY Left 10/15/2017    LEFT MEDIAL SHIN, PATH: SEBORRHEIC KERATOSIS, DR. INGRID GAYLE    SKIN BIOPSY N/A 03/03/2017    NASAL TIP, PATH: BENIGN, DR. INGRID GAYLE    SKIN CANCER EXCISION Right 09/14/2012    SCC OF RIGHT UPPER ARM, DR. INGRID GAYLE    TONSILLECTOMY Bilateral     VASECTOMY N/A      Outpatient Medications Prior to Visit   Medication Sig Dispense Refill    acebutolol (SECTRAL) 200 MG capsule Take 1 capsule by mouth 2 (Two) Times a Day. 180 capsule 3    albuterol sulfate  (90 Base) MCG/ACT inhaler As Needed.      Breo Ellipta 100-25 MCG/ACT aerosol powder  INHALE 1 PUFF BY MOUTH DAILY. RINSE MOUTH AFTER USE      busPIRone (BUSPAR) 10 MG tablet Take 1 tablet by mouth Daily As Needed.      escitalopram (LEXAPRO) 10 MG tablet Take 1 tablet by mouth Daily.      finasteride (PROSCAR) 5 MG tablet TK 1 T PO ONCE DAILY  2    Glucosamine-Chondroitin (OSTEO BI-FLEX REGULAR STRENGTH PO) Daily.      hydrALAZINE (APRESOLINE) 50 MG tablet TAKE 1 TABLET BY MOUTH THREE TIMES DAILY (Patient taking differently: Take 1.5 tablets by mouth 3 (Three) Times a Day.) 90 tablet 3    hydroCHLOROthiazide (HYDRODIURIL) 25 MG tablet Take 0.5 tablets by mouth 2 (Two) Times a Day.      ibuprofen (ADVIL,MOTRIN) 800 MG tablet Take 1 tablet by mouth Every 6 (Six) Hours As Needed for Mild Pain or Moderate Pain. 60 tablet 1    lamoTRIgine (LaMICtal) 200 MG tablet Take 1 tablet by mouth Daily.      NIFEdipine XL (PROCARDIA XL) 30 MG 24 hr tablet Take 1 tablet by mouth 3 (Three) Times a Day. Dr Bell 270 tablet 3    Omega-3 Fatty  Acids (fish oil) 1000 MG capsule capsule Take  by mouth Daily.      pantoprazole (PROTONIX) 40 MG EC tablet Take 1 tablet by mouth Daily. 90 tablet 3    rosuvastatin (CRESTOR) 10 MG tablet TAKE 1 TABLET BY MOUTH DAILY 90 tablet 2    tamsulosin (FLOMAX) 0.4 MG capsule 24 hr capsule Take 1 capsule by mouth Daily.      zolpidem (AMBIEN) 5 MG tablet TAKE 1 TABLET BY MOUTH AT NIGHT AS NEEDED FOR SLEEP 25 tablet 2    lidocaine (LIDODERM) 5 % Place 2 patches on the skin as directed by provider Daily. Remove & Discard patch within 12 hours or as directed by MD (Patient not taking: Reported on 2/20/2024) 60 patch 1    naloxone (NARCAN) 4 MG/0.1ML nasal spray Call 911. Don't prime. [place] in 1 nostril for overdose. Repeat in 2-3 minutes in other nostril if no or minimal breathing/responsiveness. (Patient not taking: Reported on 2/20/2024) 2 each 0    oxyCODONE-acetaminophen (PERCOCET) 5-325 MG per tablet Take 1 tablet by mouth Every 4 (Four) Hours As Needed for Severe Pain. (Patient not taking: Reported on 2/20/2024) 12 tablet 0     No facility-administered medications prior to visit.       Allergies as of 02/20/2024    (No Known Allergies)     Social History     Socioeconomic History    Marital status:    Tobacco Use    Smoking status: Never     Passive exposure: Never    Smokeless tobacco: Never   Vaping Use    Vaping Use: Never used   Substance and Sexual Activity    Alcohol use: Yes     Alcohol/week: 2.0 standard drinks of alcohol     Types: 2 Drinks containing 0.5 oz of alcohol per week     Comment: Once Weekly    Drug use: Not Currently    Sexual activity: Not Currently     Partners: Female     Birth control/protection: Vasectomy     Comment: .     Family History   Problem Relation Age of Onset    Heart disease Mother     Hypertension Mother     Colon polyps Mother     Depression Father     Prostate cancer Father     Cancer Father         prostate    Heart disease Father     Coronary artery disease Father   "   Stroke Father     Cancer Brother         prostate    Prostate cancer Brother     Kidney disease Daughter     Colon cancer Neg Hx     Crohn's disease Neg Hx     Irritable bowel syndrome Neg Hx     Ulcerative colitis Neg Hx      Review of Systems   Constitutional: Positive for malaise/fatigue.   Cardiovascular:  Negative for chest pain, claudication, dyspnea on exertion, leg swelling, near-syncope, orthopnea, palpitations, paroxysmal nocturnal dyspnea and syncope.   Respiratory:  Negative for shortness of breath.    Neurological:  Negative for brief paralysis, dizziness, headaches and light-headedness.   All other systems reviewed and are negative.       Objective:     Vitals:    02/20/24 1017   BP: 112/64   Pulse: 67   Weight: 90.3 kg (199 lb)   Height: 172.7 cm (68\")     Body mass index is 30.26 kg/m².    Vitals reviewed.   Constitutional:       General: Not in acute distress.     Appearance: Well-developed and not in distress. Not diaphoretic.   HENT:      Head: Normocephalic.   Pulmonary:      Effort: Pulmonary effort is normal. No respiratory distress.      Breath sounds: Normal breath sounds. No wheezing. No rhonchi. No rales.   Cardiovascular:      Normal rate. Regular rhythm.      Murmurs: There is no murmur.   Pulses:     Radial: 2+ bilaterally.  Edema:     Peripheral edema absent.   Skin:     General: Skin is warm and dry. There is no cyanosis.      Findings: No rash.   Neurological:      Mental Status: Alert and oriented to person, place, and time.   Psychiatric:         Behavior: Behavior normal. Behavior is cooperative.         Thought Content: Thought content normal.         Judgment: Judgment normal.       Lab Review:     Lab Results   Component Value Date     09/11/2023     09/01/2023    K 3.7 09/11/2023    K 3.7 09/01/2023     09/11/2023     09/01/2023    CO2 24.8 09/11/2023    CO2 26.6 09/01/2023    BUN 20 09/11/2023    BUN 17 09/01/2023    CREATININE 1.32 (H) 09/11/2023    " "CREATININE 1.35 (H) 09/01/2023    EGFRIFNONA 35 (L) 12/22/2021    EGFRIFNONA 61 12/22/2021    EGFRIFAFRI 71 12/22/2021    EGFRIFAFRI 71 06/11/2021    GLUCOSE 84 09/11/2023    GLUCOSE 128 (H) 09/01/2023    CALCIUM 9.2 09/11/2023    CALCIUM 9.2 09/01/2023    PROTENTOTREF 6.7 06/11/2021    PROTENTOTREF 7.1 01/03/2020    ALBUMIN 4.1 09/01/2023    ALBUMIN 3.7 06/06/2023    BILITOT 0.3 09/01/2023    BILITOT 0.3 06/06/2023    AST 19 09/01/2023    AST 27 06/06/2023    ALT 20 09/01/2023    ALT 21 06/06/2023     Lab Results   Component Value Date    WBC 7.07 09/01/2023    WBC 6.55 06/06/2023    HGB 14.8 09/01/2023    HGB 14.2 06/06/2023    HCT 45.1 09/01/2023    HCT 41.3 06/06/2023    MCV 92.2 09/01/2023    MCV 90.6 06/06/2023     09/01/2023     06/06/2023     Lab Results   Component Value Date    PROBNP 106.6 05/05/2020     No results found for: \"CKTOTAL\", \"CKMB\", \"CKMBINDEX\", \"TROPONINI\", \"TROPONINT\"  Lab Results   Component Value Date    TSH 1.380 07/17/2023    TSH 1.250 06/06/2023             ECG 12 Lead    Date/Time: 2/20/2024 10:41 AM  Performed by: Genesis Johnson APRN    Authorized by: Genesis Johnson APRN  Comparison: compared with previous ECG   Similar to previous ECG  Rhythm: sinus rhythm  Rate: normal  BPM: 67  Conduction: incomplete left bundle branch block  QRS axis: normal  Comments: Similar to prior.  No new ischemic changes        Assessment:       Diagnosis Plan   1. Coronary artery disease involving native coronary artery of native heart without angina pectoris  ECG 12 Lead    Adult Transthoracic Echo Complete w/ Color, Spectral and Contrast if Necessary Per Protocol    Stress Test With Myocardial Perfusion One Day      2. PVC (premature ventricular contraction)  ECG 12 Lead    Adult Transthoracic Echo Complete w/ Color, Spectral and Contrast if Necessary Per Protocol    Stress Test With Myocardial Perfusion One Day      3. Primary hypertension  ECG 12 Lead    Adult Transthoracic Echo " Complete w/ Color, Spectral and Contrast if Necessary Per Protocol    Stress Test With Myocardial Perfusion One Day      4. Obstructive sleep apnea on CPAP  ECG 12 Lead    Adult Transthoracic Echo Complete w/ Color, Spectral and Contrast if Necessary Per Protocol    Stress Test With Myocardial Perfusion One Day      5. Mixed hyperlipidemia  ECG 12 Lead    Adult Transthoracic Echo Complete w/ Color, Spectral and Contrast if Necessary Per Protocol    Stress Test With Myocardial Perfusion One Day      6. Premature atrial contraction  ECG 12 Lead    Adult Transthoracic Echo Complete w/ Color, Spectral and Contrast if Necessary Per Protocol    Stress Test With Myocardial Perfusion One Day        Plan:       1.  Recurrent PACs, PVCs questionable atrial fibrillation.  No clear documentation of atrial fibrillation with just references to this by other providers in the past.  Seen by EP service who also had not documented prior A-fib.  He does have PACs and PVCs.  Rythmol was felt to be of benefit and continued.  He originally stopped Sectral however he developed hypertension and Sectral was restarted.  Currently he is doing relatively well.  Nifedipine was decreased to 30 mg 3 times daily by PCP due to reflux symptoms.  2.  Mild coronary artery disease.  Last stress test negative for ischemia in 2019.  With persistent fatigue will recheck Lexiscan Cardiolite stress test as left bundle branch block precludes treadmill stress test.  3.  Dilated right ventricle, as above likely due to sleep apnea.,  Improved on CPAP  4.  Obstructive sleep apnea.on CPAP therapy  5.  Hypertension.  Controlled on current regimen.    6.  Dyslipidemia.  Fairly reasonable with low HDL.  We have asked him to increase his exercise.   7.  Hepatic cysts  8.  Nonspecific interventricular conduction delay and incomplete left bundle branch block pattern.  Will get repeat echocardiogram as last evaluation was in 2018.  Will also check Lexiscan Cardiolite  stress test as above.  9.  Persistent fatigue.  Discussed sleep pattern with him today.  Discussed avoiding artificial light for about 2 hours prior to bedtime.  Discussed getting out into the daylight in the morning to help with sleep-wake cycle.  If above cardiac testing is negative would recommend he start a routine exercise program and also try to set a consistent sleep and wake times.      Time Spent: I spent 30 minutes caring for Wilmar on this date of service. This time includes time spent by me in the following activities: preparing for the visit, reviewing tests, performing a medically appropriate examination and/or evaluations, counseling and educating the patient/family/caregiver, ordering medications, tests, or procedures, documenting information in the medical record, and independently interpreting results and communicating that information with the patient/family/caregiver.   I spent 1 minutes on the separately reported service of ECG. This time is not included in the time used to support the E/M service also reported today.        Your medication list            Accurate as of February 20, 2024 11:36 AM. If you have any questions, ask your nurse or doctor.                CHANGE how you take these medications        Instructions Last Dose Given Next Dose Due   hydrALAZINE 50 MG tablet  Commonly known as: APRESOLINE  What changed: how much to take      TAKE 1 TABLET BY MOUTH THREE TIMES DAILY              CONTINUE taking these medications        Instructions Last Dose Given Next Dose Due   acebutolol 200 MG capsule  Commonly known as: SECTRAL      Take 1 capsule by mouth 2 (Two) Times a Day.       albuterol sulfate  (90 Base) MCG/ACT inhaler  Commonly known as: PROVENTIL HFA;VENTOLIN HFA;PROAIR HFA      As Needed.       Breo Ellipta 100-25 MCG/ACT aerosol powder   Generic drug: Fluticasone Furoate-Vilanterol      INHALE 1 PUFF BY MOUTH DAILY. RINSE MOUTH AFTER USE       busPIRone 10 MG  tablet  Commonly known as: BUSPAR      Take 1 tablet by mouth Daily As Needed.       escitalopram 10 MG tablet  Commonly known as: LEXAPRO      Take 1 tablet by mouth Daily.       finasteride 5 MG tablet  Commonly known as: PROSCAR      TK 1 T PO ONCE DAILY       fish oil 1000 MG capsule capsule      Take  by mouth Daily.       hydroCHLOROthiazide 25 MG tablet      Take 0.5 tablets by mouth 2 (Two) Times a Day.       ibuprofen 800 MG tablet  Commonly known as: ADVIL,MOTRIN      Take 1 tablet by mouth Every 6 (Six) Hours As Needed for Mild Pain or Moderate Pain.       lamoTRIgine 200 MG tablet  Commonly known as: LaMICtal      Take 1 tablet by mouth Daily.       NIFEdipine XL 30 MG 24 hr tablet  Commonly known as: PROCARDIA XL      Take 1 tablet by mouth 3 (Three) Times a Day. Dr Arabella BURCH BI-FLEX REGULAR STRENGTH PO      Daily.       pantoprazole 40 MG EC tablet  Commonly known as: PROTONIX      Take 1 tablet by mouth Daily.       rosuvastatin 10 MG tablet  Commonly known as: CRESTOR      TAKE 1 TABLET BY MOUTH DAILY       tamsulosin 0.4 MG capsule 24 hr capsule  Commonly known as: FLOMAX      Take 1 capsule by mouth Daily.       zolpidem 5 MG tablet  Commonly known as: AMBIEN      TAKE 1 TABLET BY MOUTH AT NIGHT AS NEEDED FOR SLEEP                Patient is no longer taking -.  I corrected the med list to reflect this.  I did not stop these medications.    Return in about 6 months (around 8/20/2024) for with Dr. Durbin.      Dictated utilizing Dragon dictation

## 2024-02-20 ENCOUNTER — OFFICE VISIT (OUTPATIENT)
Dept: CARDIOLOGY | Facility: CLINIC | Age: 74
End: 2024-02-20
Payer: MEDICARE

## 2024-02-20 VITALS
DIASTOLIC BLOOD PRESSURE: 64 MMHG | SYSTOLIC BLOOD PRESSURE: 112 MMHG | HEART RATE: 67 BPM | WEIGHT: 199 LBS | BODY MASS INDEX: 30.16 KG/M2 | HEIGHT: 68 IN

## 2024-02-20 DIAGNOSIS — E78.2 MIXED HYPERLIPIDEMIA: ICD-10-CM

## 2024-02-20 DIAGNOSIS — I44.7 LBBB (LEFT BUNDLE BRANCH BLOCK): ICD-10-CM

## 2024-02-20 DIAGNOSIS — I25.10 CORONARY ARTERY DISEASE INVOLVING NATIVE CORONARY ARTERY OF NATIVE HEART WITHOUT ANGINA PECTORIS: Primary | ICD-10-CM

## 2024-02-20 DIAGNOSIS — I49.3 PVC (PREMATURE VENTRICULAR CONTRACTION): ICD-10-CM

## 2024-02-20 DIAGNOSIS — I49.1 PREMATURE ATRIAL CONTRACTION: ICD-10-CM

## 2024-02-20 DIAGNOSIS — G47.33 OBSTRUCTIVE SLEEP APNEA ON CPAP: ICD-10-CM

## 2024-02-20 DIAGNOSIS — I10 PRIMARY HYPERTENSION: ICD-10-CM

## 2024-02-20 PROCEDURE — 3078F DIAST BP <80 MM HG: CPT | Performed by: NURSE PRACTITIONER

## 2024-02-20 PROCEDURE — 3074F SYST BP LT 130 MM HG: CPT | Performed by: NURSE PRACTITIONER

## 2024-02-20 PROCEDURE — 93000 ELECTROCARDIOGRAM COMPLETE: CPT | Performed by: NURSE PRACTITIONER

## 2024-02-20 PROCEDURE — 99214 OFFICE O/P EST MOD 30 MIN: CPT | Performed by: NURSE PRACTITIONER

## 2024-02-20 RX ORDER — BUSPIRONE HYDROCHLORIDE 10 MG/1
10 TABLET ORAL DAILY PRN
COMMUNITY
Start: 2024-02-19

## 2024-02-26 DIAGNOSIS — R73.09 ELEVATED HEMOGLOBIN A1C: Primary | ICD-10-CM

## 2024-02-26 DIAGNOSIS — E55.9 VITAMIN D DEFICIENCY: ICD-10-CM

## 2024-02-26 DIAGNOSIS — R73.09 ELEVATED GLUCOSE: ICD-10-CM

## 2024-02-26 DIAGNOSIS — R74.8 LOW SERUM ALKALINE PHOSPHATASE: ICD-10-CM

## 2024-02-26 DIAGNOSIS — N18.31 STAGE 3A CHRONIC KIDNEY DISEASE: ICD-10-CM

## 2024-02-26 DIAGNOSIS — E78.2 MIXED HYPERLIPIDEMIA: ICD-10-CM

## 2024-03-08 ENCOUNTER — TELEPHONE (OUTPATIENT)
Dept: CARDIOLOGY | Facility: CLINIC | Age: 74
End: 2024-03-08
Payer: MEDICARE

## 2024-03-11 ENCOUNTER — HOSPITAL ENCOUNTER (OUTPATIENT)
Dept: CARDIOLOGY | Facility: HOSPITAL | Age: 74
Discharge: HOME | End: 2024-03-11
Payer: MEDICARE

## 2024-03-11 VITALS
HEART RATE: 70 BPM | DIASTOLIC BLOOD PRESSURE: 49 MMHG | HEIGHT: 68 IN | SYSTOLIC BLOOD PRESSURE: 115 MMHG | WEIGHT: 199 LBS | BODY MASS INDEX: 30.16 KG/M2

## 2024-03-11 DIAGNOSIS — I25.10 CORONARY ARTERY DISEASE INVOLVING NATIVE CORONARY ARTERY OF NATIVE HEART WITHOUT ANGINA PECTORIS: ICD-10-CM

## 2024-03-11 DIAGNOSIS — I49.1 PREMATURE ATRIAL CONTRACTION: ICD-10-CM

## 2024-03-11 DIAGNOSIS — G47.33 OBSTRUCTIVE SLEEP APNEA ON CPAP: ICD-10-CM

## 2024-03-11 DIAGNOSIS — E78.2 MIXED HYPERLIPIDEMIA: ICD-10-CM

## 2024-03-11 DIAGNOSIS — I49.3 PVC (PREMATURE VENTRICULAR CONTRACTION): ICD-10-CM

## 2024-03-11 DIAGNOSIS — I10 PRIMARY HYPERTENSION: ICD-10-CM

## 2024-03-11 LAB
AORTIC ARCH: 2.6 CM
AORTIC DIMENSIONLESS INDEX: 0.9 (DI)
ASCENDING AORTA: 3.1 CM
BH CV ECHO LEFT VENTRICLE GLOBAL LONGITUDINAL STRAIN: -24.4 %
BH CV ECHO MEAS - ACS: 2.34 CM
BH CV ECHO MEAS - AO MAX PG: 7.8 MMHG
BH CV ECHO MEAS - AO MEAN PG: 4 MMHG
BH CV ECHO MEAS - AO ROOT DIAM: 3.3 CM
BH CV ECHO MEAS - AO V2 MAX: 140 CM/SEC
BH CV ECHO MEAS - AO V2 VTI: 28.6 CM
BH CV ECHO MEAS - AVA(I,D): 5 CM2
BH CV ECHO MEAS - EDV(CUBED): 172 ML
BH CV ECHO MEAS - EDV(MOD-SP2): 56 ML
BH CV ECHO MEAS - EDV(MOD-SP4): 85 ML
BH CV ECHO MEAS - EF(MOD-BP): 63.3 %
BH CV ECHO MEAS - EF(MOD-SP2): 64.3 %
BH CV ECHO MEAS - EF(MOD-SP4): 64.7 %
BH CV ECHO MEAS - ESV(CUBED): 35.6 ML
BH CV ECHO MEAS - ESV(MOD-SP2): 20 ML
BH CV ECHO MEAS - ESV(MOD-SP4): 30 ML
BH CV ECHO MEAS - FS: 40.8 %
BH CV ECHO MEAS - IVS/LVPW: 0.81 CM
BH CV ECHO MEAS - IVSD: 0.97 CM
BH CV ECHO MEAS - LAT PEAK E' VEL: 11.2 CM/SEC
BH CV ECHO MEAS - LV DIASTOLIC VOL/BSA (35-75): 41.7 CM2
BH CV ECHO MEAS - LV MASS(C)D: 239.5 GRAMS
BH CV ECHO MEAS - LV MAX PG: 6.5 MMHG
BH CV ECHO MEAS - LV MEAN PG: 3 MMHG
BH CV ECHO MEAS - LV SYSTOLIC VOL/BSA (12-30): 14.7 CM2
BH CV ECHO MEAS - LV V1 MAX: 127 CM/SEC
BH CV ECHO MEAS - LV V1 VTI: 26.5 CM
BH CV ECHO MEAS - LVIDD: 5.6 CM
BH CV ECHO MEAS - LVIDS: 3.3 CM
BH CV ECHO MEAS - LVOT AREA: 5.4 CM2
BH CV ECHO MEAS - LVOT DIAM: 2.6 CM
BH CV ECHO MEAS - LVPWD: 1.19 CM
BH CV ECHO MEAS - MED PEAK E' VEL: 7.3 CM/SEC
BH CV ECHO MEAS - MV A DUR: 0.16 SEC
BH CV ECHO MEAS - MV A MAX VEL: 96.4 CM/SEC
BH CV ECHO MEAS - MV DEC SLOPE: 300.9 CM/SEC2
BH CV ECHO MEAS - MV DEC TIME: 0.25 SEC
BH CV ECHO MEAS - MV E MAX VEL: 71.1 CM/SEC
BH CV ECHO MEAS - MV E/A: 0.74
BH CV ECHO MEAS - MV MAX PG: 4.3 MMHG
BH CV ECHO MEAS - MV MEAN PG: 1.65 MMHG
BH CV ECHO MEAS - MV P1/2T: 101.3 MSEC
BH CV ECHO MEAS - MV V2 VTI: 36.5 CM
BH CV ECHO MEAS - MVA(P1/2T): 2.17 CM2
BH CV ECHO MEAS - MVA(VTI): 3.9 CM2
BH CV ECHO MEAS - PA ACC TIME: 0.1 SEC
BH CV ECHO MEAS - PA V2 MAX: 87.7 CM/SEC
BH CV ECHO MEAS - PULM A REVS DUR: 0.16 SEC
BH CV ECHO MEAS - PULM A REVS VEL: 35 CM/SEC
BH CV ECHO MEAS - PULM DIAS VEL: 25.3 CM/SEC
BH CV ECHO MEAS - PULM S/D: 1.49
BH CV ECHO MEAS - PULM SYS VEL: 37.7 CM/SEC
BH CV ECHO MEAS - QP/QS: 0.28
BH CV ECHO MEAS - RV MAX PG: 2.41 MMHG
BH CV ECHO MEAS - RV V1 MAX: 77.6 CM/SEC
BH CV ECHO MEAS - RV V1 VTI: 18.4 CM
BH CV ECHO MEAS - RVOT DIAM: 1.67 CM
BH CV ECHO MEAS - SI(MOD-SP2): 17.7 ML/M2
BH CV ECHO MEAS - SI(MOD-SP4): 27 ML/M2
BH CV ECHO MEAS - SV(LVOT): 141.8 ML
BH CV ECHO MEAS - SV(MOD-SP2): 36 ML
BH CV ECHO MEAS - SV(MOD-SP4): 55 ML
BH CV ECHO MEAS - SV(RVOT): 40.2 ML
BH CV ECHO MEAS - TAPSE (>1.6): 2.6 CM
BH CV ECHO MEAS - TR MAX PG: 22.2 MMHG
BH CV ECHO MEAS - TR MAX VEL: 235.6 CM/SEC
BH CV ECHO MEASUREMENTS AVERAGE E/E' RATIO: 7.69
BH CV NUCLEAR PRIOR STUDY: 2
BH CV REST NUCLEAR ISOTOPE DOSE: 10.2 MCI
BH CV STRESS BP STAGE 1: NORMAL
BH CV STRESS COMMENTS STAGE 1: NORMAL
BH CV STRESS DOSE REGADENOSON STAGE 1: 0.4
BH CV STRESS DURATION MIN STAGE 1: 0
BH CV STRESS DURATION SEC STAGE 1: 10
BH CV STRESS HR STAGE 1: 80
BH CV STRESS NUCLEAR ISOTOPE DOSE: 34.6 MCI
BH CV STRESS PROTOCOL 1: NORMAL
BH CV STRESS RECOVERY BP: NORMAL MMHG
BH CV STRESS RECOVERY HR: 70 BPM
BH CV STRESS STAGE 1: 1
BH CV XLRA - RV BASE: 3.4 CM
BH CV XLRA - RV LENGTH: 8.6 CM
BH CV XLRA - RV MID: 3.1 CM
BH CV XLRA - TDI S': 12.6 CM/SEC
LEFT ATRIUM VOLUME INDEX: 29.4 ML/M2
LV EF NUC BP: 66 %
MAXIMAL PREDICTED HEART RATE: 147 BPM
PERCENT MAX PREDICTED HR: 54.42 %
SINUS: 3.4 CM
STJ: 3.1 CM
STRESS BASELINE BP: NORMAL MMHG
STRESS BASELINE HR: 60 BPM
STRESS PERCENT HR: 64 %
STRESS POST EXERCISE DUR SEC: 10 SEC
STRESS POST PEAK BP: NORMAL MMHG
STRESS POST PEAK HR: 80 BPM
STRESS TARGET HR: 125 BPM

## 2024-03-11 PROCEDURE — 93306 TTE W/DOPPLER COMPLETE: CPT

## 2024-03-11 PROCEDURE — A9502 TC99M TETROFOSMIN: HCPCS | Performed by: NURSE PRACTITIONER

## 2024-03-11 PROCEDURE — 93356 MYOCRD STRAIN IMG SPCKL TRCK: CPT

## 2024-03-11 PROCEDURE — 0 TECHNETIUM TETROFOSMIN KIT: Performed by: NURSE PRACTITIONER

## 2024-03-11 PROCEDURE — 93017 CV STRESS TEST TRACING ONLY: CPT

## 2024-03-11 PROCEDURE — 78452 HT MUSCLE IMAGE SPECT MULT: CPT | Performed by: INTERNAL MEDICINE

## 2024-03-11 PROCEDURE — 93306 TTE W/DOPPLER COMPLETE: CPT | Performed by: INTERNAL MEDICINE

## 2024-03-11 PROCEDURE — 25010000002 REGADENOSON 0.4 MG/5ML SOLUTION: Performed by: NURSE PRACTITIONER

## 2024-03-11 PROCEDURE — 93356 MYOCRD STRAIN IMG SPCKL TRCK: CPT | Performed by: INTERNAL MEDICINE

## 2024-03-11 PROCEDURE — 93018 CV STRESS TEST I&R ONLY: CPT | Performed by: INTERNAL MEDICINE

## 2024-03-11 PROCEDURE — 78452 HT MUSCLE IMAGE SPECT MULT: CPT

## 2024-03-11 PROCEDURE — 93016 CV STRESS TEST SUPVJ ONLY: CPT | Performed by: INTERNAL MEDICINE

## 2024-03-11 RX ORDER — REGADENOSON 0.08 MG/ML
0.4 INJECTION, SOLUTION INTRAVENOUS
Status: COMPLETED | OUTPATIENT
Start: 2024-03-11 | End: 2024-03-11

## 2024-03-11 RX ADMIN — REGADENOSON 0.4 MG: 0.08 INJECTION, SOLUTION INTRAVENOUS at 09:45

## 2024-03-11 RX ADMIN — TETROFOSMIN 1 DOSE: 1.38 INJECTION, POWDER, LYOPHILIZED, FOR SOLUTION INTRAVENOUS at 09:45

## 2024-03-11 RX ADMIN — TETROFOSMIN 1 DOSE: 1.38 INJECTION, POWDER, LYOPHILIZED, FOR SOLUTION INTRAVENOUS at 09:03

## 2024-03-12 ENCOUNTER — TELEPHONE (OUTPATIENT)
Dept: CARDIOLOGY | Facility: CLINIC | Age: 74
End: 2024-03-12
Payer: MEDICARE

## 2024-03-12 NOTE — TELEPHONE ENCOUNTER
Please let him know that stress test was normal.  No evidence of tight blockage in the heart at this time.  Echocardiogram showed his heart is strong and functioning well.  There is a tiny amount of leakage from his mitral valve but this is not worrisome at this time.  There was an incidentally found cyst on his liver seen on echo.  He should discuss this with PCP.  Overall there is nothing on these tests that show a cause for his fatigue.  Would continue to follow with sleep medicine and PCP for alternate causes of persistent fatigue.

## 2024-03-12 NOTE — TELEPHONE ENCOUNTER
Results and recommendations called to pt.  Instructed to call with any further questions or concerns.  Verbalized understanding.    Genesis- pt wanted me to let you know that he's known about the cyst on his liver for 20 yrs, it's been evaluated and is benign    Vonda Iqbal, ISELA  Triage Nurse, [facility]  03/12/24 09:21 EDT

## 2024-04-05 ENCOUNTER — LAB (OUTPATIENT)
Dept: LAB | Facility: HOSPITAL | Age: 74
End: 2024-04-05
Payer: MEDICARE

## 2024-04-05 DIAGNOSIS — N18.31 STAGE 3A CHRONIC KIDNEY DISEASE: ICD-10-CM

## 2024-04-05 DIAGNOSIS — E55.9 VITAMIN D DEFICIENCY: ICD-10-CM

## 2024-04-05 DIAGNOSIS — R73.09 ELEVATED GLUCOSE: ICD-10-CM

## 2024-04-05 DIAGNOSIS — E78.2 MIXED HYPERLIPIDEMIA: ICD-10-CM

## 2024-04-05 DIAGNOSIS — R73.09 ELEVATED HEMOGLOBIN A1C: ICD-10-CM

## 2024-04-05 DIAGNOSIS — R74.8 LOW SERUM ALKALINE PHOSPHATASE: ICD-10-CM

## 2024-04-05 LAB
25(OH)D3 SERPL-MCNC: 50.3 NG/ML (ref 30–100)
ALBUMIN SERPL-MCNC: 4.3 G/DL (ref 3.5–5.2)
ALBUMIN/GLOB SERPL: 1.5 G/DL
ALP SERPL-CCNC: 39 U/L (ref 39–117)
ALT SERPL W P-5'-P-CCNC: 21 U/L (ref 1–41)
ANION GAP SERPL CALCULATED.3IONS-SCNC: 10.7 MMOL/L (ref 5–15)
AST SERPL-CCNC: 22 U/L (ref 1–40)
BASOPHILS # BLD AUTO: 0.06 10*3/MM3 (ref 0–0.2)
BASOPHILS NFR BLD AUTO: 1 % (ref 0–1.5)
BILIRUB SERPL-MCNC: 0.3 MG/DL (ref 0–1.2)
BILIRUB UR QL STRIP: NEGATIVE
BUN SERPL-MCNC: 21 MG/DL (ref 8–23)
BUN/CREAT SERPL: 14.5 (ref 7–25)
CALCIUM SPEC-SCNC: 9.9 MG/DL (ref 8.6–10.5)
CHLORIDE SERPL-SCNC: 101 MMOL/L (ref 98–107)
CHOLEST SERPL-MCNC: 155 MG/DL (ref 0–200)
CLARITY UR: CLEAR
CO2 SERPL-SCNC: 30.3 MMOL/L (ref 22–29)
COLOR UR: YELLOW
CREAT SERPL-MCNC: 1.45 MG/DL (ref 0.76–1.27)
DEPRECATED RDW RBC AUTO: 45.5 FL (ref 37–54)
EGFRCR SERPLBLD CKD-EPI 2021: 50.9 ML/MIN/1.73
EOSINOPHIL # BLD AUTO: 0.2 10*3/MM3 (ref 0–0.4)
EOSINOPHIL NFR BLD AUTO: 3.3 % (ref 0.3–6.2)
ERYTHROCYTE [DISTWIDTH] IN BLOOD BY AUTOMATED COUNT: 13.6 % (ref 12.3–15.4)
GLOBULIN UR ELPH-MCNC: 2.9 GM/DL
GLUCOSE SERPL-MCNC: 119 MG/DL (ref 65–99)
GLUCOSE UR STRIP-MCNC: NEGATIVE MG/DL
HBA1C MFR BLD: 6 % (ref 4.8–5.6)
HCT VFR BLD AUTO: 44.2 % (ref 37.5–51)
HDLC SERPL QL: 4.84
HDLC SERPL-MCNC: 32 MG/DL (ref 40–60)
HGB BLD-MCNC: 14.9 G/DL (ref 13–17.7)
HGB UR QL STRIP.AUTO: NEGATIVE
IMM GRANULOCYTES # BLD AUTO: 0.02 10*3/MM3 (ref 0–0.05)
IMM GRANULOCYTES NFR BLD AUTO: 0.3 % (ref 0–0.5)
KETONES UR QL STRIP: NEGATIVE
LDLC SERPL CALC-MCNC: 91 MG/DL (ref 0–100)
LEUKOCYTE ESTERASE UR QL STRIP.AUTO: NEGATIVE
LYMPHOCYTES # BLD AUTO: 2.21 10*3/MM3 (ref 0.7–3.1)
LYMPHOCYTES NFR BLD AUTO: 36.4 % (ref 19.6–45.3)
MCH RBC QN AUTO: 30.6 PG (ref 26.6–33)
MCHC RBC AUTO-ENTMCNC: 33.7 G/DL (ref 31.5–35.7)
MCV RBC AUTO: 90.8 FL (ref 79–97)
MONOCYTES # BLD AUTO: 0.57 10*3/MM3 (ref 0.1–0.9)
MONOCYTES NFR BLD AUTO: 9.4 % (ref 5–12)
NEUTROPHILS NFR BLD AUTO: 3.01 10*3/MM3 (ref 1.7–7)
NEUTROPHILS NFR BLD AUTO: 49.6 % (ref 42.7–76)
NITRITE UR QL STRIP: NEGATIVE
NRBC BLD AUTO-RTO: 0 /100 WBC (ref 0–0.2)
PH UR STRIP.AUTO: 7 [PH] (ref 5–8)
PLATELET # BLD AUTO: 253 10*3/MM3 (ref 140–450)
PMV BLD AUTO: 9.1 FL (ref 6–12)
POTASSIUM SERPL-SCNC: 3.5 MMOL/L (ref 3.5–5.2)
PROT SERPL-MCNC: 7.2 G/DL (ref 6–8.5)
PROT UR QL STRIP: NEGATIVE
RBC # BLD AUTO: 4.87 10*6/MM3 (ref 4.14–5.8)
SODIUM SERPL-SCNC: 142 MMOL/L (ref 136–145)
SP GR UR STRIP: 1.02 (ref 1–1.03)
T3FREE SERPL-MCNC: 3.44 PG/ML (ref 2–4.4)
T4 FREE SERPL-MCNC: 1.07 NG/DL (ref 0.93–1.7)
TRIGL SERPL-MCNC: 182 MG/DL (ref 0–150)
TSH SERPL DL<=0.05 MIU/L-ACNC: 1.2 UIU/ML (ref 0.27–4.2)
UROBILINOGEN UR QL STRIP: NORMAL
VIT B12 BLD-MCNC: 1342 PG/ML (ref 211–946)
VLDLC SERPL-MCNC: 32 MG/DL (ref 5–40)
WBC NRBC COR # BLD AUTO: 6.07 10*3/MM3 (ref 3.4–10.8)

## 2024-04-05 PROCEDURE — 85025 COMPLETE CBC W/AUTO DIFF WBC: CPT

## 2024-04-05 PROCEDURE — 80053 COMPREHEN METABOLIC PANEL: CPT

## 2024-04-05 PROCEDURE — 36415 COLL VENOUS BLD VENIPUNCTURE: CPT

## 2024-04-05 PROCEDURE — 82607 VITAMIN B-12: CPT

## 2024-04-05 PROCEDURE — 81003 URINALYSIS AUTO W/O SCOPE: CPT

## 2024-04-05 PROCEDURE — 83036 HEMOGLOBIN GLYCOSYLATED A1C: CPT

## 2024-04-05 PROCEDURE — 80061 LIPID PANEL: CPT

## 2024-04-05 PROCEDURE — 84481 FREE ASSAY (FT-3): CPT

## 2024-04-05 PROCEDURE — 82306 VITAMIN D 25 HYDROXY: CPT

## 2024-04-05 PROCEDURE — 84443 ASSAY THYROID STIM HORMONE: CPT

## 2024-04-05 PROCEDURE — 84439 ASSAY OF FREE THYROXINE: CPT

## 2024-04-08 RX ORDER — PANTOPRAZOLE SODIUM 40 MG/1
40 TABLET, DELAYED RELEASE ORAL DAILY
Qty: 90 TABLET | Refills: 3 | Status: SHIPPED | OUTPATIENT
Start: 2024-04-08

## 2024-04-12 ENCOUNTER — OFFICE VISIT (OUTPATIENT)
Dept: INTERNAL MEDICINE | Facility: CLINIC | Age: 74
End: 2024-04-12
Payer: MEDICARE

## 2024-04-12 VITALS
HEART RATE: 60 BPM | SYSTOLIC BLOOD PRESSURE: 122 MMHG | DIASTOLIC BLOOD PRESSURE: 68 MMHG | BODY MASS INDEX: 30.71 KG/M2 | OXYGEN SATURATION: 97 % | WEIGHT: 202 LBS

## 2024-04-12 DIAGNOSIS — K14.8 TONGUE LESION: ICD-10-CM

## 2024-04-12 DIAGNOSIS — R73.09 ELEVATED HEMOGLOBIN A1C: Primary | ICD-10-CM

## 2024-04-12 DIAGNOSIS — E78.2 MIXED HYPERLIPIDEMIA: ICD-10-CM

## 2024-04-12 DIAGNOSIS — R09.81 COMPLAINT OF NASAL CONGESTION: ICD-10-CM

## 2024-04-12 DIAGNOSIS — Z87.19 HISTORY OF HEMORRHOIDS: ICD-10-CM

## 2024-04-12 RX ORDER — FLUTICASONE PROPIONATE 50 MCG
SPRAY, SUSPENSION (ML) NASAL
COMMUNITY
Start: 2024-03-15

## 2024-04-12 RX ORDER — HYDROCORTISONE 25 MG/G
CREAM TOPICAL 2 TIMES DAILY
Qty: 28 G | Refills: 5 | Status: SHIPPED | OUTPATIENT
Start: 2024-04-12

## 2024-04-12 NOTE — PROGRESS NOTES
"Chief Complaint  Hypertension, Hyperlipidemia, and Nasal Congestion    Subjective        Wilmar Carmona presents to [facility]  History of Present Illness  Jose Maria is a delightful glenis who is very active in [employer].  He is seeing cardiology and his stress test are stable.  Will keep him on current dose of rosuvastatin 10 mg although may need to increase this in the future.    Otherwise he has nasal congestion with bending over and has seen Dr. Nelson and probably is going to get allergy testing done although may not do immunotherapy.  He has a pending biopsy of tongue lesion by Dr. Mackenzie and we will see what the result of that is.    Will refill some Proctosol for hemorrhoids and otherwise continue current treatment of hypertension hyperlipidemia and also reviewed Dr. Durbin's evaluation of his heart which looks good.  Hypertension  This is a chronic problem. The problem is controlled.   Hyperlipidemia        Objective   Vital Signs:  /68 (BP Location: Left arm, Patient Position: Sitting, Cuff Size: Adult)   Pulse 60   Wt 91.6 kg (202 lb)   SpO2 97%   BMI 30.71 kg/m²   Estimated body mass index is 30.71 kg/m² as calculated from the following:    Height as of 3/11/24: 172.7 cm (68\").    Weight as of this encounter: 91.6 kg (202 lb).               Physical Exam  Vitals reviewed.   Constitutional:       Appearance: He is well-developed.   HENT:      Head: Normocephalic and atraumatic.      Right Ear: Tympanic membrane and external ear normal.      Left Ear: Tympanic membrane and external ear normal.      Nose: Nose normal.   Eyes:      Conjunctiva/sclera: Conjunctivae normal.      Pupils: Pupils are equal, round, and reactive to light.   Neck:      Thyroid: No thyromegaly.      Vascular: No JVD.   Cardiovascular:      Rate and Rhythm: Normal rate and regular rhythm.      Heart sounds: Normal heart sounds.   Pulmonary:      Effort: Pulmonary effort is normal.      Breath sounds: Normal " breath sounds.   Abdominal:      General: Bowel sounds are normal.      Palpations: Abdomen is soft.   Musculoskeletal:         General: Normal range of motion.      Cervical back: Normal range of motion and neck supple.   Lymphadenopathy:      Cervical: No cervical adenopathy.   Skin:     General: Skin is warm and dry.      Findings: No rash.   Neurological:      Mental Status: He is alert and oriented to person, place, and time.      Cranial Nerves: No cranial nerve deficit.      Coordination: Coordination normal.   Psychiatric:         Behavior: Behavior normal.         Thought Content: Thought content normal.         Judgment: Judgment normal.        Result Review :    The following data was reviewed by: Darryl King MD on 04/12/2024:  Common labs          9/11/2023    13:10 1/23/2024    10:38 4/5/2024    11:48   Common Labs   Glucose 84   119    BUN 20   21    Creatinine 1.32   1.45    Sodium 143   142    Potassium 3.7   3.5    Chloride 106   101    Calcium 9.2   9.9    Albumin   4.3    Total Bilirubin   0.3    Alkaline Phosphatase   39    AST (SGOT)   22    ALT (SGPT)   21    WBC   6.07    Hemoglobin   14.9    Hematocrit   44.2    Platelets   253    Total Cholesterol   155    Triglycerides   182    HDL Cholesterol   32    LDL Cholesterol    91    Hemoglobin A1C   6.00    PSA  2.610                    Assessment and Plan     Diagnoses and all orders for this visit:    1. Elevated hemoglobin A1c (Primary)    2. Complaint of nasal congestion  Comments:  Worse with bending over with question of effect of environmental allergies.    3. Tongue lesion  Comments:  Pending biopsy by Dr. Nelson    4. History of hemorrhoids  Comments:  Proctosol twice daily 2-1/2% unless insurance does not cover    5. Mixed hyperlipidemia  Comments:  Rosuvastatin 10 mg daily    Other orders  -     Hydrocortisone, Perianal, (Proctosol HC) 2.5 % rectal cream; Insert  into the rectum 2 (Two) Times a Day.  Dispense: 28 g; Refill: 5              Follow Up     No follow-ups on file.  Patient was given instructions and counseling regarding his condition or for health maintenance advice. Please see specific information pulled into the AVS if appropriate.

## 2024-04-16 ENCOUNTER — OFFICE VISIT (OUTPATIENT)
Dept: SURGERY | Facility: CLINIC | Age: 74
End: 2024-04-16
Payer: MEDICARE

## 2024-04-16 VITALS
BODY MASS INDEX: 30.58 KG/M2 | HEART RATE: 58 BPM | WEIGHT: 201.8 LBS | HEIGHT: 68 IN | OXYGEN SATURATION: 97 % | DIASTOLIC BLOOD PRESSURE: 68 MMHG | SYSTOLIC BLOOD PRESSURE: 128 MMHG

## 2024-04-16 DIAGNOSIS — K64.4 EXTERNAL HEMORRHOIDS: Primary | ICD-10-CM

## 2024-04-16 DIAGNOSIS — K64.8 INTERNAL HEMORRHOIDS: ICD-10-CM

## 2024-04-16 PROCEDURE — 1159F MED LIST DOCD IN RCRD: CPT | Performed by: PHYSICIAN ASSISTANT

## 2024-04-16 PROCEDURE — 3074F SYST BP LT 130 MM HG: CPT | Performed by: PHYSICIAN ASSISTANT

## 2024-04-16 PROCEDURE — 99213 OFFICE O/P EST LOW 20 MIN: CPT | Performed by: PHYSICIAN ASSISTANT

## 2024-04-16 PROCEDURE — 46600 DIAGNOSTIC ANOSCOPY SPX: CPT | Performed by: PHYSICIAN ASSISTANT

## 2024-04-16 PROCEDURE — 1160F RVW MEDS BY RX/DR IN RCRD: CPT | Performed by: PHYSICIAN ASSISTANT

## 2024-04-16 PROCEDURE — 3078F DIAST BP <80 MM HG: CPT | Performed by: PHYSICIAN ASSISTANT

## 2024-04-16 NOTE — PROGRESS NOTES
"Wilmar Carmona is a 73 y.o. male in for follow up of External hemorrhoids    Internal hemorrhoids    Pt presents today for perianal itching, which he contributes to a hemorrhoid.  Started approximately 3-4 weeks ago.  Saw his PCP 4 days ago and was prescribed Proctosol-HC 2.5 % rectal cream.   Applying internally and externally BID.  Has noticed improvement since starting this cream and states he almost cancelled today's appointment.   He denies any RB.    Stool is hard (Amelia: 1-3) and he is frequently needing to strain during defecation.   Not taking any fiber, stool softeners, or laxatives.     /68 (BP Location: Left arm, Patient Position: Sitting, Cuff Size: Adult)   Pulse 58   Ht 172.7 cm (68\")   Wt 91.5 kg (201 lb 12.8 oz)   SpO2 97%   BMI 30.68 kg/m²   Body mass index is 30.68 kg/m².      PE:   Physical Exam  Exam conducted with a chaperone present.   Constitutional:       General: He is not in acute distress.     Appearance: He is well-developed.   HENT:      Head: Normocephalic and atraumatic.   Abdominal:      General: There is no distension.      Palpations: Abdomen is soft.   Genitourinary:     Comments: Perianal exam: Mild erythema and excoriation along the perianal skin. Left lateral and right posterior external hemorrhoids very mildly enlarged.   TANNER- Good tone, no masses  Anoscopy performed: Grade II x 2 internal hemorrhoids.   Musculoskeletal:         General: Normal range of motion.   Neurological:      Mental Status: He is alert.   Psychiatric:         Thought Content: Thought content normal.       Review of Medical Record:      Colonoscopy 11/06/2019:  - 6 mm Tubular adenoma, Sigmoid colon  - Non-bleeding internal hemorrhoids  - Rescope: 5 Years  - Dr. Hemal Bravo, [facility]     FHx:  Mother: Hx of colon polyps    Assessment:  1. External hemorrhoids    2. Internal hemorrhoids    - New    Plan:  - Continue Proctosol-HC 2.5 % rectal cream BID for a total of 7-10 days. Can use PRN " after this. Pt cautioned against chronic use.  - Recommended switching to a barrier cream after the HC cream to reduce irritation  - Start fiber. Recommended 30-40 grams daily. Printed instructions provided.   - Miralax and/or stool softeners PRN. Encouraged good bowel habits and avoidance of straining.   - Follow up as needed.

## 2024-04-24 RX ORDER — ROSUVASTATIN CALCIUM 10 MG/1
10 TABLET, COATED ORAL DAILY
Qty: 90 TABLET | Refills: 1 | Status: SHIPPED | OUTPATIENT
Start: 2024-04-24

## 2024-06-24 ENCOUNTER — OFFICE VISIT (OUTPATIENT)
Dept: INTERNAL MEDICINE | Facility: CLINIC | Age: 74
End: 2024-06-24
Payer: MEDICARE

## 2024-06-24 ENCOUNTER — HOSPITAL ENCOUNTER (OUTPATIENT)
Facility: HOSPITAL | Age: 74
Discharge: HOME | End: 2024-06-24
Payer: MEDICARE

## 2024-06-24 VITALS
WEIGHT: 203.6 LBS | DIASTOLIC BLOOD PRESSURE: 65 MMHG | SYSTOLIC BLOOD PRESSURE: 115 MMHG | TEMPERATURE: 98 F | HEART RATE: 73 BPM | HEIGHT: 68 IN | OXYGEN SATURATION: 96 % | BODY MASS INDEX: 30.86 KG/M2

## 2024-06-24 DIAGNOSIS — M54.50 LUMBAR BACK PAIN: ICD-10-CM

## 2024-06-24 DIAGNOSIS — M54.50 LUMBAR BACK PAIN: Primary | ICD-10-CM

## 2024-06-24 PROCEDURE — 72100 X-RAY EXAM L-S SPINE 2/3 VWS: CPT

## 2024-06-24 PROCEDURE — 99213 OFFICE O/P EST LOW 20 MIN: CPT

## 2024-06-24 PROCEDURE — 1125F AMNT PAIN NOTED PAIN PRSNT: CPT

## 2024-06-24 PROCEDURE — 3074F SYST BP LT 130 MM HG: CPT

## 2024-06-24 PROCEDURE — 3078F DIAST BP <80 MM HG: CPT

## 2024-06-24 RX ORDER — BUSPIRONE HYDROCHLORIDE 10 MG/1
10 TABLET ORAL 2 TIMES DAILY PRN
COMMUNITY
Start: 2024-06-14

## 2024-06-24 RX ORDER — CLOTRIMAZOLE AND BETAMETHASONE DIPROPIONATE 10; .64 MG/G; MG/G
CREAM TOPICAL
COMMUNITY
Start: 2024-05-11

## 2024-06-24 RX ORDER — METHYLPREDNISOLONE 4 MG/1
TABLET ORAL
Qty: 21 TABLET | Refills: 0 | Status: SHIPPED | OUTPATIENT
Start: 2024-06-24

## 2024-06-24 RX ORDER — CYCLOBENZAPRINE HCL 5 MG
5 TABLET ORAL 3 TIMES DAILY PRN
Qty: 45 TABLET | Refills: 0 | Status: SHIPPED | OUTPATIENT
Start: 2024-06-24

## 2024-06-24 NOTE — PROGRESS NOTES
"        Chief Complaint  Back Pain     Subjective:      History of Present Illness {CC  Problem List  Visit  Diagnosis   Encounters  Notes  Medications  Labs  Result Review Imaging  Media :23}     Wilmar Carmona presents to [facility] for:      History of Present Illness     Pt states he bent over 2 days ago and felt a sharp pain. Pt states he was unable to stand up. Pt states this is lower bilateral back and denies numbness or tingling in his legs. Pt states he took gabapentin he had extra. Pt has taken meloxicam and advil and nothing has helped. Pt statres this does seem to get slightly better when moving around.     I have reviewed patient's medical history, any new submitted information provided by patient or medical assistant and updated medical record.      Objective:      Physical Exam  Vitals reviewed.   Constitutional:       Appearance: Normal appearance.   HENT:      Head: Normocephalic.   Cardiovascular:      Rate and Rhythm: Normal rate and regular rhythm.      Pulses: Normal pulses.      Heart sounds: Normal heart sounds.   Pulmonary:      Effort: Pulmonary effort is normal.      Breath sounds: Normal breath sounds.   Musculoskeletal:      Lumbar back: Spasms present. Decreased range of motion. Positive right straight leg raise test and positive left straight leg raise test.   Skin:     General: Skin is warm and dry.      Capillary Refill: Capillary refill takes less than 2 seconds.   Neurological:      General: No focal deficit present.      Mental Status: He is alert.      Gait: Gait abnormal (d.\/t back pain).   Psychiatric:         Mood and Affect: Mood normal.         Behavior: Behavior normal.        Result Review  Data Reviewed:{ Labs  Result Review  Imaging  Med Tab  Media :23}                Vital Signs:   /65 (BP Location: Left arm, Patient Position: Standing, Cuff Size: Adult)   Pulse 73   Temp 98 °F (36.7 °C) (Oral)   Ht 172.7 cm (68\")   " "Wt 92.4 kg (203 lb 9.6 oz)   SpO2 96%   BMI 30.96 kg/m²   Estimated body mass index is 30.96 kg/m² as calculated from the following:    Height as of this encounter: 172.7 cm (68\").    Weight as of this encounter: 92.4 kg (203 lb 9.6 oz).        Requested Prescriptions     Signed Prescriptions Disp Refills    cyclobenzaprine (FLEXERIL) 5 MG tablet 45 tablet 0     Sig: Take 1 tablet by mouth 3 (Three) Times a Day As Needed for Muscle Spasms.    methylPREDNISolone (MEDROL) 4 MG dose pack 21 tablet 0     Sig: Take as directed on package instructions.       Routine medications provided by this office will also be refilled via pharmacy request.       Current Outpatient Medications:     acebutolol (SECTRAL) 200 MG capsule, Take 1 capsule by mouth 2 (Two) Times a Day., Disp: 180 capsule, Rfl: 3    busPIRone (BUSPAR) 10 MG tablet, Take 1 tablet by mouth 2 (Two) Times a Day As Needed. for anxiety, Disp: , Rfl:     clotrimazole-betamethasone (LOTRISONE) 1-0.05 % cream, APPLY TOPICALLY TO THE AFFECTED AREA EVERY 12 HOURS, Disp: , Rfl:     escitalopram (LEXAPRO) 10 MG tablet, Take 1 tablet by mouth Daily., Disp: , Rfl:     finasteride (PROSCAR) 5 MG tablet, TK 1 T PO ONCE DAILY, Disp: , Rfl: 2    fluticasone (FLONASE) 50 MCG/ACT nasal spray, , Disp: , Rfl:     Glucosamine-Chondroitin (OSTEO BI-FLEX REGULAR STRENGTH PO), Daily., Disp: , Rfl:     hydrALAZINE (APRESOLINE) 50 MG tablet, TAKE 1 TABLET BY MOUTH THREE TIMES DAILY (Patient taking differently: Take 1.5 tablets by mouth 3 (Three) Times a Day.), Disp: 90 tablet, Rfl: 3    hydroCHLOROthiazide (HYDRODIURIL) 25 MG tablet, Take 0.5 tablets by mouth 2 (Two) Times a Day., Disp: , Rfl:     Hydrocortisone, Perianal, (Proctosol HC) 2.5 % rectal cream, Insert  into the rectum 2 (Two) Times a Day., Disp: 28 g, Rfl: 5    lamoTRIgine (LaMICtal) 200 MG tablet, Take 1 tablet by mouth Daily., Disp: , Rfl:     NIFEdipine XL (PROCARDIA XL) 30 MG 24 hr tablet, Take 1 tablet by mouth 3 " (Three) Times a Day. Dr Bell, Disp: 270 tablet, Rfl: 3    Omega-3 Fatty Acids (fish oil) 1000 MG capsule capsule, Take  by mouth Daily., Disp: , Rfl:     pantoprazole (PROTONIX) 40 MG EC tablet, TAKE 1 TABLET BY MOUTH DAILY, Disp: 90 tablet, Rfl: 3    rosuvastatin (CRESTOR) 10 MG tablet, TAKE 1 TABLET BY MOUTH DAILY, Disp: 90 tablet, Rfl: 1    tamsulosin (FLOMAX) 0.4 MG capsule 24 hr capsule, Take 1 capsule by mouth Daily., Disp: , Rfl:     zolpidem (AMBIEN) 5 MG tablet, TAKE 1 TABLET BY MOUTH AT NIGHT AS NEEDED FOR SLEEP, Disp: 25 tablet, Rfl: 2    cyclobenzaprine (FLEXERIL) 5 MG tablet, Take 1 tablet by mouth 3 (Three) Times a Day As Needed for Muscle Spasms., Disp: 45 tablet, Rfl: 0    methylPREDNISolone (MEDROL) 4 MG dose pack, Take as directed on package instructions., Disp: 21 tablet, Rfl: 0     Assessment and Plan:      Assessment and Plan {CC Problem List  Visit Diagnosis  ROS  Review (Popup)  [facility] Maintenance  Quality  BestPractice  Medications  SmartSets  SnapShot Encounters  Media :23}     Diagnoses and all orders for this visit:    1. Lumbar back pain (Primary)  -     XR Spine Lumbar 2 or 3 View; Future  -     cyclobenzaprine (FLEXERIL) 5 MG tablet; Take 1 tablet by mouth 3 (Three) Times a Day As Needed for Muscle Spasms.  Dispense: 45 tablet; Refill: 0  -     methylPREDNISolone (MEDROL) 4 MG dose pack; Take as directed on package instructions.  Dispense: 21 tablet; Refill: 0             New Medications Ordered This Visit   Medications    cyclobenzaprine (FLEXERIL) 5 MG tablet     Sig: Take 1 tablet by mouth 3 (Three) Times a Day As Needed for Muscle Spasms.     Dispense:  45 tablet     Refill:  0    methylPREDNISolone (MEDROL) 4 MG dose pack     Sig: Take as directed on package instructions.     Dispense:  21 tablet     Refill:  0       Will get x-rays today and review with patient once available.  Educated patient on dosing and side effects of Flexeril and Medrol Dosepak.  Depending on  x-rays we will potentially send patient to neurosurgery or physical therapy.  Patient educated to rest, use ice, heat, Voltaren.  Patient verbalized understanding and is comfortable with the plan of care.    Follow Up {Instructions Charge Capture  Follow-up Communications :23}     Return if symptoms worsen or fail to improve.      Patient was given instructions and counseling regarding his condition or for health maintenance advice. Please see specific information pulled into the AVS if appropriate.    Dragon disclaimer:   Much of this encounter note is an electronic transcription/translation of spoken language to printed text. The electronic translation of spoken language may permit erroneous, or at times, nonsensical words or phrases to be inadvertently transcribed; Although I have reviewed the note for such errors, some may still exist.     Additional Patient Counseling:       There are no Patient Instructions on file for this visit.

## 2024-07-12 ENCOUNTER — OFFICE VISIT (OUTPATIENT)
Dept: SLEEP MEDICINE | Facility: HOSPITAL | Age: 74
End: 2024-07-12
Payer: MEDICARE

## 2024-07-12 ENCOUNTER — TELEPHONE (OUTPATIENT)
Dept: SLEEP MEDICINE | Facility: HOSPITAL | Age: 74
End: 2024-07-12
Payer: MEDICARE

## 2024-07-12 VITALS
BODY MASS INDEX: 30.77 KG/M2 | OXYGEN SATURATION: 95 % | HEIGHT: 68 IN | DIASTOLIC BLOOD PRESSURE: 64 MMHG | SYSTOLIC BLOOD PRESSURE: 116 MMHG | WEIGHT: 203 LBS | HEART RATE: 61 BPM

## 2024-07-12 DIAGNOSIS — G47.33 OBSTRUCTIVE SLEEP APNEA, ADULT: Primary | ICD-10-CM

## 2024-07-12 DIAGNOSIS — E66.9 OBESITY (BMI 30-39.9): ICD-10-CM

## 2024-07-12 DIAGNOSIS — Z78.9 DIFFICULTY WITH CPAP USE: ICD-10-CM

## 2024-07-12 PROCEDURE — G0463 HOSPITAL OUTPT CLINIC VISIT: HCPCS

## 2024-07-12 NOTE — PROGRESS NOTES
"[facility]  [address]  [place] [address] [phone]    PCP: Darryl King MD    Reason for visit:  Sleep disorders: BRITTA    Wilmar \"Grady" is a 73 y.o.male who was seen in the Sleep Disorders Center today. Annual fu. He is fully compliant - uses 2 machines. Sleeps from 11:30pm to 9am. He uses nasal pillows, wakes up rested.  He got a device passed down from his father.  He prefers the settings on the Respironics machine that he received through his father.  His own ResMed machine is set to 9 cm.  [place] Sleepiness Scale is 6. Caffeine 0 per day. Alcohol 1 per week.    Wilmar \"Grady"  reports that he has never smoked. He has never been exposed to tobacco smoke. He has never used smokeless tobacco.    Pertinent Positive Review of Systems of denies  Rest of Review of Systems was negative as recorded in Sleep Questionnaire.    Patient  has a past medical history of Abnormal EKG (01/31/2020), Abnormal stress test, Acne, Actinic keratosis, Allergic rhinitis, Anxiety (05/03/2022), Arthritis, Atrial premature depolarization (05/2018), Finnegan's esophagus without dysplasia (10/15/2021), BCC (basal cell carcinoma) (12/2011), Biceps tendinitis of left shoulder (01/2023), Bilateral vitreous detachment (07/20/2018), Blepharitis of both eyes, BPH (benign prostatic hypertrophy), Bronchitis with bronchospasm (11/18/2016), CAD (coronary artery disease), Candidal stomatitis (10/2018), Cataract, Chalazion left upper eyelid (03/2022), 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), 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), Closed fracture of multiple ribs of right side (05/2020), Colon polyps, Consolidation of left lower lobe of lung (05/05/2020), Coronary atherosclerosis (04/09/2018), Cyclothymic disorder, Depression, Diplopia, Enlarged prostate (02/22/2023), Ganglion cyst of left foot (09/2017), Gastric polyps, Geographical tongue, GERD (gastroesophageal reflux " disease), Globus sensation, Heart murmur, Hiatal hernia, Hordeolum internum of left eye (03/2022), Hyperlipidemia, Hypersomnia, Hypertension, Hypertensive chronic kidney disease (12/15/2021), Hypouricemia (12/2021), IgG deficiency, Impingement syndrome of left shoulder (01/2023), Jet lag syndrome, Keratoconjunctivitis sicca of both eyes, Lesion of spleen (06/2020), Low serum alkaline phosphatase (01/2023), Meibomian gland dysfunction (MGD) of both eyes, Mild tricuspid regurgitation, Nodular prostate, Olecranon bursitis of right elbow (05/06/2021), Oropharyngeal dysphagia, BRITTA on CPAP, PAC (premature atrial contraction), PAF (paroxysmal atrial fibrillation), Palpitations, 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), 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), Pedal edema (05/2020), Perianal cyst (11/2022), Plantar fascial fibromatosis (10/2017), PLMD (periodic limb movement disorder), PNA (pneumonia) (04/08/2020), Posterior tibial tendinitis of left leg (02/2018), Prediabetes (08/07/2020), Ptosis of both eyelids (01/2018), Renal insufficiency (01/10/2018), Rotator cuff tendinitis, left (01/2023), SCC (squamous cell carcinoma) (09/2012), Seasonal allergies, Seborrheic keratosis, Snoring, Sprain of left ankle (05/03/2018), Stage 3 chronic kidney disease (12/15/2021), Thoracic aortic aneurysm without rupture (04/2018), Tremor, Trichiasis without entropion left lower eyelid, Urge incontinence (02/2020), Ventricular premature beats (04/17/2020), Vertical strabismus, right eye, Vitamin D deficiency, and Vitreous floaters (05/17/2019).     Current Medications:    Current Outpatient Medications:     acebutolol (SECTRAL) 200 MG capsule, Take 1 capsule by mouth 2 (Two) Times a Day., Disp: 180 capsule, Rfl: 3    busPIRone (BUSPAR) 10 MG tablet, Take 1 tablet by mouth 2 (Two) Times a Day As Needed. for anxiety, Disp: , Rfl:     clotrimazole-betamethasone (LOTRISONE) 1-0.05 % cream, APPLY TOPICALLY TO THE AFFECTED AREA EVERY 12 HOURS,  "Disp: , Rfl:     cyclobenzaprine (FLEXERIL) 5 MG tablet, Take 1 tablet by mouth 3 (Three) Times a Day As Needed for Muscle Spasms., Disp: 45 tablet, Rfl: 0    escitalopram (LEXAPRO) 10 MG tablet, Take 1 tablet by mouth Daily., Disp: , Rfl:     finasteride (PROSCAR) 5 MG tablet, TK 1 T PO ONCE DAILY, Disp: , Rfl: 2    fluticasone (FLONASE) 50 MCG/ACT nasal spray, , Disp: , Rfl:     Glucosamine-Chondroitin (OSTEO BI-FLEX REGULAR STRENGTH PO), Daily., Disp: , Rfl:     hydrALAZINE (APRESOLINE) 50 MG tablet, TAKE 1 TABLET BY MOUTH THREE TIMES DAILY (Patient taking differently: Take 1.5 tablets by mouth 3 (Three) Times a Day.), Disp: 90 tablet, Rfl: 3    hydroCHLOROthiazide (HYDRODIURIL) 25 MG tablet, Take 0.5 tablets by mouth 2 (Two) Times a Day., Disp: , Rfl:     Hydrocortisone, Perianal, (Proctosol HC) 2.5 % rectal cream, Insert  into the rectum 2 (Two) Times a Day., Disp: 28 g, Rfl: 5    lamoTRIgine (LaMICtal) 200 MG tablet, Take 1 tablet by mouth Daily., Disp: , Rfl:     methylPREDNISolone (MEDROL) 4 MG dose pack, Take as directed on package instructions., Disp: 21 tablet, Rfl: 0    NIFEdipine XL (PROCARDIA XL) 30 MG 24 hr tablet, Take 1 tablet by mouth 3 (Three) Times a Day. Dr Bell, Disp: 270 tablet, Rfl: 3    Omega-3 Fatty Acids (fish oil) 1000 MG capsule capsule, Take  by mouth Daily., Disp: , Rfl:     pantoprazole (PROTONIX) 40 MG EC tablet, TAKE 1 TABLET BY MOUTH DAILY, Disp: 90 tablet, Rfl: 3    rosuvastatin (CRESTOR) 10 MG tablet, TAKE 1 TABLET BY MOUTH DAILY, Disp: 90 tablet, Rfl: 1    tamsulosin (FLOMAX) 0.4 MG capsule 24 hr capsule, Take 1 capsule by mouth Daily., Disp: , Rfl:     zolpidem (AMBIEN) 5 MG tablet, TAKE 1 TABLET BY MOUTH AT NIGHT AS NEEDED FOR SLEEP, Disp: 25 tablet, Rfl: 2   also entered in Sleep Questionnaire         Vital Signs: /64   Pulse 61   Ht 172.7 cm (68\")   Wt 92.1 kg (203 lb)   SpO2 95%   BMI 30.87 kg/m²     Body mass index is 30.87 kg/m².       Tongue: Large       " "Dentition: good       Pharynx: Posterior pharyngeal pillars are wide   Mallampatti: III (soft and hard palate and base of uvula visible)        General: Alert. Cooperative. Well developed. No acute distress.             Head:  Normocephalic. Symmetrical. Atraumatic.              Nose: No septal deviation. No drainage.          Throat: No oral lesions. No thrush. Moist mucous membranes.    Chest Wall:  Normal shape. Symmetric expansion with respiration. No tenderness.             Neck:  Trachea midline.           Lungs:  Clear to auscultation bilaterally. No wheezes. No rhonchi. No rales. Respirations regular, even and unlabored.            Heart:  Regular rhythm and normal rate. Normal S1 and S2. No murmur.     Abdomen:  Soft, non-tender and non-distended. Normal bowel sounds. No masses.  Extremities:  Moves all extremities well. No edema.    Psychiatric: Normal mood and affect.    Diagnostic data available to date is as below and was reviewed on current visit:  He was diagnosed with severe supine related BRITTA in March 2022 with supine AHI of 56/hr.     No results found for: \"IRON\", \"TIBC\", \"FERRITIN\"    Most current available usage data reviewed on 07/12/2024:        [employer]: [employer]    Prescription to [employer] for replacement supplies as below:    nasal pillow      Description Replacement    Nasal PILLOWS     x A 7034 Nasal Pillows  every 3 mth   x A 7033 Repl Nasal Pillows  2 per mth    Nasal MASK/CUSHION      A 7034 Nasal Mask/Cushion  every 3 mth    A 7032 Repl Nasal Mask/Cushion  2 per mth    Full Face MASK      A 7030 Full Face Mask  every 3 mth    A 7031 Repl Face Mask  1 per mth      A 4604 Heated Tubing  every 3 mth    A 7037 Standard Tubing  every 3 mth   x A 7035 Headgear  every 3 mth   x A 7046 Repl Humidifier Chamber  every 6 yrs   x A 7038 Disposable Filters  2 per mth   x A 7039 Non-disposable Filter  every 6 mth   x A 7036 Chin Strap  every 6 mth     Orders Placed This Encounter   Procedures    " "Pulmonary Results Scan          Impression:  1. Obstructive sleep apnea, adult    2. Obesity (BMI 30-39.9)    3. Difficulty with CPAP use        Plan:  Wilmar \"Grady" is fully compliant and uses 2 different machines which have 2 different settings. Change both machines to auto 5-15.  I reviewed the download for both procedures.  I asked the patient to get his Respironics device, that was his father's, updated in the system with crypt to reflect his own name and date of birth as well as have the modem connected.    I reiterated the importance of effective treatment of obstructive sleep apnea with PAP machine.  Cardiovascular health risks of untreated sleep apnea were again reviewed.  Patient was asked to remain cautious if there is persistent hypersomnolence. The benefit of weight loss in reducing severity of obstructive sleep apnea was discussed.  Patient would benefit from adhering to a strict diet to achieve ideal BMI.     Change of PAP supplies regularly is important for effective use.  Change of cushion on the mask or plugs on nasal pillows along with disposable filters once every month and change of mask frame, tubing, headgear and Velcro straps every 6 months at the minimum was reiterated.    This patient is compliant with PAP machine and benefits from its use.  Apnea hypopneas index is corrected/improved.  Daytime hypersomnolence has resolved.     Patient will follow up in this clinic in 3-4 mths, bismark giordano    Thank you for allowing me to participate in your patient's care.    Electronically signed by Han Mcgovern MD, 07/12/24, 3:05 PM EDT.    Part of this note may be an electronic transcription/translation of spoken language to printed text using the Dragon Dictation System.  "

## 2024-08-08 RX ORDER — ACEBUTOLOL HYDROCHLORIDE 200 MG/1
200 CAPSULE ORAL 2 TIMES DAILY
Qty: 180 CAPSULE | Refills: 3 | Status: SHIPPED | OUTPATIENT
Start: 2024-08-08

## 2024-08-20 NOTE — PROGRESS NOTES
Date of Office Visit: 2024  Encounter Provider: Diana Durbin MD  Place of Service: [facility] CARDIOLOGY  Patient Name: Wilmar Carmona  :1950    Chief complaint  Follow of paroxysmal atrial fibrillation, left ventricular hypertrophy    History of Present Illness  Patient is a pleasant 73-year-old gentleman with history of hypertension, hyperlipidemia, obstructive sleep apnea.  He Punnett diagnosis atrial fibrillation on outside prior records for many years and previously was followed by Dr. Bailey.  However this is not clearly documented.  With no strips or monitors indicating this.  He has been maintained on Rythmol therapy and Sectral.  He had an abnormal stress test in  leading to cardiac catheterization that revealed minimal vessel disease up to 10%.  However there is a CT scan from 2016 that revealed calcification of all 3 major vessels.  He had a 2 weeks patch in 2018 that showed sinus rhythm with occasional PVCs that were symptomatic.  There is no atrial arrhythmia noted.  He had a stress perfusion study on 2019 that was negative for ischemia.  Last CT chest without contrast performed on 2020 showed no pulmonary process.  Aneurysm size aortic root size was not mentioned.  Old rib fractures were noted.  On 10/2022 with paroxysmal atrial fibrillation patient had a 14-day monitor that showed 19 patient triggered events with fatigue and fluttering is associated with either PACs PVCs.  No other tachy arrhythmia was present.Ejection fraction 63%, GLS -24.4% with normal on 3/2024 with complaints of dyspnea and echocardiogram showed grade 1 diastolic dysfunction and ejection fraction 63% GLS -24% with mildly dilated left ventricle, grade 1 diastolic function, trivial valve regurgitation, probably hepatic cyst once again noted.  Stress perfusion study was negative for ischemia.    Since last visit patient is using CPAP consistently.  He denies any chest pain  shortness of breath palpitations syncope near.  Blood pressure checked 2 hours after a.m. meds is in the 120s to 130s.  In the afternoon is slightly higher in the 130s.  Currently she is taking hydralazine 75 mg 3 times daily.  Patient remains active and rides a bicycle 3 times a week outside and believes she is getting 6000 steps a day.      Past Medical History:   Diagnosis Date   • Abnormal EKG 01/31/2020   • Abnormal stress test    • Acne    • Actinic keratosis     FOLLOWED BY DR. INGRID GAYLE   • Allergic rhinitis    • Anxiety 05/03/2022   • Arthritis    • Atrial premature depolarization 05/2018    ALSO VENTRICULAR PREMATURE DEPOLARIZATION   • Finnegan's esophagus without dysplasia 10/15/2021    FOLLOWED BY DR. MATTIE COREAS   • BCC (basal cell carcinoma) 12/2011    RIGHT LATERAL SUPERIOR NECK, S/P MOHS   • Biceps tendinitis of left shoulder 01/2023   • Bilateral vitreous detachment 07/20/2018   • Blepharitis of both eyes     FOLLOWED BY DR. CHARU CHEEK   • BPH (benign prostatic hypertrophy)     FOLLOWED BY DR. 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 MATTIE COREAS   • Consolidation of left lower lobe of lung 05/05/2020   • Coronary atherosclerosis 04/09/2018   • Cyclothymic disorder    • Depression    • Diplopia    • Enlarged prostate 02/22/2023    FOLLOWED BY DR. SUE THOMPSON   • Ganglion cyst of left foot 09/2017   • Gastric polyps     FOLLOWED BY DR. MATTIE COREAS   • Geographical tongue    • GERD (gastroesophageal reflux disease)    • Globus sensation    • Heart murmur    • Hiatal hernia    • Hordeolum  internum of left eye 03/2022   • Hyperlipidemia     MIXED HLD   • Hypersomnia    • Hypertension    • Hypertensive chronic kidney disease 12/15/2021   • Hypouricemia 12/2021   • IgG deficiency    • Impingement syndrome of left shoulder 01/2023   • Jet lag syndrome    • Keratoconjunctivitis sicca of both eyes    • Lesion of spleen 06/2020   • Low serum alkaline phosphatase 01/2023    FOLLOWED BY DR. DULCE SHEEHAN   • Meibomian gland dysfunction (MGD) of both eyes     UPPER EYES BILATERAL   • Mild tricuspid regurgitation    • Nodular prostate     WITH ELEVATED PSA'S, FOLLOWED BY DR. SUE THOMPSON   • Olecranon bursitis of right elbow 05/06/2021    SEEN AT    • Oropharyngeal dysphagia    • BRITTA on CPAP     FOLLOWED BY DR. SANDRA WILLIS   • PAC (premature atrial contraction)    • PAF (paroxysmal atrial fibrillation)    • Palpitations    • Patellofemoral arthrosis 08/13/2018    Right knee   • Patellofemoral chondrosis 08/13/2018    BILATERAL KNEES   • Pedal edema 05/2020   • Perianal cyst 11/2022   • Plantar fascial fibromatosis 10/2017   • PLMD (periodic limb movement disorder)    • PNA (pneumonia) 04/08/2020    LEFT LOWER LOBE   • Posterior tibial tendinitis of left leg 02/2018   • Prediabetes 08/07/2020   • Ptosis of both eyelids 01/2018    S/P REPAIR   • Renal insufficiency 01/10/2018   • Rotator cuff tendinitis, left 01/2023   • SCC (squamous cell carcinoma) 09/2012    RIGHT POSTERIOR UPPER ARM, S/P EXCISION   • Seasonal allergies    • Seborrheic keratosis     FOLLOWED BY DR. INGRID GAYLE   • Snoring    • Sprain of left ankle 05/03/2018    SEEN AT [facility] ER   • Stage 3 chronic kidney disease 12/15/2021    FOLLOWED BY DR. DULCE SHEEHAN   • Thoracic aortic aneurysm without rupture 04/2018   • Tremor    • Trichiasis without entropion left lower eyelid    • Urge incontinence 02/2020   • Ventricular premature beats 04/17/2020   • Vertical strabismus, right eye    • Vitamin D deficiency    • Vitreous floaters  05/17/2019     Past Surgical History:   Procedure Laterality Date   • CARDIAC CATHETERIZATION Left 10/09/2015    WNL, DR. SHARATH REYNOLDS AT [facility]   • COLONOSCOPY N/A 11/30/2004    d/t thickening of sigmoid found on ct scan, entire colon wnl, Dr.Marc Sherman at [facility]   • ENDOSCOPY N/A 10/20/2021    Z LINE IRREGULAR AT 38 CM FROM INCISORS, 2 CM HIATAL HERNIA, DR. MATTIE COREAS AT [facility]   • ENDOSCOPY N/A 11/13/2017    IRREGULAR Z LINE, GASTRITIS, PATH BENIGN, DR. MATTIE COREAS AT [facility]   • ENDOSCOPY N/A 01/13/2017    GASTRITIS, BENIGN GASTRIC POLYP, (+) BARRETTS ESOPHAGUS, DR. MATTIE COREAS AT [facility]   • ENDOSCOPY N/A 03/22/2012    PATH BENIGN, DR. CONSTANTINO SHERMAN AT [facility]   • ENDOSCOPY AND COLONOSCOPY N/A 10/20/2014    BENIGN GASTRIC POLYP, COLON WNL, DR. CONSTANTINO SHERMAN T [facility]   • ENDOSCOPY AND COLONOSCOPY N/A 11/06/2019    BENIGN GASTRIC POLYP, BARRETTS ESOPHAGUS, 6 MM TUBULAR ADENOMA POLYP IN SIGMOID, INTERNAL HEMORRHOIDS, RESCOPE IN 5 YRS, DR. MATTIE GRAY AT [facility]   • ENDOSCOPY AND COLONOSCOPY N/A 06/11/2008    2 BENIGN GASTRIC POLYP, CHRONIC GASTRITIS, COLON WNL, DR. CONSTANTINO SHERMAN AT [facility]   • EYE SURGERY Bilateral 12/16/2021    ECTROPION REPAIR BILATERAL, LOWER EYELID STENT, UPPER BLEPHAROPLASTY WITH BROW AND NASAL FAT PAD, DR. KEVIN CHEEK [facility]    • EYE SURGERY Bilateral 06/06/2018    BILATERAL UPPER AND LOWER BLEPHAROPLASTY AND ENTROPION REPAIR LEFT LOWER EYELID, DR. KEVIN CHEEK AT [facility]    • INGUINAL HERNIA REPAIR  1981    DR. SUNG   • KNEE ARTHROSCOPY W/ MENISCAL REPAIR Left 2014    DR. PINON   • MOHS SURGERY Right 12/13/2011    BCC OF RIGHT LATERAL NECK, DR. CHAU ZARATE   • PROSTATE BIOPSY Bilateral 09/13/2004    BENIGN, DR. NA CANALES AT [facility]   • PROSTATE BIOPSY Bilateral 10/22/2007    BENIGN, DR. NA CANALES AT [facility]   • PROSTATE BIOPSY Bilateral 03/07/2011    BENIGN, DR. NA CANALES AT [facility]   • SKIN BIOPSY Left 10/15/2017    LEFT MEDIAL SHIN, PATH: SEBORRHEIC KERATOSIS, DR. QUINTERO  IRWIN   • SKIN BIOPSY N/A 03/03/2017    NASAL TIP, PATH: BENIGN, DR. INGRID GAYEL   • SKIN CANCER EXCISION Right 09/14/2012    SCC OF RIGHT UPPER ARM, DR. INGRID GAYLE   • TONSILLECTOMY Bilateral    • VASECTOMY N/A      Outpatient Medications Prior to Visit   Medication Sig Dispense Refill   • acebutolol (SECTRAL) 200 MG capsule TAKE 1 CAPSULE BY MOUTH TWICE DAILY 180 capsule 3   • busPIRone (BUSPAR) 10 MG tablet Take 1 tablet by mouth 2 (Two) Times a Day As Needed. for anxiety     • clotrimazole-betamethasone (LOTRISONE) 1-0.05 % cream APPLY TOPICALLY TO THE AFFECTED AREA EVERY 12 HOURS     • escitalopram (LEXAPRO) 10 MG tablet Take 1 tablet by mouth Daily.     • finasteride (PROSCAR) 5 MG tablet TK 1 T PO ONCE DAILY  2   • Glucosamine-Chondroitin (OSTEO BI-FLEX REGULAR STRENGTH PO) Daily.     • hydrALAZINE (APRESOLINE) 50 MG tablet TAKE 1 TABLET BY MOUTH THREE TIMES DAILY (Patient taking differently: Take 1.5 tablets by mouth 3 (Three) Times a Day.) 90 tablet 3   • hydroCHLOROthiazide (HYDRODIURIL) 25 MG tablet Take 0.5 tablets by mouth Daily.     • Hydrocortisone, Perianal, (Proctosol HC) 2.5 % rectal cream Insert  into the rectum 2 (Two) Times a Day. 28 g 5   • lamoTRIgine (LaMICtal) 200 MG tablet Take 1 tablet by mouth Daily.     • NIFEdipine XL (PROCARDIA XL) 30 MG 24 hr tablet Take 1 tablet by mouth 3 (Three) Times a Day. Dr Bell 270 tablet 3   • Omega-3 Fatty Acids (fish oil) 1000 MG capsule capsule Take  by mouth Daily.     • pantoprazole (PROTONIX) 40 MG EC tablet TAKE 1 TABLET BY MOUTH DAILY 90 tablet 3   • rosuvastatin (CRESTOR) 10 MG tablet TAKE 1 TABLET BY MOUTH DAILY 90 tablet 1   • tamsulosin (FLOMAX) 0.4 MG capsule 24 hr capsule Take 1 capsule by mouth Daily.     • zolpidem (AMBIEN) 5 MG tablet TAKE 1 TABLET BY MOUTH AT NIGHT AS NEEDED FOR SLEEP 25 tablet 2   • cyclobenzaprine (FLEXERIL) 5 MG tablet Take 1 tablet by mouth 3 (Three) Times a Day As Needed for Muscle Spasms. (Patient  not taking: Reported on 8/21/2024) 45 tablet 0   • fluticasone (FLONASE) 50 MCG/ACT nasal spray  (Patient not taking: Reported on 8/21/2024)     • methylPREDNISolone (MEDROL) 4 MG dose pack Take as directed on package instructions. (Patient not taking: Reported on 8/21/2024) 21 tablet 0     No facility-administered medications prior to visit.       Allergies as of 08/21/2024   • (No Known Allergies)     Social History     Socioeconomic History   • Marital status:    Tobacco Use   • Smoking status: Never     Passive exposure: Never   • Smokeless tobacco: Never   Vaping Use   • Vaping status: Never Used   Substance and Sexual Activity   • Alcohol use: Yes     Alcohol/week: 2.0 standard drinks of alcohol     Types: 2 Drinks containing 0.5 oz of alcohol per week     Comment: Once Weekly   • Drug use: Not Currently   • Sexual activity: Not Currently     Partners: Female     Birth control/protection: Vasectomy     Comment: .     Family History   Problem Relation Age of Onset   • Heart disease Mother    • Hypertension Mother    • Colon polyps Mother    • Depression Father    • Prostate cancer Father    • Cancer Father         prostate   • Heart disease Father    • Coronary artery disease Father    • Stroke Father    • Cancer Brother         prostate   • Prostate cancer Brother    • Kidney disease Daughter    • Colon cancer Neg Hx    • Crohn's disease Neg Hx    • Irritable bowel syndrome Neg Hx    • Ulcerative colitis Neg Hx      Review of Systems   Constitutional: Positive for weight gain. Negative for chills, fever and weight loss.   Cardiovascular:  Negative for leg swelling.   Respiratory:  Negative for cough, snoring and wheezing.    Hematologic/Lymphatic: Negative for bleeding problem. Does not bruise/bleed easily.   Skin:  Negative for color change.   Musculoskeletal:  Negative for falls, joint pain and myalgias.   Gastrointestinal:  Negative for melena.   Genitourinary:  Negative for hematuria.  "  Neurological:  Positive for excessive daytime sleepiness.   Psychiatric/Behavioral:  Negative for depression. The patient is not nervous/anxious.         Objective:     Vitals:    08/21/24 1039   BP: 116/74   BP Location: Right arm   Patient Position: Sitting   Cuff Size: Adult   Pulse: 56   Resp: 16   SpO2: 97%   Weight: 93 kg (205 lb)   Height: 172.7 cm (68\")     Body mass index is 31.17 kg/m².    Vitals reviewed.   Constitutional:       Appearance: Well-developed.   Eyes:      General: No scleral icterus.        Right eye: No discharge.      Conjunctiva/sclera: Conjunctivae normal.      Pupils: Pupils are equal, round, and reactive to light.   HENT:      Head: Normocephalic.      Nose: Nose normal.   Neck:      Thyroid: No thyromegaly.      Vascular: No JVD.   Pulmonary:      Effort: Pulmonary effort is normal. No respiratory distress.      Breath sounds: Normal breath sounds. No wheezing. No rales.   Cardiovascular:      Normal rate. Regular rhythm. Normal S1. Normal S2.       Murmurs: There is no murmur.      No gallop.    Pulses:     Intact distal pulses.      Carotid: 2+ bilaterally.     Radial: 2+ bilaterally.     Femoral: 2+ bilaterally.     Popliteal: 2+ bilaterally.     Dorsalis pedis: 2+ bilaterally.     Posterior tibial: 2+ bilaterally.  Edema:     Peripheral edema absent.   Abdominal:      General: Bowel sounds are normal. There is no distension.      Palpations: Abdomen is soft.      Tenderness: There is no abdominal tenderness. There is no rebound.   Musculoskeletal: Normal range of motion.         General: No tenderness.      Cervical back: Normal range of motion and neck supple. Skin:     General: Skin is warm and dry.      Findings: No erythema or rash.   Neurological:      Mental Status: Alert and oriented to person, place, and time.   Psychiatric:         Behavior: Behavior normal.         Thought Content: Thought content normal.         Judgment: Judgment normal.     Lab Review:   Lab Results " - Last 18 Months   Lab Units 04/05/24  1148 09/01/23  1043   WBC 10*3/mm3 6.07 7.07   RBC 10*6/mm3 4.87 4.89   HEMOGLOBIN g/dL 14.9 14.8   HEMATOCRIT % 44.2 45.1   MCV fL 90.8 92.2   MCH pg 30.6 30.3   MCHC g/dL 33.7 32.8   RDW % 13.6 13.6   PLATELETS 10*3/mm3 253 232   NEUTROPHIL % % 49.6 57.7   LYMPHOCYTE % % 36.4 31.0   MONOCYTES % % 9.4 7.5   EOSINOPHIL % % 3.3 3.1   BASOPHIL % % 1.0 0.4   NEUTROS ABS 10*3/mm3 3.01 4.08   LYMPHS ABS 10*3/mm3 2.21 2.19   MONOS ABS 10*3/mm3 0.57 0.53   EOS ABS 10*3/mm3 0.20 0.22   BASOS ABS 10*3/mm3 0.06 0.03   RDW-SD fl 45.5 46.3   MPV fL 9.1 8.8       Lab Results - Last 18 Months   Lab Units 04/05/24  1148 09/11/23  1310 09/01/23  1043   GLUCOSE mg/dL 119* 84 128*   BUN mg/dL 21 20 17   CREATININE mg/dL 1.45* 1.32* 1.35*   SODIUM mmol/L 142 143 140   POTASSIUM mmol/L 3.5 3.7 3.7   CHLORIDE mmol/L 101 106 105   CO2 mmol/L 30.3* 24.8 26.6   CALCIUM mg/dL 9.9 9.2 9.2   TOTAL PROTEIN g/dL 7.2  --  6.3   ALBUMIN g/dL 4.3  --  4.1   ALT (SGPT) U/L 21  --  20   AST (SGOT) U/L 22  --  19   ALK PHOS U/L 39  --  38*   BILIRUBIN mg/dL 0.3  --  0.3   GLOBULIN gm/dL 2.9  --  2.2   A/G RATIO g/dL 1.5  --  1.9   BUN / CREAT RATIO  14.5 15.2 12.6   ANION GAP mmol/L 10.7 12.2 8.4   EGFR mL/min/1.73 50.9* 57.3* 55.8*     Lab Results - Last 18 Months   Lab Units 04/05/24  1148   CHOLESTEROL mg/dL 155   TRIGLYCERIDES mg/dL 182*   HDL CHOL mg/dL 32*   LDL CHOL mg/dL 91   VLDL CHOL mg/dL 32       Lab Results - Last 18 Months   Lab Units 04/05/24  1148 07/17/23  1047   TSH uIU/mL 1.200 1.380         ECG 12 Lead    Date/Time: 8/21/2024 9:02 AM  Performed by: Diana Durbin MD    Authorized by: Diana Durbin MD  Comparison: compared with previous ECG   Comparison to previous ECG: Limb length and likely alternate lead placement.  Rhythm: sinus rhythm  QRS axis: right  Other findings: non-specific ST-T wave changes    Clinical impression: abnormal EKG      Assessment:       Diagnosis Plan   1.  Atherosclerosis of coronary artery of native heart without angina pectoris, unspecified vessel or lesion type        2. Mixed hyperlipidemia        3. Primary hypertension        4. Premature atrial contraction        5. Obstructive sleep apnea syndrome          Plan:       1.  Recurrent PACs, PVCs questionable atrial fibrillation.  No clear documentation of atrial fibrillation with just references to this by other providers in the past.  Seen by EP service who also not documented prior A-fib.  He does have PACs and PVCs.  Rythmol was felt to be of benefit i and was continued.  It was also he stop Sectral however patient developed hypertension was placed back on Sectral.  Currently he is doing on the current regimen and we will continue this for now though he will monitor his blood pressure more consistently and bring in his device for device check.  2.  Mild coronary artery disease.  No ischemia on stress test 3/2024.  3.  Dilated right ventricle, resolved on echo 3/2024  4.  Obstructive sleep apnea.on CPAP therapy  5.  Hypertension.  As above.  6.  Dyslipidemia.  Fairly reasonable with low HDL.  Of asked him to increase his exercise  7.  Hepatic cysts  8.  Intermittently abnormal EKG with intermittent incomplete left bundle branch block      Time Spent: I spent 25 minutes caring for Wilmar on this date of service. This time includes time spent by me in the following activities: preparing for the visit, reviewing tests, obtaining and/or reviewing a separately obtained history, performing a medically appropriate examination and/or evaluation, counseling and educating the patient/family/caregiver, documenting information in the medical record, and independently interpreting results and communicating that information with the patient/family/caregiver.   I spent 1 minutes on the separately reported service of ECG. This time is not included in the time used to support the E/M service also reported today.        Your  medication list            Accurate as of August 21, 2024 11:59 PM. If you have any questions, ask your nurse or doctor.                CHANGE how you take these medications        Instructions Last Dose Given Next Dose Due   hydrALAZINE 50 MG tablet  Commonly known as: APRESOLINE  What changed: how much to take      TAKE 1 TABLET BY MOUTH THREE TIMES DAILY              CONTINUE taking these medications        Instructions Last Dose Given Next Dose Due   acebutolol 200 MG capsule  Commonly known as: SECTRAL      TAKE 1 CAPSULE BY MOUTH TWICE DAILY       busPIRone 10 MG tablet  Commonly known as: BUSPAR      Take 1 tablet by mouth 2 (Two) Times a Day As Needed. for anxiety       clotrimazole-betamethasone 1-0.05 % cream  Commonly known as: LOTRISONE      APPLY TOPICALLY TO THE AFFECTED AREA EVERY 12 HOURS       cyclobenzaprine 5 MG tablet  Commonly known as: FLEXERIL      Take 1 tablet by mouth 3 (Three) Times a Day As Needed for Muscle Spasms.       escitalopram 10 MG tablet  Commonly known as: LEXAPRO      Take 1 tablet by mouth Daily.       finasteride 5 MG tablet  Commonly known as: PROSCAR      TK 1 T PO ONCE DAILY       fish oil 1000 MG capsule capsule      Take  by mouth Daily.       fluticasone 50 MCG/ACT nasal spray  Commonly known as: FLONASE           hydroCHLOROthiazide 25 MG tablet      Take 0.5 tablets by mouth Daily.       Hydrocortisone (Perianal) 2.5 % rectal cream  Commonly known as: Proctosol HC      Insert  into the rectum 2 (Two) Times a Day.       lamoTRIgine 200 MG tablet  Commonly known as: LaMICtal      Take 1 tablet by mouth Daily.       methylPREDNISolone 4 MG dose pack  Commonly known as: MEDROL      Take as directed on package instructions.       NIFEdipine XL 30 MG 24 hr tablet  Commonly known as: PROCARDIA XL      Take 1 tablet by mouth 3 (Three) Times a Day. Dr Arabella BURCH BI-FLEX REGULAR STRENGTH PO      Daily.       pantoprazole 40 MG EC tablet  Commonly known as: PROTONIX       TAKE 1 TABLET BY MOUTH DAILY       rosuvastatin 10 MG tablet  Commonly known as: CRESTOR      TAKE 1 TABLET BY MOUTH DAILY       tamsulosin 0.4 MG capsule 24 hr capsule  Commonly known as: FLOMAX      Take 1 capsule by mouth Daily.       zolpidem 5 MG tablet  Commonly known as: AMBIEN      TAKE 1 TABLET BY MOUTH AT NIGHT AS NEEDED FOR SLEEP                Patient is no longer taking -.  I corrected the med list to reflect this.  I did not stop these medications.      Dictated utilizing Dragon dictation

## 2024-08-21 ENCOUNTER — OFFICE VISIT (OUTPATIENT)
Dept: CARDIOLOGY | Facility: CLINIC | Age: 74
End: 2024-08-21
Payer: MEDICARE

## 2024-08-21 VITALS
OXYGEN SATURATION: 97 % | BODY MASS INDEX: 31.07 KG/M2 | WEIGHT: 205 LBS | RESPIRATION RATE: 16 BRPM | HEART RATE: 56 BPM | DIASTOLIC BLOOD PRESSURE: 74 MMHG | SYSTOLIC BLOOD PRESSURE: 116 MMHG | HEIGHT: 68 IN

## 2024-08-21 DIAGNOSIS — I25.10 ATHEROSCLEROSIS OF CORONARY ARTERY OF NATIVE HEART WITHOUT ANGINA PECTORIS, UNSPECIFIED VESSEL OR LESION TYPE: Primary | ICD-10-CM

## 2024-08-21 DIAGNOSIS — E78.2 MIXED HYPERLIPIDEMIA: ICD-10-CM

## 2024-08-21 DIAGNOSIS — I10 PRIMARY HYPERTENSION: ICD-10-CM

## 2024-08-21 DIAGNOSIS — I49.1 PREMATURE ATRIAL CONTRACTION: ICD-10-CM

## 2024-08-21 DIAGNOSIS — G47.33 OBSTRUCTIVE SLEEP APNEA SYNDROME: ICD-10-CM

## 2024-08-21 PROCEDURE — 3078F DIAST BP <80 MM HG: CPT | Performed by: INTERNAL MEDICINE

## 2024-08-21 PROCEDURE — 93000 ELECTROCARDIOGRAM COMPLETE: CPT | Performed by: INTERNAL MEDICINE

## 2024-08-21 PROCEDURE — 3074F SYST BP LT 130 MM HG: CPT | Performed by: INTERNAL MEDICINE

## 2024-08-21 PROCEDURE — 99213 OFFICE O/P EST LOW 20 MIN: CPT | Performed by: INTERNAL MEDICINE

## 2024-08-21 PROCEDURE — 1159F MED LIST DOCD IN RCRD: CPT | Performed by: INTERNAL MEDICINE

## 2024-08-21 PROCEDURE — 1160F RVW MEDS BY RX/DR IN RCRD: CPT | Performed by: INTERNAL MEDICINE

## 2024-09-05 ENCOUNTER — TRANSCRIBE ORDERS (OUTPATIENT)
Dept: LAB | Facility: HOSPITAL | Age: 74
End: 2024-09-05
Payer: MEDICARE

## 2024-09-05 ENCOUNTER — LAB (OUTPATIENT)
Dept: LAB | Facility: HOSPITAL | Age: 74
End: 2024-09-05
Payer: MEDICARE

## 2024-09-05 DIAGNOSIS — I12.9 HYPERTENSIVE NEPHROPATHY: ICD-10-CM

## 2024-09-05 DIAGNOSIS — N18.30 STAGE 3 CHRONIC KIDNEY DISEASE, UNSPECIFIED WHETHER STAGE 3A OR 3B CKD: Primary | ICD-10-CM

## 2024-09-05 DIAGNOSIS — R73.03 DIABETES MELLITUS, LATENT: ICD-10-CM

## 2024-09-05 DIAGNOSIS — N18.30 STAGE 3 CHRONIC KIDNEY DISEASE, UNSPECIFIED WHETHER STAGE 3A OR 3B CKD: ICD-10-CM

## 2024-09-05 LAB
ALBUMIN SERPL-MCNC: 4 G/DL (ref 3.5–5.2)
ANION GAP SERPL CALCULATED.3IONS-SCNC: 7.3 MMOL/L (ref 5–15)
BUN SERPL-MCNC: 20 MG/DL (ref 8–23)
BUN/CREAT SERPL: 13 (ref 7–25)
CALCIUM SPEC-SCNC: 9.1 MG/DL (ref 8.6–10.5)
CHLORIDE SERPL-SCNC: 103 MMOL/L (ref 98–107)
CO2 SERPL-SCNC: 31.7 MMOL/L (ref 22–29)
CREAT SERPL-MCNC: 1.54 MG/DL (ref 0.76–1.27)
CREAT UR-MCNC: 228.9 MG/DL
EGFRCR SERPLBLD CKD-EPI 2021: 47.3 ML/MIN/1.73
GLUCOSE SERPL-MCNC: 97 MG/DL (ref 65–99)
HCT VFR BLD AUTO: 41.6 % (ref 37.5–51)
HGB BLD-MCNC: 14 G/DL (ref 13–17.7)
PHOSPHATE SERPL-MCNC: 3.1 MG/DL (ref 2.5–4.5)
POTASSIUM SERPL-SCNC: 3.7 MMOL/L (ref 3.5–5.2)
PROT ?TM UR-MCNC: 27.8 MG/DL
PROT/CREAT UR: 121.5 MG/G CREA (ref 0–200)
PTH-INTACT SERPL-MCNC: 24.3 PG/ML (ref 15–65)
SODIUM SERPL-SCNC: 142 MMOL/L (ref 136–145)

## 2024-09-05 PROCEDURE — 85018 HEMOGLOBIN: CPT

## 2024-09-05 PROCEDURE — 82570 ASSAY OF URINE CREATININE: CPT

## 2024-09-05 PROCEDURE — 84156 ASSAY OF PROTEIN URINE: CPT

## 2024-09-05 PROCEDURE — 36415 COLL VENOUS BLD VENIPUNCTURE: CPT

## 2024-09-05 PROCEDURE — 80069 RENAL FUNCTION PANEL: CPT

## 2024-09-05 PROCEDURE — 85014 HEMATOCRIT: CPT

## 2024-09-05 PROCEDURE — 83970 ASSAY OF PARATHORMONE: CPT

## 2024-09-09 ENCOUNTER — OFFICE VISIT (OUTPATIENT)
Dept: CARDIOLOGY | Facility: CLINIC | Age: 74
End: 2024-09-09
Payer: MEDICARE

## 2024-09-09 VITALS — DIASTOLIC BLOOD PRESSURE: 77 MMHG | OXYGEN SATURATION: 95 % | HEART RATE: 56 BPM | SYSTOLIC BLOOD PRESSURE: 122 MMHG

## 2024-09-09 DIAGNOSIS — I48.0 PAROXYSMAL ATRIAL FIBRILLATION: Primary | ICD-10-CM

## 2024-09-09 NOTE — PROGRESS NOTES
Patient presented to the clinic for a blood pressure check and to check his own blood pressure machine .  Verified and updated patient allergies and medication.  Patient did not c/o symptoms.    BP in office was 134/68 (L) and 122/70 (R) and HR was 55.  BP with patient cuff was 122/64 (L) and 134/75 (R) and 122/77 (L) and HR was 56.    Presented patient to GEOFFREY Hawkins.   Patient was notified that is blood pressure are within protocol limits and if there's anything more, Kristi Johnson will reach out herself to speak with him but he is clear to leave the office and call us back if he has issues with his blood pressure.  Patient verbalized understanding.

## 2024-09-11 ENCOUNTER — TELEPHONE (OUTPATIENT)
Dept: GASTROENTEROLOGY | Facility: CLINIC | Age: 74
End: 2024-09-11
Payer: MEDICARE

## 2024-09-16 ENCOUNTER — PREP FOR SURGERY (OUTPATIENT)
Dept: SURGERY | Facility: SURGERY CENTER | Age: 74
End: 2024-09-16
Payer: MEDICARE

## 2024-09-16 DIAGNOSIS — K21.9 GASTROESOPHAGEAL REFLUX DISEASE, UNSPECIFIED WHETHER ESOPHAGITIS PRESENT: ICD-10-CM

## 2024-09-16 DIAGNOSIS — Z86.0100 PERSONAL HISTORY OF COLONIC POLYPS: ICD-10-CM

## 2024-09-16 DIAGNOSIS — R10.13 EPIGASTRIC PAIN: ICD-10-CM

## 2024-09-16 DIAGNOSIS — K22.70 BARRETT'S ESOPHAGUS WITHOUT DYSPLASIA: Primary | ICD-10-CM

## 2024-09-17 RX ORDER — SODIUM CHLORIDE 0.9 % (FLUSH) 0.9 %
10 SYRINGE (ML) INJECTION AS NEEDED
Status: CANCELLED | OUTPATIENT
Start: 2024-09-17

## 2024-09-17 RX ORDER — SODIUM CHLORIDE 0.9 % (FLUSH) 0.9 %
3 SYRINGE (ML) INJECTION EVERY 12 HOURS SCHEDULED
Status: CANCELLED | OUTPATIENT
Start: 2024-09-17

## 2024-09-17 RX ORDER — SODIUM CHLORIDE, SODIUM LACTATE, POTASSIUM CHLORIDE, CALCIUM CHLORIDE 600; 310; 30; 20 MG/100ML; MG/100ML; MG/100ML; MG/100ML
30 INJECTION, SOLUTION INTRAVENOUS CONTINUOUS PRN
Status: CANCELLED | OUTPATIENT
Start: 2024-09-17

## 2024-09-18 PROBLEM — Z86.0100 PERSONAL HISTORY OF COLONIC POLYPS: Status: ACTIVE | Noted: 2024-09-16

## 2024-09-18 PROBLEM — Z86.010 PERSONAL HISTORY OF COLONIC POLYPS: Status: ACTIVE | Noted: 2024-09-16

## 2024-09-20 ENCOUNTER — PREP FOR SURGERY (OUTPATIENT)
Dept: SURGERY | Facility: SURGERY CENTER | Age: 74
End: 2024-09-20
Payer: MEDICARE

## 2024-09-20 DIAGNOSIS — Z12.11 ENCOUNTER FOR SCREENING FOR MALIGNANT NEOPLASM OF COLON: ICD-10-CM

## 2024-09-20 DIAGNOSIS — Z86.0100 PERSONAL HISTORY OF COLONIC POLYPS: ICD-10-CM

## 2024-09-20 DIAGNOSIS — K22.70 BARRETT'S ESOPHAGUS WITHOUT DYSPLASIA: Primary | ICD-10-CM

## 2024-09-20 RX ORDER — SODIUM CHLORIDE, SODIUM LACTATE, POTASSIUM CHLORIDE, CALCIUM CHLORIDE 600; 310; 30; 20 MG/100ML; MG/100ML; MG/100ML; MG/100ML
30 INJECTION, SOLUTION INTRAVENOUS CONTINUOUS PRN
OUTPATIENT
Start: 2024-09-20

## 2024-09-20 RX ORDER — SODIUM CHLORIDE 0.9 % (FLUSH) 0.9 %
10 SYRINGE (ML) INJECTION AS NEEDED
OUTPATIENT
Start: 2024-09-20

## 2024-09-20 RX ORDER — SODIUM CHLORIDE 0.9 % (FLUSH) 0.9 %
3 SYRINGE (ML) INJECTION EVERY 12 HOURS SCHEDULED
OUTPATIENT
Start: 2024-09-20

## 2024-10-08 ENCOUNTER — TELEPHONE (OUTPATIENT)
Dept: INTERNAL MEDICINE | Facility: CLINIC | Age: 74
End: 2024-10-08
Payer: MEDICARE

## 2024-10-08 NOTE — TELEPHONE ENCOUNTER
"  Caller: Wilmar Carmona \"Jose Maria\"    Relationship: Self    Best call back number: [phone]     What orders are you requesting (i.e. lab or imaging): LABS INCLUDE A1C     In what timeframe would the patient need to come in: ASAP/MWV IS NEXT WEEK     Where will you receive your lab/imaging services: [facility]    Additional notes: PLEASE CALL WHEN DONE  "

## 2024-10-08 NOTE — TELEPHONE ENCOUNTER
Please place orders for a [facility]  And send back to me so I can let him know

## 2024-10-11 ENCOUNTER — LAB (OUTPATIENT)
Dept: LAB | Facility: HOSPITAL | Age: 74
End: 2024-10-11
Payer: MEDICARE

## 2024-10-11 DIAGNOSIS — E53.8 VITAMIN B12 DEFICIENCY: ICD-10-CM

## 2024-10-11 DIAGNOSIS — Z12.5 PROSTATE CANCER SCREENING: Primary | ICD-10-CM

## 2024-10-11 DIAGNOSIS — R73.09 ELEVATED HEMOGLOBIN A1C: ICD-10-CM

## 2024-10-11 DIAGNOSIS — E78.2 MIXED HYPERLIPIDEMIA: ICD-10-CM

## 2024-10-11 DIAGNOSIS — N40.0 BENIGN PROSTATIC HYPERPLASIA, UNSPECIFIED WHETHER LOWER URINARY TRACT SYMPTOMS PRESENT: ICD-10-CM

## 2024-10-11 DIAGNOSIS — E55.9 VITAMIN D DEFICIENCY: ICD-10-CM

## 2024-10-11 LAB
25(OH)D3 SERPL-MCNC: 50.7 NG/ML (ref 30–100)
ALBUMIN SERPL-MCNC: 4 G/DL (ref 3.5–5.2)
ALBUMIN/GLOB SERPL: 1.3 G/DL
ALP SERPL-CCNC: 47 U/L (ref 39–117)
ALT SERPL W P-5'-P-CCNC: 20 U/L (ref 1–41)
ANION GAP SERPL CALCULATED.3IONS-SCNC: 10.6 MMOL/L (ref 5–15)
AST SERPL-CCNC: 22 U/L (ref 1–40)
BASOPHILS # BLD AUTO: 0.06 10*3/MM3 (ref 0–0.2)
BASOPHILS NFR BLD AUTO: 0.9 % (ref 0–1.5)
BILIRUB SERPL-MCNC: 0.3 MG/DL (ref 0–1.2)
BILIRUB UR QL STRIP: NEGATIVE
BUN SERPL-MCNC: 19 MG/DL (ref 8–23)
BUN/CREAT SERPL: 12.1 (ref 7–25)
CALCIUM SPEC-SCNC: 9.3 MG/DL (ref 8.6–10.5)
CHLORIDE SERPL-SCNC: 103 MMOL/L (ref 98–107)
CHOLEST SERPL-MCNC: 135 MG/DL (ref 0–200)
CLARITY UR: CLEAR
CO2 SERPL-SCNC: 29.4 MMOL/L (ref 22–29)
COLOR UR: YELLOW
CREAT SERPL-MCNC: 1.57 MG/DL (ref 0.76–1.27)
DEPRECATED RDW RBC AUTO: 44.5 FL (ref 37–54)
EGFRCR SERPLBLD CKD-EPI 2021: 46.3 ML/MIN/1.73
EOSINOPHIL # BLD AUTO: 0.25 10*3/MM3 (ref 0–0.4)
EOSINOPHIL NFR BLD AUTO: 3.6 % (ref 0.3–6.2)
ERYTHROCYTE [DISTWIDTH] IN BLOOD BY AUTOMATED COUNT: 13.2 % (ref 12.3–15.4)
FOLATE SERPL-MCNC: 16.2 NG/ML (ref 4.78–24.2)
GLOBULIN UR ELPH-MCNC: 3 GM/DL
GLUCOSE SERPL-MCNC: 94 MG/DL (ref 65–99)
GLUCOSE UR STRIP-MCNC: NEGATIVE MG/DL
HBA1C MFR BLD: 5.8 % (ref 4.8–5.6)
HCT VFR BLD AUTO: 41.3 % (ref 37.5–51)
HDLC SERPL QL: 4.22
HDLC SERPL-MCNC: 32 MG/DL (ref 40–60)
HGB BLD-MCNC: 13.8 G/DL (ref 13–17.7)
HGB UR QL STRIP.AUTO: NEGATIVE
IMM GRANULOCYTES # BLD AUTO: 0.02 10*3/MM3 (ref 0–0.05)
IMM GRANULOCYTES NFR BLD AUTO: 0.3 % (ref 0–0.5)
KETONES UR QL STRIP: NEGATIVE
LDLC SERPL CALC-MCNC: 65 MG/DL (ref 0–100)
LEUKOCYTE ESTERASE UR QL STRIP.AUTO: NEGATIVE
LYMPHOCYTES # BLD AUTO: 2.59 10*3/MM3 (ref 0.7–3.1)
LYMPHOCYTES NFR BLD AUTO: 36.9 % (ref 19.6–45.3)
MCH RBC QN AUTO: 30.5 PG (ref 26.6–33)
MCHC RBC AUTO-ENTMCNC: 33.4 G/DL (ref 31.5–35.7)
MCV RBC AUTO: 91.2 FL (ref 79–97)
MONOCYTES # BLD AUTO: 0.65 10*3/MM3 (ref 0.1–0.9)
MONOCYTES NFR BLD AUTO: 9.3 % (ref 5–12)
NEUTROPHILS NFR BLD AUTO: 3.44 10*3/MM3 (ref 1.7–7)
NEUTROPHILS NFR BLD AUTO: 49 % (ref 42.7–76)
NITRITE UR QL STRIP: NEGATIVE
NRBC BLD AUTO-RTO: 0 /100 WBC (ref 0–0.2)
PH UR STRIP.AUTO: 5.5 [PH] (ref 5–8)
PLATELET # BLD AUTO: 275 10*3/MM3 (ref 140–450)
PMV BLD AUTO: 8.7 FL (ref 6–12)
POTASSIUM SERPL-SCNC: 3.5 MMOL/L (ref 3.5–5.2)
PROT SERPL-MCNC: 7 G/DL (ref 6–8.5)
PROT UR QL STRIP: NEGATIVE
RBC # BLD AUTO: 4.53 10*6/MM3 (ref 4.14–5.8)
SODIUM SERPL-SCNC: 143 MMOL/L (ref 136–145)
SP GR UR STRIP: >=1.03 (ref 1–1.03)
T3FREE SERPL-MCNC: 3.33 PG/ML (ref 2–4.4)
T4 FREE SERPL-MCNC: 1 NG/DL (ref 0.92–1.68)
TRIGL SERPL-MCNC: 236 MG/DL (ref 0–150)
TSH SERPL DL<=0.05 MIU/L-ACNC: 1.47 UIU/ML (ref 0.27–4.2)
UROBILINOGEN UR QL STRIP: NORMAL
VIT B12 BLD-MCNC: 867 PG/ML (ref 211–946)
VLDLC SERPL-MCNC: 38 MG/DL (ref 5–40)
WBC NRBC COR # BLD AUTO: 7.01 10*3/MM3 (ref 3.4–10.8)

## 2024-10-11 PROCEDURE — 84481 FREE ASSAY (FT-3): CPT

## 2024-10-11 PROCEDURE — 80061 LIPID PANEL: CPT

## 2024-10-11 PROCEDURE — 84153 ASSAY OF PSA TOTAL: CPT

## 2024-10-11 PROCEDURE — 84439 ASSAY OF FREE THYROXINE: CPT

## 2024-10-11 PROCEDURE — 36415 COLL VENOUS BLD VENIPUNCTURE: CPT

## 2024-10-11 PROCEDURE — 83036 HEMOGLOBIN GLYCOSYLATED A1C: CPT

## 2024-10-11 PROCEDURE — 85025 COMPLETE CBC W/AUTO DIFF WBC: CPT

## 2024-10-11 PROCEDURE — 82306 VITAMIN D 25 HYDROXY: CPT

## 2024-10-11 PROCEDURE — 80053 COMPREHEN METABOLIC PANEL: CPT

## 2024-10-11 PROCEDURE — 82607 VITAMIN B-12: CPT

## 2024-10-11 PROCEDURE — 82746 ASSAY OF FOLIC ACID SERUM: CPT

## 2024-10-11 PROCEDURE — 81003 URINALYSIS AUTO W/O SCOPE: CPT

## 2024-10-11 PROCEDURE — 84443 ASSAY THYROID STIM HORMONE: CPT

## 2024-10-11 NOTE — TELEPHONE ENCOUNTER
"  Caller: Wilmar Carmona \"Jose Maria\"    Relationship: Self    Best call back number: [phone]    What is the best time to reach you: ANYTIME     Who are you requesting to speak with (clinical staff, provider,  specific staff member): CLINICAL     What was the call regarding: PATIENT IS CALLING TO CHECK THE STATUS OF THIS ENCOUNTER.     PATIENT WOULD LIKE A CALL BACK ONCE ORDERS ARE ACTIVE.     "

## 2024-10-14 LAB
PSA SERPL-MCNC: 2.6 NG/ML (ref 0–4)
REFLEX: NORMAL

## 2024-10-16 ENCOUNTER — OFFICE VISIT (OUTPATIENT)
Dept: INTERNAL MEDICINE | Facility: CLINIC | Age: 74
End: 2024-10-16
Payer: MEDICARE

## 2024-10-16 VITALS
OXYGEN SATURATION: 94 % | WEIGHT: 201 LBS | DIASTOLIC BLOOD PRESSURE: 66 MMHG | SYSTOLIC BLOOD PRESSURE: 132 MMHG | HEART RATE: 55 BPM | BODY MASS INDEX: 30.56 KG/M2

## 2024-10-16 DIAGNOSIS — F32.A DEPRESSIVE DISORDER: ICD-10-CM

## 2024-10-16 DIAGNOSIS — Z00.00 MEDICARE ANNUAL WELLNESS VISIT, SUBSEQUENT: ICD-10-CM

## 2024-10-16 DIAGNOSIS — R10.9 FLANK PAIN, CHRONIC: ICD-10-CM

## 2024-10-16 DIAGNOSIS — N18.31 STAGE 3A CHRONIC KIDNEY DISEASE: ICD-10-CM

## 2024-10-16 DIAGNOSIS — E78.2 MIXED HYPERLIPIDEMIA: ICD-10-CM

## 2024-10-16 DIAGNOSIS — R79.89 ELEVATED SERUM CREATININE: Primary | ICD-10-CM

## 2024-10-16 DIAGNOSIS — N41.1 CHRONIC PROSTATITIS: ICD-10-CM

## 2024-10-16 DIAGNOSIS — N40.0 ENLARGED PROSTATE: ICD-10-CM

## 2024-10-16 DIAGNOSIS — I10 PRIMARY HYPERTENSION: ICD-10-CM

## 2024-10-16 DIAGNOSIS — G89.29 FLANK PAIN, CHRONIC: ICD-10-CM

## 2024-10-16 RX ORDER — CEFDINIR 300 MG/1
300 CAPSULE ORAL 2 TIMES DAILY
Qty: 60 CAPSULE | Refills: 0 | Status: SHIPPED | OUTPATIENT
Start: 2024-10-16

## 2024-10-16 NOTE — PATIENT INSTRUCTIONS
Medicare Wellness  Personal Prevention Plan of Service     Date of Office Visit:    Encounter Provider:  Darryl King MD  Place of Service:  [facility] PRIMARY [facility]ARE  Patient Name: Wilmar Carmona  :  1950    As part of the Medicare Wellness portion of your visit today, we are providing you with this personalized preventive plan of services (PPPS). This plan is based upon recommendations of the [place] Preventive Services Task Force (USPSTF) and the Advisory Committee on Immunization Practices (ACIP).    This lists the preventive care services that should be considered, and provides dates of when you are due. Items listed as completed are up-to-date and do not require any further intervention.    Health Maintenance   Topic Date Due    HEPATITIS C SCREENING  Never done    ZOSTER VACCINE (3 of 3) 2018    BMI FOLLOWUP  2024    COLORECTAL CANCER SCREENING  2024    GASTROSCOPY (EGD)  10/20/2024    LIPID PANEL  10/11/2025    ANNUAL WELLNESS VISIT  10/16/2025    TDAP/TD VACCINES (3 - Td or Tdap) 2031    COVID-19 Vaccine  Completed    INFLUENZA VACCINE  Completed    Pneumococcal Vaccine 65+  Completed       Orders Placed This Encounter   Procedures    US Renal Bilateral     Standing Status:   Future     Standing Expiration Date:   10/16/2025     Scheduling Instructions:      Please get the [place] if possible elevated creatinine flank pain     Order Specific Question:   Reason for Exam:     Answer:   Elevated creatinine flank pain     Order Specific Question:   Release to patient     Answer:   Routine Release [[id number]]    Basic Metabolic Panel     Standing Status:   Future     Standing Expiration Date:   10/16/2025     Order Specific Question:   Release to patient     Answer:   Routine Release [[id number]]       No follow-ups on file.

## 2024-10-16 NOTE — PROGRESS NOTES
Subjective   The ABCs of the Annual Wellness Visit  Medicare Wellness Visit      Wilmar Carmona is a 73 y.o. patient who presents for a Medicare Wellness Visit.    The following portions of the patient's history were reviewed and   updated as appropriate: allergies, current medications, past family history, past medical history, past social history, past surgical history, and problem list.    Compared to one year ago, the patient's physical   health is the same.  Compared to one year ago, the patient's mental   health is the same.    Recent Hospitalizations:  He was not admitted to the hospital during the last year.     Current Medical Providers:  Patient Care Team:  Darryl King MD as PCP - General (Family Medicine)  Sudeep George MD as Consulting Physician (Urology)  Diana Durbin MD as Consulting Physician (Cardiology)  Han Mcgovern MD as Consulting Physician (Pulmonary Disease)  Hemal Bravo MD as Consulting Physician (Gastroenterology)  Meghan Fierro MD (Psychiatry)  Zoe Ortiz APRN as Nurse Practitioner (Nurse Practitioner)  Kinza Bell MD as Consulting Physician (Nephrology)  Dedra Cid MD as Consulting Physician (Dermatology)  Bryan Nelson MD as Consulting Physician (Otolaryngology)  Alvina Menjivar PA-C as Physician Assistant (Colon and Rectal Surgery)    Outpatient Medications Prior to Visit   Medication Sig Dispense Refill    acebutolol (SECTRAL) 200 MG capsule TAKE 1 CAPSULE BY MOUTH TWICE DAILY (Patient taking differently: Take 1 capsule by mouth Daily.) 180 capsule 3    busPIRone (BUSPAR) 10 MG tablet Take 1 tablet by mouth 2 (Two) Times a Day As Needed. for anxiety      clotrimazole-betamethasone (LOTRISONE) 1-0.05 % cream APPLY TOPICALLY TO THE AFFECTED AREA EVERY 12 HOURS      cyclobenzaprine (FLEXERIL) 5 MG tablet Take 1 tablet by mouth 3 (Three) Times a Day As Needed for Muscle Spasms. 45 tablet 0    escitalopram (LEXAPRO) 10 MG  tablet Take 1 tablet by mouth Daily.      finasteride (PROSCAR) 5 MG tablet TK 1 T PO ONCE DAILY  2    fluticasone (FLONASE) 50 MCG/ACT nasal spray       Glucosamine-Chondroitin (OSTEO BI-FLEX REGULAR STRENGTH PO) Daily.      hydrALAZINE (APRESOLINE) 50 MG tablet TAKE 1 TABLET BY MOUTH THREE TIMES DAILY (Patient taking differently: Take 1.5 tablets by mouth 3 (Three) Times a Day.) 90 tablet 3    hydroCHLOROthiazide (HYDRODIURIL) 25 MG tablet Take 0.5 tablets by mouth Daily.      Hydrocortisone, Perianal, (Proctosol HC) 2.5 % rectal cream Insert  into the rectum 2 (Two) Times a Day. 28 g 5    lamoTRIgine (LaMICtal) 200 MG tablet Take 1 tablet by mouth Daily.      NIFEdipine XL (PROCARDIA XL) 30 MG 24 hr tablet Take 1 tablet by mouth 3 (Three) Times a Day. Dr Bell 270 tablet 3    Omega-3 Fatty Acids (fish oil) 1000 MG capsule capsule Take  by mouth Daily.      pantoprazole (PROTONIX) 40 MG EC tablet TAKE 1 TABLET BY MOUTH DAILY 90 tablet 3    rosuvastatin (CRESTOR) 10 MG tablet TAKE 1 TABLET BY MOUTH DAILY 90 tablet 1    tamsulosin (FLOMAX) 0.4 MG capsule 24 hr capsule Take 1 capsule by mouth Daily.      zolpidem (AMBIEN) 5 MG tablet TAKE 1 TABLET BY MOUTH AT NIGHT AS NEEDED FOR SLEEP 25 tablet 2    methylPREDNISolone (MEDROL) 4 MG dose pack Take as directed on package instructions. 21 tablet 0     No facility-administered medications prior to visit.     No opioid medication identified on active medication list. I have reviewed chart for other potential  high risk medication/s and harmful drug interactions in the elderly.      Aspirin is not on active medication list.  Aspirin use is not indicated based on review of current medical condition/s. Risk of harm outweighs potential benefits.  .    Patient Active Problem List   Diagnosis    Abnormal electrocardiogram    PAF (paroxysmal atrial fibrillation)    Hypertension    Premature atrial contraction    Hyperlipidemia    Bronchitis with bronchospasm    Contusion of  "bone    Sprain of left ankle    Coronary atherosclerosis    Patellofemoral chondrosis of left knee    Patellofemoral arthrosis    Patellofemoral chondrosis of right knee    Popping sound of knee joint    Edema    Obstructive sleep apnea syndrome    Renal insufficiency    Vitreous floaters    Ventricular premature beats    Prediabetes    Gastroesophageal reflux disease    Epigastric pain    Finnegan's esophagus without dysplasia    Stage 3 chronic kidney disease    Hypertensive chronic kidney disease    Malignant neoplasm of skin    Depressive disorder    Bilateral vitreous detachment    Anxiety    Enlarged prostate    Perennial allergic conjunctivitis of both eyes    After cataract    Ventricular premature beats    Personal history of colonic polyps    Encounter for screening for malignant neoplasm of colon     Advance Care Planning Advance Directive is on file.  ACP discussion was held with the patient during this visit. Patient has an advance directive in EMR which is still valid.             Objective   Vitals:    10/16/24 1213   BP: 132/66   BP Location: Left arm   Patient Position: Sitting   Cuff Size: Adult   Pulse: 55   SpO2: 94%   Weight: 91.2 kg (201 lb)   PainSc: 0-No pain       Estimated body mass index is 30.56 kg/m² as calculated from the following:    Height as of 8/21/24: 172.7 cm (68\").    Weight as of this encounter: 91.2 kg (201 lb).    BMI is >= 30 and <35. (Class 1 Obesity). The following options were offered after discussion;: exercise counseling/recommendations and nutrition counseling/recommendations       Does the patient have evidence of cognitive impairment? No  Lab Results   Component Value Date    TRIG 236 (H) 10/11/2024    HDL 32 (L) 10/11/2024    LDL 65 10/11/2024    VLDL 38 10/11/2024    HGBA1C 5.80 (H) 10/11/2024                                                                                                Health  Risk Assessment    Smoking Status:  Social History     Tobacco Use "   Smoking Status Never    Passive exposure: Never   Smokeless Tobacco Never     Alcohol Consumption:  Social History     Substance and Sexual Activity   Alcohol Use Yes    Alcohol/week: 2.0 standard drinks of alcohol    Types: 2 Drinks containing 0.5 oz of alcohol per week    Comment: Once Weekly       Fall Risk Screen  [facility] Fall Risk Assessment was completed, and patient is at LOW risk for falls.Assessment completed on:10/16/2024    Depression Screening:      10/16/2024    12:18 PM   PHQ-2/PHQ-9 Depression Screening   Little interest or pleasure in doing things Not at all   Feeling down, depressed, or hopeless Not at all     Health Habits and Functional and Cognitive Screening:      10/16/2024    12:16 PM   Functional & Cognitive Status   Do you have difficulty preparing food and eating? No   Do you have difficulty bathing yourself, getting dressed or grooming yourself? No   Do you have difficulty using the toilet? No   Do you have difficulty moving around from place to place? No   Do you have trouble with steps or getting out of a bed or a chair? No   Current Diet Frequent Junk Food   Dental Exam Up to date   Eye Exam Up to date   Exercise (times per week) 0 times per week   Current Exercises Include No Regular Exercise   Do you need help using the phone?  No   Are you deaf or do you have serious difficulty hearing?  No   Do you need help to go to places out of walking distance? No   Do you need help shopping? No   Do you need help preparing meals?  No   Do you need help with housework?  No   Do you need help with laundry? No   Do you need help taking your medications? No   Do you need help managing money? No   Do you ever drive or ride in a car without wearing a seat belt? No   Have you felt unusual stress, anger or loneliness in the last month? No   Who do you live with? Spouse   If you need help, do you have trouble finding someone available to you? No   Have you been bothered in the last four weeks by sexual  problems? No   Do you have difficulty concentrating, remembering or making decisions? No           Age-appropriate Screening Schedule:  Refer to the list below for future screening recommendations based on patient's age, sex and/or medical conditions. Orders for these recommended tests are listed in the plan section. The patient has been provided with a written plan.    Health Maintenance List  Health Maintenance   Topic Date Due    HEPATITIS C SCREENING  Never done    ZOSTER VACCINE (3 of 3) 09/24/2018    ANNUAL WELLNESS VISIT  09/06/2024    BMI FOLLOWUP  09/06/2024    COLORECTAL CANCER SCREENING  11/06/2024    GASTROSCOPY (EGD)  10/20/2024    LIPID PANEL  10/11/2025    TDAP/TD VACCINES (3 - Td or Tdap) 08/29/2031    COVID-19 Vaccine  Completed    INFLUENZA VACCINE  Completed    Pneumococcal Vaccine 65+  Completed                                                                                                                                                CMS Preventative Services Quick Reference  Risk Factors Identified During Encounter  None Identified    The above risks/problems have been discussed with the patient.  Pertinent information has been shared with the patient in the After Visit Summary.  An After Visit Summary and PPPS were made available to the patient.    Follow Up:   Next Medicare Wellness visit to be scheduled in 1 year.         Additional E&M Note during same encounter follows:  Patient has additional, significant, and separately identifiable condition(s)/problem(s) that require work above and beyond the Medicare Wellness Visit     Chief Complaint  Medicare Wellness-subsequent    Subjective   Is a delightful glenis who is managing [facility] but has had recent fatigue continued strong urine smell despite normal urinalysis and back soreness which may be related to some of his facet arthropathy but he also has flank pain in the right flank and marginal elevation of creatinine over the past couple  years.  We are going to hold hydrochlorothiazide recheck BMP in about 3 weeks he is already been seen by nephrology.  Will see if this makes a difference otherwise in reference to the enlarged prostate and symptoms of possible prostatitis with urgency will try months of Omnicef 300 twice daily and see if that will improve that situation in conjunction with monitoring the ultrasound of the kidneys.  New current treatment of blood pressure or heart rate etc.  Continue current rosuvastatin 10 mg which is working well.      Jose Maria is also being seen today for an annual adult preventative physical exam.  and Jose Maria is also being seen today for additional medical problem/s.    Review of Systems   All other systems reviewed and are negative.             Objective   Vital Signs:  /66 (BP Location: Left arm, Patient Position: Sitting, Cuff Size: Adult)   Pulse 55   Wt 91.2 kg (201 lb)   SpO2 94%   BMI 30.56 kg/m²   Physical Exam  Vitals reviewed.   Constitutional:       Appearance: He is well-developed.   HENT:      Head: Normocephalic and atraumatic.      Right Ear: Tympanic membrane and external ear normal.      Left Ear: Tympanic membrane and external ear normal.      Nose: Nose normal.   Eyes:      Conjunctiva/sclera: Conjunctivae normal.      Pupils: Pupils are equal, round, and reactive to light.   Neck:      Thyroid: No thyromegaly.      Vascular: No JVD.   Cardiovascular:      Rate and Rhythm: Normal rate and regular rhythm.      Heart sounds: Normal heart sounds.   Pulmonary:      Effort: Pulmonary effort is normal.      Breath sounds: Normal breath sounds.   Abdominal:      General: Bowel sounds are normal.      Palpations: Abdomen is soft.   Musculoskeletal:         General: Normal range of motion.      Cervical back: Normal range of motion and neck supple.      Comments: Right flank discomfort   Lymphadenopathy:      Cervical: No cervical adenopathy.   Skin:     General: Skin is warm and dry.      Findings:  No rash.   Neurological:      Mental Status: He is alert and oriented to person, place, and time.      Cranial Nerves: No cranial nerve deficit.      Coordination: Coordination normal.   Psychiatric:         Behavior: Behavior normal.         Thought Content: Thought content normal.         Judgment: Judgment normal.                 Assessment and Plan               Elevated serum creatinine  Hold hydrochlorothiazide and recheck nonfasting BMP in 3 weeks at [facility]  Chronic prostatitis  Symptoms appear to show chronic prostatitis with prostate inflammation recently seen by urology.  Will try trial of Omnicef/cefdinir 3 mg twice daily for a month and see if symptoms improve  Flank pain, chronic  Check ultrasound to kidneys.  Primary hypertension    Mixed hyperlipidemia     Stage 3a chronic kidney disease    Enlarged prostate    Depressive disorder    Medicare annual wellness visit, subsequent    Orders Placed This Encounter   Procedures    US Renal Bilateral     Standing Status:   Future     Standing Expiration Date:   10/16/2025     Scheduling Instructions:      Please get the [facility] if possible elevated creatinine flank pain     Order Specific Question:   Reason for Exam:     Answer:   Elevated creatinine flank pain     Order Specific Question:   Release to patient     Answer:   Routine Release [[id number]]    Basic Metabolic Panel     Standing Status:   Future     Standing Expiration Date:   10/16/2025     Order Specific Question:   Release to patient     Answer:   Routine Release [[id number]]     New Medications Ordered This Visit   Medications    cefdinir (OMNICEF) 300 MG capsule     Sig: Take 1 capsule by mouth 2 (Two) Times a Day.     Dispense:  60 capsule     Refill:  0          Follow Up   No follow-ups on file.  Patient was given instructions and counseling regarding his condition or for health maintenance advice. Please see specific information pulled into the AVS if appropriate.

## 2024-10-18 ENCOUNTER — OFFICE VISIT (OUTPATIENT)
Dept: SLEEP MEDICINE | Facility: HOSPITAL | Age: 74
End: 2024-10-18
Payer: MEDICARE

## 2024-10-18 VITALS
HEART RATE: 62 BPM | BODY MASS INDEX: 30.92 KG/M2 | SYSTOLIC BLOOD PRESSURE: 128 MMHG | WEIGHT: 204 LBS | HEIGHT: 68 IN | OXYGEN SATURATION: 95 % | DIASTOLIC BLOOD PRESSURE: 65 MMHG

## 2024-10-18 DIAGNOSIS — G47.33 OBSTRUCTIVE SLEEP APNEA, ADULT: Primary | ICD-10-CM

## 2024-10-18 DIAGNOSIS — E66.9 OBESITY (BMI 30-39.9): ICD-10-CM

## 2024-10-18 PROCEDURE — G0463 HOSPITAL OUTPT CLINIC VISIT: HCPCS

## 2024-10-18 NOTE — PROGRESS NOTES
[facility]  Telephone: [phone] / Fax: [phone] [place]  Telephone: [phone] / Fax: [phone] Glenys Crowder    PCP: Darryl King MD    Reason for visit: BRITTA f/u    Wilmar Carmona is a 73 y.o.male  was last seen at [facility] sleep lab in July 2024. Last visit we changed pressures on CPAP devices to 5-15cm H2O. He has 2 different machines. 1 ResMed and 1 Ozzie. ResMed machine based on today's download is set at a fixed pressure of 11cm H2O. Pressure change did not transfer to the device. Regardless, current pressures feel adequate,     He has been using the ResMed machine exclusively. It is less than 1 year old. He uses nasal pillow mask that fits well, no significant leaks or excessive dry mouth reported.  He denies interval health changes.    SH- no tobacco, no alcohol, no caffeine    ROS-negative.    DME [employer]    Current Medications:    Current Outpatient Medications:     acebutolol (SECTRAL) 200 MG capsule, TAKE 1 CAPSULE BY MOUTH TWICE DAILY (Patient taking differently: Take 1 capsule by mouth Daily.), Disp: 180 capsule, Rfl: 3    busPIRone (BUSPAR) 10 MG tablet, Take 1 tablet by mouth 2 (Two) Times a Day As Needed. for anxiety, Disp: , Rfl:     cefdinir (OMNICEF) 300 MG capsule, Take 1 capsule by mouth 2 (Two) Times a Day., Disp: 60 capsule, Rfl: 0    clotrimazole-betamethasone (LOTRISONE) 1-0.05 % cream, APPLY TOPICALLY TO THE AFFECTED AREA EVERY 12 HOURS, Disp: , Rfl:     cyclobenzaprine (FLEXERIL) 5 MG tablet, Take 1 tablet by mouth 3 (Three) Times a Day As Needed for Muscle Spasms., Disp: 45 tablet, Rfl: 0    escitalopram (LEXAPRO) 10 MG tablet, Take 1 tablet by mouth Daily., Disp: , Rfl:     finasteride (PROSCAR) 5 MG tablet, TK 1 T PO ONCE DAILY, Disp: , Rfl: 2    fluticasone (FLONASE) 50 MCG/ACT nasal spray, , Disp: , Rfl:     Glucosamine-Chondroitin (OSTEO BI-FLEX REGULAR STRENGTH PO), Daily., Disp: , Rfl:     hydrALAZINE (APRESOLINE) 50 MG tablet, TAKE 1 TABLET BY  MOUTH THREE TIMES DAILY (Patient taking differently: Take 1.5 tablets by mouth 3 (Three) Times a Day.), Disp: 90 tablet, Rfl: 3    hydroCHLOROthiazide (HYDRODIURIL) 25 MG tablet, Take 0.5 tablets by mouth Daily., Disp: , Rfl:     Hydrocortisone, Perianal, (Proctosol HC) 2.5 % rectal cream, Insert  into the rectum 2 (Two) Times a Day., Disp: 28 g, Rfl: 5    lamoTRIgine (LaMICtal) 200 MG tablet, Take 1 tablet by mouth Daily., Disp: , Rfl:     NIFEdipine XL (PROCARDIA XL) 30 MG 24 hr tablet, Take 1 tablet by mouth 3 (Three) Times a Day. Dr Bell, Disp: 270 tablet, Rfl: 3    Omega-3 Fatty Acids (fish oil) 1000 MG capsule capsule, Take  by mouth Daily., Disp: , Rfl:     pantoprazole (PROTONIX) 40 MG EC tablet, TAKE 1 TABLET BY MOUTH DAILY, Disp: 90 tablet, Rfl: 3    rosuvastatin (CRESTOR) 10 MG tablet, TAKE 1 TABLET BY MOUTH DAILY, Disp: 90 tablet, Rfl: 1    tamsulosin (FLOMAX) 0.4 MG capsule 24 hr capsule, Take 1 capsule by mouth Daily., Disp: , Rfl:     zolpidem (AMBIEN) 5 MG tablet, TAKE 1 TABLET BY MOUTH AT NIGHT AS NEEDED FOR SLEEP, Disp: 25 tablet, Rfl: 2   also entered in Sleep Questionnaire    Patient  has a past medical history of Abnormal EKG (01/31/2020), Abnormal stress test, Acne, Actinic keratosis, Allergic rhinitis, Anxiety (05/03/2022), Arthritis, Atrial premature depolarization (05/2018), Finnegan's esophagus without dysplasia (10/15/2021), BCC (basal cell carcinoma) (12/2011), Biceps tendinitis of left shoulder (01/2023), Bilateral vitreous detachment (07/20/2018), Blepharitis of both eyes, BPH (benign prostatic hypertrophy), Bronchitis with bronchospasm (11/18/2016), CAD (coronary artery disease), Candidal stomatitis (10/2018), Cataract, Chalazion left upper eyelid (03/2022), 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), 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), Closed fracture of multiple ribs of right side (05/2020), Colon polyps, Consolidation of left lower lobe of lung  "(05/05/2020), Coronary atherosclerosis (04/09/2018), Cyclothymic disorder, Depression, Diplopia, Enlarged prostate (02/22/2023), Ganglion cyst of left foot (09/2017), Gastric polyps, Geographical tongue, GERD (gastroesophageal reflux disease), Globus sensation, Heart murmur, Hiatal hernia, Hordeolum internum of left eye (03/2022), Hyperlipidemia, Hypersomnia, Hypertension, Hypertensive chronic kidney disease (12/15/2021), Hypouricemia (12/2021), IgG deficiency, Impingement syndrome of left shoulder (01/2023), Jet lag syndrome, Keratoconjunctivitis sicca of both eyes, Lesion of spleen (06/2020), Low serum alkaline phosphatase (01/2023), Meibomian gland dysfunction (MGD) of both eyes, Mild tricuspid regurgitation, Nodular prostate, Olecranon bursitis of right elbow (05/06/2021), Oropharyngeal dysphagia, BRITTA on CPAP, PAC (premature atrial contraction), PAF (paroxysmal atrial fibrillation), Palpitations, 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), 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), Pedal edema (05/2020), Perianal cyst (11/2022), Plantar fascial fibromatosis (10/2017), PLMD (periodic limb movement disorder), PNA (pneumonia) (04/08/2020), Posterior tibial tendinitis of left leg (02/2018), Prediabetes (08/07/2020), Ptosis of both eyelids (01/2018), Renal insufficiency (01/10/2018), Rotator cuff tendinitis, left (01/2023), SCC (squamous cell carcinoma) (09/2012), Seasonal allergies, Seborrheic keratosis, Snoring, Sprain of left ankle (05/03/2018), Stage 3 chronic kidney disease (12/15/2021), Thoracic aortic aneurysm without rupture (04/2018), Tremor, Trichiasis without entropion left lower eyelid, Urge incontinence (02/2020), Ventricular premature beats (04/17/2020), Vertical strabismus, right eye, Vitamin D deficiency, and Vitreous floaters (05/17/2019).    I have reviewed the Past Medical History, Past Surgical History, Social History and Family History.    Vital Signs /65   Pulse 62   Ht 172.7 cm (68\")   Wt 92.5 " kg (204 lb)   SpO2 95%   BMI 31.02 kg/m²  Body mass index is 31.02 kg/m².    General Alert and oriented. No acute distress noted   Pharynx/Throat Class II  Mallampati airway, large tongue, no evidence of redundant lateral pharyngeal tissue. No oral lesions. No thrush. Moist mucous membranes.   Head Normocephalic. Symmetrical. Atraumatic.    Nose No septal deviation. No drainage   Chest Wall Normal shape. Symmetric expansion with respiration. No tenderness.   Neck Trachea midline, no thyromegaly or adenopathy    Lungs Clear to auscultation bilaterally. No wheezes. No rhonchi. No rales. Respirations regular, even and unlabored.   Heart Regular rhythm and normal rate. Normal S1 and S2. No murmur   Abdomen Soft, non-tender and non-distended. Normal bowel sounds. No masses.   Extremities Moves all extremities well. No edema   Psychiatric Normal mood and affect.     Testing:  Download 7/20/24-10/17/24 92% use with average nightly use of 8 hours and 18 minutes on CPAP 11cm H2O, AHI 4.8/hr.    Study-7/12/24- Diagnostic data available to date is as below and was reviewed on current visit:  He was diagnosed with severe supine related BRITTA in March 2022 with supine AHI of 56/hr.     Impression:  1. Obstructive sleep apnea, adult    2. Obesity (BMI 30-39.9)          Plan:  I reviewed download data from the device. Keep pressure set at 11cm H2O, call if excessive or insufficient. Machine at this pressure setting is beneficial to control obstructive events. Pre treatment AHI was 56/hr. AHI on treatment is 4.8/hr.    Weight loss will be strongly beneficial to reduce BRITTA severity.    I renewed prescription for CPAP accessories.    RTC in 1 year.    Thank you for allowing me to participate in your patient's care.      GEOFFREY Buckley  [facility]  Phone: [phone]      Part of this note may be an electronic transcription/translation of spoken language to printed text using the Dragon Dictation System.

## 2024-10-25 RX ORDER — ROSUVASTATIN CALCIUM 10 MG/1
10 TABLET, COATED ORAL DAILY
Qty: 90 TABLET | Refills: 0 | Status: SHIPPED | OUTPATIENT
Start: 2024-10-25

## 2024-10-25 RX ORDER — NIFEDIPINE 30 MG/1
30 TABLET, EXTENDED RELEASE ORAL 3 TIMES DAILY
Qty: 270 TABLET | Refills: 2 | Status: SHIPPED | OUTPATIENT
Start: 2024-10-25

## 2024-10-29 ENCOUNTER — PATIENT MESSAGE (OUTPATIENT)
Dept: INTERNAL MEDICINE | Facility: CLINIC | Age: 74
End: 2024-10-29
Payer: MEDICARE

## 2024-10-29 ENCOUNTER — HOSPITAL ENCOUNTER (OUTPATIENT)
Dept: ULTRASOUND IMAGING | Facility: HOSPITAL | Age: 74
Discharge: HOME | End: 2024-10-29
Payer: MEDICARE

## 2024-10-29 ENCOUNTER — LAB (OUTPATIENT)
Dept: LAB | Facility: HOSPITAL | Age: 74
End: 2024-10-29
Payer: MEDICARE

## 2024-10-29 DIAGNOSIS — R10.9 FLANK PAIN, CHRONIC: ICD-10-CM

## 2024-10-29 DIAGNOSIS — R79.89 ELEVATED SERUM CREATININE: ICD-10-CM

## 2024-10-29 DIAGNOSIS — G89.29 FLANK PAIN, CHRONIC: ICD-10-CM

## 2024-10-29 LAB
ANION GAP SERPL CALCULATED.3IONS-SCNC: 9.3 MMOL/L (ref 5–15)
BUN SERPL-MCNC: 20 MG/DL (ref 8–23)
BUN/CREAT SERPL: 14.8 (ref 7–25)
CALCIUM SPEC-SCNC: 9.2 MG/DL (ref 8.6–10.5)
CHLORIDE SERPL-SCNC: 105 MMOL/L (ref 98–107)
CO2 SERPL-SCNC: 28.7 MMOL/L (ref 22–29)
CREAT SERPL-MCNC: 1.35 MG/DL (ref 0.76–1.27)
EGFRCR SERPLBLD CKD-EPI 2021: 55.1 ML/MIN/1.73
GLUCOSE SERPL-MCNC: 116 MG/DL (ref 65–99)
POTASSIUM SERPL-SCNC: 4 MMOL/L (ref 3.5–5.2)
SODIUM SERPL-SCNC: 143 MMOL/L (ref 136–145)

## 2024-10-29 PROCEDURE — 80048 BASIC METABOLIC PNL TOTAL CA: CPT

## 2024-10-29 PROCEDURE — 76775 US EXAM ABDO BACK WALL LIM: CPT

## 2024-10-29 PROCEDURE — 36415 COLL VENOUS BLD VENIPUNCTURE: CPT

## 2024-10-29 NOTE — PROGRESS NOTES
Creatinine show some improvement to 1.35.  BUN which is the other parameter for kidney function is normal at 20.  I do not have the results of the ultrasound back at this moment.

## 2024-11-11 DIAGNOSIS — N20.0 KIDNEY STONE: Primary | ICD-10-CM

## 2025-01-08 ENCOUNTER — ANESTHESIA (OUTPATIENT)
Dept: SURGERY | Facility: SURGERY CENTER | Age: 75
End: 2025-01-08
Payer: MEDICARE

## 2025-01-08 ENCOUNTER — HOSPITAL ENCOUNTER (OUTPATIENT)
Facility: SURGERY CENTER | Age: 75
Setting detail: HOSPITAL OUTPATIENT SURGERY
Discharge: HOME | End: 2025-01-08
Attending: INTERNAL MEDICINE | Admitting: INTERNAL MEDICINE
Payer: MEDICARE

## 2025-01-08 ENCOUNTER — ANESTHESIA EVENT (OUTPATIENT)
Dept: SURGERY | Facility: SURGERY CENTER | Age: 75
End: 2025-01-08
Payer: MEDICARE

## 2025-01-08 VITALS
HEIGHT: 68 IN | SYSTOLIC BLOOD PRESSURE: 128 MMHG | RESPIRATION RATE: 16 BRPM | BODY MASS INDEX: 30.83 KG/M2 | DIASTOLIC BLOOD PRESSURE: 69 MMHG | OXYGEN SATURATION: 96 % | TEMPERATURE: 98.4 F | HEART RATE: 65 BPM | WEIGHT: 203.4 LBS

## 2025-01-08 DIAGNOSIS — K22.70 BARRETT'S ESOPHAGUS WITHOUT DYSPLASIA: ICD-10-CM

## 2025-01-08 DIAGNOSIS — Z86.0100 PERSONAL HISTORY OF COLONIC POLYPS: ICD-10-CM

## 2025-01-08 DIAGNOSIS — Z86.0100 HISTORY OF COLONIC POLYPS: ICD-10-CM

## 2025-01-08 DIAGNOSIS — Z12.11 ENCOUNTER FOR SCREENING FOR MALIGNANT NEOPLASM OF COLON: ICD-10-CM

## 2025-01-08 PROCEDURE — 25010000002 PROPOFOL 200 MG/20ML EMULSION: Performed by: NURSE ANESTHETIST, CERTIFIED REGISTERED

## 2025-01-08 PROCEDURE — 45385 COLONOSCOPY W/LESION REMOVAL: CPT | Performed by: INTERNAL MEDICINE

## 2025-01-08 PROCEDURE — 43239 EGD BIOPSY SINGLE/MULTIPLE: CPT | Performed by: INTERNAL MEDICINE

## 2025-01-08 PROCEDURE — 25010000002 PROPOFOL 10 MG/ML EMULSION: Performed by: NURSE ANESTHETIST, CERTIFIED REGISTERED

## 2025-01-08 PROCEDURE — 88342 IMHCHEM/IMCYTCHM 1ST ANTB: CPT | Performed by: INTERNAL MEDICINE

## 2025-01-08 PROCEDURE — 88305 TISSUE EXAM BY PATHOLOGIST: CPT | Performed by: INTERNAL MEDICINE

## 2025-01-08 PROCEDURE — 25010000002 LIDOCAINE 1 % SOLUTION: Performed by: NURSE ANESTHETIST, CERTIFIED REGISTERED

## 2025-01-08 PROCEDURE — 25810000003 LACTATED RINGERS PER 1000 ML: Performed by: INTERNAL MEDICINE

## 2025-01-08 RX ORDER — SODIUM CHLORIDE, SODIUM LACTATE, POTASSIUM CHLORIDE, CALCIUM CHLORIDE 600; 310; 30; 20 MG/100ML; MG/100ML; MG/100ML; MG/100ML
30 INJECTION, SOLUTION INTRAVENOUS CONTINUOUS PRN
Status: DISCONTINUED | OUTPATIENT
Start: 2025-01-08 | End: 2025-01-08 | Stop reason: HOSPADM

## 2025-01-08 RX ORDER — SODIUM CHLORIDE 0.9 % (FLUSH) 0.9 %
10 SYRINGE (ML) INJECTION AS NEEDED
Status: DISCONTINUED | OUTPATIENT
Start: 2025-01-08 | End: 2025-01-08 | Stop reason: HOSPADM

## 2025-01-08 RX ORDER — SODIUM CHLORIDE 0.9 % (FLUSH) 0.9 %
3 SYRINGE (ML) INJECTION EVERY 12 HOURS SCHEDULED
Status: DISCONTINUED | OUTPATIENT
Start: 2025-01-08 | End: 2025-01-08 | Stop reason: HOSPADM

## 2025-01-08 RX ORDER — PROPOFOL 10 MG/ML
INJECTION, EMULSION INTRAVENOUS AS NEEDED
Status: DISCONTINUED | OUTPATIENT
Start: 2025-01-08 | End: 2025-01-08 | Stop reason: SURG

## 2025-01-08 RX ORDER — LIDOCAINE HYDROCHLORIDE 10 MG/ML
INJECTION, SOLUTION INFILTRATION; PERINEURAL AS NEEDED
Status: DISCONTINUED | OUTPATIENT
Start: 2025-01-08 | End: 2025-01-08 | Stop reason: SURG

## 2025-01-08 RX ADMIN — SODIUM CHLORIDE, POTASSIUM CHLORIDE, SODIUM LACTATE AND CALCIUM CHLORIDE 30 ML/HR: 600; 310; 30; 20 INJECTION, SOLUTION INTRAVENOUS at 08:15

## 2025-01-08 RX ADMIN — PROPOFOL 140 MCG/KG/MIN: 10 INJECTION, EMULSION INTRAVENOUS at 08:35

## 2025-01-08 RX ADMIN — PROPOFOL 80 MG: 10 INJECTION, EMULSION INTRAVENOUS at 08:35

## 2025-01-08 RX ADMIN — LIDOCAINE HYDROCHLORIDE 100 MG: 10 INJECTION, SOLUTION INFILTRATION; PERINEURAL at 08:35

## 2025-01-08 NOTE — H&P
No chief complaint on file.      HPI  Barretts  H/o polyps         Problem List:    Patient Active Problem List   Diagnosis    Abnormal electrocardiogram    PAF (paroxysmal atrial fibrillation)    Hypertension    Premature atrial contraction    Hyperlipidemia    Bronchitis with bronchospasm    Contusion of bone    Sprain of left ankle    Coronary atherosclerosis    Patellofemoral chondrosis of left knee    Patellofemoral arthrosis    Patellofemoral chondrosis of right knee    Popping sound of knee joint    Edema    Obstructive sleep apnea syndrome    Renal insufficiency    Vitreous floaters    Ventricular premature beats    Prediabetes    Gastroesophageal reflux disease    Epigastric pain    Finnegan's esophagus without dysplasia    Stage 3 chronic kidney disease    Hypertensive chronic kidney disease    Malignant neoplasm of skin    Depressive disorder    Bilateral vitreous detachment    Anxiety    Enlarged prostate    Perennial allergic conjunctivitis of both eyes    After cataract    Ventricular premature beats    Personal history of colonic polyps    Encounter for screening for malignant neoplasm of colon       Medical History:    Past Medical History:   Diagnosis Date    Abnormal EKG 01/31/2020    Abnormal stress test     Acne     Actinic keratosis     FOLLOWED BY DR. INGRID GAYLE    Allergic rhinitis     Anxiety 05/03/2022    Arthritis     Atrial premature depolarization 05/2018    ALSO VENTRICULAR PREMATURE DEPOLARIZATION    Finnegan's esophagus without dysplasia 10/15/2021    FOLLOWED BY DR. MATTIE COREAS    BCC (basal cell carcinoma) 12/2011    RIGHT LATERAL SUPERIOR NECK, S/P MOHS    Biceps tendinitis of left shoulder 01/2023    Bilateral vitreous detachment 07/20/2018    Blepharitis of both eyes     FOLLOWED BY DR. CHARU CHEEK    BPH (benign prostatic hypertrophy)     FOLLOWED BY DR. 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 MATTIE COREAS    Consolidation of left lower lobe of lung 05/05/2020    Coronary atherosclerosis 04/09/2018    Cyclothymic disorder     Depression     Diplopia     Enlarged prostate 02/22/2023    FOLLOWED BY DR. SUE THOMPSON    Ganglion cyst of left foot 09/2017    Gastric polyps     FOLLOWED BY DR. MATTIE COREAS    Geographical tongue     GERD (gastroesophageal reflux disease)     Globus sensation     Heart murmur     Hiatal hernia     Hordeolum internum of left eye 03/2022    Hyperlipidemia     MIXED HLD    Hypersomnia     Hypertension     Hypertensive chronic kidney disease 12/15/2021    Hypouricemia 12/2021    IgG deficiency     Impingement syndrome of left shoulder 01/2023    Jet lag syndrome     Keratoconjunctivitis sicca of both eyes     Lesion of spleen 06/2020    Low serum alkaline phosphatase 01/2023    FOLLOWED BY DR. DULCE SHEEHAN    Meibomian gland dysfunction (MGD) of both eyes     UPPER EYES BILATERAL    Mild tricuspid regurgitation     Nodular prostate     WITH ELEVATED PSA'S, FOLLOWED BY DR. SUE THOMPSON    Olecranon bursitis of right elbow 05/06/2021    SEEN AT     Oropharyngeal dysphagia     BRITTA on CPAP     FOLLOWED BY DR. SANDRA WILLIS    PAC (premature atrial contraction)     PAF (paroxysmal atrial fibrillation)     Palpitations     Patellofemoral arthrosis 08/13/2018    Right knee    Patellofemoral chondrosis 08/13/2018    BILATERAL KNEES    Pedal edema 05/2020    Perianal cyst 11/2022    Plantar fascial fibromatosis 10/2017    PLMD (periodic limb movement disorder)     PNA (pneumonia) 04/08/2020    LEFT LOWER LOBE    Posterior tibial tendinitis of left leg 02/2018     Prediabetes 08/07/2020    Ptosis of both eyelids 01/2018    S/P REPAIR    Renal insufficiency 01/10/2018    Rotator cuff tendinitis, left 01/2023    SCC (squamous cell carcinoma) 09/2012    RIGHT POSTERIOR UPPER ARM, S/P EXCISION    Seasonal allergies     Seborrheic keratosis     FOLLOWED BY DR. INGRID GAYLE    Snoring     Sprain of left ankle 05/03/2018    SEEN AT [facility]    Stage 3 chronic kidney disease 12/15/2021    FOLLOWED BY DR. DULCE SHEEHAN    Thoracic aortic aneurysm without rupture 04/2018    Tremor     Trichiasis without entropion left lower eyelid     Urge incontinence 02/2020    Ventricular premature beats 04/17/2020    Vertical strabismus, right eye     Vitamin D deficiency     Vitreous floaters 05/17/2019        Social History:    Social History     Socioeconomic History    Marital status:    Tobacco Use    Smoking status: Never     Passive exposure: Never    Smokeless tobacco: Never   Vaping Use    Vaping status: Never Used   Substance and Sexual Activity    Alcohol use: Yes     Alcohol/week: 2.0 standard drinks of alcohol     Types: 2 Drinks containing 0.5 oz of alcohol per week     Comment: Once Weekly    Drug use: Not Currently    Sexual activity: Not Currently     Partners: Female     Birth control/protection: Vasectomy     Comment: .       Family History:   Family History   Problem Relation Age of Onset    Heart disease Mother     Hypertension Mother     Colon polyps Mother     Depression Father     Prostate cancer Father     Cancer Father         prostate    Heart disease Father     Coronary artery disease Father     Stroke Father     Cancer Brother         prostate    Prostate cancer Brother     Kidney disease Daughter     Colon cancer Neg Hx     Crohn's disease Neg Hx     Irritable bowel syndrome Neg Hx     Ulcerative colitis Neg Hx        Surgical History:   Past Surgical History:   Procedure Laterality Date    CARDIAC CATHETERIZATION Left 10/09/2015    DR. SHARATH LUCAS  GAIL AT [facility]    COLONOSCOPY N/A 11/30/2004    d/t thickening of sigmoid found on ct scan, entire colon wnl, Dr.Marc Shermna at [facility]    ENDOSCOPY N/A 10/20/2021    Z LINE IRREGULAR AT 38 CM FROM INCISORS, 2 CM HIATAL HERNIA, DR. MATTIE COREAS AT [facility]    ENDOSCOPY N/A 11/13/2017    IRREGULAR Z LINE, GASTRITIS, PATH BENIGN, DR. MATTIE COREAS AT [facility]    ENDOSCOPY N/A 01/13/2017    GASTRITIS, BENIGN GASTRIC POLYP, (+) BARRETTS ESOPHAGUS, DR. MATTIE COREAS AT [facility]    ENDOSCOPY N/A 03/22/2012    PATH BENIGN, DR. CONSTANTINO SHERMAN AT [facility]    ENDOSCOPY AND COLONOSCOPY N/A 10/20/2014    BENIGN GASTRIC POLYP, COLON WNL, DR. CONSTANTINO SHERMAN T [facility]    ENDOSCOPY AND COLONOSCOPY N/A 11/06/2019    BENIGN GASTRIC POLYP, BARRETTS ESOPHAGUS, 6 MM TUBULAR ADENOMA POLYP IN SIGMOID, INTERNAL HEMORRHOIDS, RESCOPE IN 5 YRS, DR. MATTIE GRAY AT [facility]    ENDOSCOPY AND COLONOSCOPY N/A 06/11/2008    2 BENIGN GASTRIC POLYP, CHRONIC GASTRITIS, COLON WNL, DR. CONSTANTINO SHERMAN AT [facility]    EYE SURGERY Bilateral 12/16/2021    ECTROPION REPAIR BILATERAL, LOWER EYELID STENT, UPPER BLEPHAROPLASTY WITH BROW AND NASAL FAT PAD, DR. KEVIN CHEEK AT [facility]    EYE SURGERY Bilateral 06/06/2018    BILATERAL UPPER AND LOWER BLEPHAROPLASTY AND ENTROPION REPAIR LEFT LOWER EYELID, DR. KEVIN CHEEK AT [facility]    INGUINAL HERNIA REPAIR  1981    DR. SUNG    KNEE ARTHROSCOPY W/ MENISCAL REPAIR Left 2014    DR. PINON    MOHS SURGERY Right 12/13/2011    BCC OF RIGHT LATERAL NECK, DR. CHAU ZARATE    PROSTATE BIOPSY Bilateral 09/13/2004    BENIGN, DR. NA CANALES AT [facility]    PROSTATE BIOPSY Bilateral 10/22/2007    BENIGN, DR. NA CANALES AT [facility]    PROSTATE BIOPSY Bilateral 03/07/2011    BENIGN, DR. NA CANALES AT [facility]    SKIN BIOPSY Left 10/15/2017    LEFT MEDIAL SHIN, PATH: SEBORRHEIC KERATOSIS, DR. INGRID GAYLE    SKIN BIOPSY N/A 03/03/2017    NASAL TIP, PATH: BENIGN, DR. INGRID GAYLE    SKIN CANCER EXCISION Right 09/14/2012    SCC OF  RIGHT UPPER ARM, DR. INGRID GAYLE    TONSILLECTOMY Bilateral     VASECTOMY N/A        No current facility-administered medications for this encounter.    Current Outpatient Medications:     acebutolol (SECTRAL) 200 MG capsule, TAKE 1 CAPSULE BY MOUTH TWICE DAILY (Patient taking differently: Take 1 capsule by mouth Daily.), Disp: 180 capsule, Rfl: 3    clotrimazole-betamethasone (LOTRISONE) 1-0.05 % cream, APPLY TOPICALLY TO THE AFFECTED AREA EVERY 12 HOURS, Disp: , Rfl:     escitalopram (LEXAPRO) 10 MG tablet, Take 1 tablet by mouth Daily., Disp: , Rfl:     finasteride (PROSCAR) 5 MG tablet, TK 1 T PO ONCE DAILY, Disp: , Rfl: 2    Glucosamine-Chondroitin (OSTEO BI-FLEX REGULAR STRENGTH PO), Daily., Disp: , Rfl:     hydrALAZINE (APRESOLINE) 50 MG tablet, TAKE 1 TABLET BY MOUTH THREE TIMES DAILY (Patient taking differently: Take 1.5 tablets by mouth 3 (Three) Times a Day.), Disp: 90 tablet, Rfl: 3    hydroCHLOROthiazide (HYDRODIURIL) 25 MG tablet, Take 0.5 tablets by mouth Daily., Disp: , Rfl:     Hydrocortisone, Perianal, (Proctosol HC) 2.5 % rectal cream, Insert  into the rectum 2 (Two) Times a Day., Disp: 28 g, Rfl: 5    lamoTRIgine (LaMICtal) 200 MG tablet, Take 1 tablet by mouth Daily., Disp: , Rfl:     NIFEdipine XL (PROCARDIA XL) 30 MG 24 hr tablet, TAKE 1 TABLET BY MOUTH THREE TIMES DAILY, Disp: 270 tablet, Rfl: 2    Omega-3 Fatty Acids (fish oil) 1000 MG capsule capsule, Take  by mouth Daily., Disp: , Rfl:     pantoprazole (PROTONIX) 40 MG EC tablet, TAKE 1 TABLET BY MOUTH DAILY, Disp: 90 tablet, Rfl: 3    rosuvastatin (CRESTOR) 10 MG tablet, TAKE 1 TABLET BY MOUTH DAILY, Disp: 90 tablet, Rfl: 0    tamsulosin (FLOMAX) 0.4 MG capsule 24 hr capsule, Take 1 capsule by mouth Daily., Disp: , Rfl:     zolpidem (AMBIEN) 5 MG tablet, TAKE 1 TABLET BY MOUTH AT NIGHT AS NEEDED FOR SLEEP, Disp: 25 tablet, Rfl: 2    Allergies: No Known Allergies     The following portions of the patient's history were reviewed by  me and updated as appropriate: review of systems, allergies, current medications, past family history, past medical history, past social history, past surgical history and problem list.    There were no vitals filed for this visit.    PHYSICAL EXAM:    CONSTITUTIONAL:  today's vital signs reviewed by me  GASTROINTESTINAL: abdomen is soft nontender nondistended with normal active bowel sounds, no masses are appreciated    Assessment/ Plan  Barretts  H/o polyps    Egd and colonoscopy    Risks and benefits as well as alternatives to endoscopic evaluation were explained to the patient and they voiced understanding and wish to proceed.  These risks include but are not limited to the risk of bleeding, perforation, adverse reaction to sedation, and missed lesions.  The patient was given the opportunity to ask questions prior to the endoscopic procedure.

## 2025-01-08 NOTE — ANESTHESIA POSTPROCEDURE EVALUATION
Patient: Wilmar Carmona    Procedure Summary       Date: 01/08/25 Room / Location: [facility]  / [facility]    Anesthesia Start: 0831 Anesthesia Stop: 0908    Procedures:       ESOPHAGOGASTRODUODENOSCOPY WITH BX      COLONOSCOPY TO CECUM WITH POLYPECTOMY Diagnosis:       Finnegan's esophagus without dysplasia      Personal history of colonic polyps      Encounter for screening for malignant neoplasm of colon      (Finnegan's esophagus without dysplasia [K22.70])      (Personal history of colonic polyps [Z86.010])      (Encounter for screening for malignant neoplasm of colon [Z12.11])    Surgeons: Hemal Bravo MD Provider: Claudine Qiu MD    Anesthesia Type: MAC ASA Status: 3            Anesthesia Type: MAC    Vitals  Vitals Value Taken Time   /69 01/08/25 0921   Temp 36.9 °C (98.4 °F) 01/08/25 0906   Pulse 65 01/08/25 0921   Resp 16 01/08/25 0921   SpO2 96 % 01/08/25 0921           Post Anesthesia Care and Evaluation    Patient location during evaluation: bedside  Patient participation: complete - patient participated  Level of consciousness: awake and alert  Pain management: adequate    Airway patency: patent  Anesthetic complications: No anesthetic complications  PONV Status: controlled  Cardiovascular status: acceptable  Respiratory status: acceptable  Hydration status: acceptable

## 2025-01-08 NOTE — ANESTHESIA PREPROCEDURE EVALUATION
Anesthesia Evaluation     Patient summary reviewed and Nursing notes reviewed   NPO Solid Status: > 8 hours  NPO Liquid Status: > 2 hours           Airway   Mallampati: III  TM distance: >3 FB  Neck ROM: full  Dental    (+) poor dentition        Pulmonary - normal exam   (+) pneumonia ,sleep apnea on CPAP  Cardiovascular     ECG reviewed    (+) hypertension, valvular problems/murmurs murmur and TI, CAD, dysrhythmias Paroxysmal Atrial Fib, PAC, PVC, PVD (TAA), hyperlipidemia      Neuro/Psych  (+) tremors, psychiatric history Anxiety and Depression  GI/Hepatic/Renal/Endo    (+) hiatal hernia, GERD, renal disease (stage III)- CRI    Musculoskeletal     Abdominal    Substance History      OB/GYN          Other   arthritis,   history of cancer    ROS/Med Hx Other: ·  Left ventricular systolic function is normal. Calculated left ventricular EF = 63.3% Normal global longitudinal LV strain (GLS) = -24.4%. Left ventricle strain data was reviewed by the physician and found to be accurate. Normal left ventricular wall thickness noted. The left ventricular cavity is borderline dilated. All left ventricular wall segments contract normally. Left ventricular diastolic function is consistent with (grade I) impaired relaxation  ·  Trace mitral valve regurgitation is present.  ·  Trace tricuspid valve regurgitation is present. Insufficient TR velocity profile to estimate the right ventricular systolic pressure.  ·  There is an echolucent structure in the liver most consistent with hepatic cyst. Clinical correlation or alternative imaging recommended.                Anesthesia Plan    ASA 3     MAC     (I have reviewed the patient's history and chart with the patient, including all pertinent laboratory results and imaging. I have explained the risks of anesthesia including but not limited to dental damage, corneal abrasion, nerve injury, MI, stroke, aspiration, and death. Patient has agreed to proceed.      /76 (BP Location:  "Left arm, Patient Position: Lying)   Pulse 58   Temp 36.1 °C (97 °F) (Temporal)   Resp 16   Ht 172.7 cm (68\")   Wt 92.3 kg (203 lb 6.4 oz)   SpO2 96%   BMI 30.93 kg/m²   )  intravenous induction     Anesthetic plan, risks, benefits, and alternatives have been provided, discussed and informed consent has been obtained with: patient.    CODE STATUS:         "

## 2025-01-10 LAB
CYTO UR: NORMAL
LAB AP CASE REPORT: NORMAL
PATH REPORT.ADDENDUM SPEC: NORMAL
PATH REPORT.FINAL DX SPEC: NORMAL
PATH REPORT.GROSS SPEC: NORMAL

## 2025-01-26 DIAGNOSIS — G47.25 CIRCADIAN RHYTHM SLEEP DISORDER, JET LAG TYPE: ICD-10-CM

## 2025-01-26 DIAGNOSIS — E78.2 MIXED HYPERLIPIDEMIA: Primary | ICD-10-CM

## 2025-01-27 RX ORDER — ZOLPIDEM TARTRATE 5 MG/1
5 TABLET ORAL NIGHTLY PRN
Qty: 25 TABLET | Refills: 1 | Status: SHIPPED | OUTPATIENT
Start: 2025-01-27

## 2025-01-27 RX ORDER — ROSUVASTATIN CALCIUM 10 MG/1
10 TABLET, COATED ORAL DAILY
Qty: 90 TABLET | Refills: 1 | Status: SHIPPED | OUTPATIENT
Start: 2025-01-27

## 2025-01-27 NOTE — TELEPHONE ENCOUNTER
Contract on file    Rx Refill Note  Requested Prescriptions     Pending Prescriptions Disp Refills    zolpidem (AMBIEN) 5 MG tablet [Pharmacy Med Name: ZOLPIDEM 5MG TABLETS] 25 tablet 1     Sig: TAKE 1 TABLET BY MOUTH AT NIGHT AS NEEDED FOR SLEEP    rosuvastatin (CRESTOR) 10 MG tablet [Pharmacy Med Name: ROSUVASTATIN 10MG TABLETS] 90 tablet 0     Sig: TAKE 1 TABLET BY MOUTH DAILY      Last office visit with prescribing clinician: 10/16/2024   Last telemedicine visit with prescribing clinician: Visit date not found   Next office visit with prescribing clinician: 10/22/2025       Melodie Giron MA  01/27/25, 08:22 EST

## 2025-03-03 ENCOUNTER — TRANSCRIBE ORDERS (OUTPATIENT)
Dept: ADMINISTRATIVE | Facility: HOSPITAL | Age: 75
End: 2025-03-03
Payer: MEDICARE

## 2025-03-03 ENCOUNTER — LAB (OUTPATIENT)
Dept: LAB | Facility: HOSPITAL | Age: 75
End: 2025-03-03
Payer: MEDICARE

## 2025-03-03 DIAGNOSIS — I12.9 HYPERTENSIVE NEPHROPATHY: ICD-10-CM

## 2025-03-03 DIAGNOSIS — N18.30 STAGE 3 CHRONIC KIDNEY DISEASE, UNSPECIFIED WHETHER STAGE 3A OR 3B CKD: Primary | ICD-10-CM

## 2025-03-03 DIAGNOSIS — E78.5 HYPERLIPIDEMIA, UNSPECIFIED HYPERLIPIDEMIA TYPE: ICD-10-CM

## 2025-03-03 DIAGNOSIS — G47.33 OBSTRUCTIVE SLEEP APNEA: ICD-10-CM

## 2025-03-03 DIAGNOSIS — R73.03 PRE-DIABETES: ICD-10-CM

## 2025-03-03 DIAGNOSIS — N18.30 STAGE 3 CHRONIC KIDNEY DISEASE, UNSPECIFIED WHETHER STAGE 3A OR 3B CKD: ICD-10-CM

## 2025-03-03 LAB
ALBUMIN SERPL-MCNC: 4.1 G/DL (ref 3.5–5.2)
ANION GAP SERPL CALCULATED.3IONS-SCNC: 7.9 MMOL/L (ref 5–15)
BUN SERPL-MCNC: 16 MG/DL (ref 8–23)
BUN/CREAT SERPL: 10.8 (ref 7–25)
CALCIUM SPEC-SCNC: 9.4 MG/DL (ref 8.6–10.5)
CHLORIDE SERPL-SCNC: 106 MMOL/L (ref 98–107)
CO2 SERPL-SCNC: 29.1 MMOL/L (ref 22–29)
CREAT SERPL-MCNC: 1.48 MG/DL (ref 0.76–1.27)
CREAT UR-MCNC: 252.5 MG/DL
DEPRECATED RDW RBC AUTO: 45.3 FL (ref 37–54)
EGFRCR SERPLBLD CKD-EPI 2021: 49.3 ML/MIN/1.73
ERYTHROCYTE [DISTWIDTH] IN BLOOD BY AUTOMATED COUNT: 13.6 % (ref 12.3–15.4)
GLUCOSE SERPL-MCNC: 80 MG/DL (ref 65–99)
HCT VFR BLD AUTO: 41.7 % (ref 37.5–51)
HGB BLD-MCNC: 14.1 G/DL (ref 13–17.7)
MCH RBC QN AUTO: 30.6 PG (ref 26.6–33)
MCHC RBC AUTO-ENTMCNC: 33.8 G/DL (ref 31.5–35.7)
MCV RBC AUTO: 90.5 FL (ref 79–97)
PHOSPHATE SERPL-MCNC: 3 MG/DL (ref 2.5–4.5)
PLATELET # BLD AUTO: 227 10*3/MM3 (ref 140–450)
PMV BLD AUTO: 9.1 FL (ref 6–12)
POTASSIUM SERPL-SCNC: 4.1 MMOL/L (ref 3.5–5.2)
PROT ?TM UR-MCNC: 19.4 MG/DL
PROT/CREAT UR: 76.8 MG/G CREA (ref 0–200)
PTH-INTACT SERPL-MCNC: 22.5 PG/ML (ref 15–65)
RBC # BLD AUTO: 4.61 10*6/MM3 (ref 4.14–5.8)
SODIUM SERPL-SCNC: 143 MMOL/L (ref 136–145)
WBC NRBC COR # BLD AUTO: 6.98 10*3/MM3 (ref 3.4–10.8)

## 2025-03-03 PROCEDURE — 83970 ASSAY OF PARATHORMONE: CPT

## 2025-03-03 PROCEDURE — 36415 COLL VENOUS BLD VENIPUNCTURE: CPT

## 2025-03-03 PROCEDURE — 80069 RENAL FUNCTION PANEL: CPT

## 2025-03-03 PROCEDURE — 82570 ASSAY OF URINE CREATININE: CPT

## 2025-03-03 PROCEDURE — 85027 COMPLETE CBC AUTOMATED: CPT

## 2025-03-03 PROCEDURE — 84156 ASSAY OF PROTEIN URINE: CPT

## 2025-03-31 RX ORDER — PANTOPRAZOLE SODIUM 40 MG/1
40 TABLET, DELAYED RELEASE ORAL DAILY
Qty: 90 TABLET | Refills: 3 | Status: SHIPPED | OUTPATIENT
Start: 2025-03-31

## 2025-05-14 ENCOUNTER — HOSPITAL ENCOUNTER (EMERGENCY)
Facility: HOSPITAL | Age: 75
Discharge: HOME | End: 2025-05-14
Attending: EMERGENCY MEDICINE
Payer: MEDICARE

## 2025-05-14 VITALS
TEMPERATURE: 97.6 F | DIASTOLIC BLOOD PRESSURE: 88 MMHG | SYSTOLIC BLOOD PRESSURE: 192 MMHG | HEIGHT: 68 IN | WEIGHT: 200 LBS | OXYGEN SATURATION: 93 % | RESPIRATION RATE: 18 BRPM | HEART RATE: 63 BPM | BODY MASS INDEX: 30.31 KG/M2

## 2025-05-14 DIAGNOSIS — I10 ESSENTIAL HYPERTENSION: Primary | ICD-10-CM

## 2025-05-14 LAB
ANION GAP SERPL CALCULATED.3IONS-SCNC: 7.6 MMOL/L (ref 5–15)
BUN SERPL-MCNC: 20 MG/DL (ref 8–23)
BUN/CREAT SERPL: 15.3 (ref 7–25)
CALCIUM SPEC-SCNC: 10.1 MG/DL (ref 8.6–10.5)
CHLORIDE SERPL-SCNC: 107 MMOL/L (ref 98–107)
CO2 SERPL-SCNC: 28.4 MMOL/L (ref 22–29)
CREAT SERPL-MCNC: 1.31 MG/DL (ref 0.76–1.27)
EGFRCR SERPLBLD CKD-EPI 2021: 57.1 ML/MIN/1.73
GLUCOSE SERPL-MCNC: 110 MG/DL (ref 65–99)
POTASSIUM SERPL-SCNC: 3.8 MMOL/L (ref 3.5–5.2)
SODIUM SERPL-SCNC: 143 MMOL/L (ref 136–145)

## 2025-05-14 PROCEDURE — 80048 BASIC METABOLIC PNL TOTAL CA: CPT | Performed by: EMERGENCY MEDICINE

## 2025-05-14 PROCEDURE — 99283 EMERGENCY DEPT VISIT LOW MDM: CPT

## 2025-05-14 PROCEDURE — 93005 ELECTROCARDIOGRAM TRACING: CPT | Performed by: EMERGENCY MEDICINE

## 2025-05-14 PROCEDURE — 93010 ELECTROCARDIOGRAM REPORT: CPT | Performed by: INTERNAL MEDICINE

## 2025-05-14 PROCEDURE — 99284 EMERGENCY DEPT VISIT MOD MDM: CPT | Performed by: EMERGENCY MEDICINE

## 2025-05-15 LAB
QT INTERVAL: 468 MS
QTC INTERVAL: 436 MS

## 2025-05-15 NOTE — FSED PROVIDER NOTE
[facility]  eMERGENCY dEPARTMENT eNCOUnter      Pt Name: Wilmar Carmona  MRN: [id number]  Birthdate 1950  Date of evaluation: 5/14/2025  Provider: Martín Coker MD    CHIEF COMPLAINT       Chief Complaint   Patient presents with    Hypertension       Nurses Notes reviewed and I agree except as noted in the HPI.      HISTORY OF PRESENT ILLNESS       History provided by:  Patient   used: No          Wilmar Carmona is a 74 y.o. male who presents to the emergency department for elevated blood pressure.  He was not having any sxs at the time.  Pt tells me that he just randomly took his BP today.  He is on three meds for HTN.  Pt denies any other issues.  He has not seen his nephrologist, who manages his BP, in some time.          REVIEW OF SYSTEMS       Review of Systems   Constitutional:  Negative for fatigue.   HENT:  Negative for congestion.    Respiratory:  Negative for cough and shortness of breath.    Cardiovascular:  Negative for chest pain and palpitations.   Gastrointestinal:  Negative for abdominal pain, nausea and vomiting.         PAST MEDICAL HISTORY     Past Medical History:   Diagnosis Date    Abnormal EKG 01/31/2020    Abnormal stress test     Acne     Actinic keratosis     FOLLOWED BY DR. INGRID GAYLE    Allergic rhinitis     Anxiety 05/03/2022    Arthritis     Atrial premature depolarization 05/2018    ALSO VENTRICULAR PREMATURE DEPOLARIZATION    Finnegan's esophagus without dysplasia 10/15/2021    FOLLOWED BY DR. MATTIE COREAS    BCC (basal cell carcinoma) 12/2011    RIGHT LATERAL SUPERIOR NECK, S/P MOHS    Biceps tendinitis of left shoulder 01/2023    Bilateral vitreous detachment 07/20/2018    Blepharitis of both eyes     FOLLOWED BY DR. CHARU CHEEK    BPH (benign prostatic hypertrophy)     FOLLOWED BY DR. 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 MATTIE COREAS    Consolidation of left lower lobe of lung 05/05/2020    Coronary atherosclerosis 04/09/2018    Cyclothymic disorder     Depression     Diplopia     Enlarged prostate 02/22/2023    FOLLOWED BY DR. SUE THOMPSON    Ganglion cyst of left foot 09/2017    Gastric polyps     FOLLOWED BY DR. MATTIE COREAS    Geographical tongue     GERD (gastroesophageal reflux disease)     Globus sensation     Heart murmur     Hiatal hernia     Hordeolum internum of left eye 03/2022    Hyperlipidemia     MIXED HLD    Hypersomnia     Hypertension     Hypertensive chronic kidney disease 12/15/2021    Hypouricemia 12/2021    IgG deficiency     Impingement syndrome of left shoulder 01/2023    Jet lag syndrome     Keratoconjunctivitis sicca of both eyes     Lesion of spleen 06/2020    Low serum alkaline phosphatase 01/2023    FOLLOWED BY DR. DULCE SHEEHAN    Meibomian gland dysfunction (MGD) of both eyes     UPPER EYES BILATERAL    Mild tricuspid regurgitation     Nodular prostate     WITH ELEVATED PSA'S, FOLLOWED BY DR. SUE THOMPSON    Olecranon bursitis of right elbow 05/06/2021    SEEN AT     Oropharyngeal dysphagia     BRITTA on CPAP     FOLLOWED BY DR. SANDRA WILLIS    PAC (premature atrial contraction)     PAF (paroxysmal atrial fibrillation)     Palpitations     Patellofemoral arthrosis 08/13/2018    Right knee    Patellofemoral chondrosis 08/13/2018    BILATERAL KNEES    Pedal edema 05/2020    Perianal cyst 11/2022    Plantar fascial fibromatosis 10/2017    PLMD (periodic limb movement disorder)     PNA (pneumonia) 04/08/2020    LEFT LOWER LOBE    Posterior tibial tendinitis of left leg 02/2018    Prediabetes 08/07/2020     Ptosis of both eyelids 01/2018    S/P REPAIR    Renal insufficiency 01/10/2018    Rotator cuff tendinitis, left 01/2023    SCC (squamous cell carcinoma) 09/2012    RIGHT POSTERIOR UPPER ARM, S/P EXCISION    Seasonal allergies     Seborrheic keratosis     FOLLOWED BY DR. INGRID GAYLE    Snoring     Sprain of left ankle 05/03/2018    SEEN AT [facility] ER    Stage 3 chronic kidney disease 12/15/2021    FOLLOWED BY DR. DULCE SHEEHAN    Thoracic aortic aneurysm without rupture 04/2018    Tremor     Trichiasis without entropion left lower eyelid     Urge incontinence 02/2020    Ventricular premature beats 04/17/2020    Vertical strabismus, right eye     Vitamin D deficiency     Vitreous floaters 05/17/2019         SURGICAL HISTORY        has a past surgical history that includes endoscopy and colonoscopy (N/A, 10/20/2014); Inguinal hernia repair (1981); Tonsillectomy (Bilateral); Vasectomy (N/A); Knee arthroscopy w/ meniscal repair (Left, 2014); Cardiac catheterization (Left, 10/09/2015); Esophagogastroduodenoscopy (N/A, 10/20/2021); Eye surgery (Bilateral, 12/16/2021); endoscopy and colonoscopy (N/A, 11/06/2019); Esophagogastroduodenoscopy (N/A, 11/13/2017); Esophagogastroduodenoscopy (N/A, 01/13/2017); Eye surgery (Bilateral, 06/06/2018); Skin biopsy (Left, 10/15/2017); Skin biopsy (N/A, 03/03/2017); endoscopy and colonoscopy (N/A, 06/11/2008); Prostate biopsy (Bilateral, 09/13/2004); Prostate biopsy (Bilateral, 10/22/2007); Prostate biopsy (Bilateral, 03/07/2011); Esophagogastroduodenoscopy (N/A, 03/22/2012); Mohs surgery (Right, 12/13/2011); Skin cancer excision (Right, 09/14/2012); Colonoscopy (N/A, 11/30/2004); Esophagogastroduodenoscopy (N/A, 1/8/2025); and Colonoscopy w/ polypectomy (N/A, 1/8/2025).      CURRENT MEDICATIONS          Medication List        CHANGE how you take these medications      acebutolol 200 MG capsule  Commonly known as: SECTRAL  TAKE 1 CAPSULE BY MOUTH TWICE DAILY  What changed: when to  take this     hydrALAZINE 50 MG tablet  Commonly known as: APRESOLINE  TAKE 1 TABLET BY MOUTH THREE TIMES DAILY  What changed: how much to take            CONTINUE taking these medications      clotrimazole-betamethasone 1-0.05 % cream  Commonly known as: LOTRISONE     escitalopram 10 MG tablet  Commonly known as: LEXAPRO     finasteride 5 MG tablet  Commonly known as: PROSCAR     fish oil 1000 MG capsule capsule     hydroCHLOROthiazide 25 MG tablet     Hydrocortisone (Perianal) 2.5 % rectal cream  Commonly known as: Proctosol HC  Insert  into the rectum 2 (Two) Times a Day.     lamoTRIgine 200 MG tablet  Commonly known as: LaMICtal     NIFEdipine XL 30 MG 24 hr tablet  Commonly known as: PROCARDIA XL  TAKE 1 TABLET BY MOUTH THREE TIMES DAILY     OSTEO BI-FLEX REGULAR STRENGTH PO     pantoprazole 40 MG EC tablet  Commonly known as: PROTONIX  TAKE 1 TABLET BY MOUTH DAILY     rosuvastatin 10 MG tablet  Commonly known as: CRESTOR  TAKE 1 TABLET BY MOUTH DAILY     tamsulosin 0.4 MG capsule 24 hr capsule  Commonly known as: FLOMAX     zolpidem 5 MG tablet  Commonly known as: AMBIEN  TAKE 1 TABLET BY MOUTH AT NIGHT AS NEEDED FOR SLEEP                ALLERGIES       has no known allergies.      FAMILY HISTORY       He indicated that his mother is . He indicated that his father is alive. He indicated that his sister is alive. He indicated that all of his three brothers are alive. He indicated that his maternal grandmother is . He indicated that his maternal grandfather is . He indicated that his paternal grandmother is . He indicated that his paternal grandfather is . He indicated that his daughter is alive. He indicated that the status of his neg hx is unknown.   family history includes Cancer in his brother and father; Colon polyps in his mother; Coronary artery disease in his father; Depression in his father; Heart disease in his father and mother; Hypertension in his mother;  "Kidney disease in his daughter; Prostate cancer in his brother and father; Stroke in his father.    SOCIAL HISTORY        reports that he has never smoked. He has never been exposed to tobacco smoke. He has never used smokeless tobacco. He reports current alcohol use of about 2.0 standard drinks of alcohol per week. He reports that he does not currently use drugs.    PHYSICAL EXAM       INITIAL VITALS:  height is 172.7 cm (68\") and weight is 90.7 kg (200 lb). His temperature is 97.6 °F (36.4 °C). His blood pressure is 192/88 (abnormal) and his pulse is 63. His respiration is 18 and oxygen saturation is 93%.      Physical Exam  Vitals and nursing note reviewed.   Constitutional:       Appearance: Normal appearance. He is not ill-appearing.   HENT:      Head: Normocephalic and atraumatic.      Nose: Nose normal.      Mouth/Throat:      Mouth: Mucous membranes are moist.      Pharynx: Oropharynx is clear. No oropharyngeal exudate or posterior oropharyngeal erythema.   Eyes:      Extraocular Movements: Extraocular movements intact.      Pupils: Pupils are equal, round, and reactive to light.   Cardiovascular:      Rate and Rhythm: Normal rate and regular rhythm.      Heart sounds: No murmur heard.  Pulmonary:      Effort: Pulmonary effort is normal.      Breath sounds: Normal breath sounds. No wheezing, rhonchi or rales.   Abdominal:      General: Abdomen is flat.      Palpations: Abdomen is soft.      Tenderness: There is no abdominal tenderness. There is no guarding.   Musculoskeletal:         General: No swelling. Normal range of motion.      Cervical back: Normal range of motion and neck supple. No tenderness.   Skin:     General: Skin is warm and dry.      Capillary Refill: Capillary refill takes less than 2 seconds.   Neurological:      General: No focal deficit present.      Mental Status: He is alert and oriented to person, place, and time.      Cranial Nerves: No cranial nerve deficit.   Psychiatric:         " Mood and Affect: Mood normal.         Behavior: Behavior normal.         Thought Content: Thought content normal.           DIAGNOSTIC RESULTS     RADIOLOGY: non-plain film images(s) such as CT, Ultrasound and MRI are read by the radiologist.  Plain radiographic images are visualized and preliminarily interpreted by the emergency physician unless otherwise stated below.      No orders to display            LABS:   Lab Results (last 24 hours)       Procedure Component Value Units Date/Time    Basic Metabolic Panel [[id number]]  (Abnormal) Collected: 05/14/25 2125    Specimen: Blood Updated: 05/14/25 2146     Glucose 110 mg/dL      BUN 20 mg/dL      Creatinine 1.31 mg/dL      Sodium 143 mmol/L      Potassium 3.8 mmol/L      Chloride 107 mmol/L      CO2 28.4 mmol/L      Calcium 10.1 mg/dL      BUN/Creatinine Ratio 15.3     Anion Gap 7.6 mmol/L      eGFR 57.1 mL/min/1.73     Narrative:      GFR Categories in Chronic Kidney Disease (CKD)              GFR Category          GFR (mL/min/1.73)    Interpretation  G1                    90 or greater        Normal or high (1)  G2                    60-89                Mild decrease (1)  G3a                   45-59                Mild to moderate decrease  G3b                   30-44                Moderate to severe decrease  G4                    15-29                Severe decrease  G5                    14 or less           Kidney failure    (1)In the absence of evidence of kidney disease, neither GFR category G1 or G2 fulfill the criteria for CKD.    eGFR calculation 2021 CKD-EPI creatinine equation, which does not include race as a factor            EMERGENCY DEPARTMENT COURSE:   Vitals:    Vitals:    05/14/25 1954 05/14/25 1955 05/14/25 2025 05/14/25 2232   BP: (!) 204/86 (!) 196/86 (!) 188/83 (!) 192/88   Pulse: 89 65 53 63   Resp:       Temp:       SpO2:  98% 98% 93%   Weight:       Height:           Medical Decision Making  Patient presented to the ED complaining that  his blood pressure was elevated.  He's got a history of renal insufficiency.  Unsure as if this is the cause of the elevation his blood pressure.  I double checked his renal function it appears to be lower than the last time it was checked.  His blood pressure improved spontaneously.  EKG was unremarkable.  Had a long discussion with him about hypertension.  I recommended that he follow-up with his PCP and see about adjusting his blood pressure medications.  I also spoke to him about how to appropriately take his blood pressure.  After careful review of history, physical and supporting data, there is low suspicion for a life or limb threatening cause of patient's symptoms.  The patient's symptoms have improved from presentation and they appear clinically well.  Patient reexamined prior to discharge and they appear improved.  Patient has been encouraged to follow-up with his/her PCP and to return to the ED with any lack of improvement or worsening symptoms.  The patient's questions regarding the work-up and plan were invited and answered.  The patient states understanding and agreement with discharge planning.      Problems Addressed:  Essential hypertension: acute illness or injury    Amount and/or Complexity of Data Reviewed  ECG/medicine tests: ordered and independent interpretation performed.         The patient was given the following medications:  Medications - No data to display         CRITICAL CARE:  none    CONSULTS:  none    PROCEDURES:  ECG 12 Lead      Date/Time: 5/14/2025 9:00 PM    Performed by: Martín Coker MD  Authorized by: Martín Coker MD  Interpreted by ED physician  Comparison: compared with previous ECG from 8/21/2024  Comparison to previous ECG: No acute changes  Rhythm: sinus rhythm  Rate: normal  BPM: 52  QRS axis: normal  Conduction: conduction normal  ST Segments: ST segments normal  T Waves: T waves normal  Clinical impression: non-specific ECG      None    DIFFERENTIAL  DIAGNOSIS:     Differential diagnoses include, but not limited to RIAN, hypertension, ACS      FINAL IMPRESSION      1. Essential hypertension          DISPOSITION/PLAN     PATIENT REFERRED TO:  Darryl King MD  [address] [facility]  [address] [place] [address]  [phone]    Call in 3 days  As needed      DISCHARGE MEDICATIONS:     Medication List        CHANGE how you take these medications      acebutolol 200 MG capsule  Commonly known as: SECTRAL  TAKE 1 CAPSULE BY MOUTH TWICE DAILY  What changed: when to take this     hydrALAZINE 50 MG tablet  Commonly known as: APRESOLINE  TAKE 1 TABLET BY MOUTH THREE TIMES DAILY  What changed: how much to take            CONTINUE taking these medications      clotrimazole-betamethasone 1-0.05 % cream  Commonly known as: LOTRISONE     escitalopram 10 MG tablet  Commonly known as: LEXAPRO     finasteride 5 MG tablet  Commonly known as: PROSCAR     fish oil 1000 MG capsule capsule     hydroCHLOROthiazide 25 MG tablet     Hydrocortisone (Perianal) 2.5 % rectal cream  Commonly known as: Proctosol HC  Insert  into the rectum 2 (Two) Times a Day.     lamoTRIgine 200 MG tablet  Commonly known as: LaMICtal     NIFEdipine XL 30 MG 24 hr tablet  Commonly known as: PROCARDIA XL  TAKE 1 TABLET BY MOUTH THREE TIMES DAILY     OSTEO BI-FLEX REGULAR STRENGTH PO     pantoprazole 40 MG EC tablet  Commonly known as: PROTONIX  TAKE 1 TABLET BY MOUTH DAILY     rosuvastatin 10 MG tablet  Commonly known as: CRESTOR  TAKE 1 TABLET BY MOUTH DAILY     tamsulosin 0.4 MG capsule 24 hr capsule  Commonly known as: FLOMAX     zolpidem 5 MG tablet  Commonly known as: AMBIEN  TAKE 1 TABLET BY MOUTH AT NIGHT AS NEEDED FOR SLEEP              (Please note that portions of this note were completed with a voice recognition program.  Efforts were made to edit the dictations but occasionally words are mis-transcribed.)    Martín Coker MD (electronically signed) Emergency Physician

## 2025-05-19 ENCOUNTER — PATIENT OUTREACH (OUTPATIENT)
Dept: CASE MANAGEMENT | Facility: OTHER | Age: 75
End: 2025-05-19
Payer: MEDICARE

## 2025-05-19 DIAGNOSIS — I48.0 PAF (PAROXYSMAL ATRIAL FIBRILLATION): ICD-10-CM

## 2025-05-19 DIAGNOSIS — I10 PRIMARY HYPERTENSION: Primary | ICD-10-CM

## 2025-05-19 NOTE — OUTREACH NOTE
"AMBULATORY CASE MANAGEMENT NOTE    Names and Relationships of Patient/Support Persons: Contact: Wilmar Carmona \"Jose Maria\"; Relationship: Self -     Patient Outreach    Spoke to patient. Introduced self and explained role. He states he is doing well. Offer to make him a follow up appointment with PCP. [place] and will call the PCP office when he returns. Patient educated on signs and symptoms that would require immediate return to the emergency room, verbalizes understanding.       Phuong BOOGIE  Ambulatory Case Management    5/19/2025, 15:02 EDT  "

## 2025-06-18 ENCOUNTER — TELEPHONE (OUTPATIENT)
Dept: CASE MANAGEMENT | Facility: OTHER | Age: 75
End: 2025-06-18
Payer: MEDICARE

## 2025-06-18 ENCOUNTER — OFFICE VISIT (OUTPATIENT)
Dept: INTERNAL MEDICINE | Facility: CLINIC | Age: 75
End: 2025-06-18
Payer: MEDICARE

## 2025-06-18 VITALS
BODY MASS INDEX: 30.66 KG/M2 | OXYGEN SATURATION: 95 % | WEIGHT: 202.3 LBS | SYSTOLIC BLOOD PRESSURE: 120 MMHG | DIASTOLIC BLOOD PRESSURE: 70 MMHG | HEART RATE: 66 BPM | HEIGHT: 68 IN

## 2025-06-18 DIAGNOSIS — R05.1 ACUTE COUGH: Primary | ICD-10-CM

## 2025-06-18 PROCEDURE — 99213 OFFICE O/P EST LOW 20 MIN: CPT | Performed by: NURSE PRACTITIONER

## 2025-06-18 PROCEDURE — 3074F SYST BP LT 130 MM HG: CPT | Performed by: NURSE PRACTITIONER

## 2025-06-18 PROCEDURE — 3078F DIAST BP <80 MM HG: CPT | Performed by: NURSE PRACTITIONER

## 2025-06-18 PROCEDURE — 1126F AMNT PAIN NOTED NONE PRSNT: CPT | Performed by: NURSE PRACTITIONER

## 2025-06-18 RX ORDER — DEXTROMETHORPHAN HYDROBROMIDE AND PROMETHAZINE HYDROCHLORIDE 15; 6.25 MG/5ML; MG/5ML
5 SYRUP ORAL 4 TIMES DAILY PRN
Qty: 118 ML | Refills: 0 | Status: SHIPPED | OUTPATIENT
Start: 2025-06-18

## 2025-06-18 NOTE — TELEPHONE ENCOUNTER
30 day chart review performed. No hospitalizations or ED visits noted this past month. No changes or new needs identified. Patient goal achieved, will close program.

## 2025-06-18 NOTE — PROGRESS NOTES
"        Chief Complaint  Cough (Started a week ago. ) and Fatigue     Subjective:      History of Present Illness {CC  Problem List  Visit  Diagnosis   Encounters  Notes  Medications  Labs  Result Review Imaging  Media :23}     Wilmar Carmona is a patient of Darryl King MD and presents to [facility] for       Cough  Chronicity:  New  Onset:  In the past 7 days  Progression since onset:  Improving (states today is the best it has been and almost cancelled but wife advised him to come)  Cough characteristics:  Dry  Associated symptoms: rhinorrhea    Associated symptoms: no chills, no ear pain, no shortness of breath, no sore throat and no wheezing    Treatments tried:  Nothing     States started with sneeze.  In the evening cough gets worse.   States gerd has been controlled.       I have reviewed patient's medical history, any new submitted information provided by patient or medical assistant and updated medical record.      Objective:      Physical Exam  Constitutional:       Appearance: Normal appearance. He is not ill-appearing.   HENT:      Nose: Rhinorrhea present.      Mouth/Throat:      Mouth: Mucous membranes are moist.      Pharynx: No oropharyngeal exudate or posterior oropharyngeal erythema.   Eyes:      Conjunctiva/sclera: Conjunctivae normal.   Cardiovascular:      Rate and Rhythm: Normal rate.      Pulses: Normal pulses.   Pulmonary:      Effort: Pulmonary effort is normal.      Breath sounds: No wheezing or rhonchi.        Result Review  Data Reviewed:{ Labs  Result Review  Imaging  Med Tab  Media :23}                Vital Signs:   /70 (BP Location: Left arm, Patient Position: Sitting, Cuff Size: Adult)   Pulse 66   Ht 172.7 cm (68\")   Wt 91.8 kg (202 lb 4.8 oz)   SpO2 95%   BMI 30.76 kg/m²         Requested Prescriptions     Signed Prescriptions Disp Refills    promethazine-dextromethorphan (PROMETHAZINE-DM) 6.25-15 MG/5ML syrup 118 mL 0     " Sig: Take 5 mL by mouth 4 (Four) Times a Day As Needed for Cough.       Routine medications provided by this office will also be refilled via pharmacy request.       Current Outpatient Medications:     acebutolol (SECTRAL) 200 MG capsule, TAKE 1 CAPSULE BY MOUTH TWICE DAILY (Patient taking differently: Take 1 capsule by mouth Daily.), Disp: 180 capsule, Rfl: 3    escitalopram (LEXAPRO) 10 MG tablet, Take 1 tablet by mouth Daily., Disp: , Rfl:     finasteride (PROSCAR) 5 MG tablet, TK 1 T PO ONCE DAILY, Disp: , Rfl: 2    Glucosamine-Chondroitin (OSTEO BI-FLEX REGULAR STRENGTH PO), Daily., Disp: , Rfl:     hydrALAZINE (APRESOLINE) 50 MG tablet, TAKE 1 TABLET BY MOUTH THREE TIMES DAILY (Patient taking differently: Take 1.5 tablets by mouth 3 (Three) Times a Day.), Disp: 90 tablet, Rfl: 3    lamoTRIgine (LaMICtal) 200 MG tablet, Take 1 tablet by mouth Daily., Disp: , Rfl:     NIFEdipine XL (PROCARDIA XL) 30 MG 24 hr tablet, TAKE 1 TABLET BY MOUTH THREE TIMES DAILY, Disp: 270 tablet, Rfl: 2    Omega-3 Fatty Acids (fish oil) 1000 MG capsule capsule, Take  by mouth Daily., Disp: , Rfl:     pantoprazole (PROTONIX) 40 MG EC tablet, TAKE 1 TABLET BY MOUTH DAILY, Disp: 90 tablet, Rfl: 3    rosuvastatin (CRESTOR) 10 MG tablet, TAKE 1 TABLET BY MOUTH DAILY, Disp: 90 tablet, Rfl: 1    tamsulosin (FLOMAX) 0.4 MG capsule 24 hr capsule, Take 1 capsule by mouth Daily., Disp: , Rfl:     zolpidem (AMBIEN) 5 MG tablet, TAKE 1 TABLET BY MOUTH AT NIGHT AS NEEDED FOR SLEEP, Disp: 25 tablet, Rfl: 1    clotrimazole-betamethasone (LOTRISONE) 1-0.05 % cream, APPLY TOPICALLY TO THE AFFECTED AREA EVERY 12 HOURS (Patient not taking: Reported on 6/18/2025), Disp: , Rfl:     hydroCHLOROthiazide (HYDRODIURIL) 25 MG tablet, Take 0.5 tablets by mouth Daily. (Patient not taking: Reported on 6/18/2025), Disp: , Rfl:     Hydrocortisone, Perianal, (Proctosol HC) 2.5 % rectal cream, Insert  into the rectum 2 (Two) Times a Day. (Patient not taking: Reported  on 6/18/2025), Disp: 28 g, Rfl: 5    promethazine-dextromethorphan (PROMETHAZINE-DM) 6.25-15 MG/5ML syrup, Take 5 mL by mouth 4 (Four) Times a Day As Needed for Cough., Disp: 118 mL, Rfl: 0     Assessment and Plan:      Assessment and Plan {CC Problem List  Visit Diagnosis  ROS  Review (Popup)  Health Maintenance  Quality  BestPractice  Medications  SmartSets  SnapShot Encounters  Media :23}     Problem List Items Addressed This Visit    None  Visit Diagnoses         Acute cough    -  Primary    Relevant Medications    promethazine-dextromethorphan (PROMETHAZINE-DM) 6.25-15 MG/5ML syrup          ? Postviral - states improving but would like cough syrup for the evening when worse.   Declined tessalon Perles.     Follow Up {Instructions Charge Capture  Follow-up Communications :23}     Return if symptoms worsen or fail to improve.    Follow up with PCP as scheduled.     Patient was given instructions and counseling regarding his condition or for health maintenance advice. Please see specific information pulled into the AVS if appropriate.    Dragon disclaimer:   Much of this encounter note is an electronic transcription/translation of spoken language to printed text. The electronic translation of spoken language may permit erroneous, or at times, nonsensical words or phrases to be inadvertently transcribed; Although I have reviewed the note for such errors, some may still exist.     Additional Patient Counseling:       Patient Instructions   Honey and Lemon Tea for cough  1 Tbsp lemon juice   2 Tbsp honey   1/2 cup or more of hot water  Directions:   Put honey and lemon juice into a tea cup or mug. Add hot water and stir. Add more lemon juice, honey, or hot water to taste.  Sip on this and have two or three per day while cough is present.     Not for children less than 2 years of age.   Caution if diabetic.

## 2025-07-01 ENCOUNTER — TRANSCRIBE ORDERS (OUTPATIENT)
Dept: ADMINISTRATIVE | Facility: HOSPITAL | Age: 75
End: 2025-07-01
Payer: MEDICARE

## 2025-07-01 ENCOUNTER — LAB (OUTPATIENT)
Dept: LAB | Facility: HOSPITAL | Age: 75
End: 2025-07-01
Payer: MEDICARE

## 2025-07-01 DIAGNOSIS — I12.9 HYPERTENSIVE NEPHROPATHY: ICD-10-CM

## 2025-07-01 DIAGNOSIS — N18.30 STAGE 3 CHRONIC KIDNEY DISEASE, UNSPECIFIED WHETHER STAGE 3A OR 3B CKD: ICD-10-CM

## 2025-07-01 DIAGNOSIS — E78.5 HYPERLIPIDEMIA, UNSPECIFIED HYPERLIPIDEMIA TYPE: ICD-10-CM

## 2025-07-01 DIAGNOSIS — G47.33 OBSTRUCTIVE SLEEP APNEA (ADULT) (PEDIATRIC): ICD-10-CM

## 2025-07-01 DIAGNOSIS — N18.30 STAGE 3 CHRONIC KIDNEY DISEASE, UNSPECIFIED WHETHER STAGE 3A OR 3B CKD: Primary | ICD-10-CM

## 2025-07-01 DIAGNOSIS — R73.03 DIABETES MELLITUS, LATENT: ICD-10-CM

## 2025-07-01 DIAGNOSIS — K21.9 CHALASIA OF LOWER ESOPHAGEAL SPHINCTER: ICD-10-CM

## 2025-07-01 LAB
ALBUMIN SERPL-MCNC: 4.2 G/DL (ref 3.5–5.2)
ANION GAP SERPL CALCULATED.3IONS-SCNC: 8.1 MMOL/L (ref 5–15)
BUN SERPL-MCNC: 17.4 MG/DL (ref 8–23)
BUN/CREAT SERPL: 13.1 (ref 7–25)
CALCIUM SPEC-SCNC: 9.5 MG/DL (ref 8.6–10.5)
CHLORIDE SERPL-SCNC: 107 MMOL/L (ref 98–107)
CO2 SERPL-SCNC: 28.9 MMOL/L (ref 22–29)
CREAT SERPL-MCNC: 1.33 MG/DL (ref 0.76–1.27)
CREAT UR-MCNC: 250 MG/DL
DEPRECATED RDW RBC AUTO: 45.1 FL (ref 37–54)
EGFRCR SERPLBLD CKD-EPI 2021: 56.1 ML/MIN/1.73
ERYTHROCYTE [DISTWIDTH] IN BLOOD BY AUTOMATED COUNT: 13.1 % (ref 12.3–15.4)
GLUCOSE SERPL-MCNC: 112 MG/DL (ref 65–99)
HCT VFR BLD AUTO: 44.5 % (ref 37.5–51)
HGB BLD-MCNC: 14.7 G/DL (ref 13–17.7)
MCH RBC QN AUTO: 30.8 PG (ref 26.6–33)
MCHC RBC AUTO-ENTMCNC: 33 G/DL (ref 31.5–35.7)
MCV RBC AUTO: 93.1 FL (ref 79–97)
PHOSPHATE SERPL-MCNC: 3 MG/DL (ref 2.5–4.5)
PLATELET # BLD AUTO: 212 10*3/MM3 (ref 140–450)
PMV BLD AUTO: 9.2 FL (ref 6–12)
POTASSIUM SERPL-SCNC: 4.7 MMOL/L (ref 3.5–5.2)
PROT ?TM UR-MCNC: 22.8 MG/DL
PROT/CREAT UR: 91.2 MG/G CREA (ref 0–200)
PTH-INTACT SERPL-MCNC: 21.8 PG/ML (ref 15–65)
RBC # BLD AUTO: 4.78 10*6/MM3 (ref 4.14–5.8)
SODIUM SERPL-SCNC: 144 MMOL/L (ref 136–145)
WBC NRBC COR # BLD AUTO: 6.5 10*3/MM3 (ref 3.4–10.8)

## 2025-07-01 PROCEDURE — 84156 ASSAY OF PROTEIN URINE: CPT

## 2025-07-01 PROCEDURE — 83970 ASSAY OF PARATHORMONE: CPT

## 2025-07-01 PROCEDURE — 80069 RENAL FUNCTION PANEL: CPT

## 2025-07-01 PROCEDURE — 85027 COMPLETE CBC AUTOMATED: CPT

## 2025-07-01 PROCEDURE — 36415 COLL VENOUS BLD VENIPUNCTURE: CPT

## 2025-07-01 PROCEDURE — 82570 ASSAY OF URINE CREATININE: CPT

## 2025-07-28 NOTE — PROGRESS NOTES
Date of Office Visit: 2019  Encounter Provider: Elizabeth Trevino, SANTO, APRN  Place of Service: [facility]  Patient Name: Wilmar Carmona  :1950        Subjective:     Chief Complaint:  Follow-up, paroxysmal atrial fibrillation, dilated aortic root, left ventricular hypertrophy.      History of Present Illness:  Wilmar Carmona is a 68 y.o. male patient of Dr. Durbin.  This is my first time seeing this patient in the office today, and I reviewed his records.    Patient has a history of hypertension, hyperlipidemia, obstructive sleep apnea, paroxysmal atrial fibrillation, dilated aortic root, left ventricular hypertrophy.    Patient has a history of atrial fibrillation for many years and was previously followed by Dr. Bailey.  He has been maintained on Rythmol therapy and Sectral.  He had an abnormal stress test in  leading to a cardiac catheterization that showed minimal vessel disease up to 10%.  However CT scan 2016 showed calcification of all 3 major vessels.  Follow-up echocardiogram 2018 showed normal left ventricular size and systolic function without evidence of aneurysmal disease of the aortic root.  Abnormal structure noted in the liver, possibly a cyst.  Patient had 2 weeks Zio patch in 2018 that showed sinus rhythm with occasional PVCs that were symptomatic.  No atrial arrhythmia was noted.    Patient was last seen in office by Dr. Durbin 10/12/18.  At that point he had been on a low-salt diet.  Blood pressures are staying less than 130 systolic overall.  He had palpitations once or twice a week lasting a few seconds.  He remained active walking 5-7000 steps a day.  Was not using CPAP for the previous 2 weeks thinking was aggravating thrush.  Denied shortness of breath, palpitation, syncope, near syncope.      Patient presents to office today for follow-up appointment.  Patient reports he has been feeling fine since last visit.  No changes or  {Hypertension is (optional):[id number]}          new or worsening symptoms.  He will get some occasional palpitations, may be 3 skipping heartbeat sensations in a row every 3 weeks or so.  Usually at rest when tired in the evening.  Lasts a couple of seconds and resolves.  No associated symptoms.  Not new or worsening.  Denies any chest pain, shortness of breath, shortness of breath with exertion, lower extremity edema, dizziness, syncope, near syncope, falls, or abnormal bleeding.  He reports that he thinks that he no longer has sleep apnea since losing weight.  He stopped using his CPAP machine on his own without discussing with sleep medicine.  Discussed importance of following back up with sleep medicine to confirm that he no longer needs CPAP.  Patient to call and schedule appointment with them.  He is working 30 hours a week and walking 6000 8000 steps a day on average, sometimes more.  He aims for about 10,000 steps a day.  Blood pressure in the office today is pretty well controlled, however patient reports that diastolic at home is usually lower in the 70s.  He has not been checking recently.  Asked him to start to monitor again and call if staying greater than 130/80.  At this point we will get him scheduled for routine follow-up stress test due to history of coronary artery disease.  He is wanting to do a nuclear stress test over a treadmill stress test if possible.  We will see if insurance will cover this and if not we will proceed with a treadmill stress test.  He does have history of paroxysmal atrial fibrillation, however none seen on last Zio patch monitor study.  Recommended continued healthy lifestyle, exercise, healthy diet, and following up with primary care to ensure that cholesterol remains well controlled.    Patient to follow-up with Dr. Durbin in 6 months or sooner if needed for any new, recurrent, or worsening symptoms or other problems/concerns.          Past Medical History:   Diagnosis Date   • Abnormal EKG    • Abnormal stress test    •  Arthritis    • Atrial fibrillation (CMS/HCC)    • BPH (benign prostatic hypertrophy)    • CAD (coronary artery disease)    • Depression    • GERD (gastroesophageal reflux disease)    • H/O medial meniscus repair of right knee 2014    Partial Menisectomy right knee   • Heart murmur    • Hyperlipidemia    • Hypertension    • Intermittent knee pain     R<L knee from cycling and running in the past -    • Mild tricuspid regurgitation    • PAC (premature atrial contraction)    • PAF (paroxysmal atrial fibrillation) (CMS/HCC)    • Palpitations    • Sleep apnea    • Tremor      Past Surgical History:   Procedure Laterality Date   • CARDIAC CATHETERIZATION  2015   • COLONOSCOPY  10/20/2014    normal -dr leon..had egd at same time   • HERNIA REPAIR     • KNEE SURGERY Left    • TONSILLECTOMY     • VASECTOMY       Outpatient Medications Prior to Visit   Medication Sig Dispense Refill   • acebutolol (SECTRAL) 200 MG capsule Take 1 capsule by mouth 2 (Two) Times a Day. 180 capsule 1   • clotrimazole-betamethasone (LOTRISONE) 1-0.05 % cream TEJAS EXT AA BID - prn  3   • finasteride (PROSCAR) 5 MG tablet TK 1 T PO ONCE DAILY  2   • Fish Oil-Cholecalciferol (FISH OIL + D3) 8215-8003 MG-UNIT capsule Take 1 capsule by mouth As Needed.     • lamoTRIgine (LaMICtal) 200 MG tablet Take 250 mg by mouth Daily.     • nefazodone (SERZONE) 50 MG tablet Take  by mouth 3 (Three) Times a Day.     • NIFEdipine XL (PROCARDIA XL) 60 MG 24 hr tablet Take 1 tablet by mouth Daily. 90 tablet 2   • pantoprazole (PROTONIX) 40 MG EC tablet Take 40 mg by mouth Daily.  0   • propafenone (RYTHMOL) 150 MG tablet TAKE 1 TABLET BY MOUTH EVERY 8 HOURS 270 tablet 1   • simvastatin (ZOCOR) 20 MG tablet TAKE 1 TABLET BY MOUTH EVERY NIGHT 90 tablet 1   • tobramycin-dexamethasone (TOBRADEX) 0.3-0.1 % ophthalmic suspension As directed  0   • famotidine (PEPCID) 40 MG tablet Take 1 tablet by mouth Daily. 30 tablet 2   • fluconazole (DIFLUCAN) 200 MG tablet Take 1  tablet by mouth Daily. 15 tablet 1   • zolpidem (AMBIEN) 5 MG tablet Take 1 tablet by mouth At Night As Needed for Sleep. 30 tablet 0     No facility-administered medications prior to visit.        Allergies as of 04/19/2019   • (No Known Allergies)     Social History     Socioeconomic History   • Marital status:      Spouse name: Not on file   • Number of children: Not on file   • Years of education: Not on file   • Highest education level: Not on file   Tobacco Use   • Smoking status: Never Smoker   • Smokeless tobacco: Never Used   Substance and Sexual Activity   • Alcohol use: No   • Drug use: Defer   • Sexual activity: Defer     Family History   Problem Relation Age of Onset   • Hypertension Mother    • Cancer Father         prostate   • Heart disease Father    • Coronary artery disease Father    • Stroke Father    • Cancer Brother         prostate       Review of Systems   Constitution: Negative for chills, fever, malaise/fatigue, weight gain and weight loss.   HENT: Negative for ear pain, hearing loss, nosebleeds and sore throat.    Eyes: Negative for blurred vision, double vision, redness, vision loss in left eye, vision loss in right eye and visual disturbance.   Cardiovascular:        SEE HPI.    Respiratory: Negative for cough, shortness of breath, snoring and wheezing.    Endocrine: Negative for cold intolerance and heat intolerance.   Skin: Negative for itching, rash and suspicious lesions.   Musculoskeletal: Negative for joint pain, joint swelling and myalgias.   Gastrointestinal: Negative for abdominal pain, diarrhea, hematemesis, melena, nausea and vomiting.   Genitourinary: Negative for dysuria, frequency and hematuria.   Neurological: Negative for dizziness, headaches, numbness, paresthesias and seizures.   Psychiatric/Behavioral: Negative for altered mental status and depression. The patient is not nervous/anxious.           Objective:     Vitals:    04/19/19 0912   BP: 120/84   BP  "Location: Left arm   Pulse: 59   Weight: 81.2 kg (179 lb)   Height: 172.7 cm (68\")     Body mass index is 27.22 kg/m².      PHYSICAL EXAM:  Physical Exam   Constitutional: He is oriented to person, place, and time. He appears well-developed and well-nourished. No distress.   HENT:   Head: Normocephalic and atraumatic.   Eyes: Pupils are equal, round, and reactive to light.   Neck: Neck supple. No JVD present. Carotid bruit is not present. No tracheal deviation present.   Cardiovascular: Normal rate, regular rhythm, normal heart sounds and intact distal pulses. Exam reveals no gallop and no friction rub.   No murmur heard.  Pulses:       Radial pulses are 2+ on the right side, and 2+ on the left side.        Posterior tibial pulses are 2+ on the right side, and 2+ on the left side.   Pulmonary/Chest: Effort normal and breath sounds normal. No respiratory distress. He has no wheezes. He has no rales.   Abdominal: Soft. Bowel sounds are normal. He exhibits no distension. There is no tenderness.   Musculoskeletal: He exhibits no edema, tenderness or deformity.   Neurological: He is alert and oriented to person, place, and time.   Skin: Skin is warm and dry. No rash noted. He is not diaphoretic. No erythema.   Psychiatric: He has a normal mood and affect. His behavior is normal. Judgment normal.           ECG 12 Lead  Date/Time: 4/19/2019 9:24 AM  Performed by: Elizabeth Trevino DNP, GEOFFREY  Authorized by: Elizabeth Trevino DNP, GEOFFREY   Comparison: compared with previous ECG from 10/12/2018  Similar to previous ECG  Rate: bradycardic  BPM: 59  Conduction: non-specific intraventricular conduction delay  Comments: No significant changes from previous ECG.            Assessment:       Diagnosis Plan   1. Coronary artery disease involving native coronary artery of native heart without angina pectoris  Stress Test With Myocardial Perfusion   2. PAF (paroxysmal atrial fibrillation) (CMS/HCC)     3. Essential hypertension     4. " Mixed hyperlipidemia           Plan:     1. Paroxysmal atrial fibrillation: No recurrence seen on last Zio patch monitor study.  Symptomatic PVCs were seen on monitor study.  2. PVCs: Patient has occasional palpitations, not new or worsening.  May be 3 skipping heartbeat sensations in a row once every 3 weeks or so at rest when tired.  Last a few seconds and then resolves.  No associated symptoms.  Sounds most consistent with PVCs.  3. No evidence of dilated aortic root by echo or CT in April 2018.  4. Dilated right ventricle: Most likely from sleep apnea.   5. Obstructive sleep apnea: Patient was on CPAP therapy but lost weight and reported that he no longer had sleep apnea symptoms and the machine started waking him up at night rather than helping him sleep.  He stopped using due to thinking that his sleep apnea had resolved.  He did not follow-up with sleep medicine first.  He still does have a sleep medicine doctor.  Advised him to call and schedule soonest available follow-up with them and bring in his card so they can see whether he really was having sleep apnea or not prior to stopping using the CPAP.  Highly recommended follow-up.  6. Hypertension: Blood pressure pretty well controlled in the office today, however patient reports that systolic is usually lower at home in the 70s.  He has not been checking recently.  We will continue to check and call if staying greater than 130/80.  7. Mild coronary artery disease: Clinically stable.  Patient due for routine follow-up stress test.  Patient requesting a nuclear stress test if possible over a treadmill stress test.  Does have history of paroxysmal atrial fibrillation, though no known recent episodes and no recurrence seen on last Zio patch.  Will try for nuclear stress test and insurance will not cover we will go to treadmill stress test.  He denies angina and remains asymptomatic.  8. Dyslipidemia: Managed by outside provider.    Patient to follow-up with   Durbin in 6 months or sooner if needed for any new, recurrent, or worsening symptoms or other problems/concerns.           Your medication list           Accurate as of 4/19/19  9:52 AM. If you have any questions, ask your nurse or doctor.               CONTINUE taking these medications      Instructions Last Dose Given Next Dose Due   acebutolol 200 MG capsule  Commonly known as:  SECTRAL      Take 1 capsule by mouth 2 (Two) Times a Day.       clotrimazole-betamethasone 1-0.05 % cream  Commonly known as:  LOTRISONE      TEJAS EXT AA BID - prn       finasteride 5 MG tablet  Commonly known as:  PROSCAR      TK 1 T PO ONCE DAILY       FISH OIL + D3 6483-1976 MG-UNIT capsule      Take 1 capsule by mouth As Needed.       lamoTRIgine 200 MG tablet  Commonly known as:  LaMICtal      Take 250 mg by mouth Daily.       nefazodone 50 MG tablet  Commonly known as:  SERZONE      Take  by mouth 3 (Three) Times a Day.       NIFEdipine XL 60 MG 24 hr tablet  Commonly known as:  PROCARDIA XL      Take 1 tablet by mouth Daily.       pantoprazole 40 MG EC tablet  Commonly known as:  PROTONIX      Take 40 mg by mouth Daily.       propafenone 150 MG tablet  Commonly known as:  RYTHMOL      TAKE 1 TABLET BY MOUTH EVERY 8 HOURS       simvastatin 20 MG tablet  Commonly known as:  ZOCOR      TAKE 1 TABLET BY MOUTH EVERY NIGHT       tobramycin-dexamethasone 0.3-0.1 % ophthalmic suspension  Commonly known as:  TOBRADEX      As directed          STOP taking these medications    famotidine 40 MG tablet  Commonly known as:  PEPCID  Stopped by:  Elizabeth Trevino DNP, APRN        fluconazole 200 MG tablet  Commonly known as:  DIFLUCAN  Stopped by:  Elizabeth Trevino DNP, APRN        zolpidem 5 MG tablet  Commonly known as:  AMBIEN  Stopped by:  Elizabeth Trevino DNP, APRN               I did not stop or change the above medications.  Patient's medication list was updated to reflect medications they are currently taking including medication changes  made by other providers.          Thanks,    Elizabeth Trevino, SANTO, APRN  04/19/2019             Dictated utilizing Dragon dictation

## 2025-08-05 ENCOUNTER — OFFICE VISIT (OUTPATIENT)
Dept: CARDIOLOGY | Facility: CLINIC | Age: 75
End: 2025-08-05
Payer: MEDICARE

## 2025-08-05 VITALS
RESPIRATION RATE: 16 BRPM | SYSTOLIC BLOOD PRESSURE: 128 MMHG | BODY MASS INDEX: 30.92 KG/M2 | HEIGHT: 68 IN | HEART RATE: 56 BPM | WEIGHT: 204 LBS | OXYGEN SATURATION: 97 % | DIASTOLIC BLOOD PRESSURE: 74 MMHG

## 2025-08-05 DIAGNOSIS — I49.1 PREMATURE ATRIAL CONTRACTION: ICD-10-CM

## 2025-08-05 DIAGNOSIS — I49.3 PVC (PREMATURE VENTRICULAR CONTRACTION): Primary | ICD-10-CM

## 2025-08-05 DIAGNOSIS — I10 PRIMARY HYPERTENSION: ICD-10-CM

## 2025-08-05 DIAGNOSIS — E78.2 MIXED HYPERLIPIDEMIA: ICD-10-CM

## 2025-08-05 DIAGNOSIS — G47.33 OBSTRUCTIVE SLEEP APNEA SYNDROME: ICD-10-CM

## 2025-08-05 DIAGNOSIS — I25.10 CORONARY ARTERY DISEASE INVOLVING NATIVE CORONARY ARTERY OF NATIVE HEART WITHOUT ANGINA PECTORIS: ICD-10-CM

## 2025-08-05 DIAGNOSIS — I44.7 LBBB (LEFT BUNDLE BRANCH BLOCK): ICD-10-CM

## 2025-08-05 PROCEDURE — 3074F SYST BP LT 130 MM HG: CPT | Performed by: NURSE PRACTITIONER

## 2025-08-05 PROCEDURE — 3078F DIAST BP <80 MM HG: CPT | Performed by: NURSE PRACTITIONER

## 2025-08-05 PROCEDURE — 99214 OFFICE O/P EST MOD 30 MIN: CPT | Performed by: NURSE PRACTITIONER

## 2025-08-05 PROCEDURE — 93000 ELECTROCARDIOGRAM COMPLETE: CPT | Performed by: NURSE PRACTITIONER

## 2025-08-05 RX ORDER — ACEBUTOLOL HYDROCHLORIDE 200 MG/1
200 CAPSULE ORAL 2 TIMES DAILY
Qty: 60 CAPSULE | Refills: 0 | OUTPATIENT
Start: 2025-08-05

## 2025-08-05 RX ORDER — ACEBUTOLOL HYDROCHLORIDE 200 MG/1
200 CAPSULE ORAL DAILY
Qty: 90 CAPSULE | Refills: 3 | Status: SHIPPED | OUTPATIENT
Start: 2025-08-05

## 2025-08-05 RX ORDER — HYDRALAZINE HYDROCHLORIDE 50 MG/1
75 TABLET, FILM COATED ORAL 3 TIMES DAILY
Qty: 410 TABLET | Refills: 3 | Status: SHIPPED | OUTPATIENT
Start: 2025-08-05

## 2025-08-05 RX ORDER — DAPAGLIFLOZIN 5 MG/1
5 TABLET, FILM COATED ORAL DAILY
COMMUNITY
Start: 2025-07-08

## 2025-08-05 RX ORDER — NIFEDIPINE 30 MG/1
30 TABLET, EXTENDED RELEASE ORAL 3 TIMES DAILY
Qty: 270 TABLET | Refills: 3 | Status: SHIPPED | OUTPATIENT
Start: 2025-08-05

## 2025-08-08 DIAGNOSIS — G47.25 CIRCADIAN RHYTHM SLEEP DISORDER, JET LAG TYPE: ICD-10-CM

## 2025-08-08 DIAGNOSIS — E78.2 MIXED HYPERLIPIDEMIA: ICD-10-CM

## 2025-08-11 RX ORDER — ROSUVASTATIN CALCIUM 10 MG/1
10 TABLET, COATED ORAL DAILY
Qty: 90 TABLET | Refills: 0 | Status: SHIPPED | OUTPATIENT
Start: 2025-08-11

## 2025-08-11 RX ORDER — ZOLPIDEM TARTRATE 5 MG/1
5 TABLET ORAL NIGHTLY PRN
Qty: 25 TABLET | OUTPATIENT
Start: 2025-08-11

## 2025-08-22 ENCOUNTER — LAB (OUTPATIENT)
Dept: LAB | Facility: HOSPITAL | Age: 75
End: 2025-08-22
Payer: MEDICARE

## 2025-08-22 ENCOUNTER — TRANSCRIBE ORDERS (OUTPATIENT)
Dept: ADMINISTRATIVE | Facility: HOSPITAL | Age: 75
End: 2025-08-22
Payer: MEDICARE

## 2025-08-22 DIAGNOSIS — N18.30 STAGE 3 CHRONIC KIDNEY DISEASE, UNSPECIFIED WHETHER STAGE 3A OR 3B CKD: Primary | ICD-10-CM

## 2025-08-25 ENCOUNTER — TELEPHONE (OUTPATIENT)
Dept: INTERNAL MEDICINE | Facility: CLINIC | Age: 75
End: 2025-08-25

## 2025-08-25 ENCOUNTER — TELEMEDICINE (OUTPATIENT)
Dept: INTERNAL MEDICINE | Facility: CLINIC | Age: 75
End: 2025-08-25
Payer: MEDICARE

## 2025-08-25 DIAGNOSIS — G47.25 CIRCADIAN RHYTHM SLEEP DISORDER, JET LAG TYPE: ICD-10-CM

## 2025-08-25 DIAGNOSIS — U07.1 COVID-19 VIRUS INFECTION: Primary | ICD-10-CM

## 2025-08-25 RX ORDER — ZOLPIDEM TARTRATE 5 MG/1
5 TABLET ORAL NIGHTLY PRN
Qty: 25 TABLET | Refills: 5 | Status: SHIPPED | OUTPATIENT
Start: 2025-08-25

## 2025-08-25 RX ORDER — ZOLPIDEM TARTRATE 5 MG/1
5 TABLET ORAL NIGHTLY PRN
Qty: 25 TABLET | Refills: 0 | Status: CANCELLED | OUTPATIENT
Start: 2025-08-25

## 2025-08-26 RX ORDER — IPRATROPIUM BROMIDE 42 UG/1
2 SPRAY, METERED NASAL 4 TIMES DAILY
Qty: 15 ML | Refills: 12 | Status: SHIPPED | OUTPATIENT
Start: 2025-08-26

## 2025-08-26 RX ORDER — DEXTROMETHORPHAN HYDROBROMIDE AND PROMETHAZINE HYDROCHLORIDE 15; 6.25 MG/5ML; MG/5ML
2.5 SYRUP ORAL 4 TIMES DAILY PRN
Qty: 180 ML | Refills: 0 | Status: SHIPPED | OUTPATIENT
Start: 2025-08-26

## (undated) DEVICE — KT ORCA ORCAPOD DISP STRL

## (undated) DEVICE — SINGLE-USE BIOPSY FORCEPS: Brand: RADIAL JAW 4

## (undated) DEVICE — Device

## (undated) DEVICE — FLEX ADVANTAGE 1500CC: Brand: FLEX ADVANTAGE

## (undated) DEVICE — BLCK/BITE BLOX W/DENTL/RIM W/STRAP 54F

## (undated) DEVICE — GOWN PROC ENDOARMOR GI LVL3 HY/SHLD UNIV

## (undated) DEVICE — ADAPT CLN SCPE ENDO PORPOISE BX/50 DISP

## (undated) DEVICE — VIAL FORMLN CAP 10PCT 20ML

## (undated) DEVICE — BITEBLOCK OMNI BLOC

## (undated) DEVICE — CANN O2 ETCO2 FITS ALL CONN CO2 SMPL A/ 7IN DISP LF

## (undated) DEVICE — LAB CORP CLOTEST UREASE

## (undated) DEVICE — SINGLE-USE POLYPECTOMY SNARE: Brand: SENSATION SHORT THROW

## (undated) DEVICE — THE SINGLE USE ETRAP – POLYP TRAP IS USED FOR SUCTION RETRIEVAL OF ENDOSCOPICALLY REMOVED POLYPS.: Brand: ETRAP

## (undated) DEVICE — MSK ENDO PORT O2 POM ELITE CURAPLEX A/